# Patient Record
Sex: FEMALE | Race: BLACK OR AFRICAN AMERICAN | HISPANIC OR LATINO | Employment: OTHER | URBAN - METROPOLITAN AREA
[De-identification: names, ages, dates, MRNs, and addresses within clinical notes are randomized per-mention and may not be internally consistent; named-entity substitution may affect disease eponyms.]

---

## 2017-01-05 DIAGNOSIS — F41.1 ANXIETY STATE: ICD-10-CM

## 2017-01-05 DIAGNOSIS — F98.8 ADD (ATTENTION DEFICIT DISORDER): ICD-10-CM

## 2017-01-05 DIAGNOSIS — Z00.00 ROUTINE GENERAL MEDICAL EXAMINATION AT A HEALTH CARE FACILITY: Primary | ICD-10-CM

## 2017-01-05 DIAGNOSIS — K21.9 GASTROESOPHAGEAL REFLUX DISEASE, ESOPHAGITIS PRESENCE NOT SPECIFIED: ICD-10-CM

## 2017-01-05 RX ORDER — FLUOXETINE HYDROCHLORIDE 20 MG/1
20 CAPSULE ORAL DAILY
Qty: 30 CAPSULE | Refills: 5 | Status: SHIPPED | OUTPATIENT
Start: 2017-01-05 | End: 2017-05-18

## 2017-01-05 RX ORDER — METHYLPHENIDATE HYDROCHLORIDE 18 MG/1
18 TABLET ORAL EVERY MORNING
Qty: 30 TABLET | Refills: 0 | Status: SHIPPED | OUTPATIENT
Start: 2017-02-06 | End: 2017-03-21 | Stop reason: SDUPTHER

## 2017-01-05 RX ORDER — METHYLPHENIDATE HYDROCHLORIDE 18 MG/1
18 TABLET ORAL EVERY MORNING
Qty: 30 TABLET | Refills: 0 | Status: SHIPPED | OUTPATIENT
Start: 2017-01-06 | End: 2017-08-02 | Stop reason: SDUPTHER

## 2017-01-05 RX ORDER — OMEPRAZOLE 40 MG/1
40 CAPSULE, DELAYED RELEASE ORAL DAILY
Qty: 30 CAPSULE | Refills: 5 | Status: SHIPPED | OUTPATIENT
Start: 2017-01-05 | End: 2017-07-20 | Stop reason: SDUPTHER

## 2017-01-05 NOTE — TELEPHONE ENCOUNTER
----- Message from Grace Milian sent at 1/5/2017  1:11 PM CST -----  Doctor appointment and lab have been scheduled.  Please link lab orders to the lab appointment.  Date of doctor appointment:    Physical or EP:  4/10  Date of lab appointment:  4/3

## 2017-01-05 NOTE — TELEPHONE ENCOUNTER
----- Message from Xenia James sent at 2017  8:44 AM CST -----  Contact: Self/ [phone]   Type: Rx    Name of medication(s): losartan-hydrochlorothiazide 50-12.5 mg (HYZAAR) 50-12.5 mg per tablet, methylphenidate (CONCERTA) 18 MG CR tablet (), fluoxetine (PROZAC) 20 MG capsule, and omeprazole (PRILOSEC) 40 MG capsule    Is this a refill? New rx? Refill     Who prescribed medication? Dr. Ayers     Pharmacy Name, Phone, & Location: [facility]     Comments: pt is calling to have a refill on the medication above. Please call and advise     Thank you

## 2017-01-13 ENCOUNTER — OFFICE VISIT (OUTPATIENT)
Dept: FAMILY MEDICINE | Facility: CLINIC | Age: 52
End: 2017-01-13
Payer: COMMERCIAL

## 2017-01-13 VITALS
HEIGHT: 67 IN | TEMPERATURE: 98 F | SYSTOLIC BLOOD PRESSURE: 130 MMHG | WEIGHT: 152.31 LBS | DIASTOLIC BLOOD PRESSURE: 88 MMHG | HEART RATE: 92 BPM | BODY MASS INDEX: 23.91 KG/M2

## 2017-01-13 DIAGNOSIS — I10 ESSENTIAL HYPERTENSION: Primary | ICD-10-CM

## 2017-01-13 DIAGNOSIS — F33.1 MAJOR DEPRESSIVE DISORDER, RECURRENT EPISODE, MODERATE: ICD-10-CM

## 2017-01-13 DIAGNOSIS — G56.01 CARPAL TUNNEL SYNDROME OF RIGHT WRIST: ICD-10-CM

## 2017-01-13 DIAGNOSIS — F43.9 SITUATIONAL STRESS: ICD-10-CM

## 2017-01-13 DIAGNOSIS — K21.9 GASTROESOPHAGEAL REFLUX DISEASE, ESOPHAGITIS PRESENCE NOT SPECIFIED: ICD-10-CM

## 2017-01-13 DIAGNOSIS — F41.1 ANXIETY STATE: ICD-10-CM

## 2017-01-13 PROCEDURE — 99214 OFFICE O/P EST MOD 30 MIN: CPT | Mod: S$GLB,,, | Performed by: FAMILY MEDICINE

## 2017-01-13 PROCEDURE — 99999 PR PBB SHADOW E&M-EST. PATIENT-LVL IV: CPT | Mod: PBBFAC,,, | Performed by: FAMILY MEDICINE

## 2017-01-13 PROCEDURE — 3075F SYST BP GE 130 - 139MM HG: CPT | Mod: S$GLB,,, | Performed by: FAMILY MEDICINE

## 2017-01-13 PROCEDURE — 1159F MED LIST DOCD IN RCRD: CPT | Mod: S$GLB,,, | Performed by: FAMILY MEDICINE

## 2017-01-13 PROCEDURE — 3079F DIAST BP 80-89 MM HG: CPT | Mod: S$GLB,,, | Performed by: FAMILY MEDICINE

## 2017-01-13 RX ORDER — CYCLOBENZAPRINE HCL 10 MG
10 TABLET ORAL 3 TIMES DAILY PRN
Qty: 20 TABLET | Refills: 1 | Status: SHIPPED | OUTPATIENT
Start: 2017-01-13 | End: 2017-01-23

## 2017-01-13 NOTE — MR AVS SNAPSHOT
[facility]  [address] [facility], [address]  [facility] [address]  Phone: [phone]  Fax: [phone]                  Janki Jones   2017 2:40 PM   Office Visit    Description:  Female : 1965   Provider:  Maria G Ayers DO   Department:  [facility]           Reason for Visit     Numbness     Dizziness     Nausea     Chest Pain     Chills     Headache           Diagnoses this Visit        Comments    Essential hypertension    -  Primary     Major depressive disorder, recurrent episode, moderate         Gastroesophageal reflux disease, esophagitis presence not specified         Anxiety state         Situational stress         Carpal tunnel syndrome of right wrist                To Do List           Future Appointments        Provider Department Dept Phone    4/3/2017 8:00 AM LAB, [facility] [phone]    4/10/2017 2:00 PM Maria G Ayers DO [facility] [phone]      Goals (5 Years of Data)     None       These Medications        Disp Refills Start End    cyclobenzaprine (FLEXERIL) 10 MG tablet 20 tablet 1 2017    Take 1 tablet (10 mg total) by mouth 3 (three) times daily as needed for Muscle spasms. - Oral    [facility] - [address] #: [phone]         [facility]     [facility] Nurse Care [facility] -  Assistance  Registered nurses in the [facility] provide clinical advisement, health education, appointment booking, and other advisory services.  Call for this free service at [phone].             Medications           Message regarding Medications     Verify the changes and/or additions to your medication regime listed below are the same as discussed with your clinician today.  If any of these changes or additions are incorrect, please notify your healthcare provider.        START  "taking these NEW medications        Refills    cyclobenzaprine (FLEXERIL) 10 MG tablet 1    Sig: Take 1 tablet (10 mg total) by mouth 3 (three) times daily as needed for Muscle spasms.    Class: Normal    Route: Oral           Verify that the below list of medications is an accurate representation of the medications you are currently taking.  If none reported, the list may be blank. If incorrect, please contact your healthcare provider. Carry this list with you in case of emergency.           Current Medications     losartan-hydrochlorothiazide 50-12.5 mg (HYZAAR) 50-12.5 mg per tablet take 1 tablet by mouth once daily    methylphenidate (CONCERTA) 18 MG CR tablet Take 1 tablet (18 mg total) by mouth every morning.    methylphenidate (CONCERTA) 18 MG CR tablet Starting on Feb 06, 2017. Take 1 tablet (18 mg total) by mouth every morning.    multivitamin with minerals tablet Take 1 tablet by mouth once daily.    omeprazole (PRILOSEC) 40 MG capsule Take 1 capsule (40 mg total) by mouth once daily. Once daily    cyclobenzaprine (FLEXERIL) 10 MG tablet Take 1 tablet (10 mg total) by mouth 3 (three) times daily as needed for Muscle spasms.    fluoxetine (PROZAC) 20 MG capsule Take 1 capsule (20 mg total) by mouth once daily.           Clinical Reference Information           Vital Signs - Last Recorded  Most recent update: 1/13/2017  2:59 PM by Genna Sevilla MA    BP Pulse Temp Ht    130/88 (BP Location: Right arm, Patient Position: Sitting, BP Method: Manual) 92 98.1 °F (36.7 °C) (Oral) 5' 7" (1.702 m)    Wt LMP BMI    69.1 kg (152 lb 5.4 oz) 11/23/2016 (Approximate) 23.86 kg/m2      Blood Pressure          Most Recent Value    BP  130/88      Allergies as of 1/13/2017     Percocet  [Oxycodone-acetaminophen]      Immunizations Administered on Date of Encounter - 1/13/2017     None      Orders Placed During Today's Visit      Normal Orders This Visit    Ambulatory consult to Orthopedics     Ambulatory consult to " Psychology

## 2017-01-13 NOTE — PROGRESS NOTES
Subjective:       Patient ID: Janki Jones is a 51 y.o. female.    Chief Complaint: Numbness (Left arm); Dizziness; Nausea; Chest Pain; Chills; and Headache    HPI   pain in Numbness in right arm.   Also complaining of some dizziness and lightheadedness for months but-worse for few days. Mostly like if she stand suddenly, she has lots of nausea,   She is also complaining of sharp left sided chest pains off and on for a few months- she has to take a deep breath, she thinks its anxiety  This week she feels hot at times and feels like she is getting a cold. She has hot flashes at times- rush of heat at times for the past month   -out of blue. ? Menopause. She feels she is having panic attacks.   Mild cough for a few days.   Her family tells her she is treating them bad. She stopped her prozac a month ago. She doesn't really know why.   Her she had no menses for 5 months then had it again around Thanksgiving and then none since.     Review of Systems  per HPI  Objective:      Physical Exam   Constitutional: She is oriented to person, place, and time. She appears well-developed and well-nourished.   HENT:   Head: Normocephalic and atraumatic.   Right Ear: External ear normal.   Left Ear: External ear normal.   Nose: Nose normal.   Mouth/Throat: Oropharynx is clear and moist.   Eyes: Conjunctivae and EOM are normal. Pupils are equal, round, and reactive to light.   Neck: Normal range of motion. Neck supple. No JVD present. No thyromegaly present.   Cardiovascular: Normal rate, regular rhythm, normal heart sounds and intact distal pulses.    No murmur heard.  Pulmonary/Chest: Effort normal and breath sounds normal.   Abdominal: Soft. Bowel sounds are normal. She exhibits no mass. There is no tenderness.   Musculoskeletal: Normal range of motion. She exhibits no edema.   Lymphadenopathy:     She has no cervical adenopathy.   Neurological: She is alert and oriented to person, place, and time. She has normal  reflexes.   Skin: Skin is warm and dry.   Psychiatric: She has a normal mood and affect. Her behavior is normal. Judgment and thought content normal.   Vitals reviewed.      Assessment:         .Janki was seen today for numbness, dizziness, nausea, chest pain, chills and headache.    Diagnoses and all orders for this visit:    Essential hypertension  The current medical regimen is effective;  continue present plan and medications.    Major depressive disorder, recurrent episode, moderate  -     Ambulatory consult to Psychology    Gastroesophageal reflux disease, esophagitis presence not specified    Anxiety state  -     Ambulatory consult to Psychology    Situational stress  -     Ambulatory consult to Psychology    Carpal tunnel syndrome of right wrist  -     Ambulatory consult to Orthopedics    Other orders  -     cyclobenzaprine (FLEXERIL) 10 MG tablet; Take 1 tablet (10 mg total) by mouth 3 (three) times daily as needed for Muscle spasms.

## 2017-03-21 DIAGNOSIS — F98.8 ADD (ATTENTION DEFICIT DISORDER): ICD-10-CM

## 2017-03-21 RX ORDER — METHYLPHENIDATE HYDROCHLORIDE 18 MG/1
18 TABLET ORAL EVERY MORNING
Qty: 30 TABLET | Refills: 0 | Status: SHIPPED | OUTPATIENT
Start: 2017-05-20 | End: 2017-06-23 | Stop reason: SDUPTHER

## 2017-03-21 RX ORDER — METHYLPHENIDATE HYDROCHLORIDE 18 MG/1
18 TABLET ORAL EVERY MORNING
Qty: 30 TABLET | Refills: 0 | Status: SHIPPED | OUTPATIENT
Start: 2017-04-20 | End: 2017-08-02 | Stop reason: SDUPTHER

## 2017-03-21 RX ORDER — METHYLPHENIDATE HYDROCHLORIDE 18 MG/1
18 TABLET ORAL EVERY MORNING
Qty: 30 TABLET | Refills: 0 | Status: SHIPPED | OUTPATIENT
Start: 2017-03-21 | End: 2017-08-02 | Stop reason: SDUPTHER

## 2017-03-21 NOTE — TELEPHONE ENCOUNTER
----- Message from Xenia James sent at 3/21/2017  8:28 AM CDT -----  Contact: Self/ [phone]   Type: Rx    Name of medication(s): losartan-hydrochlorothiazide 50-12.5 mg (HYZAAR) 50-12.5 mg per tablet    Is this a refill? New rx? Refill     Who prescribed medication? Dr. Ayers    Pharmacy Name, Phone, & Location: [facility]     Comments: pt is calling to have a refill on the medication above. Please call and advise       Thank you

## 2017-04-10 ENCOUNTER — OFFICE VISIT (OUTPATIENT)
Dept: FAMILY MEDICINE | Facility: CLINIC | Age: 52
End: 2017-04-10
Payer: COMMERCIAL

## 2017-04-10 ENCOUNTER — LAB VISIT (OUTPATIENT)
Dept: LAB | Facility: HOSPITAL | Age: 52
End: 2017-04-10
Attending: FAMILY MEDICINE
Payer: COMMERCIAL

## 2017-04-10 VITALS
HEART RATE: 88 BPM | HEIGHT: 67 IN | DIASTOLIC BLOOD PRESSURE: 84 MMHG | BODY MASS INDEX: 24.53 KG/M2 | WEIGHT: 156.31 LBS | SYSTOLIC BLOOD PRESSURE: 152 MMHG | TEMPERATURE: 98 F

## 2017-04-10 DIAGNOSIS — R10.13 EPIGASTRIC PAIN: Primary | ICD-10-CM

## 2017-04-10 DIAGNOSIS — R10.13 EPIGASTRIC PAIN: ICD-10-CM

## 2017-04-10 DIAGNOSIS — R11.2 NON-INTRACTABLE VOMITING WITH NAUSEA, UNSPECIFIED VOMITING TYPE: ICD-10-CM

## 2017-04-10 LAB
ALBUMIN SERPL BCP-MCNC: 4.1 G/DL
ALP SERPL-CCNC: 96 U/L
ALT SERPL W/O P-5'-P-CCNC: 30 U/L
ANION GAP SERPL CALC-SCNC: 9 MMOL/L
AST SERPL-CCNC: 33 U/L
BASOPHILS # BLD AUTO: 0.01 K/UL
BASOPHILS NFR BLD: 0.1 %
BILIRUB SERPL-MCNC: 0.3 MG/DL
BUN SERPL-MCNC: 11 MG/DL
CALCIUM SERPL-MCNC: 9.6 MG/DL
CHLORIDE SERPL-SCNC: 103 MMOL/L
CO2 SERPL-SCNC: 27 MMOL/L
CREAT SERPL-MCNC: 0.8 MG/DL
DIFFERENTIAL METHOD: ABNORMAL
EOSINOPHIL # BLD AUTO: 0 K/UL
EOSINOPHIL NFR BLD: 0.2 %
ERYTHROCYTE [DISTWIDTH] IN BLOOD BY AUTOMATED COUNT: 13.8 %
EST. GFR  (AFRICAN AMERICAN): >60 ML/MIN/1.73 M^2
EST. GFR  (NON AFRICAN AMERICAN): >60 ML/MIN/1.73 M^2
GLUCOSE SERPL-MCNC: 87 MG/DL
HCT VFR BLD AUTO: 36.7 %
HGB BLD-MCNC: 11.9 G/DL
LYMPHOCYTES # BLD AUTO: 2.3 K/UL
LYMPHOCYTES NFR BLD: 25.5 %
MCH RBC QN AUTO: 27.5 PG
MCHC RBC AUTO-ENTMCNC: 32.4 %
MCV RBC AUTO: 85 FL
MONOCYTES # BLD AUTO: 0.4 K/UL
MONOCYTES NFR BLD: 4.6 %
NEUTROPHILS # BLD AUTO: 6.2 K/UL
NEUTROPHILS NFR BLD: 69.4 %
PLATELET # BLD AUTO: 242 K/UL
PMV BLD AUTO: 12.4 FL
POTASSIUM SERPL-SCNC: 3.7 MMOL/L
PROT SERPL-MCNC: 8.4 G/DL
RBC # BLD AUTO: 4.32 M/UL
SODIUM SERPL-SCNC: 139 MMOL/L
WBC # BLD AUTO: 8.97 K/UL

## 2017-04-10 PROCEDURE — 36415 COLL VENOUS BLD VENIPUNCTURE: CPT | Mod: PO

## 2017-04-10 PROCEDURE — 85025 COMPLETE CBC W/AUTO DIFF WBC: CPT

## 2017-04-10 PROCEDURE — 3077F SYST BP >= 140 MM HG: CPT | Mod: S$GLB,,, | Performed by: FAMILY MEDICINE

## 2017-04-10 PROCEDURE — 1160F RVW MEDS BY RX/DR IN RCRD: CPT | Mod: S$GLB,,, | Performed by: FAMILY MEDICINE

## 2017-04-10 PROCEDURE — 80053 COMPREHEN METABOLIC PANEL: CPT

## 2017-04-10 PROCEDURE — 99999 PR PBB SHADOW E&M-EST. PATIENT-LVL III: CPT | Mod: PBBFAC,,, | Performed by: FAMILY MEDICINE

## 2017-04-10 PROCEDURE — 3079F DIAST BP 80-89 MM HG: CPT | Mod: S$GLB,,, | Performed by: FAMILY MEDICINE

## 2017-04-10 PROCEDURE — 99214 OFFICE O/P EST MOD 30 MIN: CPT | Mod: S$GLB,,, | Performed by: FAMILY MEDICINE

## 2017-04-10 RX ORDER — ONDANSETRON 4 MG/1
4 TABLET, ORALLY DISINTEGRATING ORAL EVERY 6 HOURS PRN
Qty: 15 TABLET | Refills: 0 | Status: SHIPPED | OUTPATIENT
Start: 2017-04-10 | End: 2017-05-18 | Stop reason: ALTCHOICE

## 2017-04-10 RX ORDER — CYCLOBENZAPRINE HCL 10 MG
TABLET ORAL
Refills: 0 | COMMUNITY
Start: 2017-02-21 | End: 2017-11-17 | Stop reason: SDUPTHER

## 2017-04-10 NOTE — PROGRESS NOTES
Subjective:       Patient ID: Janki Jones is a 51 y.o. female.    Chief Complaint: Emesis; Abdominal Pain; Night Sweats; Fatigue; and Diarrhea    HPI  Awoke this morning with upper epigastric abdominal pain- severe (12 on a 1-10 PS), she broke out in a cold sweat, she vomited,  She took some immodium (not because she had diarrhea but just because it was the only thing for abdominal trouble she had in the house and she was taking whatever), she also the omeprazole which she takes every day., she almost called (!!.   The severe pain was coming in waves. The last severe pain was about an hour and a half ago.    she hasnt vomited in an hour or so. She did eat a banana at 8 am and hasnt thrown that up.   She has never had pain this severe before. But she reports some similar pains for 2 months - these pains are getting worse  Review of Systems  per HPI  Objective:      Physical Exam   Constitutional: She is oriented to person, place, and time. She appears well-developed and well-nourished.   HENT:   Head: Normocephalic and atraumatic.   Right Ear: External ear normal.   Left Ear: External ear normal.   Nose: Nose normal.   Mouth/Throat: Oropharynx is clear and moist.   Eyes: Conjunctivae and EOM are normal. Pupils are equal, round, and reactive to light.   Neck: Normal range of motion. Neck supple. No JVD present. No thyromegaly present.   Cardiovascular: Normal rate, regular rhythm, normal heart sounds and intact distal pulses.    No murmur heard.  Pulmonary/Chest: Effort normal and breath sounds normal.   Abdominal: Soft. Bowel sounds are normal. She exhibits no mass. There is tenderness (RUQ and epigastric, positive McMurrys).   Musculoskeletal: Normal range of motion. She exhibits no edema.   Lymphadenopathy:     She has no cervical adenopathy.   Neurological: She is alert and oriented to person, place, and time. She has normal reflexes.   Skin: Skin is warm and dry.   Psychiatric: She has a normal mood  and affect. Her behavior is normal. Judgment and thought content normal.   Vitals reviewed.      Assessment:         Janki was seen today for emesis, abdominal pain, night sweats, fatigue and diarrhea.    Diagnoses and all orders for this visit:    Epigastric pain-probable biliary colic  -     US Abdomen Complete; Future  -     CBC auto differential; Future  -     Comprehensive metabolic panel; Future    Non-intractable vomiting with nausea, unspecified vomiting type  -     ondansetron (ZOFRAN-ODT) 4 MG TbDL; Take 1 tablet (4 mg total) by mouth every 6 (six) hours as needed.

## 2017-04-10 NOTE — MR AVS SNAPSHOT
[facility]  [address] [facility], [address]  [facility] [address]  Phone: [phone]  Fax: [phone]                  Janki Jones   4/10/2017 9:40 AM   Office Visit    Description:  Female : 1965   Provider:  Maria G Ayers DO   Department:  [facility]           Reason for Visit     Emesis     Abdominal Pain     Night Sweats     Fatigue     Diarrhea           Diagnoses this Visit        Comments    Epigastric pain    -  Primary     Non-intractable vomiting with nausea, unspecified vomiting type                To Do List           Future Appointments        Provider Department Dept Phone    2017 8:00 AM LAB, [facility] [phone]    2017 9:00 AM Maria G Ayers DO [facility] [phone]      Goals (5 Years of Data)     None       These Medications        Disp Refills Start End    ondansetron (ZOFRAN-ODT) 4 MG TbDL 15 tablet 0 4/10/2017     Take 1 tablet (4 mg total) by mouth every 6 (six) hours as needed. - Oral    Ph[facility]: [facility] #: [phone]         [facility]     [facility] Care Line -  Assistance  Unless otherwise directed by your provider, please contact [facility], our nurse care line that is available for  assistance.     Registered nurses in the [facility] provide: appointment scheduling, clinical advisement, health education, and other advisory services.  Call: [phone] (toll free)               Medications           Message regarding Medications     Verify the changes and/or additions to your medication regime listed below are the same as discussed with your clinician today.  If any of these changes or additions are incorrect, please notify your healthcare provider.        START taking these NEW medications        Refills    ondansetron (ZOFRAN-ODT) 4 MG TbDL  "0    Sig: Take 1 tablet (4 mg total) by mouth every 6 (six) hours as needed.    Class: Normal    Route: Oral           Verify that the below list of medications is an accurate representation of the medications you are currently taking.  If none reported, the list may be blank. If incorrect, please contact your healthcare provider. Carry this list with you in case of emergency.           Current Medications     cyclobenzaprine (FLEXERIL) 10 MG tablet     fluoxetine (PROZAC) 20 MG capsule Take 1 capsule (20 mg total) by mouth once daily.    losartan-hydrochlorothiazide 50-12.5 mg (HYZAAR) 50-12.5 mg per tablet take 1 tablet by mouth once daily    methylphenidate (CONCERTA) 18 MG CR tablet Take 1 tablet (18 mg total) by mouth every morning.    methylphenidate (CONCERTA) 18 MG CR tablet Starting on Apr 20, 2017. Take 1 tablet (18 mg total) by mouth every morning.    methylphenidate (CONCERTA) 18 MG CR tablet Starting on May 20, 2017. Take 1 tablet (18 mg total) by mouth every morning.    multivitamin with minerals tablet Take 1 tablet by mouth once daily.    omeprazole (PRILOSEC) 40 MG capsule Take 1 capsule (40 mg total) by mouth once daily. Once daily    ondansetron (ZOFRAN-ODT) 4 MG TbDL Take 1 tablet (4 mg total) by mouth every 6 (six) hours as needed.           Clinical Reference Information           Your Vitals Were     BP Pulse Temp Height    152/84 (BP Location: Right arm, Patient Position: Sitting, BP Method: Manual) 88 97.8 °F (36.6 °C) (Oral) 5' 7" (1.702 m)    Weight Last Period BMI    70.9 kg (156 lb 4.9 oz) 02/10/2017 (Approximate) 24.48 kg/m2      Blood Pressure          Most Recent Value    BP  (!)  152/84      Allergies as of 4/10/2017     Percocet  [Oxycodone-acetaminophen]      Immunizations Administered on Date of Encounter - 4/10/2017     None      Orders Placed During Today's Visit     Future Labs/Procedures Expected by Expires    CBC auto differential  4/10/2017 6/9/2018    Comprehensive " metabolic panel  4/10/2017 6/9/2018    US Abdomen Complete  4/10/2017 4/10/2018      Language Assistance Services     ATTENTION: Language assistance services are available, free of charge. Please call [phone].      ATENCIÓN: Si habla waldo, tiene a caro disposición servicios gratuitos de asistencia lingüística. Llame al [phone].     CHÚ Ý: N?u b?n nói Ti?ng Vi?t, có các d?ch v? h? tr? ngôn ng? mi?n phí dành cho b?n. G?i s? [phone].         [facility] complies with applicable Federal civil rights laws and does not discriminate on the basis of race, color, national origin, age, disability, or sex.

## 2017-04-11 ENCOUNTER — LAB VISIT (OUTPATIENT)
Dept: LAB | Facility: HOSPITAL | Age: 52
End: 2017-04-11
Attending: FAMILY MEDICINE
Payer: COMMERCIAL

## 2017-04-11 DIAGNOSIS — Z00.00 ROUTINE GENERAL MEDICAL EXAMINATION AT A HEALTH CARE FACILITY: ICD-10-CM

## 2017-04-11 LAB
CHOLEST/HDLC SERPL: 3.7 {RATIO}
HDL/CHOLESTEROL RATIO: 26.9 %
HDLC SERPL-MCNC: 193 MG/DL
HDLC SERPL-MCNC: 52 MG/DL
LDLC SERPL CALC-MCNC: 123.6 MG/DL
NONHDLC SERPL-MCNC: 141 MG/DL
TRIGL SERPL-MCNC: 87 MG/DL
TSH SERPL DL<=0.005 MIU/L-ACNC: 1.77 UIU/ML

## 2017-04-11 PROCEDURE — 36415 COLL VENOUS BLD VENIPUNCTURE: CPT | Mod: PO

## 2017-04-11 PROCEDURE — 84443 ASSAY THYROID STIM HORMONE: CPT

## 2017-04-11 PROCEDURE — 80061 LIPID PANEL: CPT

## 2017-05-18 ENCOUNTER — OFFICE VISIT (OUTPATIENT)
Dept: FAMILY MEDICINE | Facility: CLINIC | Age: 52
End: 2017-05-18
Payer: COMMERCIAL

## 2017-05-18 VITALS
BODY MASS INDEX: 25.15 KG/M2 | DIASTOLIC BLOOD PRESSURE: 82 MMHG | TEMPERATURE: 98 F | SYSTOLIC BLOOD PRESSURE: 110 MMHG | HEIGHT: 66 IN | HEART RATE: 90 BPM | WEIGHT: 156.5 LBS

## 2017-05-18 DIAGNOSIS — N76.0 ACUTE VAGINITIS: ICD-10-CM

## 2017-05-18 DIAGNOSIS — L30.9 DERMATITIS: Primary | ICD-10-CM

## 2017-05-18 LAB
BACTERIA #/AREA URNS AUTO: ABNORMAL /HPF
BILIRUB UR QL STRIP: NEGATIVE
CANDIDA RRNA VAG QL PROBE: POSITIVE
CLARITY UR REFRACT.AUTO: ABNORMAL
COLOR UR AUTO: YELLOW
G VAGINALIS RRNA GENITAL QL PROBE: POSITIVE
GLUCOSE UR QL STRIP: NEGATIVE
HGB UR QL STRIP: ABNORMAL
KETONES UR QL STRIP: NEGATIVE
LEUKOCYTE ESTERASE UR QL STRIP: ABNORMAL
MICROSCOPIC COMMENT: ABNORMAL
NITRITE UR QL STRIP: NEGATIVE
PH UR STRIP: 7 [PH] (ref 5–8)
PROT UR QL STRIP: NEGATIVE
RBC #/AREA URNS AUTO: 3 /HPF (ref 0–4)
SP GR UR STRIP: 1 (ref 1–1.03)
SQUAMOUS #/AREA URNS AUTO: 10 /HPF
T VAGINALIS RRNA GENITAL QL PROBE: NEGATIVE
URN SPEC COLLECT METH UR: ABNORMAL
UROBILINOGEN UR STRIP-ACNC: NEGATIVE EU/DL
WBC #/AREA URNS AUTO: 4 /HPF (ref 0–5)

## 2017-05-18 PROCEDURE — 3074F SYST BP LT 130 MM HG: CPT | Mod: S$GLB,,, | Performed by: NURSE PRACTITIONER

## 2017-05-18 PROCEDURE — 87480 CANDIDA DNA DIR PROBE: CPT

## 2017-05-18 PROCEDURE — 87529 HSV DNA AMP PROBE: CPT

## 2017-05-18 PROCEDURE — 1160F RVW MEDS BY RX/DR IN RCRD: CPT | Mod: S$GLB,,, | Performed by: NURSE PRACTITIONER

## 2017-05-18 PROCEDURE — 81001 URINALYSIS AUTO W/SCOPE: CPT

## 2017-05-18 PROCEDURE — 87591 N.GONORRHOEAE DNA AMP PROB: CPT

## 2017-05-18 PROCEDURE — 3079F DIAST BP 80-89 MM HG: CPT | Mod: S$GLB,,, | Performed by: NURSE PRACTITIONER

## 2017-05-18 PROCEDURE — 99999 PR PBB SHADOW E&M-EST. PATIENT-LVL IV: CPT | Mod: PBBFAC,,, | Performed by: NURSE PRACTITIONER

## 2017-05-18 PROCEDURE — 99214 OFFICE O/P EST MOD 30 MIN: CPT | Mod: S$GLB,,, | Performed by: NURSE PRACTITIONER

## 2017-05-18 RX ORDER — DOXYLAMINE SUCCINATE 25 MG
TABLET ORAL 2 TIMES DAILY
Qty: 30 G | Refills: 2 | Status: SHIPPED | OUTPATIENT
Start: 2017-05-18 | End: 2017-11-17 | Stop reason: ALTCHOICE

## 2017-05-18 NOTE — PROGRESS NOTES
"Subjective:       Patient ID: Janki Jones is a 51 y.o. female.    Chief Complaint: Vaginal Itching    Vaginal Itching   The patient's primary symptoms include genital itching. The patient's pertinent negatives include no pelvic pain. This is a new problem. The current episode started yesterday. The problem occurs constantly. The problem has been unchanged. The patient is experiencing no pain. The problem affects both sides. She is not pregnant. Pertinent negatives include no abdominal pain, anorexia, back pain, chills, dysuria, fever or hematuria. Her menstrual history has been regular (on menses now).     Past Medical History:   Diagnosis Date    Abnormal Pap smear 2007    ADD (attention deficit disorder)     Anxiety     Depression     Fibroids     Hypertension      Past Surgical History:   Procedure Laterality Date    CERVICAL BIOPSY  W/ LOOP ELECTRODE EXCISION  2007    essure      TUBAL LIGATION       Social History     Social History Narrative     Family History   Problem Relation Age of Onset    Alcohol abuse Mother     Diabetes Mother     Liver disease Mother     Stroke Mother     Other Father      glaucoma    Diabetes Father     Heart disease Father     Hyperlipidemia Father     Hypertension Father     Kidney disease Father     Glaucoma Father     Hypertension Brother     Other Sister      thyroid    Cancer Sister      thyroid cancer    Breast cancer Neg Hx     Colon cancer Neg Hx     Ovarian cancer Neg Hx     Amblyopia Neg Hx     Blindness Neg Hx     Cataracts Neg Hx     Macular degeneration Neg Hx     Retinal detachment Neg Hx     Strabismus Neg Hx      Vitals:    05/18/17 1437   BP: 110/82   Pulse: 90   Temp: 98 °F (36.7 °C)   Weight: 71 kg (156 lb 8.4 oz)   Height: 5' 6" (1.676 m)   PainSc: 0-No pain     Outpatient Encounter Prescriptions as of 5/18/2017   Medication Sig Dispense Refill    losartan-hydrochlorothiazide 50-12.5 mg (HYZAAR) 50-12.5 mg per tablet " "take 1 tablet by mouth once daily 30 tablet 5    methylphenidate (CONCERTA) 18 MG CR tablet Take 1 tablet (18 mg total) by mouth every morning. 30 tablet 0    [START ON 5/20/2017] methylphenidate (CONCERTA) 18 MG CR tablet Take 1 tablet (18 mg total) by mouth every morning. 30 tablet 0    multivitamin with minerals tablet Take 1 tablet by mouth once daily.      omeprazole (PRILOSEC) 40 MG capsule Take 1 capsule (40 mg total) by mouth once daily. Once daily 30 capsule 5    cyclobenzaprine (FLEXERIL) 10 MG tablet   0    miconazole (MICOTIN) 2 % cream Apply topically 2 (two) times daily. Apply to external vaginal area and labia twice a day 30 g 2    [DISCONTINUED] fluoxetine (PROZAC) 20 MG capsule Take 1 capsule (20 mg total) by mouth once daily. 30 capsule 5    [DISCONTINUED] ondansetron (ZOFRAN-ODT) 4 MG TbDL Take 1 tablet (4 mg total) by mouth every 6 (six) hours as needed. 15 tablet 0     No facility-administered encounter medications on file as of 5/18/2017.      Wt Readings from Last 3 Encounters:   05/18/17 71 kg (156 lb 8.4 oz)   04/10/17 70.9 kg (156 lb 4.9 oz)   01/13/17 69.1 kg (152 lb 5.4 oz)     Last 3 sets of Vitals    Vitals - 1 value per visit 1/13/2017 4/10/2017 5/18/2017   SYSTOLIC 130 152 110   DIASTOLIC 88 84 82   PULSE 92 88 90   TEMPERATURE 98.1 97.8 98   RESPIRATIONS - - -   Weight (lb) 152.34 156.31 156.53   Weight (kg) 69.1 70.9 71   HEIGHT 5' 7" 5' 7" 5' 6"   BODY MASS INDEX 23.86 24.48 25.26   VISIT REPORT - - -   Pain Score  7 7 0     No results found for: CBC  Review of Systems   Constitutional: Negative for chills, fatigue and fever.   Respiratory: Negative for cough.    Gastrointestinal: Negative for abdominal pain and anorexia.   Genitourinary: Negative for dysuria, hematuria and pelvic pain.   Musculoskeletal: Negative for back pain.       Objective:      Physical Exam   Constitutional: She is oriented to person, place, and time. She appears well-developed and well-nourished. No " distress.   Abdominal: Soft. Hernia confirmed negative in the right inguinal area and confirmed negative in the left inguinal area.   Genitourinary:       Pelvic exam was performed with patient supine. There is bleeding in the vagina.   Genitourinary Comments: Mild swelling noted    Pt on menses     Lymphadenopathy:        Right: No inguinal adenopathy present.        Left: No inguinal adenopathy present.   Neurological: She is alert and oriented to person, place, and time.   Skin: Skin is warm and dry. No rash noted. She is not diaphoretic. No erythema. No pallor.   Psychiatric: She has a normal mood and affect. Her behavior is normal. Judgment and thought content normal.   Nursing note and vitals reviewed.      Assessment:       1. Dermatitis    2. Acute vaginitis        Plan:       Janki was seen today for vaginal itching.    Diagnoses and all orders for this visit:    Dermatitis    Acute vaginitis  -     miconazole (MICOTIN) 2 % cream; Apply topically 2 (two) times daily. Apply to external vaginal area and labia twice a day  -     Vaginosis Screen by DNA Probe  -     C. trachomatis/N. gonorrhoeae by AMP DNA Cervicovaginal  -     Herpes simplex virus culture      Patient Instructions   Baking soda bath    Apply cream to external area twice a day

## 2017-05-18 NOTE — MR AVS SNAPSHOT
[facility]  [address] [facility], [address]  [facility] [address]  Phone: [phone]  Fax: [phone]                  Janki Jones   2017 2:20 PM   Office Visit    Description:  Female : 1965   Provider:  ANDRÉS Angel   Department:  [facility]           Reason for Visit     Vaginal Itching           Diagnoses this Visit        Comments    Dermatitis    -  Primary     Acute vaginitis                To Do List           Future Appointments        Provider Department Dept Phone    2017 8:00 AM [facility] [phone]      Goals (5 Years of Data)     None       These Medications        Disp Refills Start End    miconazole (MICOTIN) 2 % cream 30 g 2 2017     Apply topically 2 (two) times daily. Apply to external vaginal area and labia twice a day - Topical (Top)    Pharmacy: [address] #: [phone]         [facility]     [facility] Nurse Care Line -  Assistance  Unless otherwise directed by your provider, please contact [facility], our nurse care line that is available for  assistance.     Registered nurses in the [facility] provide: appointment scheduling, clinical advisement, health education, and other advisory services.  Call: [phone] (toll free)               Medications           Message regarding Medications     Verify the changes and/or additions to your medication regime listed below are the same as discussed with your clinician today.  If any of these changes or additions are incorrect, please notify your healthcare provider.        START taking these NEW medications        Refills    miconazole (MICOTIN) 2 % cream 2    Sig: Apply topically 2 (two) times daily. Apply to external vaginal area and labia twice a day    Class: Normal    Route: Topical (Top)      STOP taking these  "medications     fluoxetine (PROZAC) 20 MG capsule Take 1 capsule (20 mg total) by mouth once daily.    ondansetron (ZOFRAN-ODT) 4 MG TbDL Take 1 tablet (4 mg total) by mouth every 6 (six) hours as needed.           Verify that the below list of medications is an accurate representation of the medications you are currently taking.  If none reported, the list may be blank. If incorrect, please contact your healthcare provider. Carry this list with you in case of emergency.           Current Medications     losartan-hydrochlorothiazide 50-12.5 mg (HYZAAR) 50-12.5 mg per tablet take 1 tablet by mouth once daily    methylphenidate (CONCERTA) 18 MG CR tablet Take 1 tablet (18 mg total) by mouth every morning.    methylphenidate (CONCERTA) 18 MG CR tablet Starting on May 20, 2017. Take 1 tablet (18 mg total) by mouth every morning.    multivitamin with minerals tablet Take 1 tablet by mouth once daily.    omeprazole (PRILOSEC) 40 MG capsule Take 1 capsule (40 mg total) by mouth once daily. Once daily    cyclobenzaprine (FLEXERIL) 10 MG tablet     miconazole (MICOTIN) 2 % cream Apply topically 2 (two) times daily. Apply to external vaginal area and labia twice a day           Clinical Reference Information           Your Vitals Were     BP Pulse Temp Height Weight Last Period    110/82 90 98 °F (36.7 °C) 5' 6" (1.676 m) 71 kg (156 lb 8.4 oz) 05/14/2017 (Exact Date)    BMI                25.26 kg/m2          Blood Pressure          Most Recent Value    BP  110/82      Allergies as of 5/18/2017     Percocet  [Oxycodone-acetaminophen]      Immunizations Administered on Date of Encounter - 5/18/2017     None      Instructions    Baking soda bath    Apply cream to external area twice a day           Language Assistance Services     ATTENTION: Language assistance services are available, free of charge. Please call [phone].      ATENCIÓN: Si pettyla waldo, tiene a caro disposición servicios gratuitos de asistencia " lingüística. Traci al [phone].     EDGARD Ý: N?u b?n nói Ti?ng Vi?t, có các d?ch v? h? tr? ngôn ng? mi?n phí dành cho b?n. G?i s? [phone].         [facility] complies with applicable Federal civil rights laws and does not discriminate on the basis of race, color, national origin, age, disability, or sex.

## 2017-05-19 ENCOUNTER — TELEPHONE (OUTPATIENT)
Dept: FAMILY MEDICINE | Facility: CLINIC | Age: 52
End: 2017-05-19

## 2017-05-19 DIAGNOSIS — N76.0 ACUTE VAGINITIS: Primary | ICD-10-CM

## 2017-05-19 LAB
C TRACH DNA SPEC QL NAA+PROBE: NOT DETECTED
N GONORRHOEA DNA SPEC QL NAA+PROBE: NOT DETECTED

## 2017-05-19 RX ORDER — METRONIDAZOLE 500 MG/1
500 TABLET ORAL EVERY 12 HOURS
Qty: 14 TABLET | Refills: 0 | Status: SHIPPED | OUTPATIENT
Start: 2017-05-19 | End: 2017-05-26

## 2017-05-19 RX ORDER — TERCONAZOLE 8 MG/G
1 CREAM VAGINAL DAILY
Qty: 20 G | Refills: 0 | Status: SHIPPED | OUTPATIENT
Start: 2017-05-19 | End: 2017-11-17 | Stop reason: ALTCHOICE

## 2017-05-19 NOTE — TELEPHONE ENCOUNTER
----- Message from ELSI Angel-ION sent at 5/19/2017 10:41 AM CDT -----  Please call pt and tell her that she had an overgrowth of the good bacteria in her vaginal area and yeast.  I sent prescriptions to her pharmacy

## 2017-05-19 NOTE — TELEPHONE ENCOUNTER
Spoke with patient, made her aware of results as per TAMMY Rueda and instructions, verbalizes understanding.

## 2017-05-20 LAB
HSV1 DNA SPEC QL NAA+PROBE: NEGATIVE
HSV2 DNA SPEC QL NAA+PROBE: NEGATIVE
SPECIMEN SOURCE: NORMAL

## 2017-05-31 ENCOUNTER — OFFICE VISIT (OUTPATIENT)
Dept: FAMILY MEDICINE | Facility: CLINIC | Age: 52
End: 2017-05-31
Payer: COMMERCIAL

## 2017-05-31 VITALS
DIASTOLIC BLOOD PRESSURE: 92 MMHG | BODY MASS INDEX: 25.15 KG/M2 | WEIGHT: 156.5 LBS | HEIGHT: 66 IN | HEART RATE: 78 BPM | TEMPERATURE: 98 F | SYSTOLIC BLOOD PRESSURE: 144 MMHG

## 2017-05-31 DIAGNOSIS — S61.218A LACERATION OF FINGER, INDEX, INITIAL ENCOUNTER: Primary | ICD-10-CM

## 2017-05-31 PROCEDURE — 90715 TDAP VACCINE 7 YRS/> IM: CPT | Mod: S$GLB,,, | Performed by: NURSE PRACTITIONER

## 2017-05-31 PROCEDURE — 99213 OFFICE O/P EST LOW 20 MIN: CPT | Mod: 25,S$GLB,, | Performed by: NURSE PRACTITIONER

## 2017-05-31 PROCEDURE — 99999 PR PBB SHADOW E&M-EST. PATIENT-LVL IV: CPT | Mod: PBBFAC,,, | Performed by: NURSE PRACTITIONER

## 2017-05-31 PROCEDURE — 90471 IMMUNIZATION ADMIN: CPT | Mod: S$GLB,,, | Performed by: NURSE PRACTITIONER

## 2017-05-31 NOTE — PROGRESS NOTES
Tdap vaccine ordered per TAMMY Rueda. Two patient identifiers used, allergies reviewed, and vaccine verified. Tdap vaccine administered to right deltoid . Pt tolerated injection well; no swelling, redness, or bruising noted at injection site. Pt advised to remain in clinic 15 minutes following injection for observation, verbalizes understanding.

## 2017-05-31 NOTE — PROGRESS NOTES
"Subjective:       Patient ID: Janki Jones is a 51 y.o. female.    Chief Complaint: Finger Injury (left hand index finger)    Pt here with laceration to left index finger.  Occurred last night around 5 pm.  Bleeding controlled.  No drainage.  Pt just wants to update her tetanus as she is concerned because her uncle  of tetanus.  No fever or chills        Past Medical History:   Diagnosis Date    Abnormal Pap smear     ADD (attention deficit disorder)     Anxiety     Depression     Fibroids     Hypertension      Past Surgical History:   Procedure Laterality Date    CERVICAL BIOPSY  W/ LOOP ELECTRODE EXCISION      essure      TUBAL LIGATION       Social History     Social History Narrative    No narrative on file     Family History   Problem Relation Age of Onset    Alcohol abuse Mother     Diabetes Mother     Liver disease Mother     Stroke Mother     Other Father      glaucoma    Diabetes Father     Heart disease Father     Hyperlipidemia Father     Hypertension Father     Kidney disease Father     Glaucoma Father     Hypertension Brother     Other Sister      thyroid    Cancer Sister      thyroid cancer    Breast cancer Neg Hx     Colon cancer Neg Hx     Ovarian cancer Neg Hx     Amblyopia Neg Hx     Blindness Neg Hx     Cataracts Neg Hx     Macular degeneration Neg Hx     Retinal detachment Neg Hx     Strabismus Neg Hx      Vitals:    17 1621   BP: (!) 144/92   Pulse: 78   Temp: 98 °F (36.7 °C)   Weight: 71 kg (156 lb 8.4 oz)   Height: 5' 6" (1.676 m)   PainSc:   5     Outpatient Encounter Prescriptions as of 2017   Medication Sig Dispense Refill    losartan-hydrochlorothiazide 50-12.5 mg (HYZAAR) 50-12.5 mg per tablet take 1 tablet by mouth once daily 30 tablet 5    methylphenidate (CONCERTA) 18 MG CR tablet Take 1 tablet (18 mg total) by mouth every morning. 30 tablet 0    miconazole (MICOTIN) 2 % cream Apply topically 2 (two) times daily. " "Apply to external vaginal area and labia twice a day 30 g 2    multivitamin with minerals tablet Take 1 tablet by mouth once daily.      omeprazole (PRILOSEC) 40 MG capsule Take 1 capsule (40 mg total) by mouth once daily. Once daily 30 capsule 5    terconazole (TERAZOL 3) 0.8 % vaginal cream Place 1 applicator vaginally once daily. 20 g 0    cyclobenzaprine (FLEXERIL) 10 MG tablet   0     No facility-administered encounter medications on file as of 5/31/2017.      Wt Readings from Last 3 Encounters:   05/31/17 71 kg (156 lb 8.4 oz)   05/18/17 71 kg (156 lb 8.4 oz)   04/10/17 70.9 kg (156 lb 4.9 oz)     Last 3 sets of Vitals    Vitals - 1 value per visit 4/10/2017 5/18/2017 5/31/2017   SYSTOLIC 152 110 144   DIASTOLIC 84 82 92   PULSE 88 90 78   TEMPERATURE 97.8 98 98   RESPIRATIONS - - -   Weight (lb) 156.31 156.53 156.53   Weight (kg) 70.9 71 71   HEIGHT 5' 7" 5' 6" 5' 6"   BODY MASS INDEX 24.48 25.26 25.26   VISIT REPORT - - -   Pain Score  7 0 5     No results found for: CBC  Review of Systems   Constitutional: Negative for chills and fever.   Skin: Positive for wound.       Objective:      Physical Exam   Constitutional: She is oriented to person, place, and time. She appears well-developed and well-nourished. No distress.   Pulmonary/Chest: Effort normal.   Neurological: She is alert and oriented to person, place, and time.   Skin: Skin is warm and dry. Capillary refill takes less than 2 seconds. She is not diaphoretic.   Small 1 cm self sealed laceration to left index finger lateral aspect.  No bleeding, no redness , no drainage.     Nursing note and vitals reviewed.      Assessment:       1. Laceration of finger, index, initial encounter        Plan:       Home care discussed , keep finger clean, dry     Tetanus updated today    Janki was seen today for finger injury.    Diagnoses and all orders for this visit:    Laceration of finger, index, initial encounter  -     Tdap Vaccine      "

## 2017-06-09 ENCOUNTER — HOSPITAL ENCOUNTER (OUTPATIENT)
Dept: RADIOLOGY | Facility: HOSPITAL | Age: 52
Discharge: HOME | End: 2017-06-09
Attending: FAMILY MEDICINE
Payer: COMMERCIAL

## 2017-06-09 DIAGNOSIS — R10.13 EPIGASTRIC PAIN: ICD-10-CM

## 2017-06-09 PROCEDURE — 76700 US EXAM ABDOM COMPLETE: CPT | Mod: TC

## 2017-06-09 PROCEDURE — 76700 US EXAM ABDOM COMPLETE: CPT | Mod: 26,,, | Performed by: RADIOLOGY

## 2017-06-23 DIAGNOSIS — F98.8 ADD (ATTENTION DEFICIT DISORDER): ICD-10-CM

## 2017-06-23 RX ORDER — LOSARTAN POTASSIUM AND HYDROCHLOROTHIAZIDE 12.5; 5 MG/1; MG/1
TABLET ORAL
Qty: 30 TABLET | Refills: 5 | Status: SHIPPED | OUTPATIENT
Start: 2017-06-23 | End: 2018-01-03 | Stop reason: SDUPTHER

## 2017-06-23 RX ORDER — METHYLPHENIDATE HYDROCHLORIDE 18 MG/1
TABLET ORAL
Qty: 30 TABLET | Refills: 0 | Status: SHIPPED | OUTPATIENT
Start: 2017-06-23 | End: 2017-08-02

## 2017-06-23 NOTE — TELEPHONE ENCOUNTER
----- Message from Cesarjaxon Isabela sent at 2017  1:01 PM CDT -----  Contact: Pt at [phone]  RX request - refill or new RX.  Is this a refill or new RX:  refill  RX name and strength: losartan-hydrochlorothiazide 50-12.5 mg (HYZAAR) 50-12.5 mg per tablet  RX name and strength: methylphenidate (CONCERTA) 18 MG CR tablet ()    Pharmacy name and phone #:  [facility] [place], [place] [address] [place], [place] - [address] [place] [address]   [phone] (Phone)  [phone] (Fax)      Comments:

## 2017-06-23 NOTE — LETTER
June 23, 2017    Janki Jones  [address] [facility] [address]             [facility]  [address] [facility], [address]  [facility] [address]  Phone: [phone]  Fax: [phone] Dear Mrs. Jones:      Our records indicate that it is now time for your Annual Physical with Dr. Ayers, along with fasting lab work prior. Please call our office to schedule. If already scheduled, please disregard.       If you have any questions or concerns, please don't hesitate to call.    Sincerely,        Harini Louis MA

## 2017-06-23 NOTE — TELEPHONE ENCOUNTER
LOV 05/31/17 (TAMMY Rueda)  Last Annual 03/08/17    [facility] message has been sent to the patient, to inform them that an appointment is due.    Letter has been mailed to the patient to inform them that an appointment is due.

## 2017-07-20 DIAGNOSIS — K21.9 GASTROESOPHAGEAL REFLUX DISEASE, ESOPHAGITIS PRESENCE NOT SPECIFIED: ICD-10-CM

## 2017-07-20 RX ORDER — OMEPRAZOLE 40 MG/1
CAPSULE, DELAYED RELEASE ORAL
Qty: 30 CAPSULE | Refills: 0 | Status: SHIPPED | OUTPATIENT
Start: 2017-07-20 | End: 2017-11-03 | Stop reason: SDUPTHER

## 2017-07-30 DIAGNOSIS — K21.9 GASTROESOPHAGEAL REFLUX DISEASE, ESOPHAGITIS PRESENCE NOT SPECIFIED: ICD-10-CM

## 2017-07-31 RX ORDER — OMEPRAZOLE 40 MG/1
CAPSULE, DELAYED RELEASE ORAL
Qty: 30 CAPSULE | Refills: 5 | Status: SHIPPED | OUTPATIENT
Start: 2017-07-31 | End: 2017-11-17 | Stop reason: SDUPTHER

## 2017-08-02 DIAGNOSIS — Z00.00 ROUTINE GENERAL MEDICAL EXAMINATION AT A HEALTH CARE FACILITY: Primary | ICD-10-CM

## 2017-08-02 DIAGNOSIS — F98.8 ATTENTION DEFICIT DISORDER, UNSPECIFIED HYPERACTIVITY PRESENCE: ICD-10-CM

## 2017-08-02 RX ORDER — METHYLPHENIDATE HYDROCHLORIDE 18 MG/1
18 TABLET ORAL EVERY MORNING
Qty: 30 TABLET | Refills: 0 | Status: SHIPPED | OUTPATIENT
Start: 2017-09-01 | End: 2017-10-01

## 2017-08-02 RX ORDER — METHYLPHENIDATE HYDROCHLORIDE 18 MG/1
18 TABLET ORAL EVERY MORNING
Qty: 30 TABLET | Refills: 0 | Status: SHIPPED | OUTPATIENT
Start: 2017-10-02 | End: 2017-11-03 | Stop reason: SDUPTHER

## 2017-08-02 RX ORDER — METHYLPHENIDATE HYDROCHLORIDE 18 MG/1
18 TABLET ORAL EVERY MORNING
Qty: 30 TABLET | Refills: 0 | Status: SHIPPED | OUTPATIENT
Start: 2017-08-02 | End: 2017-09-01

## 2017-08-02 NOTE — TELEPHONE ENCOUNTER
----- Message from Allyson Joshi sent at 8/2/2017  8:10 AM CDT -----  Contact: call pt at [phone]  RX request - refill or new RX.  Is this a refill or new RX:  refill  RX name and strength: methylphenidate (CONCERTA) 18 MG CR tablet  Directions:   Is this a 30 day or 90 day RX:    Pharmacy name and phone #: [facility] [place] [address] [place] - [address] [phone] (Phone)   Comments:

## 2017-08-02 NOTE — TELEPHONE ENCOUNTER
----- Message from Allyson Joshi sent at 8/2/2017  8:12 AM CDT -----  Contact: call pt [phone]  Doctor appointment and lab have been scheduled.  Please link lab orders to the lab appointment.  Date of doctor appointment:  11/17/2017  Physical or EP:  epp  Date of lab appointment:  11/13/17  Comments:

## 2017-08-25 ENCOUNTER — OFFICE VISIT (OUTPATIENT)
Dept: OPTOMETRY | Facility: CLINIC | Age: 52
End: 2017-08-25
Payer: COMMERCIAL

## 2017-08-25 DIAGNOSIS — H04.123 DRY EYES, BILATERAL: Primary | ICD-10-CM

## 2017-08-25 DIAGNOSIS — Z83.511 FAMILY HISTORY OF GLAUCOMA: ICD-10-CM

## 2017-08-25 DIAGNOSIS — H52.4 PRESBYOPIA OU: ICD-10-CM

## 2017-08-25 DIAGNOSIS — Z13.5 SCREENING FOR GLAUCOMA: ICD-10-CM

## 2017-08-25 PROCEDURE — 92014 COMPRE OPH EXAM EST PT 1/>: CPT | Mod: S$GLB,,, | Performed by: OPTOMETRIST

## 2017-08-25 PROCEDURE — 99999 PR PBB SHADOW E&M-EST. PATIENT-LVL II: CPT | Mod: PBBFAC,,, | Performed by: OPTOMETRIST

## 2017-08-25 PROCEDURE — 92015 DETERMINE REFRACTIVE STATE: CPT | Mod: S$GLB,,, | Performed by: OPTOMETRIST

## 2017-08-25 NOTE — PROGRESS NOTES
HPI     DLS: 9/22/2015  Pt states no noticeable va changes. Wear +1.75 otc readers when reading.    Occasional floaters  Denies flashes   fam hx glaucoma (dad)  No gtts     Last edited by Daniel Cano, OD on 8/25/2017  9:09 AM. (History)        ROS     Negative for: Constitutional, Gastrointestinal, Neurological, Skin,   Genitourinary, Musculoskeletal, HENT, Endocrine, Cardiovascular, Eyes,   Respiratory, Psychiatric, Allergic/Imm, Heme/Lymph    Last edited by Daniel Cano, OD on 8/25/2017  9:09 AM. (History)        Assessment /Plan     For exam results, see Encounter Report.    Dry eyes, bilateral    Family history of glaucoma    Presbyopia OU    Screening for glaucoma      1. Presby--wrote new spes Rx, ot otc readers OK  2. fam hx glauc (father)--advised yearly exam  3. Dry eyes sx--  Advised SYSTANE BAL or SOOTHE XP  ATs QID and overnight if needed    PLAN:    Pt will call if sx persist w ATs, otherwise rtc 1 yr

## 2017-08-29 ENCOUNTER — TELEPHONE (OUTPATIENT)
Dept: FAMILY MEDICINE | Facility: CLINIC | Age: 52
End: 2017-08-29

## 2017-08-29 NOTE — TELEPHONE ENCOUNTER
----- Message from Allyson Adi sent at 8/29/2017  3:57 PM CDT -----  Contact: call pt at [phone]  RX request - refill or new RX.  Is this a refill or new RX:  refill  RX name and strength: methylphenidate (CONCERTA) 18 MG CR tablet  Directions:   Is this a 30 day or 90 day RX:  30 day   Pharmacy name and phone #: [facility] [place], [place] [address] - [address] [phone] (Phone)   Comments:  Pt would like a phone call to let her know what is going on this medication, and why it is not ready for her to

## 2017-10-04 ENCOUNTER — TELEPHONE (OUTPATIENT)
Dept: FAMILY MEDICINE | Facility: CLINIC | Age: 52
End: 2017-10-04

## 2017-10-04 NOTE — TELEPHONE ENCOUNTER
Pt advised that she still has an Rx at the pharmacy, and that this was confirmed with the pharmacy.

## 2017-10-04 NOTE — TELEPHONE ENCOUNTER
"----- Message from Keyla Aguila sent at 10/4/2017 11:52 AM CDT -----  Contact: pt [phone]  RX request - refill or new RX.  Is this a refill or new RX:  Refill   RX name and strength: methylphenidate (CONCERTA) 18 MG CR tablet  Directions: once a day   Is this a 30 day or 90 day RX:  30  Pharmacy name and phone # (DON'T enter "on file" or "in chart"): [facility] [phone] (Phone)  [phone] (Fax)  Comments:          "

## 2017-11-03 DIAGNOSIS — K21.9 GASTROESOPHAGEAL REFLUX DISEASE, ESOPHAGITIS PRESENCE NOT SPECIFIED: ICD-10-CM

## 2017-11-03 DIAGNOSIS — F98.8 ATTENTION DEFICIT DISORDER, UNSPECIFIED HYPERACTIVITY PRESENCE: ICD-10-CM

## 2017-11-03 NOTE — TELEPHONE ENCOUNTER
"----- Message from Keyla Aguila sent at 11/3/2017  8:15 AM CDT -----  Contact: pt [phone]  RX request - refill or new RX.  Is this a refill or new RX:  Refill   RX name and strength: omeprazole (PRILOSEC) 40 MG capsule  Directions: once daily   Is this a 30 day or 90 day RX:  30  Pharmacy name and phone # (DON'T enter "on file" or "in chart"): [facility] [phone] (Phone)  [phone] (Fax)  Comments:        RX request - refill or new RX.  Is this a refill or new RX:  Refill   RX name and strength:   methylphenidate (CONCERTA) 18 MG CR tablet ()  Directions: once a day   Is this a 30 day or 90 day RX:  30  Pharmacy name and phone # (DON'T enter "on file" or "in chart"): [facility] [phone] (Phone)  [phone] (Fax)  Comments:            "

## 2017-11-05 RX ORDER — METHYLPHENIDATE HYDROCHLORIDE 18 MG/1
18 TABLET ORAL EVERY MORNING
Qty: 30 TABLET | Refills: 0 | Status: SHIPPED | OUTPATIENT
Start: 2017-11-05 | End: 2017-11-17 | Stop reason: DRUGHIGH

## 2017-11-05 RX ORDER — OMEPRAZOLE 40 MG/1
40 CAPSULE, DELAYED RELEASE ORAL DAILY
Qty: 30 CAPSULE | Refills: 5 | Status: SHIPPED | OUTPATIENT
Start: 2017-11-05 | End: 2018-07-06 | Stop reason: SDUPTHER

## 2017-11-13 ENCOUNTER — LAB VISIT (OUTPATIENT)
Dept: LAB | Facility: HOSPITAL | Age: 52
End: 2017-11-13
Attending: FAMILY MEDICINE
Payer: COMMERCIAL

## 2017-11-13 DIAGNOSIS — Z00.00 ROUTINE GENERAL MEDICAL EXAMINATION AT A HEALTH CARE FACILITY: ICD-10-CM

## 2017-11-13 LAB
ALBUMIN SERPL BCP-MCNC: 3.6 G/DL
ALP SERPL-CCNC: 99 U/L
ALT SERPL W/O P-5'-P-CCNC: 19 U/L
ANION GAP SERPL CALC-SCNC: 9 MMOL/L
AST SERPL-CCNC: 23 U/L
BASOPHILS # BLD AUTO: 0.01 K/UL
BASOPHILS NFR BLD: 0.2 %
BILIRUB SERPL-MCNC: 0.4 MG/DL
BUN SERPL-MCNC: 13 MG/DL
CALCIUM SERPL-MCNC: 9.2 MG/DL
CHLORIDE SERPL-SCNC: 105 MMOL/L
CHOLEST SERPL-MCNC: 203 MG/DL
CHOLEST/HDLC SERPL: 4.4 {RATIO}
CO2 SERPL-SCNC: 28 MMOL/L
CREAT SERPL-MCNC: 0.8 MG/DL
DIFFERENTIAL METHOD: ABNORMAL
EOSINOPHIL # BLD AUTO: 0.1 K/UL
EOSINOPHIL NFR BLD: 1.2 %
ERYTHROCYTE [DISTWIDTH] IN BLOOD BY AUTOMATED COUNT: 13.3 %
EST. GFR  (AFRICAN AMERICAN): >60 ML/MIN/1.73 M^2
EST. GFR  (NON AFRICAN AMERICAN): >60 ML/MIN/1.73 M^2
GLUCOSE SERPL-MCNC: 87 MG/DL
HCT VFR BLD AUTO: 35.8 %
HDLC SERPL-MCNC: 46 MG/DL
HDLC SERPL: 22.7 %
HGB BLD-MCNC: 11.4 G/DL
IMM GRANULOCYTES # BLD AUTO: 0.01 K/UL
IMM GRANULOCYTES NFR BLD AUTO: 0.2 %
LDLC SERPL CALC-MCNC: 135.6 MG/DL
LYMPHOCYTES # BLD AUTO: 2.6 K/UL
LYMPHOCYTES NFR BLD: 50.1 %
MCH RBC QN AUTO: 27.9 PG
MCHC RBC AUTO-ENTMCNC: 31.8 G/DL
MCV RBC AUTO: 88 FL
MONOCYTES # BLD AUTO: 0.5 K/UL
MONOCYTES NFR BLD: 8.7 %
NEUTROPHILS # BLD AUTO: 2 K/UL
NEUTROPHILS NFR BLD: 39.6 %
NONHDLC SERPL-MCNC: 157 MG/DL
NRBC BLD-RTO: 0 /100 WBC
PLATELET # BLD AUTO: 231 K/UL
PMV BLD AUTO: 12.7 FL
POTASSIUM SERPL-SCNC: 3.8 MMOL/L
PROT SERPL-MCNC: 7.7 G/DL
RBC # BLD AUTO: 4.08 M/UL
SODIUM SERPL-SCNC: 142 MMOL/L
TRIGL SERPL-MCNC: 107 MG/DL
TSH SERPL DL<=0.005 MIU/L-ACNC: 0.91 UIU/ML
WBC # BLD AUTO: 5.15 K/UL

## 2017-11-13 PROCEDURE — 80053 COMPREHEN METABOLIC PANEL: CPT

## 2017-11-13 PROCEDURE — 85025 COMPLETE CBC W/AUTO DIFF WBC: CPT

## 2017-11-13 PROCEDURE — 36415 COLL VENOUS BLD VENIPUNCTURE: CPT | Mod: PO

## 2017-11-13 PROCEDURE — 80061 LIPID PANEL: CPT

## 2017-11-13 PROCEDURE — 84443 ASSAY THYROID STIM HORMONE: CPT

## 2017-11-17 ENCOUNTER — OFFICE VISIT (OUTPATIENT)
Dept: FAMILY MEDICINE | Facility: CLINIC | Age: 52
End: 2017-11-17
Payer: COMMERCIAL

## 2017-11-17 VITALS
HEART RATE: 72 BPM | WEIGHT: 154.31 LBS | SYSTOLIC BLOOD PRESSURE: 134 MMHG | BODY MASS INDEX: 24.8 KG/M2 | DIASTOLIC BLOOD PRESSURE: 80 MMHG | HEIGHT: 66 IN | TEMPERATURE: 98 F

## 2017-11-17 DIAGNOSIS — K21.9 GASTROESOPHAGEAL REFLUX DISEASE, ESOPHAGITIS PRESENCE NOT SPECIFIED: ICD-10-CM

## 2017-11-17 DIAGNOSIS — G56.03 BILATERAL CARPAL TUNNEL SYNDROME: ICD-10-CM

## 2017-11-17 DIAGNOSIS — R10.11 RIGHT UPPER QUADRANT ABDOMINAL PAIN: ICD-10-CM

## 2017-11-17 DIAGNOSIS — Z23 NEED FOR PROPHYLACTIC VACCINATION AND INOCULATION AGAINST INFLUENZA: ICD-10-CM

## 2017-11-17 DIAGNOSIS — Z12.39 SCREENING FOR BREAST CANCER: ICD-10-CM

## 2017-11-17 DIAGNOSIS — F98.8 ATTENTION DEFICIT DISORDER, UNSPECIFIED HYPERACTIVITY PRESENCE: ICD-10-CM

## 2017-11-17 DIAGNOSIS — F41.1 ANXIETY STATE: ICD-10-CM

## 2017-11-17 DIAGNOSIS — Z01.419 ENCOUNTER FOR ANNUAL ROUTINE GYNECOLOGICAL EXAMINATION: ICD-10-CM

## 2017-11-17 DIAGNOSIS — F43.23 ADJUSTMENT DISORDER WITH MIXED ANXIETY AND DEPRESSED MOOD: ICD-10-CM

## 2017-11-17 DIAGNOSIS — J30.1 CHRONIC ALLERGIC RHINITIS DUE TO POLLEN, UNSPECIFIED SEASONALITY: ICD-10-CM

## 2017-11-17 DIAGNOSIS — I10 ESSENTIAL HYPERTENSION: ICD-10-CM

## 2017-11-17 DIAGNOSIS — F43.9 SITUATIONAL STRESS: ICD-10-CM

## 2017-11-17 DIAGNOSIS — Z00.00 ROUTINE GENERAL MEDICAL EXAMINATION AT A HEALTH CARE FACILITY: Primary | ICD-10-CM

## 2017-11-17 DIAGNOSIS — F33.1 MAJOR DEPRESSIVE DISORDER, RECURRENT EPISODE, MODERATE: ICD-10-CM

## 2017-11-17 LAB
CREAT UR-MCNC: 34 MG/DL
MICROALBUMIN UR DL<=1MG/L-MCNC: 3 UG/ML
MICROALBUMIN/CREATININE RATIO: 8.8 UG/MG

## 2017-11-17 PROCEDURE — 90686 IIV4 VACC NO PRSV 0.5 ML IM: CPT | Mod: S$GLB,,, | Performed by: FAMILY MEDICINE

## 2017-11-17 PROCEDURE — 99396 PREV VISIT EST AGE 40-64: CPT | Mod: S$GLB,,, | Performed by: FAMILY MEDICINE

## 2017-11-17 PROCEDURE — 82570 ASSAY OF URINE CREATININE: CPT

## 2017-11-17 PROCEDURE — 99999 PR PBB SHADOW E&M-EST. PATIENT-LVL V: CPT | Mod: PBBFAC,,, | Performed by: FAMILY MEDICINE

## 2017-11-17 PROCEDURE — 90471 IMMUNIZATION ADMIN: CPT | Mod: S$GLB,,, | Performed by: FAMILY MEDICINE

## 2017-11-17 RX ORDER — METHYLPHENIDATE HYDROCHLORIDE 36 MG/1
36 TABLET ORAL EVERY MORNING
Qty: 30 TABLET | Refills: 0 | Status: SHIPPED | OUTPATIENT
Start: 2017-11-17 | End: 2017-12-16

## 2017-11-17 RX ORDER — ESCITALOPRAM OXALATE 10 MG/1
10 TABLET ORAL DAILY
Qty: 30 TABLET | Refills: 5 | Status: SHIPPED | OUTPATIENT
Start: 2017-11-17 | End: 2018-06-12 | Stop reason: SDUPTHER

## 2017-11-17 RX ORDER — CYCLOBENZAPRINE HCL 10 MG
10 TABLET ORAL 3 TIMES DAILY PRN
Qty: 30 TABLET | Refills: 0 | Status: SHIPPED | OUTPATIENT
Start: 2017-11-17 | End: 2017-11-27

## 2017-11-17 RX ORDER — METHYLPHENIDATE HYDROCHLORIDE 36 MG/1
36 TABLET ORAL EVERY MORNING
Qty: 30 TABLET | Refills: 0 | Status: SHIPPED | OUTPATIENT
Start: 2018-01-17 | End: 2018-03-14 | Stop reason: SDUPTHER

## 2017-11-17 RX ORDER — METHYLPHENIDATE HYDROCHLORIDE 36 MG/1
36 TABLET ORAL EVERY MORNING
Qty: 30 TABLET | Refills: 0 | Status: SHIPPED | OUTPATIENT
Start: 2017-12-17 | End: 2018-01-15

## 2017-11-17 NOTE — PROGRESS NOTES
Two patient identifiers verified.  Allergies reviewed.Flu vaccine given IM administered to right deltoid per MD order.  Patient tolerated injection well; no redness, bleeding, or bruising noted to injection site.  Patient instructed to remain in clinic setting for 15 minutes.  Verbalizes understanding.

## 2017-11-17 NOTE — PROGRESS NOTES
Subjective:     Patient ID: Janki Jones is a 51 y.o. female.    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 She had to drop a class because she was doing poorly.   She states she has trouble focusing and staying on task.she stopped her anxiety med. She felt like it was was making her tired.   Lots os stress at home with  and daughter and school  HPI  Review of Systems   Constitutional: Negative for appetite change, fatigue and fever.   HENT: Negative for hearing loss and sore throat.    Eyes: Negative.    Respiratory: Negative for cough, chest tightness, shortness of breath and wheezing.    Cardiovascular: Negative for chest pain, palpitations and leg swelling.   Gastrointestinal: Positive for abdominal pain (when she eats fried foods- US in June was normal). Negative for blood in stool, constipation, diarrhea, nausea and vomiting.   Endocrine: Negative.    Genitourinary: Negative for difficulty urinating, dysuria, frequency, hematuria, menstrual problem, pelvic pain and urgency.   Musculoskeletal: Positive for arthralgias (wrist pain). Negative for back pain.   Skin: Negative for pallor and rash.   Allergic/Immunologic: Negative.    Neurological: Negative for dizziness, syncope, light-headedness and headaches.   Hematological: Negative for adenopathy.   Psychiatric/Behavioral: Negative.  Negative for dysphoric mood and sleep disturbance.       Objective:      Physical Exam   Constitutional: She is oriented to person, place, and time. She appears well-developed and well-nourished.   HENT:   Head: Normocephalic and atraumatic.   Right Ear: External ear normal.   Left Ear: External ear normal.   Nose: Nose normal.   Mouth/Throat: Oropharynx is clear and moist.   Eyes: Conjunctivae and EOM are normal. Pupils are equal, round, and reactive to light.   Neck: Normal range of motion. Neck supple. No JVD present. No thyromegaly present.   Cardiovascular: Normal  rate, regular rhythm, normal heart sounds and intact distal pulses.    No murmur heard.  Pulmonary/Chest: Effort normal and breath sounds normal.   Abdominal: Soft. Bowel sounds are normal. She exhibits no mass. There is no tenderness.   Musculoskeletal: Normal range of motion. She exhibits no edema.   Lymphadenopathy:     She has no cervical adenopathy.   Neurological: She is alert and oriented to person, place, and time. She has normal reflexes.   Skin: Skin is warm and dry.   Psychiatric: She has a normal mood and affect. Her behavior is normal. Judgment and thought content normal.   Vitals reviewed.      Assessment:     Janki was seen today for annual exam.    Diagnoses and all orders for this visit:    Routine general medical examination at a health care facility    Attention deficit disorder, unspecified hyperactivity presence  -     Ambulatory referral to Psychiatry    Situational stress    Adjustment disorder with mixed anxiety and depressed mood  -     Ambulatory referral to Psychiatry    Anxiety state  -     Ambulatory referral to Psychiatry    Major depressive disorder, recurrent episode, moderate    Essential hypertension  -     Microalbumin/creatinine urine ratio    Gastroesophageal reflux disease, esophagitis presence not specified    Chronic allergic rhinitis due to pollen, unspecified seasonality    Screening for breast cancer  -     Mammo Digital Screening Bilat with CAD; Future    Encounter for annual routine gynecological examination  -     Ambulatory referral to Obstetrics / Gynecology    Right upper quadrant abdominal pain  -     NM Hepatobiliary Imaging HIDA; Future    Bilateral carpal tunnel syndrome    Other orders  -     methylphenidate HCl (CONCERTA) 36 MG CR tablet; Take 1 tablet (36 mg total) by mouth every morning.  -     methylphenidate HCl (CONCERTA) 36 MG CR tablet; Take 1 tablet (36 mg total) by mouth every morning.  -     methylphenidate HCl (CONCERTA) 36 MG CR tablet; Take 1  tablet (36 mg total) by mouth every morning.  -     escitalopram oxalate (LEXAPRO) 10 MG tablet; Take 1 tablet (10 mg total) by mouth once daily.  -     cyclobenzaprine (FLEXERIL) 10 MG tablet; Take 1 tablet (10 mg total) by mouth 3 (three) times daily as needed for Muscle spasms.

## 2017-11-20 ENCOUNTER — TELEPHONE (OUTPATIENT)
Dept: FAMILY MEDICINE | Facility: CLINIC | Age: 52
End: 2017-11-20

## 2017-11-20 NOTE — TELEPHONE ENCOUNTER
----- Message from Maria G Ayers DO sent at 11/20/2017  7:03 AM CST -----  Please notify pt their urine test was fine. No sign of strain on the  kidneys.

## 2017-11-20 NOTE — TELEPHONE ENCOUNTER
Attempted to contact the patient, phone rang once & went to voicemail which was not set up. Will try to contact the patient again later today.

## 2017-11-30 ENCOUNTER — TELEPHONE (OUTPATIENT)
Dept: RADIOLOGY | Facility: HOSPITAL | Age: 52
End: 2017-11-30

## 2017-12-01 DIAGNOSIS — Z12.11 COLON CANCER SCREENING: ICD-10-CM

## 2017-12-27 ENCOUNTER — HOSPITAL ENCOUNTER (OUTPATIENT)
Dept: RADIOLOGY | Facility: HOSPITAL | Age: 52
Discharge: HOME | End: 2017-12-27
Attending: FAMILY MEDICINE
Payer: COMMERCIAL

## 2017-12-27 DIAGNOSIS — Z12.39 SCREENING FOR BREAST CANCER: ICD-10-CM

## 2017-12-27 PROCEDURE — 77067 SCR MAMMO BI INCL CAD: CPT | Mod: TC,PO

## 2017-12-27 PROCEDURE — 77063 BREAST TOMOSYNTHESIS BI: CPT | Mod: 26,,, | Performed by: RADIOLOGY

## 2017-12-27 PROCEDURE — 77067 SCR MAMMO BI INCL CAD: CPT | Mod: 26,,, | Performed by: RADIOLOGY

## 2018-01-03 RX ORDER — LOSARTAN POTASSIUM AND HYDROCHLOROTHIAZIDE 12.5; 5 MG/1; MG/1
TABLET ORAL
Qty: 30 TABLET | Refills: 5 | Status: SHIPPED | OUTPATIENT
Start: 2018-01-03 | End: 2018-07-06 | Stop reason: SDUPTHER

## 2018-03-14 DIAGNOSIS — F98.8 ATTENTION DEFICIT DISORDER, UNSPECIFIED HYPERACTIVITY PRESENCE: ICD-10-CM

## 2018-03-14 RX ORDER — METHYLPHENIDATE HYDROCHLORIDE 36 MG/1
36 TABLET ORAL EVERY MORNING
Qty: 30 TABLET | Refills: 0 | Status: SHIPPED | OUTPATIENT
Start: 2018-03-14 | End: 2018-03-20

## 2018-03-14 RX ORDER — METHYLPHENIDATE HYDROCHLORIDE 36 MG/1
36 TABLET ORAL EVERY MORNING
Qty: 30 TABLET | Refills: 0 | Status: SHIPPED | OUTPATIENT
Start: 2018-04-13 | End: 2018-03-20

## 2018-03-14 NOTE — TELEPHONE ENCOUNTER
Last filled on 02/05/18 per the [facility]  LOV 11/17/17  Follow up due in May 2018    Appointment scheduled for 03/20/18

## 2018-03-14 NOTE — TELEPHONE ENCOUNTER
"----- Message from Allyson Adi sent at 3/14/2018  9:23 AM CDT -----  RX request - refill or new RX.  Is this a refill or new RX:  Refill   RX name and strength: methylphenidate HCl (CONCERTA) 36 MG CR tablet ()  Directions:   Is this a 30 day or 90 day RX:  30 day   Pharmacy name and phone # (DON'T enter "on file" or "in chart"): [facility] [place], [place] [address] - [address] [phone] (Phone)   Comments:        "

## 2018-03-20 ENCOUNTER — OFFICE VISIT (OUTPATIENT)
Dept: FAMILY MEDICINE | Facility: CLINIC | Age: 53
End: 2018-03-20
Payer: COMMERCIAL

## 2018-03-20 VITALS
WEIGHT: 155.63 LBS | DIASTOLIC BLOOD PRESSURE: 86 MMHG | HEIGHT: 67 IN | TEMPERATURE: 98 F | SYSTOLIC BLOOD PRESSURE: 134 MMHG | BODY MASS INDEX: 24.43 KG/M2 | HEART RATE: 82 BPM

## 2018-03-20 DIAGNOSIS — F90.1 ATTENTION DEFICIT HYPERACTIVITY DISORDER (ADHD), PREDOMINANTLY HYPERACTIVE TYPE: Primary | ICD-10-CM

## 2018-03-20 DIAGNOSIS — B35.4 TINEA CORPORIS: ICD-10-CM

## 2018-03-20 DIAGNOSIS — F41.1 ANXIETY STATE: ICD-10-CM

## 2018-03-20 DIAGNOSIS — J30.1 CHRONIC ALLERGIC RHINITIS DUE TO POLLEN, UNSPECIFIED SEASONALITY: ICD-10-CM

## 2018-03-20 PROCEDURE — 3075F SYST BP GE 130 - 139MM HG: CPT | Mod: CPTII,S$GLB,, | Performed by: FAMILY MEDICINE

## 2018-03-20 PROCEDURE — 3079F DIAST BP 80-89 MM HG: CPT | Mod: CPTII,S$GLB,, | Performed by: FAMILY MEDICINE

## 2018-03-20 PROCEDURE — 99214 OFFICE O/P EST MOD 30 MIN: CPT | Mod: S$GLB,,, | Performed by: FAMILY MEDICINE

## 2018-03-20 PROCEDURE — 99999 PR PBB SHADOW E&M-EST. PATIENT-LVL III: CPT | Mod: PBBFAC,,, | Performed by: FAMILY MEDICINE

## 2018-03-20 RX ORDER — METHYLPHENIDATE HYDROCHLORIDE 54 MG/1
54 TABLET ORAL EVERY MORNING
Qty: 30 TABLET | Refills: 0 | Status: SHIPPED | OUTPATIENT
Start: 2018-03-20 | End: 2018-06-25 | Stop reason: SDUPTHER

## 2018-03-20 RX ORDER — METHYLPHENIDATE HYDROCHLORIDE 54 MG/1
54 TABLET ORAL EVERY MORNING
Qty: 30 TABLET | Refills: 0 | Status: SHIPPED | OUTPATIENT
Start: 2018-04-20 | End: 2018-06-25 | Stop reason: SDUPTHER

## 2018-03-20 RX ORDER — METHYLPHENIDATE HYDROCHLORIDE 54 MG/1
54 TABLET ORAL EVERY MORNING
Qty: 30 TABLET | Refills: 0 | Status: SHIPPED | OUTPATIENT
Start: 2018-05-20 | End: 2018-06-25 | Stop reason: SDUPTHER

## 2018-03-20 RX ORDER — CLOTRIMAZOLE 1 %
CREAM (GRAM) TOPICAL 2 TIMES DAILY
Qty: 60 G | Refills: 0 | Status: SHIPPED | OUTPATIENT
Start: 2018-03-20 | End: 2018-09-17

## 2018-03-20 RX ORDER — LORATADINE 10 MG/1
10 TABLET ORAL DAILY
COMMUNITY

## 2018-03-20 NOTE — PROGRESS NOTES
"Subjective:     Patient ID: Janki Jones is a 52 y.o. female.    Chief Complaint: Hypertension; Medication Management; Dizziness; ADD; Chest Pain (intermittent); Rash; and Mass (on left ear)    HPI This pleasant 52 y.o. Black or  female  presents for management  of their medical problems including   Patient Active Problem List   Diagnosis    Hypertension    ADD (attention deficit disorder)    GERD (gastroesophageal reflux disease)    Situational stress    Adjustment disorder with mixed anxiety and depressed mood    Anxiety state    Major depressive disorder, recurrent episode, moderate    Allergic rhinitis    Recurrent UTI   she is having trouble focusing and retaining the information with school. She did go back on the citalopram and she finds it is helping with her anxiousness   -she still finds she is "running too fast" "everything is too fast". Also the citalopram makes her gag.   Also she feels a little lightheaded at times - she fell one time. She has tried to reduce her sweets.   She didn't have  A chance to have the HIDA scan and she is still having nausea , also has a rash on her legs  Also having allergies and is using claritan -  Review of Systems  per HPI  Objective:      Physical Exam   Constitutional: She appears well-developed and well-nourished.   Neck: Normal range of motion. Neck supple.   Cardiovascular: Normal rate, regular rhythm and normal heart sounds.    Pulmonary/Chest: Effort normal and breath sounds normal.   Skin: Skin is warm and dry.   Psychiatric: She has a normal mood and affect. Her behavior is normal. Judgment and thought content normal.   Nursing note and vitals reviewed.      Assessment:     Janki was seen today for hypertension, medication management, dizziness, add, chest pain, rash and mass.    Diagnoses and all orders for this visit:    Attention deficit hyperactivity disorder (ADHD), predominantly hyperactive type  -    Increase  " methylphenidate HCl (CONCERTA) 54 MG CR tablet; Take 1 tablet (54 mg total) by mouth every morning.  -                    methylphenidate HCl (CONCERTA) 54 MG CR tablet; Take 1 tablet (54 mg total) by mouth every morning.  -                    methylphenidate HCl (CONCERTA) 54 MG CR tablet; Take 1 tablet (54 mg total) by mouth every morning.    Anxiety state  Continue lexapro    Tinea corporis  -     clotrimazole (LOTRIMIN) 1 % cream; Apply topically 2 (two) times daily.    Chronic allergic rhinitis due to pollen, unspecified seasonality    recheck 3 months

## 2018-03-23 ENCOUNTER — HOSPITAL ENCOUNTER (OUTPATIENT)
Dept: RADIOLOGY | Facility: HOSPITAL | Age: 53
Discharge: HOME | End: 2018-03-23
Attending: FAMILY MEDICINE
Payer: COMMERCIAL

## 2018-03-23 DIAGNOSIS — R10.11 RIGHT UPPER QUADRANT ABDOMINAL PAIN: ICD-10-CM

## 2018-03-23 PROCEDURE — A9537 TC99M MEBROFENIN: HCPCS

## 2018-03-23 PROCEDURE — 78227 HEPATOBIL SYST IMAGE W/DRUG: CPT | Mod: 26,,, | Performed by: RADIOLOGY

## 2018-04-07 DIAGNOSIS — N76.0 ACUTE VAGINITIS: ICD-10-CM

## 2018-04-09 RX ORDER — METRONIDAZOLE 500 MG/1
TABLET ORAL
Qty: 14 TABLET | Refills: 0 | OUTPATIENT
Start: 2018-04-09

## 2018-06-12 DIAGNOSIS — F41.1 ANXIETY STATE: ICD-10-CM

## 2018-06-12 RX ORDER — ESCITALOPRAM OXALATE 10 MG/1
TABLET ORAL
Qty: 30 TABLET | Refills: 5 | Status: SHIPPED | OUTPATIENT
Start: 2018-06-12 | End: 2018-07-30 | Stop reason: SDUPTHER

## 2018-06-25 DIAGNOSIS — F90.1 ATTENTION DEFICIT HYPERACTIVITY DISORDER (ADHD), PREDOMINANTLY HYPERACTIVE TYPE: ICD-10-CM

## 2018-06-25 RX ORDER — METHYLPHENIDATE HYDROCHLORIDE 54 MG/1
54 TABLET ORAL EVERY MORNING
Qty: 30 TABLET | Refills: 0 | Status: SHIPPED | OUTPATIENT
Start: 2018-08-24 | End: 2018-07-30 | Stop reason: SDUPTHER

## 2018-06-25 RX ORDER — METHYLPHENIDATE HYDROCHLORIDE 54 MG/1
54 TABLET ORAL EVERY MORNING
Qty: 30 TABLET | Refills: 0 | Status: SHIPPED | OUTPATIENT
Start: 2018-06-25 | End: 2018-07-25

## 2018-06-25 RX ORDER — METHYLPHENIDATE HYDROCHLORIDE 54 MG/1
54 TABLET ORAL EVERY MORNING
Qty: 30 TABLET | Refills: 0 | Status: SHIPPED | OUTPATIENT
Start: 2018-07-25 | End: 2018-07-31 | Stop reason: SDUPTHER

## 2018-06-25 NOTE — TELEPHONE ENCOUNTER
"----- Message from Samantha Alvares sent at 6/25/2018  4:42 PM CDT -----  Contact: Self [phone]  RX request - refill or new RX.  Is this a refill or new RX:  Refill  RX name and strength: methylphenidate HCl (CONCERTA) 54 MG CR tablet  Directions:   Is this a 30 day or 90 day RX:  30 day  Local pharmacy or mail order pharmacy:    Pharmacy name and phone # (DON'T enter "on file" or "in chart"): [facility] [place] [address]. - [address] [phone] (Phone) [phone] (Fax)  Comments:          "

## 2018-07-06 ENCOUNTER — OFFICE VISIT (OUTPATIENT)
Dept: FAMILY MEDICINE | Facility: CLINIC | Age: 53
End: 2018-07-06
Payer: COMMERCIAL

## 2018-07-06 VITALS
DIASTOLIC BLOOD PRESSURE: 84 MMHG | WEIGHT: 155.63 LBS | HEIGHT: 67 IN | BODY MASS INDEX: 24.43 KG/M2 | TEMPERATURE: 99 F | HEART RATE: 80 BPM | SYSTOLIC BLOOD PRESSURE: 128 MMHG

## 2018-07-06 DIAGNOSIS — F90.1 ATTENTION DEFICIT HYPERACTIVITY DISORDER (ADHD), PREDOMINANTLY HYPERACTIVE TYPE: Primary | ICD-10-CM

## 2018-07-06 DIAGNOSIS — F43.9 SITUATIONAL STRESS: ICD-10-CM

## 2018-07-06 DIAGNOSIS — I10 ESSENTIAL HYPERTENSION: ICD-10-CM

## 2018-07-06 DIAGNOSIS — F43.23 ADJUSTMENT DISORDER WITH MIXED ANXIETY AND DEPRESSED MOOD: ICD-10-CM

## 2018-07-06 DIAGNOSIS — K21.9 GASTROESOPHAGEAL REFLUX DISEASE, ESOPHAGITIS PRESENCE NOT SPECIFIED: ICD-10-CM

## 2018-07-06 DIAGNOSIS — F41.1 ANXIETY STATE: ICD-10-CM

## 2018-07-06 DIAGNOSIS — R07.82 INTERCOSTAL PAIN: ICD-10-CM

## 2018-07-06 PROCEDURE — 3079F DIAST BP 80-89 MM HG: CPT | Mod: CPTII,S$GLB,, | Performed by: FAMILY MEDICINE

## 2018-07-06 PROCEDURE — 3074F SYST BP LT 130 MM HG: CPT | Mod: CPTII,S$GLB,, | Performed by: FAMILY MEDICINE

## 2018-07-06 PROCEDURE — 99214 OFFICE O/P EST MOD 30 MIN: CPT | Mod: S$GLB,,, | Performed by: FAMILY MEDICINE

## 2018-07-06 PROCEDURE — 3008F BODY MASS INDEX DOCD: CPT | Mod: CPTII,S$GLB,, | Performed by: FAMILY MEDICINE

## 2018-07-06 PROCEDURE — 99999 PR PBB SHADOW E&M-EST. PATIENT-LVL III: CPT | Mod: PBBFAC,,, | Performed by: FAMILY MEDICINE

## 2018-07-06 RX ORDER — OMEPRAZOLE 40 MG/1
40 CAPSULE, DELAYED RELEASE ORAL DAILY
Qty: 30 CAPSULE | Refills: 5 | Status: SHIPPED | OUTPATIENT
Start: 2018-07-06 | End: 2018-07-30 | Stop reason: SDUPTHER

## 2018-07-06 RX ORDER — LOSARTAN POTASSIUM AND HYDROCHLOROTHIAZIDE 12.5; 5 MG/1; MG/1
1 TABLET ORAL DAILY
Qty: 30 TABLET | Refills: 5 | Status: SHIPPED | OUTPATIENT
Start: 2018-07-06 | End: 2018-07-30 | Stop reason: SDUPTHER

## 2018-07-06 NOTE — PROGRESS NOTES
Subjective:     Patient ID: Janki Jones is a 52 y.o. female.    Chief Complaint: ADD,  Dizziness (medication follow up); Chest Pain (last happened on yesterday); Anxiety; Diarrhea; and Anorexia    HPI   We did increase her ADD med last time. She did past her test. But she is complaining of fatigue in the afternoon. She goes to sleep at 9 or 9:30 (she is grinding her teeth a lot). She sleeps through the night. She gets up at 6;30 am. She occasionally wakes at night.    She finds a t 4-5 pm she gets fatigued.   She is frustrated that no one helps us at home. Her daughters (14 and 12)  isnt helping   Her . She has mild dizziness , she had some left sided CP off and on for years , -sharp.on the left side   Patient Active Problem List   Diagnosis    Hypertension    ADD (attention deficit disorder)    GERD (gastroesophageal reflux disease)    Situational stress    Adjustment disorder with mixed anxiety and depressed mood    Anxiety state    Major depressive disorder, recurrent episode, moderate    Allergic rhinitis    Recurrent UTI     Review of Systems  see above  Objective:      Physical Exam   Constitutional: She is oriented to person, place, and time. She appears well-developed and well-nourished.   HENT:   Head: Normocephalic and atraumatic.   Right Ear: External ear normal.   Left Ear: External ear normal.   Nose: Nose normal.   Mouth/Throat: Oropharynx is clear and moist.   Eyes: Conjunctivae and EOM are normal. Pupils are equal, round, and reactive to light.   Neck: Normal range of motion. Neck supple. No JVD present. No thyromegaly present.   Cardiovascular: Normal rate, regular rhythm, normal heart sounds and intact distal pulses.    No murmur heard.  Left sided chest pain at costochondral joints at T5 .   Pulmonary/Chest: Effort normal and breath sounds normal.   Abdominal: Soft. Bowel sounds are normal. She exhibits no mass. There is no tenderness.   Musculoskeletal: Normal range of motion.  She exhibits no edema.   Lymphadenopathy:     She has no cervical adenopathy.   Neurological: She is alert and oriented to person, place, and time. She has normal reflexes.   Skin: Skin is warm and dry.   Psychiatric: She has a normal mood and affect. Her behavior is normal. Judgment and thought content normal.   Vitals reviewed.      Assessment:     Janki was seen today for dizziness, chest pain, anxiety, diarrhea and anorexia.    Diagnoses and all orders for this visit:    Attention deficit hyperactivity disorder (ADHD), predominantly hyperactive type    Gastroesophageal reflux disease, esophagitis presence not specified  -     omeprazole (PRILOSEC) 40 MG capsule; Take 1 capsule (40 mg total) by mouth once daily.    Situational stress    Adjustment disorder with mixed anxiety and depressed mood    Anxiety state    Essential hypertension  -     losartan-hydrochlorothiazide 50-12.5 mg (HYZAAR) 50-12.5 mg per tablet; Take 1 tablet by mouth once daily.    Intercostal pain    chest wall strain.     Recheck 6 motnhs =-sooner prn

## 2018-07-30 ENCOUNTER — OFFICE VISIT (OUTPATIENT)
Dept: FAMILY MEDICINE | Facility: CLINIC | Age: 53
End: 2018-07-30
Payer: COMMERCIAL

## 2018-07-30 VITALS
HEIGHT: 67 IN | BODY MASS INDEX: 25.01 KG/M2 | RESPIRATION RATE: 16 BRPM | TEMPERATURE: 98 F | WEIGHT: 159.38 LBS | SYSTOLIC BLOOD PRESSURE: 128 MMHG | DIASTOLIC BLOOD PRESSURE: 80 MMHG

## 2018-07-30 DIAGNOSIS — T14.8XXA ABRASION OF SKIN WITH INFECTION: Primary | ICD-10-CM

## 2018-07-30 DIAGNOSIS — F41.1 ANXIETY STATE: ICD-10-CM

## 2018-07-30 DIAGNOSIS — I10 ESSENTIAL HYPERTENSION: ICD-10-CM

## 2018-07-30 DIAGNOSIS — L08.9 ABRASION OF SKIN WITH INFECTION: Primary | ICD-10-CM

## 2018-07-30 DIAGNOSIS — K21.9 GASTROESOPHAGEAL REFLUX DISEASE, ESOPHAGITIS PRESENCE NOT SPECIFIED: ICD-10-CM

## 2018-07-30 PROCEDURE — 99999 PR PBB SHADOW E&M-EST. PATIENT-LVL III: CPT | Mod: PBBFAC,,, | Performed by: INTERNAL MEDICINE

## 2018-07-30 PROCEDURE — 3008F BODY MASS INDEX DOCD: CPT | Mod: CPTII,S$GLB,, | Performed by: INTERNAL MEDICINE

## 2018-07-30 PROCEDURE — 3074F SYST BP LT 130 MM HG: CPT | Mod: CPTII,S$GLB,, | Performed by: INTERNAL MEDICINE

## 2018-07-30 PROCEDURE — 99214 OFFICE O/P EST MOD 30 MIN: CPT | Mod: S$GLB,,, | Performed by: INTERNAL MEDICINE

## 2018-07-30 PROCEDURE — 3079F DIAST BP 80-89 MM HG: CPT | Mod: CPTII,S$GLB,, | Performed by: INTERNAL MEDICINE

## 2018-07-30 RX ORDER — ESCITALOPRAM OXALATE 10 MG/1
10 TABLET ORAL DAILY
Qty: 90 TABLET | Refills: 2 | Status: SHIPPED | OUTPATIENT
Start: 2018-07-30 | End: 2018-12-07

## 2018-07-30 RX ORDER — OMEPRAZOLE 40 MG/1
40 CAPSULE, DELAYED RELEASE ORAL DAILY
Qty: 90 CAPSULE | Refills: 2 | Status: SHIPPED | OUTPATIENT
Start: 2018-07-30 | End: 2018-12-07 | Stop reason: SDUPTHER

## 2018-07-30 RX ORDER — LOSARTAN POTASSIUM AND HYDROCHLOROTHIAZIDE 12.5; 5 MG/1; MG/1
1 TABLET ORAL DAILY
Qty: 90 TABLET | Refills: 2 | Status: SHIPPED | OUTPATIENT
Start: 2018-07-30 | End: 2018-12-07

## 2018-07-30 RX ORDER — CLINDAMYCIN HYDROCHLORIDE 300 MG/1
300 CAPSULE ORAL EVERY 6 HOURS
Qty: 28 CAPSULE | Refills: 0 | Status: SHIPPED | OUTPATIENT
Start: 2018-07-30 | End: 2018-08-06

## 2018-07-30 NOTE — TELEPHONE ENCOUNTER
----- Message from Nirmala Khanna sent at 7/30/2018  8:25 AM CDT -----  Contact: self/[phone]  Patient called in regards needing rx to be resent to the pharmacy. Patient stated that [employer] and [employer] merge and they were having problems with the computer system. Please resent it.   #omeprazole (PRILOSEC) 40 MG capsule  #losartan-hydrochlorothiazide 50-12.5 mg (HYZAAR) 50-12.5 mg per tablet  #escitalopram oxalate (LEXAPRO) 10 MG tablet  [id number] [place], [place] - [address] [place] [address] AT [facility] # [phone]. Please call and advise. Thank you.

## 2018-07-30 NOTE — PROGRESS NOTES
Subjective:        Patient ID: Janki Jones is a 52 y.o. female.    Chief Complaint: Abrasion (right ankle)    HPI   Janki Jones presents with c/o abrasion and infection of the skin on the right lower leg.  Pt was in a hospital in [place] with her father.  She reached to help him/prevent him from falling and scraped her leg on something.  This occurred 1 week ago.  She has been cleaning the wound with alcohol and peroxide and applying antibiotic ointment; however, the wound has been getting worse.  Yesterday and today she has noticed some purulent discharge from the wound.    Review of Systems  as per HPI      Objective:        Vitals:    18 1600   BP: 128/80   Resp: 16   Temp: 98.2 °F (36.8 °C)     Physical Exam   Constitutional: She is oriented to person, place, and time. She appears well-developed and well-nourished. No distress.   Neurological: She is alert and oriented to person, place, and time.   Skin:   R medial lower le.5cm superficial abrasion with 5mm erythematous border, weeping of clear fluid   Psychiatric: She has a normal mood and affect. Her behavior is normal.   Vitals reviewed.          Assessment:         1. Abrasion of skin with infection              Plan:         Janki was seen today for abrasion.    Diagnoses and all orders for this visit:    Abrasion of skin with infection: Pt has pictures of wound with purulent drainage.   No improvement with topical abx.  Start oral clinda QID x 7 days.  Loosely cover with bandage to keep clean.  Can discontinue topical abx.  -     clindamycin (CLEOCIN) 300 MG capsule; Take 1 capsule (300 mg total) by mouth every 6 (six) hours. for 7 days        Follow-up if symptoms worsen or fail to improve.

## 2018-07-31 DIAGNOSIS — F90.1 ATTENTION DEFICIT HYPERACTIVITY DISORDER (ADHD), PREDOMINANTLY HYPERACTIVE TYPE: ICD-10-CM

## 2018-07-31 RX ORDER — METHYLPHENIDATE HYDROCHLORIDE 54 MG/1
54 TABLET ORAL EVERY MORNING
Qty: 30 TABLET | Refills: 0 | Status: SHIPPED | OUTPATIENT
Start: 2018-09-29 | End: 2018-09-17 | Stop reason: SDUPTHER

## 2018-07-31 RX ORDER — METHYLPHENIDATE HYDROCHLORIDE 54 MG/1
54 TABLET ORAL EVERY MORNING
Qty: 30 TABLET | Refills: 0 | Status: SHIPPED | OUTPATIENT
Start: 2018-08-30 | End: 2018-11-19 | Stop reason: SDUPTHER

## 2018-07-31 RX ORDER — METHYLPHENIDATE HYDROCHLORIDE 54 MG/1
54 TABLET ORAL EVERY MORNING
Qty: 30 TABLET | Refills: 0 | Status: SHIPPED | OUTPATIENT
Start: 2018-07-31 | End: 2018-08-30

## 2018-07-31 NOTE — TELEPHONE ENCOUNTER
Last filled on 06/26/18 per the [facility]    LOV 07/06/18    Patient is unable to  the last Rx with a start date of 07/25/18 due to the pharmacy being swapped from [facility] to [facility]. The Rx can not be transferred.

## 2018-07-31 NOTE — TELEPHONE ENCOUNTER
"----- Message from Sandra Reese sent at 7/31/2018 11:02 AM CDT -----  Contact: Call her at  [phone]  RX request - refill or new RX.  Is this a refill or new RX:  refill  RX name and strength: methylphenidate HCl (CONCERTA) 54 MG CR tablet  Directions:   Is this a 30 day or 90 day RX:  30  Local pharmacy or mail order pharmacy:  Local   Pharmacy name and phone # (DON'T enter "on file" or "in chart"): [facility] [phone] (Phone) [phone] (Fax)    Comments:  This was called sent in on 6/25 an when her  went to pick it up they said they didn't have it. Please resend.      "

## 2018-08-03 ENCOUNTER — TELEPHONE (OUTPATIENT)
Dept: OBSTETRICS AND GYNECOLOGY | Facility: CLINIC | Age: 53
End: 2018-08-03

## 2018-08-03 NOTE — TELEPHONE ENCOUNTER
----- Message from Jeana Cobb sent at 8/3/2018  1:39 PM CDT -----  Contact: LISSETT COELHO [[id number]]            Name of Who is Calling: LISSETT COELHO [[id number]]      What is the request in detail: patient would like to reschedule appt to a later time or a different day. Please call    Can the clinic reply by [facility]: no    What Number to Call Back [facility]: [phone]

## 2018-09-17 ENCOUNTER — OFFICE VISIT (OUTPATIENT)
Dept: OBSTETRICS AND GYNECOLOGY | Facility: CLINIC | Age: 53
End: 2018-09-17
Payer: COMMERCIAL

## 2018-09-17 VITALS
DIASTOLIC BLOOD PRESSURE: 82 MMHG | HEIGHT: 67 IN | BODY MASS INDEX: 24.67 KG/M2 | SYSTOLIC BLOOD PRESSURE: 128 MMHG | WEIGHT: 157.19 LBS

## 2018-09-17 DIAGNOSIS — Z12.4 PAP SMEAR FOR CERVICAL CANCER SCREENING: ICD-10-CM

## 2018-09-17 DIAGNOSIS — N89.8 VAGINAL DISCHARGE: ICD-10-CM

## 2018-09-17 DIAGNOSIS — Z01.419 ENCOUNTER FOR GYNECOLOGICAL EXAMINATION: Primary | ICD-10-CM

## 2018-09-17 LAB
CANDIDA RRNA VAG QL PROBE: NEGATIVE
G VAGINALIS RRNA GENITAL QL PROBE: NEGATIVE
T VAGINALIS RRNA GENITAL QL PROBE: NEGATIVE

## 2018-09-17 PROCEDURE — 99999 PR PBB SHADOW E&M-EST. PATIENT-LVL III: CPT | Mod: PBBFAC,,, | Performed by: OBSTETRICS & GYNECOLOGY

## 2018-09-17 PROCEDURE — 3079F DIAST BP 80-89 MM HG: CPT | Mod: CPTII,S$GLB,, | Performed by: OBSTETRICS & GYNECOLOGY

## 2018-09-17 PROCEDURE — 99386 PREV VISIT NEW AGE 40-64: CPT | Mod: S$GLB,,, | Performed by: OBSTETRICS & GYNECOLOGY

## 2018-09-17 PROCEDURE — 3074F SYST BP LT 130 MM HG: CPT | Mod: CPTII,S$GLB,, | Performed by: OBSTETRICS & GYNECOLOGY

## 2018-09-17 PROCEDURE — 87480 CANDIDA DNA DIR PROBE: CPT

## 2018-09-17 PROCEDURE — 87510 GARDNER VAG DNA DIR PROBE: CPT

## 2018-09-17 PROCEDURE — 88175 CYTOPATH C/V AUTO FLUID REDO: CPT

## 2018-09-17 NOTE — PROGRESS NOTES
HPI: Pt is a 52 y.o.  female who presents for routine annual exam. She is now  as she reports no periods in the past year. Complains of white vaginal discharge with odor.     ROS:  GENERAL: Feeling well overall. Denies fever or chills.   SKIN: Denies rash or lesions.   HEAD: Denies head injury or headache.   NODES: Denies enlarged lymph nodes.   CHEST: Denies chest pain or shortness of breath.   CARDIOVASCULAR: Denies palpitations or left sided chest pain.   ABDOMEN: No abdominal pain, constipation, diarrhea, nausea, vomiting or rectal bleeding.   URINARY: No dysuria, hematuria, or burning on urination.  REPRODUCTIVE: See HPI.   BREASTS: Denies pain, lumps, or nipple discharge.   HEMATOLOGIC: No easy bruisability or excessive bleeding.   MUSCULOSKELETAL: Denies joint pain or swelling.   NEUROLOGIC: Denies syncope or weakness.   PSYCHIATRIC: Denies depression, anxiety or mood swings.    PE:   APPEARANCE: Well nourished, well developed, Black or  female in no acute distress.  NODES: no cervical, supraclavicular, or inguinal lymphadenopathy  BREASTS: Symmetrical, no skin changes or visible lesions. No palpable masses, nipple discharge or adenopathy bilaterally.  ABDOMEN: Soft. No tenderness or masses. No distention. No hernias palpated. No CVA tenderness.  VULVA: No lesions. Normal external female genitalia.  URETHRAL MEATUS: Normal size and location, no lesions, no prolapse.  URETHRA: No masses, tenderness, or prolapse.  VAGINA: Moist. No lesions or lacerations noted. Creamy white discharge present. No odor present.   CERVIX: No lesions or discharge. No cervical motion tenderness.   UTERUS: Normal size, regular shape, mobile, non-tender.  ADNEXA: No tenderness. No fullness or masses palpated in the adnexal regions.   ANUS PERINEUM: Normal.      Diagnosis:  1. Encounter for gynecological examination    2. Pap smear for cervical cancer screening    3. Vaginal discharge        Plan:     Orders Placed  This Encounter    Vaginosis Screen by DNA Probe    Liquid-based pap smear, screening       Patient was counseled today on the new ACS guidelines for cervical cytology screening as well as the current recommendations for breast cancer screening. She was counseled to follow up with her PCP for other routine health maintenance. Counseling session lasted approximately 10 minutes, and all her questions were answered.    Follow-up with me in 1 year for routine exam; pap in 3 years.

## 2018-11-19 ENCOUNTER — NURSE TRIAGE (OUTPATIENT)
Dept: ADMINISTRATIVE | Facility: CLINIC | Age: 53
End: 2018-11-19

## 2018-11-19 DIAGNOSIS — F90.1 ATTENTION DEFICIT HYPERACTIVITY DISORDER (ADHD), PREDOMINANTLY HYPERACTIVE TYPE: Primary | ICD-10-CM

## 2018-11-19 DIAGNOSIS — Z00.00 ROUTINE GENERAL MEDICAL EXAMINATION AT A HEALTH CARE FACILITY: ICD-10-CM

## 2018-11-19 DIAGNOSIS — I10 ESSENTIAL HYPERTENSION: ICD-10-CM

## 2018-11-19 RX ORDER — METHYLPHENIDATE HYDROCHLORIDE 54 MG/1
54 TABLET ORAL EVERY MORNING
Qty: 30 TABLET | Refills: 0 | Status: SHIPPED | OUTPATIENT
Start: 2018-11-19 | End: 2018-12-07

## 2018-11-19 RX ORDER — METHYLPHENIDATE HYDROCHLORIDE 54 MG/1
54 TABLET ORAL EVERY MORNING
Qty: 30 TABLET | Refills: 0 | Status: SHIPPED | OUTPATIENT
Start: 2018-12-19 | End: 2018-12-07

## 2018-11-19 NOTE — TELEPHONE ENCOUNTER
----- Message from Prudence Do sent at 11/19/2018  9:03 AM CST -----  Contact: Patient [phone]  Is this a refill or new RX:  Refill    RX name and strength: methylphenidate HCl (CONCERTA) 54 MG CR tablet    Pharmacy name and phone # [id number] - [address] [phone] (Phone)  [phone] (Fax)

## 2018-11-19 NOTE — TELEPHONE ENCOUNTER
Reason for Disposition   Intermittent chest pain and pain has been increasing in severity or frequency    Protocols used: ST CHEST PAIN-A-OH    Ms. Jones states she has been experiencing sharp chest pain. She states pain is intermittent, but it has worsened. Patient advised to go to the ED.

## 2018-11-19 NOTE — TELEPHONE ENCOUNTER
Last filled on 10/12/18 per the [facility]    LOV 07/06/18    Due Back in January for ADD Check & Annual

## 2018-12-06 ENCOUNTER — LAB VISIT (OUTPATIENT)
Dept: LAB | Facility: HOSPITAL | Age: 53
End: 2018-12-06
Attending: FAMILY MEDICINE
Payer: COMMERCIAL

## 2018-12-06 DIAGNOSIS — Z00.00 ROUTINE GENERAL MEDICAL EXAMINATION AT A HEALTH CARE FACILITY: ICD-10-CM

## 2018-12-06 LAB
ALBUMIN SERPL BCP-MCNC: 3.9 G/DL
ALP SERPL-CCNC: 102 U/L
ALT SERPL W/O P-5'-P-CCNC: 28 U/L
ANION GAP SERPL CALC-SCNC: 7 MMOL/L
AST SERPL-CCNC: 30 U/L
BASOPHILS # BLD AUTO: 0.02 K/UL
BASOPHILS NFR BLD: 0.4 %
BILIRUB SERPL-MCNC: 0.6 MG/DL
BUN SERPL-MCNC: 14 MG/DL
CALCIUM SERPL-MCNC: 9.7 MG/DL
CHLORIDE SERPL-SCNC: 105 MMOL/L
CHOLEST SERPL-MCNC: 225 MG/DL
CHOLEST/HDLC SERPL: 3.9 {RATIO}
CO2 SERPL-SCNC: 30 MMOL/L
CREAT SERPL-MCNC: 0.8 MG/DL
DIFFERENTIAL METHOD: ABNORMAL
EOSINOPHIL # BLD AUTO: 0.1 K/UL
EOSINOPHIL NFR BLD: 1.5 %
ERYTHROCYTE [DISTWIDTH] IN BLOOD BY AUTOMATED COUNT: 13.3 %
EST. GFR  (AFRICAN AMERICAN): >60 ML/MIN/1.73 M^2
EST. GFR  (NON AFRICAN AMERICAN): >60 ML/MIN/1.73 M^2
GLUCOSE SERPL-MCNC: 93 MG/DL
HCT VFR BLD AUTO: 37.9 %
HDLC SERPL-MCNC: 58 MG/DL
HDLC SERPL: 25.8 %
HGB BLD-MCNC: 12.1 G/DL
IMM GRANULOCYTES # BLD AUTO: 0.01 K/UL
IMM GRANULOCYTES NFR BLD AUTO: 0.2 %
LDLC SERPL CALC-MCNC: 139 MG/DL
LYMPHOCYTES # BLD AUTO: 2.5 K/UL
LYMPHOCYTES NFR BLD: 47.1 %
MCH RBC QN AUTO: 27.8 PG
MCHC RBC AUTO-ENTMCNC: 31.9 G/DL
MCV RBC AUTO: 87 FL
MONOCYTES # BLD AUTO: 0.4 K/UL
MONOCYTES NFR BLD: 7.8 %
NEUTROPHILS # BLD AUTO: 2.3 K/UL
NEUTROPHILS NFR BLD: 43 %
NONHDLC SERPL-MCNC: 167 MG/DL
NRBC BLD-RTO: 0 /100 WBC
PLATELET # BLD AUTO: 259 K/UL
PMV BLD AUTO: 12.7 FL
POTASSIUM SERPL-SCNC: 3.7 MMOL/L
PROT SERPL-MCNC: 8.1 G/DL
RBC # BLD AUTO: 4.36 M/UL
SODIUM SERPL-SCNC: 142 MMOL/L
TRIGL SERPL-MCNC: 140 MG/DL
TSH SERPL DL<=0.005 MIU/L-ACNC: 1.51 UIU/ML
WBC # BLD AUTO: 5.26 K/UL

## 2018-12-06 PROCEDURE — 80061 LIPID PANEL: CPT

## 2018-12-06 PROCEDURE — 36415 COLL VENOUS BLD VENIPUNCTURE: CPT | Mod: PO

## 2018-12-06 PROCEDURE — 80053 COMPREHEN METABOLIC PANEL: CPT

## 2018-12-06 PROCEDURE — 85025 COMPLETE CBC W/AUTO DIFF WBC: CPT

## 2018-12-06 PROCEDURE — 84443 ASSAY THYROID STIM HORMONE: CPT

## 2018-12-07 ENCOUNTER — OFFICE VISIT (OUTPATIENT)
Dept: FAMILY MEDICINE | Facility: CLINIC | Age: 53
End: 2018-12-07
Payer: COMMERCIAL

## 2018-12-07 VITALS
BODY MASS INDEX: 24.92 KG/M2 | SYSTOLIC BLOOD PRESSURE: 134 MMHG | TEMPERATURE: 98 F | DIASTOLIC BLOOD PRESSURE: 80 MMHG | WEIGHT: 158.75 LBS | HEIGHT: 67 IN | HEART RATE: 84 BPM

## 2018-12-07 DIAGNOSIS — I10 ESSENTIAL HYPERTENSION: ICD-10-CM

## 2018-12-07 DIAGNOSIS — Z12.11 SCREEN FOR COLON CANCER: ICD-10-CM

## 2018-12-07 DIAGNOSIS — N39.0 RECURRENT UTI: ICD-10-CM

## 2018-12-07 DIAGNOSIS — Z00.00 ROUTINE GENERAL MEDICAL EXAMINATION AT A HEALTH CARE FACILITY: Primary | ICD-10-CM

## 2018-12-07 DIAGNOSIS — F41.1 ANXIETY STATE: ICD-10-CM

## 2018-12-07 DIAGNOSIS — Z23 NEED FOR PROPHYLACTIC VACCINATION AND INOCULATION AGAINST INFLUENZA: ICD-10-CM

## 2018-12-07 DIAGNOSIS — F43.9 SITUATIONAL STRESS: ICD-10-CM

## 2018-12-07 DIAGNOSIS — L98.9 SKIN LESION OF LEFT LEG: ICD-10-CM

## 2018-12-07 DIAGNOSIS — F90.1 ATTENTION DEFICIT HYPERACTIVITY DISORDER (ADHD), PREDOMINANTLY HYPERACTIVE TYPE: ICD-10-CM

## 2018-12-07 DIAGNOSIS — F33.1 MAJOR DEPRESSIVE DISORDER, RECURRENT EPISODE, MODERATE: ICD-10-CM

## 2018-12-07 DIAGNOSIS — L98.9 SKIN LESION OF RIGHT LEG: ICD-10-CM

## 2018-12-07 DIAGNOSIS — Z12.39 SCREENING FOR BREAST CANCER: ICD-10-CM

## 2018-12-07 DIAGNOSIS — K21.9 GASTROESOPHAGEAL REFLUX DISEASE, ESOPHAGITIS PRESENCE NOT SPECIFIED: ICD-10-CM

## 2018-12-07 DIAGNOSIS — F43.23 ADJUSTMENT DISORDER WITH MIXED ANXIETY AND DEPRESSED MOOD: ICD-10-CM

## 2018-12-07 DIAGNOSIS — J30.1 ALLERGIC RHINITIS DUE TO POLLEN, UNSPECIFIED SEASONALITY: ICD-10-CM

## 2018-12-07 PROCEDURE — 3075F SYST BP GE 130 - 139MM HG: CPT | Mod: CPTII,S$GLB,, | Performed by: FAMILY MEDICINE

## 2018-12-07 PROCEDURE — 3079F DIAST BP 80-89 MM HG: CPT | Mod: CPTII,S$GLB,, | Performed by: FAMILY MEDICINE

## 2018-12-07 PROCEDURE — 90686 IIV4 VACC NO PRSV 0.5 ML IM: CPT | Mod: S$GLB,,, | Performed by: FAMILY MEDICINE

## 2018-12-07 PROCEDURE — 99999 PR PBB SHADOW E&M-EST. PATIENT-LVL IV: CPT | Mod: PBBFAC,,, | Performed by: FAMILY MEDICINE

## 2018-12-07 PROCEDURE — 99396 PREV VISIT EST AGE 40-64: CPT | Mod: 25,S$GLB,, | Performed by: FAMILY MEDICINE

## 2018-12-07 PROCEDURE — 90471 IMMUNIZATION ADMIN: CPT | Mod: S$GLB,,, | Performed by: FAMILY MEDICINE

## 2018-12-07 RX ORDER — METHYLPHENIDATE HYDROCHLORIDE 36 MG/1
72 TABLET ORAL EVERY MORNING
Qty: 60 TABLET | Refills: 0 | Status: SHIPPED | OUTPATIENT
Start: 2018-12-07 | End: 2019-02-26 | Stop reason: SDUPTHER

## 2018-12-07 RX ORDER — PRENATAL VIT 91/IRON/FOLIC/DHA 28-975-200
COMBINATION PACKAGE (EA) ORAL 2 TIMES DAILY
Qty: 28 G | Refills: 1 | COMMUNITY
Start: 2018-12-07 | End: 2018-12-07 | Stop reason: SDUPTHER

## 2018-12-07 RX ORDER — PRENATAL VIT 91/IRON/FOLIC/DHA 28-975-200
COMBINATION PACKAGE (EA) ORAL 2 TIMES DAILY
Qty: 28 G | Refills: 1 | COMMUNITY
Start: 2018-12-07 | End: 2019-08-30 | Stop reason: SDUPTHER

## 2018-12-07 RX ORDER — ESCITALOPRAM OXALATE 20 MG/1
20 TABLET ORAL DAILY
Qty: 90 TABLET | Refills: 2 | Status: SHIPPED | OUTPATIENT
Start: 2018-12-07 | End: 2019-02-26 | Stop reason: SDUPTHER

## 2018-12-07 RX ORDER — LOSARTAN POTASSIUM AND HYDROCHLOROTHIAZIDE 25; 100 MG/1; MG/1
1 TABLET ORAL DAILY
Qty: 90 TABLET | Refills: 3 | Status: SHIPPED | OUTPATIENT
Start: 2018-12-07 | End: 2019-12-16 | Stop reason: SDUPTHER

## 2018-12-07 RX ORDER — OMEPRAZOLE 40 MG/1
40 CAPSULE, DELAYED RELEASE ORAL DAILY
Qty: 90 CAPSULE | Refills: 2 | Status: SHIPPED | OUTPATIENT
Start: 2018-12-07 | End: 2019-03-07

## 2018-12-07 NOTE — PROGRESS NOTES
"Subjective:     Patient ID: Janki Jones is a 53 y.o. female.    Chief Complaint: Annual Exam; Chest Pain (last night); Dizziness; and Excessive Sweating    HPI  Review of Systems   Cardiovascular: Positive for chest pain ( off and on for 2 months - more at night. sharp pain from back to front. deep breathing makes it better. she feels she is on edge. and she feels her chest muscles are tight. ).        She states people keep telling her to relax and calm/slow down. She states she "needs to do yoga" but she doesn't.    Gastrointestinal:        Gerd if she doesn't take omeprazole   Endocrine:        Occasionally she feels very hot -she is not breaking out in sweat. Those spells come and go over the past 3 months.   She feels like she wants to "run away"    Genitourinary:        No menses for  Almost a year but then she had it in October   Skin: Positive for rash.        2 rashes - 1 spot of left inner thigh and 1 of rt lower inner calf since she injured the leg    Neurological: Positive for dizziness (if she bends over and gets up fast she feels lightheaded but it passes quickly).   Psychiatric/Behavioral: Positive for agitation and decreased concentration (easily distracted). The patient is nervous/anxious (panic attacks).         She complains of trouble focusing and concentrating.        Objective:      Physical Exam   Constitutional: She is oriented to person, place, and time. She appears well-developed and well-nourished.   HENT:   Head: Normocephalic and atraumatic.   Right Ear: External ear normal.   Left Ear: External ear normal.   Nose: Nose normal.   Mouth/Throat: Oropharynx is clear and moist.   Eyes: Conjunctivae and EOM are normal. Pupils are equal, round, and reactive to light.   Neck: Normal range of motion. Neck supple. No JVD present. No thyromegaly present.   Cardiovascular: Normal rate, regular rhythm, normal heart sounds and intact distal pulses.   No murmur heard.  Pulmonary/Chest: " Effort normal and breath sounds normal.   Abdominal: Soft. Bowel sounds are normal. She exhibits no mass. There is no tenderness.   Musculoskeletal: Normal range of motion. She exhibits no edema.   Lymphadenopathy:     She has no cervical adenopathy.   Neurological: She is alert and oriented to person, place, and time. She has normal reflexes.   Skin: Skin is warm and dry.   round raised coin shaped plaque of left inner thigh, red and slightly thickened with some central clearing    Rt calf with healing hemosiderin change/scar   Psychiatric: She has a normal mood and affect. Her behavior is normal. Judgment and thought content normal.   Vitals reviewed.      Assessment:     Janki was seen today for annual exam, chest pain, dizziness and excessive sweating.    Diagnoses and all orders for this visit:    Routine general medical examination at a health care facility    Essential hypertension  -     Ambulatory referral to Optometry  -     losartan-hydrochlorothiazide 100-25 mg (HYZAAR) 100-25 mg per tablet; Take 1 tablet by mouth once daily.    Screening for breast cancer  -     Mammo Digital Diagnostic Bilat; Future    Screen for colon cancer  -     Case request GI: COLONOSCOPY    Skin lesion of left leg  -     Discontinue: terbinafine HCl (LAMISIL) 1 % cream; Apply topically 2 (two) times daily.  -     terbinafine HCl (LAMISIL) 1 % cream; Apply topically 2 (two) times daily.    Skin lesion of right leg    Major depressive disorder, recurrent episode, moderate    Situational stress    Anxiety state  -     escitalopram oxalate (LEXAPRO) 20 MG tablet; Take 1 tablet (20 mg total) by mouth once daily.    Attention deficit hyperactivity disorder (ADHD), predominantly hyperactive type  -     methylphenidate HCl (CONCERTA) 36 MG CR tablet; Take 2 tablets (72 mg total) by mouth every morning.    Adjustment disorder with mixed anxiety and depressed mood    Recurrent UTI    Gastroesophageal reflux disease, esophagitis presence  not specified  -     omeprazole (PRILOSEC) 40 MG capsule; Take 1 capsule (40 mg total) by mouth once daily.    Allergic rhinitis due to pollen, unspecified seasonality    rtc in 1 month for recheck bp and to see how she is doing ton the higher dose of the concerta.and higher dose of lexapro

## 2018-12-10 ENCOUNTER — OFFICE VISIT (OUTPATIENT)
Dept: OPTOMETRY | Facility: CLINIC | Age: 53
End: 2018-12-10
Payer: COMMERCIAL

## 2018-12-10 DIAGNOSIS — H04.123 DRY EYES, BILATERAL: Primary | ICD-10-CM

## 2018-12-10 DIAGNOSIS — H52.4 BILATERAL PRESBYOPIA: ICD-10-CM

## 2018-12-10 DIAGNOSIS — Z83.511 FAMILY HISTORY OF GLAUCOMA: ICD-10-CM

## 2018-12-10 PROCEDURE — 92014 COMPRE OPH EXAM EST PT 1/>: CPT | Mod: S$GLB,,, | Performed by: OPTOMETRIST

## 2018-12-10 PROCEDURE — 99999 PR PBB SHADOW E&M-EST. PATIENT-LVL II: CPT | Mod: PBBFAC,,, | Performed by: OPTOMETRIST

## 2018-12-10 NOTE — LETTER
December 10, 2018      Maria G Ayers, DO  [address]  [address]  [facility] [address]           [facility]  [address] [place] [address]  Phone: [phone]  Fax: [phone]          Patient: Janki Jones   MR Number: [id number]   YOB: 1965   Date of Visit: 12/10/2018       Dear Dr. Maria G Ayers:    Thank you for referring Janki Jones to me for evaluation. Attached you will find relevant portions of my assessment and plan of care.    If you have questions, please do not hesitate to call me. I look forward to following Janki Jones along with you.    Sincerely,    Maverick Jones, OD    Enclosure  CC:  No Recipients    If you would like to receive this communication electronically, please contact [email or web address] or [phone] to request more information on [facility] access.    For providers and/or their staff who would like to refer a patient to [facility], please contact us through our one-stop-shop provider referral line, [facility], at [phone].    If you feel you have received this communication in error or would no longer like to receive these types of communications, please e-mail [email or web address]

## 2018-12-10 NOTE — PROGRESS NOTES
VILMA SMITH  About 1 yr. Ago elsewhere.  Wears OTC +1.75, seems to work fine.    Distance seems fine without glasses.  Eyes feel dry, sometimes hard to   open in the morning. Not using any drops. Father has glaucoma.    Last edited by Linda Ennis on 12/10/2018  3:29 PM. (History)            Assessment /Plan     For exam results, see Encounter Report.    Dry eyes, bilateral    Family history of glaucoma    Bilateral presbyopia      1. Recommend artificial tears. 1 drop 2x per day. Chronicity of disease and treatment discussed.  2. Monitor condition. Patient to report any changes. RTC 1 year recheck.  3. Cont with OTC readers.

## 2019-01-28 ENCOUNTER — TELEPHONE (OUTPATIENT)
Dept: SURGERY | Facility: CLINIC | Age: 54
End: 2019-01-28

## 2019-01-28 DIAGNOSIS — Z12.11 SPECIAL SCREENING FOR MALIGNANT NEOPLASMS, COLON: Primary | ICD-10-CM

## 2019-01-28 RX ORDER — POLYETHYLENE GLYCOL 3350, SODIUM SULFATE ANHYDROUS, SODIUM BICARBONATE, SODIUM CHLORIDE, POTASSIUM CHLORIDE 236; 22.74; 6.74; 5.86; 2.97 G/4L; G/4L; G/4L; G/4L; G/4L
POWDER, FOR SOLUTION ORAL DAILY
Qty: 4000 ML | Refills: 0 | Status: SHIPPED | OUTPATIENT
Start: 2019-01-28 | End: 2019-01-30

## 2019-01-28 NOTE — TELEPHONE ENCOUNTER
----- Message from Maribel Tuttle PharmD sent at 1/28/2019 12:02 PM CST -----  Good afternoon. A prescription for Suprep was sent to the outpatient pharmacy for patient Janki Jones (MRN [id number]).  The Suprep is not covered by the insurance.  I also did a test claim for Moviprep and Prepopik, which were also not covered. Golytely is covered with $0 copay.  Patient is aware of the change and is okay with it.     Thanks,  Maribel Tuttle, PharmD.

## 2019-01-31 ENCOUNTER — HOSPITAL ENCOUNTER (OUTPATIENT)
Dept: RADIOLOGY | Facility: HOSPITAL | Age: 54
Discharge: HOME | End: 2019-01-31
Attending: FAMILY MEDICINE
Payer: COMMERCIAL

## 2019-01-31 DIAGNOSIS — Z12.39 SCREENING FOR BREAST CANCER: ICD-10-CM

## 2019-01-31 PROCEDURE — 77067 MAMMO DIGITAL SCREENING BILAT WITH TOMOSYNTHESIS_CAD: ICD-10-PCS | Mod: 26,,, | Performed by: RADIOLOGY

## 2019-01-31 PROCEDURE — 77063 BREAST TOMOSYNTHESIS BI: CPT | Mod: 26,,, | Performed by: RADIOLOGY

## 2019-01-31 PROCEDURE — 77067 SCR MAMMO BI INCL CAD: CPT | Mod: 26,,, | Performed by: RADIOLOGY

## 2019-01-31 PROCEDURE — 77067 SCR MAMMO BI INCL CAD: CPT | Mod: TC,PO

## 2019-01-31 PROCEDURE — 77063 MAMMO DIGITAL SCREENING BILAT WITH TOMOSYNTHESIS_CAD: ICD-10-PCS | Mod: 26,,, | Performed by: RADIOLOGY

## 2019-02-15 ENCOUNTER — ANESTHESIA EVENT (OUTPATIENT)
Dept: ENDOSCOPY | Facility: HOSPITAL | Age: 54
End: 2019-02-15
Payer: COMMERCIAL

## 2019-02-15 ENCOUNTER — ANESTHESIA (OUTPATIENT)
Dept: ENDOSCOPY | Facility: HOSPITAL | Age: 54
End: 2019-02-15
Payer: COMMERCIAL

## 2019-02-15 ENCOUNTER — HOSPITAL ENCOUNTER (OUTPATIENT)
Facility: HOSPITAL | Age: 54
Discharge: HOME | End: 2019-02-15
Attending: SURGERY | Admitting: SURGERY
Payer: COMMERCIAL

## 2019-02-15 VITALS
DIASTOLIC BLOOD PRESSURE: 77 MMHG | SYSTOLIC BLOOD PRESSURE: 141 MMHG | HEIGHT: 67 IN | HEART RATE: 64 BPM | WEIGHT: 150 LBS | BODY MASS INDEX: 23.54 KG/M2 | OXYGEN SATURATION: 100 % | RESPIRATION RATE: 16 BRPM | TEMPERATURE: 97 F

## 2019-02-15 DIAGNOSIS — Z12.11 ENCOUNTER FOR SCREENING COLONOSCOPY: Primary | ICD-10-CM

## 2019-02-15 LAB
B-HCG UR QL: NEGATIVE
CTP QC/QA: YES

## 2019-02-15 PROCEDURE — E9220 PRA ENDO ANESTHESIA: ICD-10-PCS | Mod: 33,,, | Performed by: NURSE ANESTHETIST, CERTIFIED REGISTERED

## 2019-02-15 PROCEDURE — 45380 COLONOSCOPY AND BIOPSY: CPT | Mod: 33,,, | Performed by: SURGERY

## 2019-02-15 PROCEDURE — 88305 TISSUE EXAM BY PATHOLOGIST: CPT | Performed by: PATHOLOGY

## 2019-02-15 PROCEDURE — 81025 URINE PREGNANCY TEST: CPT | Performed by: SURGERY

## 2019-02-15 PROCEDURE — 45380 COLONOSCOPY AND BIOPSY: CPT | Performed by: SURGERY

## 2019-02-15 PROCEDURE — E9220 PRA ENDO ANESTHESIA: HCPCS | Mod: 33,,, | Performed by: NURSE ANESTHETIST, CERTIFIED REGISTERED

## 2019-02-15 PROCEDURE — 27201012 HC FORCEPS, HOT/COLD, DISP: Performed by: SURGERY

## 2019-02-15 PROCEDURE — 37000009 HC ANESTHESIA EA ADD 15 MINS: Performed by: SURGERY

## 2019-02-15 PROCEDURE — 88305 TISSUE SPECIMEN TO PATHOLOGY - SURGERY: ICD-10-PCS | Mod: 26,,, | Performed by: PATHOLOGY

## 2019-02-15 PROCEDURE — 37000008 HC ANESTHESIA 1ST 15 MINUTES: Performed by: SURGERY

## 2019-02-15 PROCEDURE — 63600175 PHARM REV CODE 636 W HCPCS: Performed by: NURSE ANESTHETIST, CERTIFIED REGISTERED

## 2019-02-15 PROCEDURE — 25000003 PHARM REV CODE 250: Performed by: SURGERY

## 2019-02-15 PROCEDURE — 45380 PR COLONOSCOPY,BIOPSY: ICD-10-PCS | Mod: 33,,, | Performed by: SURGERY

## 2019-02-15 RX ORDER — LIDOCAINE HCL/PF 100 MG/5ML
SYRINGE (ML) INTRAVENOUS
Status: DISCONTINUED | OUTPATIENT
Start: 2019-02-15 | End: 2019-02-15

## 2019-02-15 RX ORDER — SODIUM CHLORIDE 9 MG/ML
INJECTION, SOLUTION INTRAVENOUS CONTINUOUS
Status: DISCONTINUED | OUTPATIENT
Start: 2019-02-15 | End: 2019-02-15 | Stop reason: HOSPADM

## 2019-02-15 RX ORDER — PROPOFOL 10 MG/ML
VIAL (ML) INTRAVENOUS
Status: DISCONTINUED | OUTPATIENT
Start: 2019-02-15 | End: 2019-02-15

## 2019-02-15 RX ORDER — PROPOFOL 10 MG/ML
VIAL (ML) INTRAVENOUS CONTINUOUS PRN
Status: DISCONTINUED | OUTPATIENT
Start: 2019-02-15 | End: 2019-02-15

## 2019-02-15 RX ADMIN — PROPOFOL 50 MG: 10 INJECTION, EMULSION INTRAVENOUS at 09:02

## 2019-02-15 RX ADMIN — LIDOCAINE HYDROCHLORIDE 100 MG: 20 INJECTION, SOLUTION INTRAVENOUS at 09:02

## 2019-02-15 RX ADMIN — SODIUM CHLORIDE: 0.9 INJECTION, SOLUTION INTRAVENOUS at 08:02

## 2019-02-15 RX ADMIN — PROPOFOL 200 MCG/KG/MIN: 10 INJECTION, EMULSION INTRAVENOUS at 09:02

## 2019-02-15 NOTE — H&P
[facility]  History & Physical     Subjective:     Chief Complaint/Reason for Admission: encounter for screening colonoscopy    History of Present Illness:   Patient 53 y.o. female presents for encounter for screening colonoscopy.  Pt notes that she has never had a colonoscopy.  She denies any abd pain. No changes in bowel habits.  No bleeding. No family history of cancer.         Patient Active Problem List    Diagnosis Date Noted    Encounter for screening colonoscopy 02/15/2019    Recurrent UTI 09/12/2016    Allergic rhinitis 03/08/2016    Anxiety state 08/20/2015    Major depressive disorder, recurrent episode, moderate 08/20/2015    Adjustment disorder with mixed anxiety and depressed mood 06/09/2015    Situational stress 04/15/2015    GERD (gastroesophageal reflux disease) 02/19/2013    Hypertension     ADD (attention deficit disorder)         Medications Prior to Admission   Medication Sig Dispense Refill Last Dose    escitalopram oxalate (LEXAPRO) 20 MG tablet Take 1 tablet (20 mg total) by mouth once daily. 90 tablet 2 2/15/2019 at Unknown time    losartan-hydrochlorothiazide 100-25 mg (HYZAAR) 100-25 mg per tablet Take 1 tablet by mouth once daily. 90 tablet 3 2/15/2019 at Unknown time    methylphenidate HCl (CONCERTA) 36 MG CR tablet Take 2 tablets (72 mg total) by mouth every morning. 60 tablet 0 2/15/2019 at Unknown time    multivitamin with minerals tablet Take 1 tablet by mouth once daily.   2/14/2019 at Unknown time    omeprazole (PRILOSEC) 40 MG capsule Take 1 capsule (40 mg total) by mouth once daily. 90 capsule 2 2/15/2019 at Unknown time    loratadine (CLARITIN) 10 mg tablet Take 10 mg by mouth once daily.   Unknown at Unknown time    terbinafine HCl (LAMISIL) 1 % cream Apply topically 2 (two) times daily. 28 g 1 Taking     Review of patient's allergies indicates:   Allergen Reactions    Percocet [oxycodone-acetaminophen]      Other reaction(s): Nausea     "    Past Medical History:   Diagnosis Date    Abnormal Pap smear 2007    Abnormal Pap smear of cervix     ADD (attention deficit disorder)     Anxiety     Depression     Fibroids     Herpes simplex virus (HSV) infection     HSV1.    Hypertension       Past Surgical History:   Procedure Laterality Date    CERVICAL BIOPSY  W/ LOOP ELECTRODE EXCISION  2007    essure      TUBAL LIGATION        Family History   Problem Relation Age of Onset    Alcohol abuse Mother     Diabetes Mother     Liver disease Mother     Stroke Mother     Panic disorder Mother     Other Father         glaucoma    Diabetes Father     Heart disease Father     Hyperlipidemia Father     Hypertension Father     Kidney disease Father     Glaucoma Father     Depression Father     Osteoporosis Father     Hypertension Brother     Other Sister         thyroid    Thyroid disease Sister     ADD / ADHD Daughter     ADD / ADHD Son     ADD / ADHD Daughter     Panic disorder Daughter     Breast cancer Neg Hx     Colon cancer Neg Hx     Ovarian cancer Neg Hx     Amblyopia Neg Hx     Blindness Neg Hx     Cataracts Neg Hx     Macular degeneration Neg Hx     Retinal detachment Neg Hx     Strabismus Neg Hx       Social History     Tobacco Use    Smoking status: Never Smoker    Smokeless tobacco: Never Used   Substance Use Topics    Alcohol use: No        Review of Systems:  A comprehensive review of systems was negative.    OBJECTIVE:     Patient Vitals for the past 8 hrs:   BP Temp Temp src Pulse Resp SpO2 Height Weight   02/15/19 0838 139/85 97.9 °F (36.6 °C) Temporal 77 18 100 % 5' 7" (1.702 m) 68 kg (150 lb)     AAox3  CTA b  Sinus  Soft/nd/nt      ASSESSMENT/PLAN:     Active Hospital Problems    Diagnosis  POA    Encounter for screening colonoscopy [Z12.11]  Not Applicable      Resolved Hospital Problems   No resolved problems to display.       Plan:  TO have screening colonoscopy  "

## 2019-02-15 NOTE — ANESTHESIA PREPROCEDURE EVALUATION
02/15/2019  Janki Jones is a 53 y.o., female.    Past Medical History:   Diagnosis Date    Abnormal Pap smear 2007    Abnormal Pap smear of cervix     ADD (attention deficit disorder)     Anxiety     Depression     Fibroids     Herpes simplex virus (HSV) infection     HSV1.    Hypertension      Past Surgical History:   Procedure Laterality Date    CERVICAL BIOPSY  W/ LOOP ELECTRODE EXCISION  2007    essure      TUBAL LIGATION           Anesthesia Evaluation    I have reviewed the Patient Summary Reports.    I have reviewed the Nursing Notes.   I have reviewed the Medications.     Review of Systems  Anesthesia Hx:  No problems with previous Anesthesia  Denies Family Hx of Anesthesia complications.   Denies Personal Hx of Anesthesia complications.   Hematology/Oncology:  Hematology Normal   Oncology Normal     EENT/Dental:EENT/Dental Normal   Cardiovascular:   Hypertension    Pulmonary:  Pulmonary Normal    Renal/:  Renal/ Normal     Hepatic/GI:   GERD    Musculoskeletal:  Musculoskeletal Normal    Neurological:  Neurology Normal    Endocrine:  Endocrine Normal    Dermatological:  Skin Normal    Psych:   Psychiatric History          Physical Exam  General:  Well nourished    Airway/Jaw/Neck:  Airway Findings: Mouth Opening: Normal Tongue: Normal  General Airway Assessment: Adult  Mallampati: II  Improves to I with phonation.  TM Distance: Normal, at least 6 cm        Eyes/Ears/Nose:  EYES/EARS/NOSE FINDINGS: Normal   Dental:  DENTAL FINDINGS: Normal   Chest/Lungs:  Chest/Lungs Findings: Clear to auscultation, Normal Respiratory Rate     Heart/Vascular:  Heart Findings: Rate: Normal  Rhythm: Regular Rhythm  Sounds: Normal  Heart murmur: negative Vascular Findings: Normal    Abdomen:  Abdomen Findings: Normal    Musculoskeletal:  Musculoskeletal Findings: Normal   Skin:  Skin Findings:  Normal    Mental Status:  Mental Status Findings: Normal        Anesthesia Plan  Type of Anesthesia, risks & benefits discussed:  Anesthesia Type:  general  Patient's Preference:   Intra-op Monitoring Plan: standard ASA monitors  Intra-op Monitoring Plan Comments:   Post Op Pain Control Plan:   Post Op Pain Control Plan Comments:   Induction:   IV  Beta Blocker:  Patient is not currently on a Beta-Blocker (No further documentation required).       Informed Consent: Patient understands risks and agrees with Anesthesia plan.  Questions answered. Anesthesia consent signed with patient.  ASA Score: 3     Day of Surgery Review of History & Physical:    H&P update referred to the provider.         Ready For Surgery From Anesthesia Perspective.

## 2019-02-15 NOTE — ANESTHESIA POSTPROCEDURE EVALUATION
"Anesthesia Post Evaluation    Patient: Janki Jones    Procedure(s) Performed: Procedure(s) (LRB):  COLONOSCOPY (N/A)    Final Anesthesia Type: general  Patient location during evaluation: PACU  Patient participation: Yes- Able to Participate  Level of consciousness: awake and alert and oriented  Post-procedure vital signs: reviewed and stable  Pain management: adequate  Airway patency: patent  PONV status at discharge: No PONV  Anesthetic complications: no      Cardiovascular status: blood pressure returned to baseline  Respiratory status: unassisted  Hydration status: euvolemic  Follow-up not needed.        Visit Vitals  BP (!) 141/77 (BP Location: Left arm, Patient Position: Lying)   Pulse 64   Temp 36.1 °C (97 °F) (Skin)   Resp 16   Ht 5' 7" (1.702 m)   Wt 68 kg (150 lb)   SpO2 100%   Breastfeeding? No   BMI 23.49 kg/m²       Pain/Ashly Score: Ashly Score: 10 (2/15/2019  9:59 AM)        "

## 2019-02-15 NOTE — DISCHARGE INSTRUCTIONS
Colonoscopy     A camera attached to a flexible tube with a viewing lens is used to take video pictures.     Colonoscopy is a test to view the inside of your lower digestive tract (colon and rectum). Sometimes it can show the last part of the small intestine (ileum). During the test, small pieces of tissue may be removed for testing. This is called a biopsy. Small growths, such as polyps, may also be removed.   Why is colonoscopy done?  The test is done to help look for colon cancer. And it can help find the source of abdominal pain, bleeding, and changes in bowel habits. It may be needed once a year, depending on factors such as your:  · Age  · Health history  · Family health history  · Symptoms  · Results from any prior colonoscopy  Risks and possible complications  These include:  · Bleeding               · A puncture or tear in the colon   · Risks of anesthesia  · A cancer lesion not being seen  Getting ready   To prepare for the test:  · Talk with your healthcare provider about the risks of the test (see below). Also ask your healthcare provider about alternatives to the test.  · Tell your healthcare provider about any medicines you take. Also tell him or her about any health conditions you may have.  · Make sure your rectum and colon are empty for the test. Follow the diet and bowel prep instructions exactly. If you dont, the test may need to be rescheduled.  · Plan for a friend or family member to drive you home after the test.     Colonoscopy provides an inside view of the entire colon.     You may discuss the results with your doctor right away or at a future visit.  During the test   The test is usually done in the hospital on an outpatient basis. This means you go home the same day. The procedure takes about 30 minutes. During that time:  · You are given relaxing (sedating) medicine through an IV line. You may be drowsy, or fully asleep.  · The healthcare provider will first give you a physical exam to  check for anal and rectal problems.  · Then the anus is lubricated and the scope inserted.  · If you are awake, you may have a feeling similar to needing to have a bowel movement. You may also feel pressure as air is pumped into the colon. Its OK to pass gas during the procedure.  · Biopsy, polyp removal, or other treatments may be done during the test.  After the test   You may have gas right after the test. It can help to try to pass it to help prevent later bloating. Your healthcare provider may discuss the results with you right away. Or you may need to schedule a follow-up visit to talk about the results. After the test, you can go back to your normal eating and other activities. You may be tired from the sedation and need to rest for a few hours.  Date Last Reviewed: 11/1/2016 © 2000-2017 [employer]. [address] [place] [address], [place] [address]. All rights reserved. This information is not intended as a substitute for professional medical care. Always follow your healthcare professional's instructions.

## 2019-02-15 NOTE — TRANSFER OF CARE
"Anesthesia Transfer of Care Note    Patient: Janki Jones    Procedure(s) Performed: Procedure(s) (LRB):  COLONOSCOPY (N/A)    Patient location: PACU    Anesthesia Type: general    Transport from OR: Transported from OR on room air with adequate spontaneous ventilation    Post pain: adequate analgesia    Post assessment: no apparent anesthetic complications and tolerated procedure well    Post vital signs: stable    Level of consciousness: sedated and responds to stimulation    Nausea/Vomiting: no nausea/vomiting    Complications: none    Transfer of care protocol was followed      Last vitals:   Visit Vitals  /77 (BP Location: Left arm, Patient Position: Lying)   Pulse 61   Temp 36.1 °C (97 °F) (Skin)   Resp 16   Ht 5' 7" (1.702 m)   Wt 68 kg (150 lb)   SpO2 100%   Breastfeeding? No   BMI 23.49 kg/m²     "

## 2019-02-15 NOTE — [employer] PATIENT INSTRUCTIONS
Discharge Summary/Instructions after an Endoscopic Procedure  Patient Name: Janki Jones  Patient MRN: [id number]  Patient YOB: 1965  Friday, February 15, 2019  Uri Torrez MD  RESTRICTIONS:  During your procedure today, you received medications for sedation.  These   medications may affect your judgment, balance and coordination.  Therefore,   for 24 hours, you have the following restrictions:   - DO NOT drive a car, operate machinery, make legal/financial decisions,   sign important papers or drink alcohol.    ACTIVITY:  Today: no heavy lifting, straining or running due to procedural   sedation/anesthesia.  The following day: return to full activity including work.  DIET:  Eat and drink normally unless instructed otherwise.     TREATMENT FOR COMMON SIDE EFFECTS:  - Mild abdominal pain, nausea, belching, bloating or excessive gas:  rest,   eat lightly and use a heating pad.  - Sore Throat: treat with throat lozenges and/or gargle with warm salt   water.  - Because air was used during the procedure, expelling large amounts of air   from your rectum or belching is normal.  - If a bowel prep was taken, you may not have a bowel movement for 1-3 days.    This is normal.  SYMPTOMS TO WATCH FOR AND REPORT TO YOUR PHYSICIAN:  1. Abdominal pain or bloating, other than gas cramps.  2. Chest pain.  3. Back pain.  4. Signs of infection such as: chills or fever occurring within 24 hours   after the procedure.  5. Rectal bleeding, which would show as bright red, maroon, or black stools.   (A tablespoon of blood from the rectum is not serious, especially if   hemorrhoids are present.)  6. Vomiting.  7. Weakness or dizziness.  GO DIRECTLY TO THE NEAREST EMERGENCY ROOM IF YOU HAVE ANY OF THE FOLLOWING:      Difficulty breathing              Chills and/or fever over 101 F   Persistent vomiting and/or vomiting blood   Severe abdominal pain   Severe chest pain   Black, tarry stools   Bleeding- more than one  tablespoon   Any other symptom or condition that you feel may need urgent attention  Your doctor recommends these additional instructions:  If any biopsies were taken, your doctors clinic will contact you in 1 to 2   weeks with any results.  - Discharge patient to home (ambulatory).   - Resume regular diet.   - Continue present medications.   - Await pathology results.   - Repeat colonoscopy in 5 years for surveillance.   - Return to my office PRN.   - Patient has a contact number available for emergencies.  The signs and   symptoms of potential delayed complications were discussed with the   patient.  Return to normal activities tomorrow.  Written discharge   instructions were provided to the patient.  For questions, problems or results please call your physician - Uri Torrez MD at Work:  [phone].  [facility], EMERGENCY ROOM PHONE NUMBER: [phone]  IF A COMPLICATION OR EMERGENCY SITUATION ARISES AND YOU ARE UNABLE TO REACH   YOUR PHYSICIAN - GO DIRECTLY TO THE EMERGENCY ROOM.  Uri Torrez MD  2/15/2019 9:25:23 AM  This report has been verified and signed electronically.  [employer]

## 2019-02-22 ENCOUNTER — TELEPHONE (OUTPATIENT)
Dept: ENDOSCOPY | Facility: HOSPITAL | Age: 54
End: 2019-02-22

## 2019-02-25 ENCOUNTER — TELEPHONE (OUTPATIENT)
Dept: SURGERY | Facility: HOSPITAL | Age: 54
End: 2019-02-25

## 2019-02-26 ENCOUNTER — OFFICE VISIT (OUTPATIENT)
Dept: FAMILY MEDICINE | Facility: CLINIC | Age: 54
End: 2019-02-26
Payer: COMMERCIAL

## 2019-02-26 VITALS
SYSTOLIC BLOOD PRESSURE: 148 MMHG | TEMPERATURE: 99 F | DIASTOLIC BLOOD PRESSURE: 94 MMHG | RESPIRATION RATE: 20 BRPM | WEIGHT: 155.88 LBS | HEIGHT: 67 IN | BODY MASS INDEX: 24.47 KG/M2

## 2019-02-26 DIAGNOSIS — F41.1 ANXIETY STATE: ICD-10-CM

## 2019-02-26 DIAGNOSIS — Z76.89 ENCOUNTER TO ESTABLISH CARE: Primary | ICD-10-CM

## 2019-02-26 DIAGNOSIS — I10 ESSENTIAL HYPERTENSION: ICD-10-CM

## 2019-02-26 DIAGNOSIS — K21.9 GASTROESOPHAGEAL REFLUX DISEASE, ESOPHAGITIS PRESENCE NOT SPECIFIED: ICD-10-CM

## 2019-02-26 DIAGNOSIS — F90.1 ATTENTION DEFICIT HYPERACTIVITY DISORDER (ADHD), PREDOMINANTLY HYPERACTIVE TYPE: ICD-10-CM

## 2019-02-26 PROCEDURE — 3077F SYST BP >= 140 MM HG: CPT | Mod: CPTII,S$GLB,, | Performed by: INTERNAL MEDICINE

## 2019-02-26 PROCEDURE — 3080F DIAST BP >= 90 MM HG: CPT | Mod: CPTII,S$GLB,, | Performed by: INTERNAL MEDICINE

## 2019-02-26 PROCEDURE — 3077F PR MOST RECENT SYSTOLIC BLOOD PRESSURE >= 140 MM HG: ICD-10-PCS | Mod: CPTII,S$GLB,, | Performed by: INTERNAL MEDICINE

## 2019-02-26 PROCEDURE — 99999 PR PBB SHADOW E&M-EST. PATIENT-LVL IV: ICD-10-PCS | Mod: PBBFAC,,, | Performed by: INTERNAL MEDICINE

## 2019-02-26 PROCEDURE — 3008F PR BODY MASS INDEX (BMI) DOCUMENTED: ICD-10-PCS | Mod: CPTII,S$GLB,, | Performed by: INTERNAL MEDICINE

## 2019-02-26 PROCEDURE — 3080F PR MOST RECENT DIASTOLIC BLOOD PRESSURE >= 90 MM HG: ICD-10-PCS | Mod: CPTII,S$GLB,, | Performed by: INTERNAL MEDICINE

## 2019-02-26 PROCEDURE — 99214 OFFICE O/P EST MOD 30 MIN: CPT | Mod: S$GLB,,, | Performed by: INTERNAL MEDICINE

## 2019-02-26 PROCEDURE — 99999 PR PBB SHADOW E&M-EST. PATIENT-LVL IV: CPT | Mod: PBBFAC,,, | Performed by: INTERNAL MEDICINE

## 2019-02-26 PROCEDURE — 3008F BODY MASS INDEX DOCD: CPT | Mod: CPTII,S$GLB,, | Performed by: INTERNAL MEDICINE

## 2019-02-26 PROCEDURE — 99214 PR OFFICE/OUTPT VISIT, EST, LEVL IV, 30-39 MIN: ICD-10-PCS | Mod: S$GLB,,, | Performed by: INTERNAL MEDICINE

## 2019-02-26 RX ORDER — METHYLPHENIDATE HYDROCHLORIDE 36 MG/1
36 TABLET ORAL 2 TIMES DAILY
Qty: 60 TABLET | Refills: 0 | Status: SHIPPED | OUTPATIENT
Start: 2019-04-27 | End: 2019-05-27

## 2019-02-26 RX ORDER — ESCITALOPRAM OXALATE 20 MG/1
20 TABLET ORAL DAILY
Qty: 90 TABLET | Refills: 3 | Status: SHIPPED | OUTPATIENT
Start: 2019-02-26 | End: 2019-08-30 | Stop reason: SDUPTHER

## 2019-02-26 RX ORDER — METHYLPHENIDATE HYDROCHLORIDE 36 MG/1
36 TABLET ORAL 2 TIMES DAILY
Qty: 60 TABLET | Refills: 0 | Status: SHIPPED | OUTPATIENT
Start: 2019-02-26 | End: 2019-05-30 | Stop reason: SDUPTHER

## 2019-02-26 RX ORDER — METHYLPHENIDATE HYDROCHLORIDE 36 MG/1
36 TABLET ORAL 2 TIMES DAILY
Qty: 60 TABLET | Refills: 0 | Status: SHIPPED | OUTPATIENT
Start: 2019-03-28 | End: 2019-04-27

## 2019-02-26 NOTE — PROGRESS NOTES
"Subjective:        Patient ID: Janki Jones is a 53 y.o. female.    Chief Complaint: Establish Care (refills)    HPI   Janki Jones presents to establish care and for medication refills.  Pt ran out of Concerta and Lexapro.  She has been taking Concerta 36mg BID which is helping her focus on school work and other tasks.  The Lexapro is helping with anxiety and also improves her focus.  Without the medications she feels very scattered, easily distracted, "all over the place".    Generally GERD is well controlled w Omeprazole qd but occasionally she'll still have sour reflux in her mouth depending on what she eats.    Previous PCP had recommended regular exercise like yoga or pilates for HTN, weight loss and stress relief.  Pt was also recommended to go to therapy but she has not done these yet.  She is interested in therapy.    Review of Systems  as per HPI      Objective:        Vitals:    02/26/19 0958   BP: (!) 148/94   Resp:    Temp:      Physical Exam   Constitutional: She is oriented to person, place, and time. She appears well-developed and well-nourished. No distress.   Cardiovascular: Normal rate, regular rhythm and intact distal pulses.   Pulmonary/Chest: Effort normal. No respiratory distress.   Neurological: She is alert and oriented to person, place, and time.   Psychiatric:   Anxious, rapid speech and changes in thought process   Vitals reviewed.          Assessment:         1. Encounter to establish care    2. Essential hypertension    3. Gastroesophageal reflux disease, esophagitis presence not specified    4. Attention deficit hyperactivity disorder (ADHD), predominantly hyperactive type    5. Anxiety state              Plan:         Janki was seen today for establish care.    Diagnoses and all orders for this visit:    Encounter to establish care    Essential hypertension: mildly elevated  - recommend continue with current losartan/hctz 100/25 qd  - start regular exercise like " walking or yoga  - f/u 1 month to recheck, if still above goal, will add another agent    Gastroesophageal reflux disease, esophagitis presence not specified: continue omeprazole 40mg qAM; if needed can take BID for breakthrough sx    Attention deficit hyperactivity disorder (ADHD), predominantly hyperactive type: continue concerta 36mg BID  -     methylphenidate HCl (CONCERTA) 36 MG CR tablet; Take 1 tablet (36 mg total) by mouth 2 (two) times daily.  -     methylphenidate HCl (CONCERTA) 36 MG CR tablet; Take 1 tablet (36 mg total) by mouth 2 (two) times daily.  -     methylphenidate HCl (CONCERTA) 36 MG CR tablet; Take 1 tablet (36 mg total) by mouth 2 (two) times daily.  -     Ambulatory referral to Psychology    Anxiety state: Lexapro 20mg qd  - referral to psychology; pt given phone # to call and schedule appt  - recommend yoga, walking, other calming activities to manage stress and anxiety  -     escitalopram oxalate (LEXAPRO) 20 MG tablet; Take 1 tablet (20 mg total) by mouth once daily.  -     Ambulatory referral to Psychology        Follow-up in about 1 month (around 3/26/2019) for high blood pressure.    Patient Instructions   [employer] (yoga, pilates and other types of exercise)

## 2019-03-08 ENCOUNTER — TELEPHONE (OUTPATIENT)
Dept: FAMILY MEDICINE | Facility: CLINIC | Age: 54
End: 2019-03-08

## 2019-03-08 NOTE — TELEPHONE ENCOUNTER
----- Message from Lisandra Spencer sent at 3/8/2019 11:16 AM CST -----  Contact: Pt Mobile/home [phone]   Patient is calling in regards to [facility] sending her a letter stating that her medication for losartan-hydrochlorothiazide 100-25 mg (HYZAAR) 100-25 mg per tablet has a recall on it. She would like a call back to speak with you about this please.

## 2019-03-08 NOTE — TELEPHONE ENCOUNTER
Called patient who states she got a letter stating that the losarten/HCTZ was recalled. I asked patient if it was her lot #, patient is going to contact [facility] to see and call back.

## 2019-05-30 DIAGNOSIS — K21.9 GASTROESOPHAGEAL REFLUX DISEASE, ESOPHAGITIS PRESENCE NOT SPECIFIED: ICD-10-CM

## 2019-05-30 DIAGNOSIS — F90.1 ATTENTION DEFICIT HYPERACTIVITY DISORDER (ADHD), PREDOMINANTLY HYPERACTIVE TYPE: ICD-10-CM

## 2019-05-30 RX ORDER — METHYLPHENIDATE HYDROCHLORIDE 36 MG/1
36 TABLET ORAL 2 TIMES DAILY
Qty: 60 TABLET | Refills: 0 | Status: SHIPPED | OUTPATIENT
Start: 2019-07-29 | End: 2019-08-30 | Stop reason: SDUPTHER

## 2019-05-30 RX ORDER — METHYLPHENIDATE HYDROCHLORIDE 36 MG/1
36 TABLET ORAL 2 TIMES DAILY
Qty: 60 TABLET | Refills: 0 | Status: SHIPPED | OUTPATIENT
Start: 2019-06-29 | End: 2019-07-29

## 2019-05-30 RX ORDER — METHYLPHENIDATE HYDROCHLORIDE 36 MG/1
36 TABLET ORAL 2 TIMES DAILY
Qty: 60 TABLET | Refills: 0 | Status: SHIPPED | OUTPATIENT
Start: 2019-05-30 | End: 2019-06-29

## 2019-05-30 NOTE — TELEPHONE ENCOUNTER
Notified patient refill was sent and follow up appointment with new PCP due in 3 months per Dr. Dillard

## 2019-05-30 NOTE — TELEPHONE ENCOUNTER
----- Message from Vanessa Wang sent at 5/30/2019  8:39 AM CDT -----  Contact: Patient [phone]  Type: Rx    Name of medication(s):  methylphenidate HCl (CONCERTA) 36 MG CR tablet    Is this a refill? New rx?Refill    Who prescribed medication?Radha Dillard MD    Pharmacy Name, Phone, & Location[id number] [address] [facility]    Comments:Please refill.      Thanks

## 2019-05-31 RX ORDER — OMEPRAZOLE 40 MG/1
CAPSULE, DELAYED RELEASE ORAL
Qty: 90 CAPSULE | Refills: 1 | Status: SHIPPED | OUTPATIENT
Start: 2019-05-31 | End: 2019-08-28 | Stop reason: SDUPTHER

## 2019-08-16 ENCOUNTER — NURSE TRIAGE (OUTPATIENT)
Dept: ADMINISTRATIVE | Facility: CLINIC | Age: 54
End: 2019-08-16

## 2019-08-16 NOTE — TELEPHONE ENCOUNTER
Pt calling with complaints of chest discomfort and dizziness x 2 months.  Intermittent, an occurring several times throughout the day.  Left sided chest pain, radiates through to her back, occurs several times daily, sharp pain, less than a minute in duration, rates it between 7-8/10 in severity.  Pt has a hx of htn, on hyzaar, and no missed doses.  She states the pain radiates into her jaw, shoulder and down her arm at times.  She also c/o sob intermittent, mild, and does not have any relationship to the amt of physical activity she is doing, and also states she feels like her heart is racing or skipping beats sometimes.  I advised she go to the ED.  She states she will do this, as soon as her  returns from picking up the children, which will be in a few minutes.  Pt  Then asks for a Concerta refill.  I advised she would need to be seen by a provider prior to a refill.    Reason for Disposition   Intermittent chest pain and pain has been increasing in severity or frequency    Additional Information   Negative: Severe difficulty breathing (e.g., struggling for each breath, speaks in single words)   Negative: Passed out (i.e., fainted, collapsed and was not responding)   Negative: Chest pain lasting longer than 5 minutes and ANY of the following:* Over 50 years old* Over 30 years old and at least one cardiac risk factor (i.e., high blood pressure, diabetes, high cholesterol, obesity, smoker or strong family history of heart disease)* Pain is crushing, pressure-like, or heavy * Took nitroglycerin and chest pain was not relieved* History of heart disease (i.e., angina, heart attack, bypass surgery, angioplasty, CHF)   Negative: Visible sweat on face or sweat dripping down face   Negative: Sounds like a life-threatening emergency to the triager   Negative: Followed an injury to chest   Negative: SEVERE chest pain   Negative: Pain also present in shoulder(s) or arm(s) or jaw   Negative: Difficulty  breathing   Negative: Cocaine use within last 3 days   Negative: History of prior 'blood clot' in leg or lungs (i.e., deep vein thrombosis, pulmonary embolism)   Negative: Recent illness requiring prolonged bed rest (i.e., immobilization)   Negative: Hip or leg fracture in past 2 months (e.g, or had cast on leg or ankle)   Negative: Major surgery in the past month   Negative: Recent long-distance travel with prolonged time in car, bus, plane, or train (i.e., within past 2 weeks; 6 or more hours duration)   Negative: Heart beating irregularly or very rapidly   Negative: Chest pain lasting longer than 5 minutes    Protocols used: CHEST PAIN-A-OH

## 2019-08-28 ENCOUNTER — TELEPHONE (OUTPATIENT)
Dept: INTERNAL MEDICINE | Facility: CLINIC | Age: 54
End: 2019-08-28

## 2019-08-28 DIAGNOSIS — K21.9 GASTROESOPHAGEAL REFLUX DISEASE, ESOPHAGITIS PRESENCE NOT SPECIFIED: ICD-10-CM

## 2019-08-28 RX ORDER — OMEPRAZOLE 40 MG/1
CAPSULE, DELAYED RELEASE ORAL
Qty: 90 CAPSULE | Refills: 1 | Status: SHIPPED | OUTPATIENT
Start: 2019-08-28 | End: 2020-10-06

## 2019-08-28 NOTE — TELEPHONE ENCOUNTER
Patient advised [facility] sent and keep appt Friday for concerta.    She expressed understanding.

## 2019-08-28 NOTE — TELEPHONE ENCOUNTER
----- Message from Prudence Do sent at 8/28/2019  9:01 AM CDT -----  Contact: Patient [phone]  Is this a refill or new RX:  Refill    RX name and strength: omeprazole (PRILOSEC) 40 MG capsule and methylphenidate HCl (CONCERTA) 36 MG CR tablet     Pharmacy name and phone # [facility] #[id number] - [address] [phone] (Phone)  [phone] (Fax)

## 2019-08-30 ENCOUNTER — TELEPHONE (OUTPATIENT)
Dept: INTERNAL MEDICINE | Facility: CLINIC | Age: 54
End: 2019-08-30

## 2019-08-30 ENCOUNTER — OFFICE VISIT (OUTPATIENT)
Dept: INTERNAL MEDICINE | Facility: CLINIC | Age: 54
End: 2019-08-30
Payer: COMMERCIAL

## 2019-08-30 ENCOUNTER — LAB VISIT (OUTPATIENT)
Dept: LAB | Facility: HOSPITAL | Age: 54
End: 2019-08-30
Attending: INTERNAL MEDICINE
Payer: COMMERCIAL

## 2019-08-30 VITALS
BODY MASS INDEX: 24.81 KG/M2 | HEIGHT: 67 IN | DIASTOLIC BLOOD PRESSURE: 80 MMHG | RESPIRATION RATE: 17 BRPM | WEIGHT: 158.06 LBS | TEMPERATURE: 99 F | SYSTOLIC BLOOD PRESSURE: 122 MMHG | HEART RATE: 68 BPM

## 2019-08-30 DIAGNOSIS — F90.1 ATTENTION DEFICIT HYPERACTIVITY DISORDER (ADHD), PREDOMINANTLY HYPERACTIVE TYPE: ICD-10-CM

## 2019-08-30 DIAGNOSIS — I10 ESSENTIAL HYPERTENSION: Primary | ICD-10-CM

## 2019-08-30 DIAGNOSIS — F41.1 ANXIETY STATE: ICD-10-CM

## 2019-08-30 DIAGNOSIS — K21.9 GASTROESOPHAGEAL REFLUX DISEASE, ESOPHAGITIS PRESENCE NOT SPECIFIED: ICD-10-CM

## 2019-08-30 DIAGNOSIS — I10 ESSENTIAL HYPERTENSION: ICD-10-CM

## 2019-08-30 DIAGNOSIS — R07.89 CHEST DISCOMFORT: ICD-10-CM

## 2019-08-30 LAB
ANION GAP SERPL CALC-SCNC: 12 MMOL/L (ref 8–16)
BASOPHILS # BLD AUTO: 0.02 K/UL (ref 0–0.2)
BASOPHILS NFR BLD: 0.4 % (ref 0–1.9)
BUN SERPL-MCNC: 11 MG/DL (ref 6–20)
CALCIUM SERPL-MCNC: 9.9 MG/DL (ref 8.7–10.5)
CHLORIDE SERPL-SCNC: 103 MMOL/L (ref 95–110)
CO2 SERPL-SCNC: 28 MMOL/L (ref 23–29)
CREAT SERPL-MCNC: 0.8 MG/DL (ref 0.5–1.4)
DIFFERENTIAL METHOD: ABNORMAL
EOSINOPHIL # BLD AUTO: 0.1 K/UL (ref 0–0.5)
EOSINOPHIL NFR BLD: 1 % (ref 0–8)
ERYTHROCYTE [DISTWIDTH] IN BLOOD BY AUTOMATED COUNT: 13.7 % (ref 11.5–14.5)
EST. GFR  (AFRICAN AMERICAN): >60 ML/MIN/1.73 M^2
EST. GFR  (NON AFRICAN AMERICAN): >60 ML/MIN/1.73 M^2
GLUCOSE SERPL-MCNC: 87 MG/DL (ref 70–110)
HCT VFR BLD AUTO: 37.7 % (ref 37–48.5)
HGB BLD-MCNC: 11.8 G/DL (ref 12–16)
IMM GRANULOCYTES # BLD AUTO: 0.01 K/UL (ref 0–0.04)
IMM GRANULOCYTES NFR BLD AUTO: 0.2 % (ref 0–0.5)
LYMPHOCYTES # BLD AUTO: 2.3 K/UL (ref 1–4.8)
LYMPHOCYTES NFR BLD: 45 % (ref 18–48)
MCH RBC QN AUTO: 28.6 PG (ref 27–31)
MCHC RBC AUTO-ENTMCNC: 31.3 G/DL (ref 32–36)
MCV RBC AUTO: 91 FL (ref 82–98)
MONOCYTES # BLD AUTO: 0.5 K/UL (ref 0.3–1)
MONOCYTES NFR BLD: 9.8 % (ref 4–15)
NEUTROPHILS # BLD AUTO: 2.2 K/UL (ref 1.8–7.7)
NEUTROPHILS NFR BLD: 43.6 % (ref 38–73)
NRBC BLD-RTO: 0 /100 WBC
PLATELET # BLD AUTO: 213 K/UL (ref 150–350)
PMV BLD AUTO: 12.9 FL (ref 9.2–12.9)
POTASSIUM SERPL-SCNC: 3.7 MMOL/L (ref 3.5–5.1)
RBC # BLD AUTO: 4.13 M/UL (ref 4–5.4)
SODIUM SERPL-SCNC: 143 MMOL/L (ref 136–145)
WBC # BLD AUTO: 5 K/UL (ref 3.9–12.7)

## 2019-08-30 PROCEDURE — 93005 ELECTROCARDIOGRAM TRACING: CPT | Mod: S$GLB,,, | Performed by: INTERNAL MEDICINE

## 2019-08-30 PROCEDURE — 3079F DIAST BP 80-89 MM HG: CPT | Mod: CPTII,S$GLB,, | Performed by: INTERNAL MEDICINE

## 2019-08-30 PROCEDURE — 85025 COMPLETE CBC W/AUTO DIFF WBC: CPT

## 2019-08-30 PROCEDURE — 99214 PR OFFICE/OUTPT VISIT, EST, LEVL IV, 30-39 MIN: ICD-10-PCS | Mod: S$GLB,,, | Performed by: INTERNAL MEDICINE

## 2019-08-30 PROCEDURE — 80048 BASIC METABOLIC PNL TOTAL CA: CPT

## 2019-08-30 PROCEDURE — 3008F BODY MASS INDEX DOCD: CPT | Mod: CPTII,S$GLB,, | Performed by: INTERNAL MEDICINE

## 2019-08-30 PROCEDURE — 36415 COLL VENOUS BLD VENIPUNCTURE: CPT | Mod: PO

## 2019-08-30 PROCEDURE — 99999 PR PBB SHADOW E&M-EST. PATIENT-LVL III: ICD-10-PCS | Mod: PBBFAC,,, | Performed by: INTERNAL MEDICINE

## 2019-08-30 PROCEDURE — 99999 PR PBB SHADOW E&M-EST. PATIENT-LVL III: CPT | Mod: PBBFAC,,, | Performed by: INTERNAL MEDICINE

## 2019-08-30 PROCEDURE — 3074F SYST BP LT 130 MM HG: CPT | Mod: CPTII,S$GLB,, | Performed by: INTERNAL MEDICINE

## 2019-08-30 PROCEDURE — 99214 OFFICE O/P EST MOD 30 MIN: CPT | Mod: S$GLB,,, | Performed by: INTERNAL MEDICINE

## 2019-08-30 PROCEDURE — 3074F PR MOST RECENT SYSTOLIC BLOOD PRESSURE < 130 MM HG: ICD-10-PCS | Mod: CPTII,S$GLB,, | Performed by: INTERNAL MEDICINE

## 2019-08-30 PROCEDURE — 93010 ELECTROCARDIOGRAM REPORT: CPT | Mod: S$GLB,,, | Performed by: INTERNAL MEDICINE

## 2019-08-30 PROCEDURE — 93010 EKG 12-LEAD: ICD-10-PCS | Mod: S$GLB,,, | Performed by: INTERNAL MEDICINE

## 2019-08-30 PROCEDURE — 3079F PR MOST RECENT DIASTOLIC BLOOD PRESSURE 80-89 MM HG: ICD-10-PCS | Mod: CPTII,S$GLB,, | Performed by: INTERNAL MEDICINE

## 2019-08-30 PROCEDURE — 93005 EKG 12-LEAD: ICD-10-PCS | Mod: S$GLB,,, | Performed by: INTERNAL MEDICINE

## 2019-08-30 PROCEDURE — 3008F PR BODY MASS INDEX (BMI) DOCUMENTED: ICD-10-PCS | Mod: CPTII,S$GLB,, | Performed by: INTERNAL MEDICINE

## 2019-08-30 RX ORDER — METHYLPHENIDATE HYDROCHLORIDE 36 MG/1
TABLET ORAL
Qty: 60 TABLET | Refills: 0 | Status: SHIPPED | OUTPATIENT
Start: 2019-08-30 | End: 2019-10-21 | Stop reason: SDUPTHER

## 2019-08-30 RX ORDER — METHYLPHENIDATE HYDROCHLORIDE 36 MG/1
TABLET ORAL
Refills: 0 | COMMUNITY
Start: 2019-05-30 | End: 2019-08-30 | Stop reason: SDUPTHER

## 2019-08-30 RX ORDER — ESCITALOPRAM OXALATE 20 MG/1
TABLET ORAL
Refills: 0 | COMMUNITY
Start: 2019-08-13 | End: 2019-10-21 | Stop reason: SDUPTHER

## 2019-08-30 RX ORDER — BUPROPION HYDROCHLORIDE 150 MG/1
150 TABLET ORAL DAILY
Qty: 30 TABLET | Refills: 1 | Status: SHIPPED | OUTPATIENT
Start: 2019-08-30 | End: 2019-10-21 | Stop reason: SDUPTHER

## 2019-08-30 NOTE — PROGRESS NOTES
Subjective:       Patient ID: Janki Jones is a 53 y.o. female.    Chief Complaint: Establish Care and Medication Refill    HPI   Pt with HTN, ADD, GERD, Anxiety is here for establishment. Pt is still c/o persistent symptoms of anxiety and feeling like she is on edge. No SI/HI. She would like a refill of her Concerta. No SE's from the medication.   Review of Systems   Constitutional: Negative for activity change, appetite change, chills, diaphoresis, fatigue, fever and unexpected weight change.   HENT: Negative for postnasal drip, rhinorrhea, sinus pressure, sneezing, sore throat, trouble swallowing and voice change.    Respiratory: Negative for cough, shortness of breath and wheezing.    Cardiovascular: Negative for chest pain, palpitations and leg swelling.   Gastrointestinal: Negative for abdominal pain, blood in stool, constipation, diarrhea, nausea and vomiting.   Genitourinary: Negative for dysuria.   Musculoskeletal: Negative for arthralgias and myalgias.   Skin: Negative for rash and wound.   Allergic/Immunologic: Negative for environmental allergies and food allergies.   Hematological: Negative for adenopathy. Does not bruise/bleed easily.   Psychiatric/Behavioral: Positive for decreased concentration. Negative for self-injury and suicidal ideas. The patient is nervous/anxious.        Objective:      Physical Exam   Constitutional: She is oriented to person, place, and time. She appears well-developed and well-nourished. No distress.   HENT:   Head: Normocephalic and atraumatic.   Eyes: Pupils are equal, round, and reactive to light. Conjunctivae and EOM are normal. Right eye exhibits no discharge. Left eye exhibits no discharge. No scleral icterus.   Neck: Neck supple. No JVD present.   Cardiovascular: Normal rate, regular rhythm, normal heart sounds and intact distal pulses.   Pulmonary/Chest: Effort normal and breath sounds normal. No respiratory distress. She has no wheezes. She has no rales.    Musculoskeletal: She exhibits no edema.   Lymphadenopathy:     She has no cervical adenopathy.   Neurological: She is alert and oriented to person, place, and time.   Skin: Skin is warm and dry. No rash noted. She is not diaphoretic. No pallor.       Assessment:       1. Essential hypertension    2. Attention deficit hyperactivity disorder (ADHD), predominantly hyperactive type    3. Gastroesophageal reflux disease, esophagitis presence not specified    4. Anxiety state    5. Chest discomfort        Plan:    1. HTN- stable on Hyzaar 100-25 mg daily   2. ADD- stable, refill Concerta 36 mg BID   3. GERD- stable on Prilosec 40 mg daily   4. Anxiety- Rx Wellbutrin  mg daily and continue Lexapro 20 mg daily   5. Check EKG   6. F/u in 1 month for anxiety

## 2019-08-30 NOTE — TELEPHONE ENCOUNTER
----- Message from Prudence Do sent at 8/30/2019 12:38 PM CDT -----  Contact: Pt [phone]  She said the pharmacy said to contact her insurance and give [place] in order for the omeprazole (PRILOSEC) 40 MG capsule to cost less.    Thank you

## 2019-08-30 NOTE — TELEPHONE ENCOUNTER
Received refill request for omeprazole.  After review of chart, new prescription was sent on 8/28/2019.

## 2019-09-06 ENCOUNTER — TELEPHONE (OUTPATIENT)
Dept: INTERNAL MEDICINE | Facility: CLINIC | Age: 54
End: 2019-09-06

## 2019-09-06 NOTE — TELEPHONE ENCOUNTER
----- Message from Lisandra Spencer sent at 9/6/2019 10:07 AM CDT -----  Contact: Pt self Mobile/Home [phone]   Patient would like a call back in regards to her saying that she spoke with you about getting her script for omeprazole (PRILOSEC) 40 MG capsule for a cheaper price. She would like a call back to speak with you about this please.

## 2019-10-21 ENCOUNTER — OFFICE VISIT (OUTPATIENT)
Dept: INTERNAL MEDICINE | Facility: CLINIC | Age: 54
End: 2019-10-21
Payer: COMMERCIAL

## 2019-10-21 VITALS
RESPIRATION RATE: 18 BRPM | HEART RATE: 84 BPM | WEIGHT: 162.25 LBS | SYSTOLIC BLOOD PRESSURE: 110 MMHG | BODY MASS INDEX: 25.47 KG/M2 | TEMPERATURE: 98 F | HEIGHT: 67 IN | DIASTOLIC BLOOD PRESSURE: 80 MMHG

## 2019-10-21 DIAGNOSIS — F43.23 ADJUSTMENT DISORDER WITH MIXED ANXIETY AND DEPRESSED MOOD: Primary | ICD-10-CM

## 2019-10-21 PROCEDURE — 99999 PR PBB SHADOW E&M-EST. PATIENT-LVL III: ICD-10-PCS | Mod: PBBFAC,,, | Performed by: INTERNAL MEDICINE

## 2019-10-21 PROCEDURE — 99213 PR OFFICE/OUTPT VISIT, EST, LEVL III, 20-29 MIN: ICD-10-PCS | Mod: 25,S$GLB,, | Performed by: INTERNAL MEDICINE

## 2019-10-21 PROCEDURE — 90686 FLU VACCINE (QUAD) GREATER THAN OR EQUAL TO 3YO PRESERVATIVE FREE IM: ICD-10-PCS | Mod: S$GLB,,, | Performed by: INTERNAL MEDICINE

## 2019-10-21 PROCEDURE — 90471 IMMUNIZATION ADMIN: CPT | Mod: S$GLB,,, | Performed by: INTERNAL MEDICINE

## 2019-10-21 PROCEDURE — 3008F BODY MASS INDEX DOCD: CPT | Mod: CPTII,S$GLB,, | Performed by: INTERNAL MEDICINE

## 2019-10-21 PROCEDURE — 99213 OFFICE O/P EST LOW 20 MIN: CPT | Mod: 25,S$GLB,, | Performed by: INTERNAL MEDICINE

## 2019-10-21 PROCEDURE — 3008F PR BODY MASS INDEX (BMI) DOCUMENTED: ICD-10-PCS | Mod: CPTII,S$GLB,, | Performed by: INTERNAL MEDICINE

## 2019-10-21 PROCEDURE — 3074F PR MOST RECENT SYSTOLIC BLOOD PRESSURE < 130 MM HG: ICD-10-PCS | Mod: CPTII,S$GLB,, | Performed by: INTERNAL MEDICINE

## 2019-10-21 PROCEDURE — 3074F SYST BP LT 130 MM HG: CPT | Mod: CPTII,S$GLB,, | Performed by: INTERNAL MEDICINE

## 2019-10-21 PROCEDURE — 90471 FLU VACCINE (QUAD) GREATER THAN OR EQUAL TO 3YO PRESERVATIVE FREE IM: ICD-10-PCS | Mod: S$GLB,,, | Performed by: INTERNAL MEDICINE

## 2019-10-21 PROCEDURE — 3079F PR MOST RECENT DIASTOLIC BLOOD PRESSURE 80-89 MM HG: ICD-10-PCS | Mod: CPTII,S$GLB,, | Performed by: INTERNAL MEDICINE

## 2019-10-21 PROCEDURE — 3079F DIAST BP 80-89 MM HG: CPT | Mod: CPTII,S$GLB,, | Performed by: INTERNAL MEDICINE

## 2019-10-21 PROCEDURE — 99999 PR PBB SHADOW E&M-EST. PATIENT-LVL III: CPT | Mod: PBBFAC,,, | Performed by: INTERNAL MEDICINE

## 2019-10-21 PROCEDURE — 90686 IIV4 VACC NO PRSV 0.5 ML IM: CPT | Mod: S$GLB,,, | Performed by: INTERNAL MEDICINE

## 2019-10-21 RX ORDER — METHYLPHENIDATE HYDROCHLORIDE 36 MG/1
TABLET ORAL
Qty: 60 TABLET | Refills: 0 | Status: SHIPPED | OUTPATIENT
Start: 2019-10-21 | End: 2019-11-21 | Stop reason: SDUPTHER

## 2019-10-21 RX ORDER — BUPROPION HYDROCHLORIDE 150 MG/1
TABLET ORAL
Refills: 1 | COMMUNITY
Start: 2019-10-14 | End: 2019-10-21

## 2019-10-21 RX ORDER — ESCITALOPRAM OXALATE 20 MG/1
20 TABLET ORAL NIGHTLY
Qty: 30 TABLET | Refills: 0
Start: 2019-10-21 | End: 2019-11-21 | Stop reason: SDUPTHER

## 2019-10-21 RX ORDER — BUPROPION HYDROCHLORIDE 150 MG/1
150 TABLET ORAL DAILY
Qty: 30 TABLET | Refills: 1
Start: 2019-10-21 | End: 2019-11-08

## 2019-10-21 NOTE — PROGRESS NOTES
Subjective:       Patient ID: Janki Jones is a 53 y.o. female.    Chief Complaint: Follow-up (1 month); Dizziness; and Epistaxis    HPI   Pt here for 1 month f/u regarding anxiety. Wellbutrin was added at last visit which she stopped 2/2 dizziness. She also stopped the Lexapro on accident.   Review of Systems   Constitutional: Negative for activity change, appetite change, chills, diaphoresis, fatigue, fever and unexpected weight change.   HENT: Negative for postnasal drip, rhinorrhea, sinus pressure, sneezing, sore throat, trouble swallowing and voice change.    Respiratory: Negative for cough, shortness of breath and wheezing.    Cardiovascular: Negative for chest pain, palpitations and leg swelling.   Gastrointestinal: Negative for abdominal pain, blood in stool, constipation, diarrhea, nausea and vomiting.   Genitourinary: Negative for dysuria.   Musculoskeletal: Negative for arthralgias and myalgias.   Skin: Negative for rash and wound.   Allergic/Immunologic: Negative for environmental allergies and food allergies.   Hematological: Negative for adenopathy. Does not bruise/bleed easily.   Psychiatric/Behavioral: Negative for self-injury and suicidal ideas. The patient is nervous/anxious.        Objective:      Physical Exam   Constitutional: She is oriented to person, place, and time. She appears well-developed and well-nourished. No distress.   HENT:   Head: Normocephalic and atraumatic.   Eyes: Pupils are equal, round, and reactive to light. Conjunctivae and EOM are normal. Right eye exhibits no discharge. Left eye exhibits no discharge. No scleral icterus.   Neck: Neck supple. No JVD present.   Cardiovascular: Normal rate, regular rhythm, normal heart sounds and intact distal pulses.   Pulmonary/Chest: Effort normal and breath sounds normal. No respiratory distress. She has no wheezes. She has no rales.   Musculoskeletal: She exhibits no edema.   Lymphadenopathy:     She has no cervical  adenopathy.   Neurological: She is alert and oriented to person, place, and time.   Skin: Skin is warm and dry. No rash noted. She is not diaphoretic. No pallor.       Assessment:       1. Adjustment disorder with mixed anxiety and depressed mood        Plan:    1. Pt advised to restart Wellbutrin/Lexapro together   2. F/u in 1 month

## 2019-11-08 RX ORDER — BUPROPION HYDROCHLORIDE 150 MG/1
TABLET ORAL
Qty: 30 TABLET | Refills: 0 | Status: SHIPPED | OUTPATIENT
Start: 2019-11-08 | End: 2019-11-21 | Stop reason: SDUPTHER

## 2019-11-11 DIAGNOSIS — I10 ESSENTIAL HYPERTENSION: ICD-10-CM

## 2019-11-11 RX ORDER — LOSARTAN POTASSIUM AND HYDROCHLOROTHIAZIDE 12.5; 5 MG/1; MG/1
TABLET ORAL
Qty: 90 TABLET | Refills: 0 | OUTPATIENT
Start: 2019-11-11

## 2019-11-21 ENCOUNTER — OFFICE VISIT (OUTPATIENT)
Dept: INTERNAL MEDICINE | Facility: CLINIC | Age: 54
End: 2019-11-21
Payer: COMMERCIAL

## 2019-11-21 VITALS
TEMPERATURE: 99 F | HEIGHT: 67 IN | BODY MASS INDEX: 25.53 KG/M2 | WEIGHT: 162.69 LBS | DIASTOLIC BLOOD PRESSURE: 80 MMHG | SYSTOLIC BLOOD PRESSURE: 136 MMHG | HEART RATE: 95 BPM | RESPIRATION RATE: 18 BRPM

## 2019-11-21 DIAGNOSIS — F90.1 ATTENTION DEFICIT HYPERACTIVITY DISORDER (ADHD), PREDOMINANTLY HYPERACTIVE TYPE: ICD-10-CM

## 2019-11-21 DIAGNOSIS — F43.23 ADJUSTMENT DISORDER WITH MIXED ANXIETY AND DEPRESSED MOOD: Primary | ICD-10-CM

## 2019-11-21 PROCEDURE — 3075F PR MOST RECENT SYSTOLIC BLOOD PRESS GE 130-139MM HG: ICD-10-PCS | Mod: CPTII,S$GLB,, | Performed by: INTERNAL MEDICINE

## 2019-11-21 PROCEDURE — 99999 PR PBB SHADOW E&M-EST. PATIENT-LVL III: CPT | Mod: PBBFAC,,, | Performed by: INTERNAL MEDICINE

## 2019-11-21 PROCEDURE — 3008F PR BODY MASS INDEX (BMI) DOCUMENTED: ICD-10-PCS | Mod: CPTII,S$GLB,, | Performed by: INTERNAL MEDICINE

## 2019-11-21 PROCEDURE — 3008F BODY MASS INDEX DOCD: CPT | Mod: CPTII,S$GLB,, | Performed by: INTERNAL MEDICINE

## 2019-11-21 PROCEDURE — 99999 PR PBB SHADOW E&M-EST. PATIENT-LVL III: ICD-10-PCS | Mod: PBBFAC,,, | Performed by: INTERNAL MEDICINE

## 2019-11-21 PROCEDURE — 3079F DIAST BP 80-89 MM HG: CPT | Mod: CPTII,S$GLB,, | Performed by: INTERNAL MEDICINE

## 2019-11-21 PROCEDURE — 3075F SYST BP GE 130 - 139MM HG: CPT | Mod: CPTII,S$GLB,, | Performed by: INTERNAL MEDICINE

## 2019-11-21 PROCEDURE — 3079F PR MOST RECENT DIASTOLIC BLOOD PRESSURE 80-89 MM HG: ICD-10-PCS | Mod: CPTII,S$GLB,, | Performed by: INTERNAL MEDICINE

## 2019-11-21 PROCEDURE — 99214 PR OFFICE/OUTPT VISIT, EST, LEVL IV, 30-39 MIN: ICD-10-PCS | Mod: S$GLB,,, | Performed by: INTERNAL MEDICINE

## 2019-11-21 PROCEDURE — 99214 OFFICE O/P EST MOD 30 MIN: CPT | Mod: S$GLB,,, | Performed by: INTERNAL MEDICINE

## 2019-11-21 RX ORDER — METHYLPHENIDATE HYDROCHLORIDE 36 MG/1
TABLET ORAL
Qty: 60 TABLET | Refills: 0 | Status: SHIPPED | OUTPATIENT
Start: 2019-11-21 | End: 2020-02-18 | Stop reason: SDUPTHER

## 2019-11-21 RX ORDER — ESCITALOPRAM OXALATE 20 MG/1
20 TABLET ORAL NIGHTLY
Qty: 90 TABLET | Refills: 1 | Status: SHIPPED | OUTPATIENT
Start: 2019-11-21 | End: 2020-06-10

## 2019-11-21 RX ORDER — METHYLPHENIDATE HYDROCHLORIDE 36 MG/1
36 TABLET ORAL 2 TIMES DAILY
Qty: 60 TABLET | Refills: 0 | Status: SHIPPED | OUTPATIENT
Start: 2019-12-21 | End: 2020-02-18 | Stop reason: SDUPTHER

## 2019-11-21 RX ORDER — METHYLPHENIDATE HYDROCHLORIDE 36 MG/1
36 TABLET ORAL 2 TIMES DAILY
Qty: 60 TABLET | Refills: 0 | Status: SHIPPED | OUTPATIENT
Start: 2020-01-21 | End: 2020-02-18 | Stop reason: SDUPTHER

## 2019-11-21 RX ORDER — BUPROPION HYDROCHLORIDE 150 MG/1
TABLET ORAL
Qty: 90 TABLET | Refills: 1 | Status: SHIPPED | OUTPATIENT
Start: 2019-11-21 | End: 2020-06-10

## 2019-11-21 NOTE — PROGRESS NOTES
Subjective:       Patient ID: Janki Jones is a 53 y.o. female.    Chief Complaint: Follow-up (1 month)    HPI   Pt with ADD is here for 1 month f/u regarding anxiety. She has been on both Wellbutrin/Lexapro which has helped some but overall she is still very anxious and would like to see a Psychiatrist for further management. No SI/HI.   Review of Systems   Constitutional: Negative for activity change, appetite change, chills, diaphoresis, fatigue, fever and unexpected weight change.   HENT: Negative for postnasal drip, rhinorrhea, sinus pressure, sneezing, sore throat, trouble swallowing and voice change.    Respiratory: Negative for cough, shortness of breath and wheezing.    Cardiovascular: Negative for chest pain, palpitations and leg swelling.   Gastrointestinal: Negative for abdominal pain, blood in stool, constipation, diarrhea, nausea and vomiting.   Genitourinary: Negative for dysuria.   Musculoskeletal: Negative for arthralgias and myalgias.   Skin: Negative for rash and wound.   Allergic/Immunologic: Negative for environmental allergies and food allergies.   Hematological: Negative for adenopathy. Does not bruise/bleed easily.   Psychiatric/Behavioral: Positive for decreased concentration. Negative for self-injury and suicidal ideas. The patient is nervous/anxious.        Objective:      Physical Exam   Constitutional: She is oriented to person, place, and time. She appears well-developed and well-nourished. No distress.   HENT:   Head: Normocephalic and atraumatic.   Eyes: Pupils are equal, round, and reactive to light. Conjunctivae and EOM are normal. Right eye exhibits no discharge. Left eye exhibits no discharge. No scleral icterus.   Neck: Neck supple. No JVD present.   Cardiovascular: Normal rate, regular rhythm, normal heart sounds and intact distal pulses.   Pulmonary/Chest: Effort normal and breath sounds normal. No respiratory distress. She has no wheezes. She has no rales.    Abdominal: Soft. Bowel sounds are normal. There is no tenderness.   Musculoskeletal: She exhibits no edema.   Lymphadenopathy:     She has no cervical adenopathy.   Neurological: She is alert and oriented to person, place, and time.   Skin: Skin is warm and dry. No rash noted. She is not diaphoretic. No pallor.       Assessment:       1. Adjustment disorder with mixed anxiety and depressed mood    2. Attention deficit hyperactivity disorder (ADHD), predominantly hyperactive type        Plan:    1. Fair control, referral to Psyc   2. Stable, refill Concerta

## 2019-12-15 DIAGNOSIS — I10 ESSENTIAL HYPERTENSION: ICD-10-CM

## 2019-12-16 ENCOUNTER — TELEPHONE (OUTPATIENT)
Dept: INTERNAL MEDICINE | Facility: CLINIC | Age: 54
End: 2019-12-16

## 2019-12-16 DIAGNOSIS — I10 ESSENTIAL HYPERTENSION: ICD-10-CM

## 2019-12-16 RX ORDER — LOSARTAN POTASSIUM AND HYDROCHLOROTHIAZIDE 25; 100 MG/1; MG/1
TABLET ORAL
Qty: 90 TABLET | Refills: 0 | OUTPATIENT
Start: 2019-12-16

## 2019-12-16 RX ORDER — LOSARTAN POTASSIUM AND HYDROCHLOROTHIAZIDE 25; 100 MG/1; MG/1
1 TABLET ORAL DAILY
Qty: 90 TABLET | Refills: 0 | Status: SHIPPED | OUTPATIENT
Start: 2019-12-16 | End: 2020-02-19

## 2019-12-16 NOTE — TELEPHONE ENCOUNTER
----- Message from Michael Oneill sent at 12/16/2019  9:00 AM CST -----  Contact: self   Patient is calling for an RX refill or new RX.  Is this a refill or new RX:    RX name and strength: losartan-hydrochlorothiazide 100-25 mg (HYZAAR) 100-25 mg per tablet  Directions (copy/paste from chart):  Take 1 tablet by mouth once daily  Is this a 30 day or 90 day RX:    Local pharmacy or mail order pharmacy:[phone] (Fax) local  Pharmacy name and phone # (copy/paste from chart):   [id number] - [place], [place] - [address] [place] [address] AT [facility] [phone] (Phone)  [phone] (Fax)  Comments:

## 2019-12-16 NOTE — TELEPHONE ENCOUNTER
----- Message from Iza Henderson LPN sent at 12/16/2019  2:37 PM CST -----  Contact: [facility] [phone]  Dr Frausto said medication can be  into two rx, for losartan and HCTZ separately.    ----- Message -----  From: Samantha Alvares  Sent: 12/16/2019  11:36 AM CST  To: Jett Pacheco Staff    Pharmacy is calling to clarify an RX.  RX name:  losartan-hydrochlorothiazide 100-25 mg (HYZAAR) 100-25 mg per tablet  What do they need to clarify:  Medication on back order, will like to change it   Comments:

## 2020-02-17 ENCOUNTER — TELEPHONE (OUTPATIENT)
Dept: INTERNAL MEDICINE | Facility: CLINIC | Age: 55
End: 2020-02-17

## 2020-02-17 NOTE — TELEPHONE ENCOUNTER
----- Message from Prudence Do sent at 2/17/2020  2:57 PM CST -----  Contact: Pt [phone]  Patient is calling for an RX refill or new RX.  Is this a refill or new RX:  Refill  RX name and strength: methylphenidate HCl (CONCERTA) 36 MG CR tablet  Directions (copy/paste from chart):    Is this a 30 day or 90 day RX:  30  Local pharmacy or mail order pharmacy:    Pharmacy name and phone [id number] - [address] [phone] (Phone)  [phone] (Fax)

## 2020-02-18 ENCOUNTER — OFFICE VISIT (OUTPATIENT)
Dept: INTERNAL MEDICINE | Facility: CLINIC | Age: 55
End: 2020-02-18
Payer: COMMERCIAL

## 2020-02-18 VITALS
DIASTOLIC BLOOD PRESSURE: 88 MMHG | BODY MASS INDEX: 26.09 KG/M2 | RESPIRATION RATE: 18 BRPM | SYSTOLIC BLOOD PRESSURE: 130 MMHG | HEIGHT: 67 IN | HEART RATE: 84 BPM | WEIGHT: 166.25 LBS | TEMPERATURE: 98 F

## 2020-02-18 DIAGNOSIS — I10 ESSENTIAL HYPERTENSION: ICD-10-CM

## 2020-02-18 DIAGNOSIS — F90.1 ATTENTION DEFICIT HYPERACTIVITY DISORDER (ADHD), PREDOMINANTLY HYPERACTIVE TYPE: Primary | ICD-10-CM

## 2020-02-18 DIAGNOSIS — F41.1 ANXIETY STATE: ICD-10-CM

## 2020-02-18 PROCEDURE — 3075F PR MOST RECENT SYSTOLIC BLOOD PRESS GE 130-139MM HG: ICD-10-PCS | Mod: CPTII,S$GLB,, | Performed by: INTERNAL MEDICINE

## 2020-02-18 PROCEDURE — 99214 PR OFFICE/OUTPT VISIT, EST, LEVL IV, 30-39 MIN: ICD-10-PCS | Mod: S$GLB,,, | Performed by: INTERNAL MEDICINE

## 2020-02-18 PROCEDURE — 99999 PR PBB SHADOW E&M-EST. PATIENT-LVL III: ICD-10-PCS | Mod: PBBFAC,,, | Performed by: INTERNAL MEDICINE

## 2020-02-18 PROCEDURE — 3008F PR BODY MASS INDEX (BMI) DOCUMENTED: ICD-10-PCS | Mod: CPTII,S$GLB,, | Performed by: INTERNAL MEDICINE

## 2020-02-18 PROCEDURE — 3008F BODY MASS INDEX DOCD: CPT | Mod: CPTII,S$GLB,, | Performed by: INTERNAL MEDICINE

## 2020-02-18 PROCEDURE — 3079F DIAST BP 80-89 MM HG: CPT | Mod: CPTII,S$GLB,, | Performed by: INTERNAL MEDICINE

## 2020-02-18 PROCEDURE — 99999 PR PBB SHADOW E&M-EST. PATIENT-LVL III: CPT | Mod: PBBFAC,,, | Performed by: INTERNAL MEDICINE

## 2020-02-18 PROCEDURE — 99214 OFFICE O/P EST MOD 30 MIN: CPT | Mod: S$GLB,,, | Performed by: INTERNAL MEDICINE

## 2020-02-18 PROCEDURE — 3075F SYST BP GE 130 - 139MM HG: CPT | Mod: CPTII,S$GLB,, | Performed by: INTERNAL MEDICINE

## 2020-02-18 PROCEDURE — 3079F PR MOST RECENT DIASTOLIC BLOOD PRESSURE 80-89 MM HG: ICD-10-PCS | Mod: CPTII,S$GLB,, | Performed by: INTERNAL MEDICINE

## 2020-02-18 RX ORDER — METHYLPHENIDATE HYDROCHLORIDE 36 MG/1
36 TABLET ORAL DAILY
Qty: 30 TABLET | Refills: 0 | Status: SHIPPED | OUTPATIENT
Start: 2020-04-18 | End: 2020-10-06 | Stop reason: SDUPTHER

## 2020-02-18 RX ORDER — LOSARTAN POTASSIUM 100 MG/1
TABLET ORAL
COMMUNITY
Start: 2019-12-16 | End: 2021-02-05 | Stop reason: SDUPTHER

## 2020-02-18 RX ORDER — METHYLPHENIDATE HYDROCHLORIDE 36 MG/1
36 TABLET ORAL DAILY
Qty: 30 TABLET | Refills: 0 | Status: SHIPPED | OUTPATIENT
Start: 2020-03-18 | End: 2020-10-06 | Stop reason: SDUPTHER

## 2020-02-18 RX ORDER — METHYLPHENIDATE HYDROCHLORIDE 36 MG/1
36 TABLET ORAL DAILY
Qty: 30 TABLET | Refills: 0 | Status: SHIPPED | OUTPATIENT
Start: 2020-02-18 | End: 2020-10-20 | Stop reason: SDUPTHER

## 2020-02-18 RX ORDER — HYDROCHLOROTHIAZIDE 25 MG/1
TABLET ORAL
COMMUNITY
Start: 2019-12-16 | End: 2020-10-06

## 2020-02-18 NOTE — PROGRESS NOTES
Subjective:       Patient ID: Janki Jones is a 54 y.o. female.    Chief Complaint: Medication Refill and Jaw Pain (right side, grinding teeth)    HPI   Pt with ADD, HTN, Anxiety is here for f/u. She is requesting refills of her Concerta which has been controlling her symptoms without any SE's.   Review of Systems   Constitutional: Negative for activity change, appetite change, chills, diaphoresis, fatigue, fever and unexpected weight change.   HENT: Negative for postnasal drip, rhinorrhea, sinus pressure, sneezing, sore throat, trouble swallowing and voice change.    Respiratory: Negative for cough, shortness of breath and wheezing.    Cardiovascular: Negative for chest pain, palpitations and leg swelling.   Gastrointestinal: Negative for abdominal pain, blood in stool, constipation, diarrhea, nausea and vomiting.   Genitourinary: Negative for dysuria.   Musculoskeletal: Negative for arthralgias and myalgias.   Skin: Negative for rash and wound.   Allergic/Immunologic: Negative for environmental allergies and food allergies.   Hematological: Negative for adenopathy. Does not bruise/bleed easily.   Psychiatric/Behavioral: Positive for decreased concentration. Negative for self-injury and suicidal ideas. The patient is nervous/anxious.        Objective:      Physical Exam   Constitutional: She is oriented to person, place, and time. She appears well-developed and well-nourished. No distress.   HENT:   Head: Normocephalic and atraumatic.   Eyes: Pupils are equal, round, and reactive to light. Conjunctivae and EOM are normal. Right eye exhibits no discharge. Left eye exhibits no discharge. No scleral icterus.   Neck: Neck supple. No JVD present.   Cardiovascular: Normal rate, regular rhythm, normal heart sounds and intact distal pulses.   Pulmonary/Chest: Effort normal and breath sounds normal. No respiratory distress. She has no wheezes. She has no rales.   Musculoskeletal: She exhibits no edema.    Lymphadenopathy:     She has no cervical adenopathy.   Neurological: She is alert and oriented to person, place, and time.   Skin: Skin is warm and dry. No rash noted. She is not diaphoretic. No pallor.       Assessment:       1. Attention deficit hyperactivity disorder (ADHD), predominantly hyperactive type    2. Essential hypertension    3. Anxiety state        Plan:    1. Stable, refill Concerta   2. Controlled on current meds   3. Stable, continue present therapy

## 2020-02-24 ENCOUNTER — OFFICE VISIT (OUTPATIENT)
Dept: OPTOMETRY | Facility: CLINIC | Age: 55
End: 2020-02-24
Payer: COMMERCIAL

## 2020-02-24 DIAGNOSIS — H52.4 PRESBYOPIA: ICD-10-CM

## 2020-02-24 DIAGNOSIS — H04.123 DRY EYE SYNDROME OF BOTH EYES: Primary | ICD-10-CM

## 2020-02-24 PROCEDURE — 99999 PR PBB SHADOW E&M-EST. PATIENT-LVL II: ICD-10-PCS | Mod: PBBFAC,,, | Performed by: OPTOMETRIST

## 2020-02-24 PROCEDURE — 92014 PR EYE EXAM, EST PATIENT,COMPREHESV: ICD-10-PCS | Mod: S$GLB,,, | Performed by: OPTOMETRIST

## 2020-02-24 PROCEDURE — 92015 DETERMINE REFRACTIVE STATE: CPT | Mod: S$GLB,,, | Performed by: OPTOMETRIST

## 2020-02-24 PROCEDURE — 99999 PR PBB SHADOW E&M-EST. PATIENT-LVL II: CPT | Mod: PBBFAC,,, | Performed by: OPTOMETRIST

## 2020-02-24 PROCEDURE — 92015 PR REFRACTION: ICD-10-PCS | Mod: S$GLB,,, | Performed by: OPTOMETRIST

## 2020-02-24 PROCEDURE — 92014 COMPRE OPH EXAM EST PT 1/>: CPT | Mod: S$GLB,,, | Performed by: OPTOMETRIST

## 2020-03-23 NOTE — PROGRESS NOTES
VILMA SMITH 12/10/2018 with Dr. Jones. Patient reports she's been having   burning, tearing, and itching that has been going on for 2 months. Patient   reports she uses Systane bid OU. Also reports glare especially with car   headlights. Patient does not wear correction for distance but reports good   distance vision. Wears +2.00 OTC readers for near, reports good near   vision with them. Patient however is interested in getting bifocals.   Denies flashes, has occasional floaters OS>OD.     Last edited by Yovana Borrero, OD on 2/24/2020  3:32 PM. (History)            Assessment /Plan     For exam results, see Encounter Report.    Dry eye syndrome of both eyes    Presbyopia      1. Educated pt on findings. Recommended increasing Systane use to 3-4x/day and using gel drop qhs for added lubrication and comfort. RTC if symptoms persist.   2. Prescribe Final Spec Rx. Educated patient on changes. RTC in 1 yr for annual eye exam or prn.

## 2020-04-14 DIAGNOSIS — I10 ESSENTIAL HYPERTENSION: ICD-10-CM

## 2020-04-14 RX ORDER — LOSARTAN POTASSIUM AND HYDROCHLOROTHIAZIDE 25; 100 MG/1; MG/1
TABLET ORAL
Qty: 90 TABLET | Refills: 3 | Status: SHIPPED | OUTPATIENT
Start: 2020-04-14 | End: 2020-10-06

## 2020-10-05 NOTE — PROGRESS NOTES
CC: Annual PE    Pt is a 54 y.o. female  With active medical problems of HTN, GERD, Anxiety, MDD, recurrent UTI  presents for PE. Exerts teeth grinding, have been trying mouth guards, associated with right sided headache. Exerts that she needs to see her dentist again. Pt admits to anxiety.  Was offered a referral to psychiatry at the last visit back in February 2020, but pt did not go to psychiatry due to COVID. However, she is now willing to see psychiatry.  She is aware that she needs to relax but also a lot of social stressors at home. Have tried family therapy in the past with some improvement. However,  might not want to participate again. Exert suicidal ideation, no plan, no prior attempts. No homicidal ideation. Has a gun at home. Currently in menopause, last menstrual cycle was more than one year ago. Pt states that she has not been taking her antidepressants for a few days, and just recently restarted. She currently does not need any medication refills. Pt denies chest pain, sob, n/v/d, constipation, fever, abdominal pain, dysuria, urinary frequency.     Non smoker  NonETOH    HEALTH MAINTENANCE:  Cholesterol/labs: due  C-scope: Feb 2019, to repeat colonoscopy in 5 years   WWE: Sept 2018, repeat PAP in 3 years  Mammo: due   DEXA: n/a   EYE exam: Feb 2020  DDS exam: due   VACCINATIONS:  TD/TDAP: May 2017  Flu: due  Pneumovax: n/a  Prevanr: n/a  Shingles: due    MEDCARD: Reviewed  ROS:  No fever, chills, or night sweats  No visual disturbance or d/c  No ear or sinus pain or pressure  No dysphagia or early satiety  No chest pain or palpitations  No cough or wheezing  No PND or orthopnea  No GERD or abdominal pain  No change in bowels or blood in stool  No hematuria or dysuria;  +  Nocturia  No vaginal bleeding or pelvic pain or bloating  No skin rashes or lesions  No joint swelling or erythema  No unusual HA or focal deficits  No cold or heat intolerance  No increased thirst or urination  No unusual  bruising or bleeding      ADLs: no limitations, 100% independent  Advance directive, living will: none   Memory:   Mental health:   Depression: PHQ9 (0 - 3 for each, considering last 2 weeks)  Little interest, pleasure during: 3  Feeling down, depressed, hopeless: 3  Trouble falling or staying asleep, sleeping too much: 3  Feeling tired, little energy: 3  Poor appetite or overeatin  Feeling bad about yourself, feelings of failure: 2  Trouble concentrating (watching TV, reading a book): 3  Moving/speaking slowly or restlessness that others notice: 0  Thoughts that you'd be better off dead, hurting yourself: 2  ** 0 = never, 1 = several day, 2 = more than half the days, 3 = nearly every day **  Safety: pt states she feels safe at home   Gait: wnl, no falls  Diet: wide variety, lots of vegetables.   Exercise: no exercise   Urinary incontinence: none     Remainder of review negative except as previously noted    PMHX:  Past Medical History:   Diagnosis Date    Abnormal Pap smear     Abnormal Pap smear of cervix     ADD (attention deficit disorder)     Anxiety     Depression     Fibroids     Herpes simplex virus (HSV) infection     HSV1.    Hypertension        PSHX:   Past Surgical History:   Procedure Laterality Date    CERVICAL BIOPSY  W/ LOOP ELECTRODE EXCISION      COLONOSCOPY N/A 2/15/2019    Procedure: COLONOSCOPY;  Surgeon: Uri Torrez MD;  Location[address];  Service: Endoscopy;  Laterality: N/A;    essure      TUBAL LIGATION         SHX:   Relationships   Social connections    Talks on phone: Not on file    Gets together: Not on file    Attends Quaker service: Not on file    Active member of club or organization: Not on file    Attends meetings of clubs or organizations: Not on file    Relationship status: Not on file           PE:  VS: As noted  GENERAL: Well developed well nourished, alert and oriented in NAD  Conversant and co-operative  EYES: Conjunctiva and  lids normal, sclera anicteric, PERRL, EOMI  ENT: Hearing intact; canals free of cerumen , TM's unremarkable  Nasal mucosa/turbinates/septum unremarkable; oropharynx w/o lesions or stridor  Sinus nontender  NECK: Supple w/o thyromegaly or lymphadenopathy  RESPIRATORY: Efforts are unlabored; lungs are CTAP  CARDIOVASCULAR: Heart RRR w/o mumur, gallop or rub; No carotid bruits noted  1+ pedal pulses; no LE edema noted  GASTROINTESTINAL: Bowel sounds are present, soft, nontender, nondistended  No hepatosplenomegaly noted.  Rectum w/o lesions, heme negative  BREASTS: On inspection; no erythema, asymmetry, or puckering                      On palpation; no warmth, masses , or discharge  LYMPH NODES: No cervical or axillary lymph nodes noted.  : External genitalia w/ normal hair distribution, no lesions or irritation noted  Vault w/o lesions or stenosis; cervix nontender to palpation,  no friability noted   Uterus midline , nontender. Adnexal w/o masses or tenderness  MUSCULOSKELETAL: Gait normal. No clubbing, cyanosis, or edema noted.  NEUROLOGIC: ESPINOZA. No tremor noted    IMPRESSION:    Pt was seen today for annual exam.     Diagnoses and all orders for this visit:    Annual physical exam  - CBC auto differential; Future  - Comprehensive Metabolic Panel; Future  - Hemoglobin A1C; Future  - Lipid Panel; Future  - TSH; Future  - Urinalysis; Future  - Urinalysis Microscopic; Future  - Vitamin D; Future    Major depressive disorder, recurrent episode, moderate  - Ambulatory referral/consult to Psychiatry; Future  - Pt exerts suicidal ideation, with no current plan, and no prior attempt.  does have a gun at home but it is locked.   - Continue with current medication of Lexapro 20 mg, Wellbutrin 20 mg    - Pt admitted she did stop her medication for a few days and is now back on her medications   - Pt advised to see psychiatry for further evaluation and management; to make an appointment as soon as possible; phone  "number given  - Pt advised to not abruptly stopping her antidepressive medications  - Phone number for psychiatry given; various resources of psychiatry were discussed with patient and she expressed understanding and awareness as multiple of family members also has mental illnesses and all are seeing psychiatry     Anxiety  - Continue with current medication of Wellbutrin 20 mg and Lexapro 20 mg   - See Major depressive disorder     Attention deficit hyperactivity disorder (ADHD), predominantly hyperactive type  - Ambulatory referral/consult to Psychiatry; Future  - Continue with current therapy of methylphenidate 36 mg QD  - Might need some medication adjustment given pt continues to exert ADHD symptoms; to defer to psychiatry for this    Adjustment disorder with mixed anxiety and depressed mood  - Ambulatory referral/consult to Psychiatry; Future  - See MDD     Well woman exam  - Ambulatory referral/consult to Obstetrics / Gynecology; Future  - Last WWE in 2018    Essential hypertension  -BP today: /84 (BP Location: Left arm, Patient Position: Sitting, BP Method: Medium (Manual))   Pulse 87   Temp 97.3 °F (36.3 °C) (Temporal)   Resp 20   Ht 5' 7" (1.702 m)   Wt 76.1 kg (167 lb 12.3 oz)   SpO2 100%   BMI 26.28 kg/m²   -HTN well controlled, pt advised to continue current management of losartan 100 mg  -Pt advised to be compliant with their treatment regimen daily to avoid BP fluctuation and any complications.   -Pt advised to monitor their blood pressure regularly and bring their recorded results to next office visit.  -Pt educated that it is important to eat right. Following a reasonable-calorie low-salt diet (Such as the DASH diet) has been shown to control blood pressure better with less medications. Recommended to exercise at least 30 mins a day for 5 days a week and also told to avoid smoking all together.    Gastroesophageal reflux disease, unspecified whether esophagitis present  - Continue with " current therapy OTC omeprazole 20 mg QD     Need for hepatitis C screening test  - Hepatitis C antibody; Future    Need for influenza vaccination  - Flu vaccine given in office today         Chelo Griffin D.O  Internal Medicine PGY-1  [facility]

## 2020-10-06 ENCOUNTER — OFFICE VISIT (OUTPATIENT)
Dept: INTERNAL MEDICINE | Facility: CLINIC | Age: 55
End: 2020-10-06
Payer: COMMERCIAL

## 2020-10-06 ENCOUNTER — LAB VISIT (OUTPATIENT)
Dept: LAB | Facility: HOSPITAL | Age: 55
End: 2020-10-06
Attending: INTERNAL MEDICINE
Payer: COMMERCIAL

## 2020-10-06 VITALS
RESPIRATION RATE: 20 BRPM | OXYGEN SATURATION: 100 % | BODY MASS INDEX: 26.33 KG/M2 | HEART RATE: 87 BPM | WEIGHT: 167.75 LBS | HEIGHT: 67 IN | SYSTOLIC BLOOD PRESSURE: 138 MMHG | DIASTOLIC BLOOD PRESSURE: 84 MMHG | TEMPERATURE: 97 F

## 2020-10-06 DIAGNOSIS — Z11.59 NEED FOR HEPATITIS C SCREENING TEST: ICD-10-CM

## 2020-10-06 DIAGNOSIS — Z00.00 ANNUAL PHYSICAL EXAM: ICD-10-CM

## 2020-10-06 DIAGNOSIS — Z23 NEED FOR INFLUENZA VACCINATION: ICD-10-CM

## 2020-10-06 DIAGNOSIS — F33.1 MAJOR DEPRESSIVE DISORDER, RECURRENT EPISODE, MODERATE: ICD-10-CM

## 2020-10-06 DIAGNOSIS — F43.23 ADJUSTMENT DISORDER WITH MIXED ANXIETY AND DEPRESSED MOOD: ICD-10-CM

## 2020-10-06 DIAGNOSIS — Z01.419 WELL WOMAN EXAM: ICD-10-CM

## 2020-10-06 DIAGNOSIS — I10 ESSENTIAL HYPERTENSION: ICD-10-CM

## 2020-10-06 DIAGNOSIS — K21.9 GASTROESOPHAGEAL REFLUX DISEASE, UNSPECIFIED WHETHER ESOPHAGITIS PRESENT: ICD-10-CM

## 2020-10-06 DIAGNOSIS — F41.1 ANXIETY STATE: ICD-10-CM

## 2020-10-06 DIAGNOSIS — Z00.00 ANNUAL PHYSICAL EXAM: Primary | ICD-10-CM

## 2020-10-06 DIAGNOSIS — F90.1 ATTENTION DEFICIT HYPERACTIVITY DISORDER (ADHD), PREDOMINANTLY HYPERACTIVE TYPE: ICD-10-CM

## 2020-10-06 LAB
ALBUMIN SERPL BCP-MCNC: 4.5 G/DL (ref 3.5–5.2)
ALP SERPL-CCNC: 119 U/L (ref 55–135)
ALT SERPL W/O P-5'-P-CCNC: 71 U/L (ref 10–44)
ANION GAP SERPL CALC-SCNC: 17 MMOL/L (ref 8–16)
AST SERPL-CCNC: 53 U/L (ref 10–40)
BASOPHILS # BLD AUTO: 0.03 K/UL (ref 0–0.2)
BASOPHILS NFR BLD: 0.4 % (ref 0–1.9)
BILIRUB SERPL-MCNC: 0.4 MG/DL (ref 0.1–1)
BUN SERPL-MCNC: 13 MG/DL (ref 6–20)
CALCIUM SERPL-MCNC: 10.1 MG/DL (ref 8.7–10.5)
CHLORIDE SERPL-SCNC: 99 MMOL/L (ref 95–110)
CHOLEST SERPL-MCNC: 240 MG/DL (ref 120–199)
CHOLEST/HDLC SERPL: 4.6 {RATIO} (ref 2–5)
CO2 SERPL-SCNC: 27 MMOL/L (ref 23–29)
CREAT SERPL-MCNC: 0.8 MG/DL (ref 0.5–1.4)
DIFFERENTIAL METHOD: ABNORMAL
EOSINOPHIL # BLD AUTO: 0.1 K/UL (ref 0–0.5)
EOSINOPHIL NFR BLD: 0.9 % (ref 0–8)
ERYTHROCYTE [DISTWIDTH] IN BLOOD BY AUTOMATED COUNT: 13.1 % (ref 11.5–14.5)
EST. GFR  (AFRICAN AMERICAN): >60 ML/MIN/1.73 M^2
EST. GFR  (NON AFRICAN AMERICAN): >60 ML/MIN/1.73 M^2
GLUCOSE SERPL-MCNC: 84 MG/DL (ref 70–110)
HCT VFR BLD AUTO: 40.8 % (ref 37–48.5)
HDLC SERPL-MCNC: 52 MG/DL (ref 40–75)
HDLC SERPL: 21.7 % (ref 20–50)
HGB BLD-MCNC: 12.4 G/DL (ref 12–16)
IMM GRANULOCYTES # BLD AUTO: 0.01 K/UL (ref 0–0.04)
IMM GRANULOCYTES NFR BLD AUTO: 0.1 % (ref 0–0.5)
LDLC SERPL CALC-MCNC: 166.4 MG/DL (ref 63–159)
LYMPHOCYTES # BLD AUTO: 3.6 K/UL (ref 1–4.8)
LYMPHOCYTES NFR BLD: 43.6 % (ref 18–48)
MCH RBC QN AUTO: 28.8 PG (ref 27–31)
MCHC RBC AUTO-ENTMCNC: 30.4 G/DL (ref 32–36)
MCV RBC AUTO: 95 FL (ref 82–98)
MONOCYTES # BLD AUTO: 0.5 K/UL (ref 0.3–1)
MONOCYTES NFR BLD: 6.6 % (ref 4–15)
NEUTROPHILS # BLD AUTO: 4 K/UL (ref 1.8–7.7)
NEUTROPHILS NFR BLD: 48.4 % (ref 38–73)
NONHDLC SERPL-MCNC: 188 MG/DL
NRBC BLD-RTO: 0 /100 WBC
PLATELET # BLD AUTO: 235 K/UL (ref 150–350)
PMV BLD AUTO: 13.1 FL (ref 9.2–12.9)
POTASSIUM SERPL-SCNC: 3.3 MMOL/L (ref 3.5–5.1)
PROT SERPL-MCNC: 8.7 G/DL (ref 6–8.4)
RBC # BLD AUTO: 4.31 M/UL (ref 4–5.4)
SODIUM SERPL-SCNC: 143 MMOL/L (ref 136–145)
TRIGL SERPL-MCNC: 108 MG/DL (ref 30–150)
TSH SERPL DL<=0.005 MIU/L-ACNC: 1.1 UIU/ML (ref 0.4–4)
WBC # BLD AUTO: 8.21 K/UL (ref 3.9–12.7)

## 2020-10-06 PROCEDURE — 80061 LIPID PANEL: CPT

## 2020-10-06 PROCEDURE — 82306 VITAMIN D 25 HYDROXY: CPT

## 2020-10-06 PROCEDURE — 86803 HEPATITIS C AB TEST: CPT

## 2020-10-06 PROCEDURE — 84443 ASSAY THYROID STIM HORMONE: CPT

## 2020-10-06 PROCEDURE — 3008F PR BODY MASS INDEX (BMI) DOCUMENTED: ICD-10-PCS | Mod: CPTII,S$GLB,, | Performed by: STUDENT IN AN ORGANIZED HEALTH CARE EDUCATION/TRAINING PROGRAM

## 2020-10-06 PROCEDURE — 99999 PR PBB SHADOW E&M-EST. PATIENT-LVL V: ICD-10-PCS | Mod: PBBFAC,,, | Performed by: STUDENT IN AN ORGANIZED HEALTH CARE EDUCATION/TRAINING PROGRAM

## 2020-10-06 PROCEDURE — 3079F DIAST BP 80-89 MM HG: CPT | Mod: CPTII,S$GLB,, | Performed by: STUDENT IN AN ORGANIZED HEALTH CARE EDUCATION/TRAINING PROGRAM

## 2020-10-06 PROCEDURE — 3079F PR MOST RECENT DIASTOLIC BLOOD PRESSURE 80-89 MM HG: ICD-10-PCS | Mod: CPTII,S$GLB,, | Performed by: STUDENT IN AN ORGANIZED HEALTH CARE EDUCATION/TRAINING PROGRAM

## 2020-10-06 PROCEDURE — 90686 FLU VACCINE (QUAD) GREATER THAN OR EQUAL TO 3YO PRESERVATIVE FREE IM: ICD-10-PCS | Mod: S$GLB,,, | Performed by: INTERNAL MEDICINE

## 2020-10-06 PROCEDURE — 85025 COMPLETE CBC W/AUTO DIFF WBC: CPT

## 2020-10-06 PROCEDURE — 80053 COMPREHEN METABOLIC PANEL: CPT

## 2020-10-06 PROCEDURE — 90471 IMMUNIZATION ADMIN: CPT | Mod: S$GLB,,, | Performed by: INTERNAL MEDICINE

## 2020-10-06 PROCEDURE — 90471 FLU VACCINE (QUAD) GREATER THAN OR EQUAL TO 3YO PRESERVATIVE FREE IM: ICD-10-PCS | Mod: S$GLB,,, | Performed by: INTERNAL MEDICINE

## 2020-10-06 PROCEDURE — 83036 HEMOGLOBIN GLYCOSYLATED A1C: CPT

## 2020-10-06 PROCEDURE — 99396 PR PREVENTIVE VISIT,EST,40-64: ICD-10-PCS | Mod: 25,S$GLB,, | Performed by: STUDENT IN AN ORGANIZED HEALTH CARE EDUCATION/TRAINING PROGRAM

## 2020-10-06 PROCEDURE — 99396 PREV VISIT EST AGE 40-64: CPT | Mod: 25,S$GLB,, | Performed by: STUDENT IN AN ORGANIZED HEALTH CARE EDUCATION/TRAINING PROGRAM

## 2020-10-06 PROCEDURE — 3075F SYST BP GE 130 - 139MM HG: CPT | Mod: CPTII,S$GLB,, | Performed by: STUDENT IN AN ORGANIZED HEALTH CARE EDUCATION/TRAINING PROGRAM

## 2020-10-06 PROCEDURE — 3008F BODY MASS INDEX DOCD: CPT | Mod: CPTII,S$GLB,, | Performed by: STUDENT IN AN ORGANIZED HEALTH CARE EDUCATION/TRAINING PROGRAM

## 2020-10-06 PROCEDURE — 99999 PR PBB SHADOW E&M-EST. PATIENT-LVL V: CPT | Mod: PBBFAC,,, | Performed by: STUDENT IN AN ORGANIZED HEALTH CARE EDUCATION/TRAINING PROGRAM

## 2020-10-06 PROCEDURE — 90686 IIV4 VACC NO PRSV 0.5 ML IM: CPT | Mod: S$GLB,,, | Performed by: INTERNAL MEDICINE

## 2020-10-06 PROCEDURE — 3075F PR MOST RECENT SYSTOLIC BLOOD PRESS GE 130-139MM HG: ICD-10-PCS | Mod: CPTII,S$GLB,, | Performed by: STUDENT IN AN ORGANIZED HEALTH CARE EDUCATION/TRAINING PROGRAM

## 2020-10-06 PROCEDURE — 36415 COLL VENOUS BLD VENIPUNCTURE: CPT | Mod: PO

## 2020-10-06 RX ORDER — OMEPRAZOLE 20 MG/1
20 TABLET, DELAYED RELEASE ORAL 2 TIMES DAILY PRN
COMMUNITY
End: 2022-01-26

## 2020-10-06 NOTE — PROGRESS NOTES
I have interviewed and examined the patient w/ the resident,   I agree w/ the impression and plan as outlined above.   Gris Dukes MD- Staff

## 2020-10-07 LAB
25(OH)D3+25(OH)D2 SERPL-MCNC: 40 NG/ML (ref 30–96)
ESTIMATED AVG GLUCOSE: 114 MG/DL (ref 68–131)
HBA1C MFR BLD HPLC: 5.6 % (ref 4–5.6)

## 2020-10-08 ENCOUNTER — TELEPHONE (OUTPATIENT)
Dept: INTERNAL MEDICINE | Facility: CLINIC | Age: 55
End: 2020-10-08

## 2020-10-08 DIAGNOSIS — E78.5 HYPERLIPIDEMIA, UNSPECIFIED HYPERLIPIDEMIA TYPE: ICD-10-CM

## 2020-10-08 DIAGNOSIS — R74.01 TRANSAMINITIS: ICD-10-CM

## 2020-10-08 DIAGNOSIS — E87.6 HYPOKALEMIA: Primary | ICD-10-CM

## 2020-10-08 LAB — HCV AB SERPL QL IA: NEGATIVE

## 2020-10-08 RX ORDER — POTASSIUM CHLORIDE 20 MEQ/1
40 TABLET, EXTENDED RELEASE ORAL 2 TIMES DAILY
Qty: 4 TABLET | Refills: 0 | Status: SHIPPED | OUTPATIENT
Start: 2020-10-08 | End: 2020-10-09

## 2020-10-08 NOTE — TELEPHONE ENCOUNTER
----- Message from Chelo Griffin DO sent at 10/8/2020 11:15 AM CDT -----  Hello, I already called the pt to notify of her results. Also added a telephone encounter. I sent a prescription for 40 mEq KCl x 2 and also told her to supplement with diet. Labs with repeat CMP, Mg, Acute hepatitis panel and Lipid Panel in one month. I told the pt and she agreed. Thank you

## 2020-10-08 NOTE — TELEPHONE ENCOUNTER
Called pt to notify of lab results. See below for significant lab results. Pt was given opportunity for ask questions or raise any concerns. All questions were answered. Pt agreed with plan.     Hypokalemia  -     potassium chloride SA (K-DUR,KLOR-CON) 20 MEQ tablet; Take 2 tablets (40 mEq total) by mouth 2 (two) times daily. for 2 doses  Dispense: 4 tablet; Refill: 0  -     Comprehensive Metabolic Panel; Future; Expected date: 11/08/2020  -     Magnesium; Future; Expected date: 11/08/2020        - Sent prescription of KCL 40 mEq x 2 for hypokalemia. Pt also advised to supplement through diet (spinach, banana).         -     To re-evaluate electrolytes (K and Mg) with repeat labs in one month     Transaminitis  -     Comprehensive Metabolic Panel; Future; Expected date: 11/08/2020  -     HEPATITIS PANEL, ACUTE; Future; Expected date: 11/08/2020        -     Likely secondary to fatty changes given  US abdomen done in 2017 revealed homogenous echogenicity likely fatty change in the liver. Denies any abdominal pain at this time         -     Pt denies consuming Tylenol, IVDA or alcohol use. Has never had acute hepatitis panel done before         -     Pt advised to avoid greasy and fried food        -     Repeat CMP along with Hep BsAg, HBsAb, HBcoreAb, HCV Ab, Ferritin and iron/TIBC in one month     Hyperlipidemia, unspecified hyperlipidemia type  -     Lipid Panel; Future; Expected date: 11/08/2020        -     Lipid profile on 11/2020: .4, HDL 52, Cholesterol 240, TGs 108.         -     Currently not on any statin         -     ASCVD score 6.7%, will try diet controlled for now given new transaminitis as well         -     Pt advised to avoid greasy and fried food         -     Repeat Lipid Panel in a month     Laboratory  Lab Results   Component Value Date    WBC 8.21 10/06/2020    HGB 12.4 10/06/2020    HCT 40.8 10/06/2020    MCV 95 10/06/2020     10/06/2020     Sodium   Date Value Ref Range Status    10/06/2020 143 136 - 145 mmol/L Final     Potassium   Date Value Ref Range Status   10/06/2020 3.3 (L) 3.5 - 5.1 mmol/L Final     Chloride   Date Value Ref Range Status   10/06/2020 99 95 - 110 mmol/L Final     CO2   Date Value Ref Range Status   10/06/2020 27 23 - 29 mmol/L Final     BUN, Bld   Date Value Ref Range Status   10/06/2020 13 6 - 20 mg/dL Final     Anion Gap   Date Value Ref Range Status   10/06/2020 17 (H) 8 - 16 mmol/L Final     Lab Results   Component Value Date    LDLCALC 166.4 (H) 10/06/2020    HDL 52 10/06/2020    TRIG 108 10/06/2020    CHOL 240 (H) 10/06/2020       Lab Results   Component Value Date    HGBA1C 5.6 10/06/2020

## 2020-10-09 ENCOUNTER — OFFICE VISIT (OUTPATIENT)
Dept: OBSTETRICS AND GYNECOLOGY | Facility: CLINIC | Age: 55
End: 2020-10-09
Payer: COMMERCIAL

## 2020-10-09 ENCOUNTER — HOSPITAL ENCOUNTER (OUTPATIENT)
Dept: RADIOLOGY | Facility: OTHER | Age: 55
Discharge: HOME | End: 2020-10-09
Attending: OBSTETRICS & GYNECOLOGY
Payer: COMMERCIAL

## 2020-10-09 VITALS
DIASTOLIC BLOOD PRESSURE: 70 MMHG | HEIGHT: 67 IN | WEIGHT: 168.19 LBS | BODY MASS INDEX: 26.4 KG/M2 | SYSTOLIC BLOOD PRESSURE: 116 MMHG

## 2020-10-09 DIAGNOSIS — Z11.51 ENCOUNTER FOR SCREENING FOR HUMAN PAPILLOMAVIRUS (HPV): ICD-10-CM

## 2020-10-09 DIAGNOSIS — Z12.31 ENCOUNTER FOR SCREENING MAMMOGRAM FOR MALIGNANT NEOPLASM OF BREAST: ICD-10-CM

## 2020-10-09 DIAGNOSIS — Z01.419 WELL WOMAN EXAM: ICD-10-CM

## 2020-10-09 DIAGNOSIS — Z12.4 PAP SMEAR FOR CERVICAL CANCER SCREENING: ICD-10-CM

## 2020-10-09 DIAGNOSIS — Z01.419 ENCOUNTER FOR GYNECOLOGICAL EXAMINATION: Primary | ICD-10-CM

## 2020-10-09 PROCEDURE — 3008F BODY MASS INDEX DOCD: CPT | Mod: CPTII,S$GLB,, | Performed by: OBSTETRICS & GYNECOLOGY

## 2020-10-09 PROCEDURE — 3078F PR MOST RECENT DIASTOLIC BLOOD PRESSURE < 80 MM HG: ICD-10-PCS | Mod: CPTII,S$GLB,, | Performed by: OBSTETRICS & GYNECOLOGY

## 2020-10-09 PROCEDURE — 3074F PR MOST RECENT SYSTOLIC BLOOD PRESSURE < 130 MM HG: ICD-10-PCS | Mod: CPTII,S$GLB,, | Performed by: OBSTETRICS & GYNECOLOGY

## 2020-10-09 PROCEDURE — 99999 PR PBB SHADOW E&M-EST. PATIENT-LVL III: ICD-10-PCS | Mod: PBBFAC,,, | Performed by: OBSTETRICS & GYNECOLOGY

## 2020-10-09 PROCEDURE — 77063 MAMMO DIGITAL SCREENING BILAT WITH TOMO: ICD-10-PCS | Mod: 26,,, | Performed by: RADIOLOGY

## 2020-10-09 PROCEDURE — 77067 MAMMO DIGITAL SCREENING BILAT WITH TOMO: ICD-10-PCS | Mod: 26,,, | Performed by: RADIOLOGY

## 2020-10-09 PROCEDURE — 99396 PR PREVENTIVE VISIT,EST,40-64: ICD-10-PCS | Mod: S$GLB,,, | Performed by: OBSTETRICS & GYNECOLOGY

## 2020-10-09 PROCEDURE — 77067 SCR MAMMO BI INCL CAD: CPT | Mod: 26,,, | Performed by: RADIOLOGY

## 2020-10-09 PROCEDURE — 3008F PR BODY MASS INDEX (BMI) DOCUMENTED: ICD-10-PCS | Mod: CPTII,S$GLB,, | Performed by: OBSTETRICS & GYNECOLOGY

## 2020-10-09 PROCEDURE — 99999 PR PBB SHADOW E&M-EST. PATIENT-LVL III: CPT | Mod: PBBFAC,,, | Performed by: OBSTETRICS & GYNECOLOGY

## 2020-10-09 PROCEDURE — 87624 HPV HI-RISK TYP POOLED RSLT: CPT

## 2020-10-09 PROCEDURE — 3074F SYST BP LT 130 MM HG: CPT | Mod: CPTII,S$GLB,, | Performed by: OBSTETRICS & GYNECOLOGY

## 2020-10-09 PROCEDURE — 77067 SCR MAMMO BI INCL CAD: CPT | Mod: TC

## 2020-10-09 PROCEDURE — 77063 BREAST TOMOSYNTHESIS BI: CPT | Mod: 26,,, | Performed by: RADIOLOGY

## 2020-10-09 PROCEDURE — 99396 PREV VISIT EST AGE 40-64: CPT | Mod: S$GLB,,, | Performed by: OBSTETRICS & GYNECOLOGY

## 2020-10-09 PROCEDURE — 88175 CYTOPATH C/V AUTO FLUID REDO: CPT

## 2020-10-09 PROCEDURE — 3078F DIAST BP <80 MM HG: CPT | Mod: CPTII,S$GLB,, | Performed by: OBSTETRICS & GYNECOLOGY

## 2020-10-09 RX ORDER — TINIDAZOLE 500 MG/1
2 TABLET ORAL DAILY
Qty: 8 TABLET | Refills: 0 | Status: SHIPPED | OUTPATIENT
Start: 2020-10-09 | End: 2020-10-11

## 2020-10-09 NOTE — PROGRESS NOTES
HPI: Pt is a 54 y.o.  female who presents for routine annual exam. She is now  as she reports no periods in the past year. Complains of white vaginal discharge with odor.     ROS:  GENERAL: Feeling well overall. Denies fever or chills.   SKIN: Denies rash or lesions.   HEAD: Denies head injury or headache.   NODES: Denies enlarged lymph nodes.   CHEST: Denies chest pain or shortness of breath.   CARDIOVASCULAR: Denies palpitations or left sided chest pain.   ABDOMEN: No abdominal pain, constipation, diarrhea, nausea, vomiting or rectal bleeding.   URINARY: No dysuria, hematuria, or burning on urination.  REPRODUCTIVE: See HPI.   BREASTS: Denies pain, lumps, or nipple discharge.   HEMATOLOGIC: No easy bruisability or excessive bleeding.   MUSCULOSKELETAL: Denies joint pain or swelling.   NEUROLOGIC: Denies syncope or weakness.   PSYCHIATRIC: Denies depression, anxiety or mood swings.    PE:   APPEARANCE: Well nourished, well developed, Black or  female in no acute distress.  NODES: no cervical, supraclavicular, or inguinal lymphadenopathy  BREASTS: Symmetrical, no skin changes or visible lesions. No palpable masses, nipple discharge or adenopathy bilaterally.  ABDOMEN: Soft. No tenderness or masses. No distention. No hernias palpated. No CVA tenderness.  VULVA: No lesions. Normal external female genitalia.  URETHRAL MEATUS: Normal size and location, no lesions, no prolapse.  URETHRA: No masses, tenderness, or prolapse.  VAGINA: Moist. No lesions or lacerations noted. Creamy white discharge present. No odor present.   CERVIX: No lesions or discharge. No cervical motion tenderness.   UTERUS: Normal size, regular shape, mobile, non-tender.  ADNEXA: No tenderness. No fullness or masses palpated in the adnexal regions.   ANUS PERINEUM: Normal.      Diagnosis:  1. Encounter for gynecological examination    2. Well woman exam    3. Encounter for screening mammogram for malignant neoplasm of breast    4.  Pap smear for cervical cancer screening    5. Encounter for screening for human papillomavirus (HPV)        Plan:     Orders Placed This Encounter    HPV High Risk Genotypes, PCR    Mammo Digital Screening Bilat w/ Trae    Liquid-Based Pap Smear, Screening    tinidazole (TINDAMAX) 500 MG tablet       Patient was counseled today on the new ACS guidelines for cervical cytology screening as well as the current recommendations for breast cancer screening. She was counseled to follow up with her PCP for other routine health maintenance. Counseling session lasted approximately 10 minutes, and all her questions were answered.    Follow-up with me in 1 year for routine exam.

## 2020-10-09 NOTE — LETTER
October 9, 2020      Chelo Griffin, DO  [address]  [facility] [address]           [facility] [address]  [address] [facility] [address]  Phone: [phone]          Patient: Janki Jones   MR Number: [id number]   YOB: 1965   Date of Visit: 10/9/2020       Dear Dr. Chelo Griffin:    Thank you for referring Janki Jones to me for evaluation. Attached you will find relevant portions of my assessment and plan of care.    If you have questions, please do not hesitate to call me. I look forward to following Janki Jones along with you.    Sincerely,    Radha Bautista MD    Enclosure  CC:  No Recipients    If you would like to receive this communication electronically, please contact [email or web address] or [phone] to request more information on [employer] access.    For providers and/or their staff who would like to refer a patient to [facility], please contact us through our one-stop-shop provider referral line, [facility], at [phone].    If you feel you have received this communication in error or would no longer like to receive these types of communications, please e-mail [email or web address]

## 2020-10-13 ENCOUNTER — TELEPHONE (OUTPATIENT)
Dept: INTERNAL MEDICINE | Facility: CLINIC | Age: 55
End: 2020-10-13

## 2020-10-20 DIAGNOSIS — F90.1 ATTENTION DEFICIT HYPERACTIVITY DISORDER (ADHD), PREDOMINANTLY HYPERACTIVE TYPE: ICD-10-CM

## 2020-10-20 LAB
HPV HR 12 DNA SPEC QL NAA+PROBE: POSITIVE
HPV16 AG SPEC QL: NEGATIVE
HPV18 DNA SPEC QL NAA+PROBE: NEGATIVE

## 2020-10-20 RX ORDER — METHYLPHENIDATE HYDROCHLORIDE 36 MG/1
36 TABLET ORAL DAILY
Qty: 30 TABLET | Refills: 0 | Status: SHIPPED | OUTPATIENT
Start: 2020-10-20 | End: 2020-12-17 | Stop reason: SDUPTHER

## 2020-10-20 NOTE — TELEPHONE ENCOUNTER
----- Message from Margaret Abhi sent at 10/20/2020 11:19 AM CDT -----  Contact: Pt [phone]  Contact: Pt [phone]  Requesting an RX refill or new RX.  Is this a refill or new RX:  refill  RX name and strength: methylphenidate HCl (CONCERTA) 36 MG CR tablet  Is this a 30 day or 90 day RX:  90  Pharmacy name and phone # (copy/paste from chart):   [id number] - [place], [place] - [address] [place] [address] [phone] (Phone)  [phone] (Fax)        Comments:

## 2020-11-06 ENCOUNTER — PATIENT MESSAGE (OUTPATIENT)
Dept: OBSTETRICS AND GYNECOLOGY | Facility: CLINIC | Age: 55
End: 2020-11-06

## 2020-11-06 LAB
FINAL PATHOLOGIC DIAGNOSIS: NORMAL
Lab: NORMAL

## 2020-11-10 ENCOUNTER — LAB VISIT (OUTPATIENT)
Dept: LAB | Facility: HOSPITAL | Age: 55
End: 2020-11-10
Attending: INTERNAL MEDICINE
Payer: COMMERCIAL

## 2020-11-10 DIAGNOSIS — E78.5 HYPERLIPIDEMIA, UNSPECIFIED HYPERLIPIDEMIA TYPE: ICD-10-CM

## 2020-11-10 DIAGNOSIS — R74.01 TRANSAMINITIS: ICD-10-CM

## 2020-11-10 DIAGNOSIS — E87.6 HYPOKALEMIA: ICD-10-CM

## 2020-11-10 LAB
ALBUMIN SERPL BCP-MCNC: 3.9 G/DL (ref 3.5–5.2)
ALP SERPL-CCNC: 98 U/L (ref 55–135)
ALT SERPL W/O P-5'-P-CCNC: 43 U/L (ref 10–44)
ANION GAP SERPL CALC-SCNC: 6 MMOL/L (ref 8–16)
AST SERPL-CCNC: 34 U/L (ref 10–40)
BILIRUB SERPL-MCNC: 0.4 MG/DL (ref 0.1–1)
BUN SERPL-MCNC: 12 MG/DL (ref 6–20)
CALCIUM SERPL-MCNC: 9.5 MG/DL (ref 8.7–10.5)
CHLORIDE SERPL-SCNC: 105 MMOL/L (ref 95–110)
CHOLEST SERPL-MCNC: 229 MG/DL (ref 120–199)
CHOLEST/HDLC SERPL: 4.7 {RATIO} (ref 2–5)
CO2 SERPL-SCNC: 33 MMOL/L (ref 23–29)
CREAT SERPL-MCNC: 0.8 MG/DL (ref 0.5–1.4)
EST. GFR  (AFRICAN AMERICAN): >60 ML/MIN/1.73 M^2
EST. GFR  (NON AFRICAN AMERICAN): >60 ML/MIN/1.73 M^2
FERRITIN SERPL-MCNC: 61 NG/ML (ref 20–300)
GLUCOSE SERPL-MCNC: 97 MG/DL (ref 70–110)
HDLC SERPL-MCNC: 49 MG/DL (ref 40–75)
HDLC SERPL: 21.4 % (ref 20–50)
IRON SERPL-MCNC: 91 UG/DL (ref 30–160)
LDLC SERPL CALC-MCNC: 150.6 MG/DL (ref 63–159)
MAGNESIUM SERPL-MCNC: 1.9 MG/DL (ref 1.6–2.6)
NONHDLC SERPL-MCNC: 180 MG/DL
POTASSIUM SERPL-SCNC: 3.9 MMOL/L (ref 3.5–5.1)
PROT SERPL-MCNC: 7.6 G/DL (ref 6–8.4)
SATURATED IRON: 24 % (ref 20–50)
SODIUM SERPL-SCNC: 144 MMOL/L (ref 136–145)
TOTAL IRON BINDING CAPACITY: 382 UG/DL (ref 250–450)
TRANSFERRIN SERPL-MCNC: 258 MG/DL (ref 200–375)
TRIGL SERPL-MCNC: 147 MG/DL (ref 30–150)

## 2020-11-10 PROCEDURE — 83540 ASSAY OF IRON: CPT

## 2020-11-10 PROCEDURE — 86803 HEPATITIS C AB TEST: CPT

## 2020-11-10 PROCEDURE — 87340 HEPATITIS B SURFACE AG IA: CPT

## 2020-11-10 PROCEDURE — 83735 ASSAY OF MAGNESIUM: CPT

## 2020-11-10 PROCEDURE — 86704 HEP B CORE ANTIBODY TOTAL: CPT

## 2020-11-10 PROCEDURE — 80061 LIPID PANEL: CPT

## 2020-11-10 PROCEDURE — 36415 COLL VENOUS BLD VENIPUNCTURE: CPT | Mod: PO

## 2020-11-10 PROCEDURE — 86706 HEP B SURFACE ANTIBODY: CPT

## 2020-11-10 PROCEDURE — 80053 COMPREHEN METABOLIC PANEL: CPT

## 2020-11-10 PROCEDURE — 82728 ASSAY OF FERRITIN: CPT

## 2020-11-11 LAB
HBV CORE AB SERPL QL IA: NEGATIVE
HBV SURFACE AB SER-ACNC: NEGATIVE M[IU]/ML
HBV SURFACE AG SERPL QL IA: NEGATIVE

## 2020-11-12 ENCOUNTER — PATIENT MESSAGE (OUTPATIENT)
Dept: INTERNAL MEDICINE | Facility: CLINIC | Age: 55
End: 2020-11-12

## 2020-11-12 ENCOUNTER — TELEPHONE (OUTPATIENT)
Dept: INTERNAL MEDICINE | Facility: CLINIC | Age: 55
End: 2020-11-12

## 2020-11-12 DIAGNOSIS — R74.01 TRANSAMINITIS: ICD-10-CM

## 2020-11-12 DIAGNOSIS — E87.6 HYPOKALEMIA: ICD-10-CM

## 2020-11-12 DIAGNOSIS — E78.5 HYPERLIPIDEMIA, UNSPECIFIED HYPERLIPIDEMIA TYPE: Primary | ICD-10-CM

## 2020-11-12 LAB — HCV AB SERPL QL IA: NEGATIVE

## 2020-11-12 NOTE — TELEPHONE ENCOUNTER
Called pt to notify of lab results. See below for significant lab results. Pt was given opportunity for ask questions or raise any concerns. All questions were answered. Pt agreed with plan.     Hyperlipidemia, unspecified hyperlipidemia type  Lipid profile on   Lab Results   Component Value Date    LDLCALC 150.6 11/10/2020    HDL 49 11/10/2020    TRIG 147 11/10/2020    CHOL 229 (H) 11/10/2020     Lipid profile on 11/10/2020: .6, HDL 49, Cholesterol 229, TGs 147.   The 10-year ASCVD risk score (Liberty ROXI Jr., et al., 2013) is: 3.6%    Values used to calculate the score:      Age: 54 years      Sex: Female      Is Non- : Yes      Diabetic: No      Tobacco smoker: No      Systolic Blood Pressure: 116 mmHg      Is BP treated: Yes      HDL Cholesterol: 49 mg/dL      Total Cholesterol: 229 mg/dL    - Not qualified for statin as of now  - Pt advised to continue with diet and lifestyle modifications. Instructions on diet restrictions will be sent to pt.    Hypokalemia  - RESOLVED   - Potassium is currently 3.9 from 3.3    Transaminitis  - RESOLVED   - AST/ALT : 34/43 from 53/71  - Hepatitis B surface antigen, hepatitis B surface Ab, hepatitis B core antibody, hepatitis C antibody are all negative.     Chelo Griffin D.O  Internal Medicine PGY-1  [facility]

## 2020-12-09 RX ORDER — BUPROPION HYDROCHLORIDE 150 MG/1
150 TABLET ORAL DAILY
Qty: 90 TABLET | Refills: 0 | Status: SHIPPED | OUTPATIENT
Start: 2020-12-09 | End: 2021-02-10 | Stop reason: SDUPTHER

## 2020-12-09 RX ORDER — ESCITALOPRAM OXALATE 20 MG/1
20 TABLET ORAL NIGHTLY
Qty: 90 TABLET | Refills: 0 | Status: SHIPPED | OUTPATIENT
Start: 2020-12-09 | End: 2021-02-10 | Stop reason: SDUPTHER

## 2020-12-09 NOTE — TELEPHONE ENCOUNTER
Care Due:                  Date            Visit Type   Department     Provider  --------------------------------------------------------------------------------                                SAME DAY -                              ESTABLISHED   [facility] INTERNAL  Last Visit: 02-      PATIENT      MEDICINE       Daniel Lawrence  Next Visit: None Scheduled  None         None Found                                                            Last  Test          Frequency    Reason                     Performed    Due Date  --------------------------------------------------------------------------------    Office Visit  12 months..  buPROPion, escitalopram..  02- 02-    Powered by [employer]. Reference number: [id number]. 12/09/2020 9:08:15 AM CST

## 2020-12-10 NOTE — TELEPHONE ENCOUNTER
Provider Staff:     Action is required for this patient.     Please schedule patient for Annual    Thanks!  [facility]     Appointments  past 12m or future 3m with PCP    Date Provider   Last Visit   2/18/2020 Daniel Lawrence DO   Next Visit   Visit date not found Daniel Lawrence DO     Note composed:9:04 PM 12/09/2020

## 2020-12-10 NOTE — PROGRESS NOTES
Refill Authorization Note   Janki Jones  is requesting a refill authorization.  Brief Assessment and Rationale for Refill:  Approve    -Medication-Related Problems Identified: Requires appointment  Medication Therapy Plan:  CDMR. need appt(ANNUAL); approve 3 more     Medication Reconciliation Completed: No   Comments:       Requested Prescriptions   Pending Prescriptions Disp Refills    buPROPion (WELLBUTRIN XL) 150 MG TB24 tablet 90 tablet 0     Sig: Take 1 tablet (150 mg total) by mouth once daily.       Psychiatry: Antidepressants - bupropion Passed - 12/9/2020  9:03 PM        Passed - Patient is at least 18 years old        Passed - Last BP in normal range within 360 days.     BP Readings from Last 3 Encounters:   10/09/20 116/70   10/06/20 138/84   02/18/20 130/88              Passed - Office visit in past 12 months or future 90 days     Recent Outpatient Visits            2 months ago Encounter for gynecological examination    [facility] [address] Radha aButista MD    2 months ago Annual physical exam    [place] [facility] Internal [facility] Chelo Griffin DO    9 months ago Dry eye syndrome of both eyes    [place] [place] 1st Fl Yovana Borrero, OD    9 months ago Attention deficit hyperactivity disorder (ADHD), predominantly hyperactive type    [place] [facility] Internal [facility] Daniel Lawrence DO    1 year ago Adjustment disorder with mixed anxiety and depressed mood    [place] [facility] Internal [facility] Daniel Lawrence DO          Future Appointments              In 3 months [facility] DO [facility], [place]                  escitalopram oxalate (LEXAPRO) 20 MG tablet 90 tablet 0     Sig: Take 1 tablet (20 mg total) by mouth every evening.       Psychiatry:  Antidepressants - SSRI Passed - 12/9/2020  9:03 PM        Passed - Patient is at least 18 years old        Passed - Office visit in past 12 months or future 90 days     Recent Outpatient Visits             2 months ago Encounter for gynecological examination    [facility] [address] Radha Bautista MD    2 months ago Annual physical exam    [facility] Chelo Griffin DO    9 months ago Dry eye syndrome of both eyes    [facility] 1st Fl Yovana Borrero, OD    9 months ago Attention deficit hyperactivity disorder (ADHD), predominantly hyperactive type    [facility] Daniel Lawrence DO    1 year ago Adjustment disorder with mixed anxiety and depressed mood    [facility] Daniel Lawrence DO          Future Appointments              In 3 months Chelo Griffin DO [facility], [place]                    Appointments  past 12m or future 3m with PCP    Date Provider   Last Visit   2/18/2020 Daniel Lawrence DO   Next Visit   Visit date not found Daniel Lawrence DO   ED visits in past 90 days: 0     Note composed:9:04 PM 12/09/2020

## 2020-12-11 DIAGNOSIS — F90.1 ATTENTION DEFICIT HYPERACTIVITY DISORDER (ADHD), PREDOMINANTLY HYPERACTIVE TYPE: ICD-10-CM

## 2020-12-11 RX ORDER — METHYLPHENIDATE HYDROCHLORIDE 36 MG/1
36 TABLET ORAL DAILY
Qty: 30 TABLET | Refills: 0 | OUTPATIENT
Start: 2020-12-11

## 2020-12-11 NOTE — TELEPHONE ENCOUNTER
Sent pt a msg via portal re: medication refill request has been denied.   Please call of go online to schedule an appt.

## 2020-12-11 NOTE — TELEPHONE ENCOUNTER
----- Message from Marilee Ryan sent at 12/11/2020 11:39 AM CST -----  Contact: self   Requesting an RX refill or new RX.  Is this a refill or new RX: New   RX name and strength: methylphenidate HCl 36 MG CR tablet  Is this a 30 day or 90 day RX: 30  Pharmacy name and phone # (copy/paste from chart):    Comments:   [id number] - [place], [place] - [address] [place] [id number]  Phone: [phone] Fax: [phone]

## 2020-12-15 ENCOUNTER — TELEPHONE (OUTPATIENT)
Dept: INTERNAL MEDICINE | Facility: CLINIC | Age: 55
End: 2020-12-15

## 2020-12-15 DIAGNOSIS — I10 HTN, GOAL BELOW 130/80: Primary | ICD-10-CM

## 2020-12-15 NOTE — TELEPHONE ENCOUNTER
----- Message from Phyllis Bardales PharmD sent at 12/15/2020 12:25 PM CST -----  Regarding: [facility] Enrollment  Hel[place]rado Lawrence,    I currently work with your  patient's , Mr. Kenneth Jones (whom you have referred) in the digital medicine program. Today, he expressed that Mrs. Jones would be interested in joining the [facility].     If you believe that she would be a good candidate for the program, please place enrollment orders. Thank you for your continued support!      Phyllis Bardales PharmD  Digital Medicine Care Team  [phone]

## 2020-12-17 DIAGNOSIS — F90.1 ATTENTION DEFICIT HYPERACTIVITY DISORDER (ADHD), PREDOMINANTLY HYPERACTIVE TYPE: ICD-10-CM

## 2020-12-17 RX ORDER — METHYLPHENIDATE HYDROCHLORIDE 36 MG/1
36 TABLET ORAL DAILY
Qty: 30 TABLET | Refills: 0 | Status: SHIPPED | OUTPATIENT
Start: 2020-12-17 | End: 2021-02-10 | Stop reason: SDUPTHER

## 2020-12-17 NOTE — TELEPHONE ENCOUNTER
----- Message from Jayleen Peters sent at 12/17/2020 12:08 PM CST -----  Contact: pt [phone]  Pt scheduled an appt for 12/23/2020 and is inquiring regarding a refill on Rx methylphenidate HCl 36 MG CR tablet    Please call and advise.    Thank You

## 2020-12-23 ENCOUNTER — OFFICE VISIT (OUTPATIENT)
Dept: INTERNAL MEDICINE | Facility: CLINIC | Age: 55
End: 2020-12-23
Payer: COMMERCIAL

## 2020-12-23 VITALS
HEIGHT: 67 IN | WEIGHT: 166 LBS | RESPIRATION RATE: 18 BRPM | DIASTOLIC BLOOD PRESSURE: 72 MMHG | OXYGEN SATURATION: 96 % | BODY MASS INDEX: 26.06 KG/M2 | TEMPERATURE: 98 F | SYSTOLIC BLOOD PRESSURE: 118 MMHG | HEART RATE: 76 BPM

## 2020-12-23 DIAGNOSIS — R10.12 LEFT UPPER QUADRANT PAIN: Primary | ICD-10-CM

## 2020-12-23 DIAGNOSIS — Z86.19 HISTORY OF HELICOBACTER PYLORI INFECTION: ICD-10-CM

## 2020-12-23 DIAGNOSIS — J30.2 SEASONAL ALLERGIES: ICD-10-CM

## 2020-12-23 PROCEDURE — 3008F BODY MASS INDEX DOCD: CPT | Mod: CPTII,S$GLB,, | Performed by: NURSE PRACTITIONER

## 2020-12-23 PROCEDURE — 1125F PR PAIN SEVERITY QUANTIFIED, PAIN PRESENT: ICD-10-PCS | Mod: S$GLB,,, | Performed by: NURSE PRACTITIONER

## 2020-12-23 PROCEDURE — 1125F AMNT PAIN NOTED PAIN PRSNT: CPT | Mod: S$GLB,,, | Performed by: NURSE PRACTITIONER

## 2020-12-23 PROCEDURE — 99999 PR PBB SHADOW E&M-EST. PATIENT-LVL III: ICD-10-PCS | Mod: PBBFAC,,, | Performed by: NURSE PRACTITIONER

## 2020-12-23 PROCEDURE — 99999 PR PBB SHADOW E&M-EST. PATIENT-LVL III: CPT | Mod: PBBFAC,,, | Performed by: NURSE PRACTITIONER

## 2020-12-23 PROCEDURE — 3008F PR BODY MASS INDEX (BMI) DOCUMENTED: ICD-10-PCS | Mod: CPTII,S$GLB,, | Performed by: NURSE PRACTITIONER

## 2020-12-23 PROCEDURE — 99214 OFFICE O/P EST MOD 30 MIN: CPT | Mod: S$GLB,,, | Performed by: NURSE PRACTITIONER

## 2020-12-23 PROCEDURE — 99214 PR OFFICE/OUTPT VISIT, EST, LEVL IV, 30-39 MIN: ICD-10-PCS | Mod: S$GLB,,, | Performed by: NURSE PRACTITIONER

## 2020-12-23 NOTE — PROGRESS NOTES
[facility] Note    Chief Complaint      Chief Complaint   Patient presents with    Dizziness     x 1 mo; when sitting down or standing up    Abdominal Pain     LUQ; extreme pain    Hand Pain     Both hands; dx carpal tunnel    Medication Refill     Methylphenidate     History of Present Illness      Janki Jones is a 55 y.o. female patient of Dr. Avina who presents today for intermittent dizziness over the past month. Denies sob, cp, visual changes, syncope. Has increased allergies at this time.     Pt is c/o worsening intermittent LUQ pain over the past month, intermittent n/v/d. Takes omeprazole, watches diet-decrease fried foods. Has h/o H pylori. Denies esophageal burning, belching, bloating.     Pt tolerating Methylphenidate very well, takes daily as directed, needs refill.  checked no concerns of abuse. Dr. Fernandez refilled x 1 month    Problem List Items Addressed This Visit     None      Visit Diagnoses     Left upper quadrant pain    -  Primary    Relevant Orders    US Abdomen Complete    H. pylori antigen, stool    History of Helicobacter pylori infection        Relevant Orders    H. pylori antigen, stool    Seasonal allergies              Health Maintenance   Topic Date Due    Eye Exam  02/24/2021    Mammogram  10/09/2022    Lipid Panel  11/10/2025    TETANUS VACCINE  05/31/2027    Hepatitis C Screening  Completed       Past Medical History:   Diagnosis Date    Abnormal Pap smear 2007    Abnormal Pap smear of cervix     ADD (attention deficit disorder)     Anxiety     Depression     Fibroids     Herpes simplex virus (HSV) infection     HSV1.    Hypertension        Past Surgical History:   Procedure Laterality Date    CERVICAL BIOPSY  W/ LOOP ELECTRODE EXCISION  2007    COLONOSCOPY N/A 2/15/2019    Procedure: COLONOSCOPY;  Surgeon: Uri Torrez MD;  Location: [address]);  Service: Endoscopy;  Laterality: N/A;    essure      TUBAL  LIGATION         family history includes ADD / ADHD in her daughter, daughter, and son; Alcohol abuse in her mother; Depression in her father; Diabetes in her father and mother; Glaucoma in her father; Heart disease in her father; Hyperlipidemia in her father; Hypertension in her brother and father; Kidney disease in her father; Liver disease in her mother; Osteoporosis in her father; Other in her father and sister; Panic disorder in her daughter and mother; Stroke in her mother; Thyroid disease in her sister.    Social History     Tobacco Use    Smoking status: Never Smoker    Smokeless tobacco: Never Used   Substance Use Topics    Alcohol use: No    Drug use: No       Review of Systems   Constitutional: Negative for chills, fever and malaise/fatigue.   Eyes: Negative for blurred vision.   Respiratory: Negative for cough and shortness of breath.    Cardiovascular: Negative for chest pain and palpitations.   Gastrointestinal: Positive for abdominal pain, diarrhea, heartburn, nausea and vomiting. Negative for blood in stool.   Genitourinary: Negative for dysuria.   Musculoskeletal: Negative for myalgias.   Neurological: Positive for dizziness. Negative for headaches.   Psychiatric/Behavioral: Negative for depression. The patient is not nervous/anxious.         Outpatient Encounter Medications as of 12/23/2020   Medication Sig Dispense Refill    buPROPion (WELLBUTRIN XL) 150 MG TB24 tablet Take 1 tablet (150 mg total) by mouth once daily. 90 tablet 0    escitalopram oxalate (LEXAPRO) 20 MG tablet Take 1 tablet (20 mg total) by mouth every evening. 90 tablet 0    loratadine (CLARITIN) 10 mg tablet Take 10 mg by mouth once daily.      losartan (COZAAR) 100 MG tablet       methylphenidate HCl 36 MG CR tablet Take 1 tablet (36 mg total) by mouth once daily. 30 tablet 0    multivitamin with minerals tablet Take 1 tablet by mouth once daily.      omeprazole (PRILOSEC OTC) 20 MG tablet Take 20 mg by mouth 2 (two)  "times daily as needed.       No facility-administered encounter medications on file as of 12/23/2020.         Review of patient's allergies indicates:   Allergen Reactions    Percocet [oxycodone-acetaminophen]      Other reaction(s): Nausea       Physical Exam      Vital Signs  Temp: 97.5 °F (36.4 °C)  Temp src: Temporal  Pulse: 76  Resp: 18  SpO2: 96 %  BP: 118/72  BP Location: Left arm  Patient Position: Sitting  Pain Score:   6  Pain Loc: Jaw(Right jaw)  Height and Weight  Height: 5' 7" (170.2 cm)  Weight: 75.3 kg (166 lb 0.1 oz)  BSA (Calculated - sq m): 1.89 sq meters  BMI (Calculated): 26  Weight in (lb) to have BMI = 25: 159.3]    Physical Exam  Vitals signs and nursing note reviewed.   Constitutional:       Appearance: Normal appearance.   HENT:      Head: Normocephalic and atraumatic.   Neck:      Musculoskeletal: Normal range of motion and neck supple.   Cardiovascular:      Rate and Rhythm: Normal rate and regular rhythm.      Heart sounds: Normal heart sounds.   Pulmonary:      Effort: Pulmonary effort is normal. No respiratory distress.      Breath sounds: Normal breath sounds.   Abdominal:      General: Bowel sounds are normal. There is no distension.      Palpations: Abdomen is soft. There is no mass.      Tenderness: There is abdominal tenderness. There is no guarding.      Hernia: No hernia is present.      Comments: Tenderness to left upper quadrant noted.    Musculoskeletal: Normal range of motion.   Skin:     General: Skin is warm and dry.   Neurological:      Mental Status: She is alert.   Psychiatric:         Mood and Affect: Mood normal.         Behavior: Behavior normal.         Thought Content: Thought content normal.         Judgment: Judgment normal.          Laboratory:  CBC:  Recent Labs   Lab Result Units 10/06/20  1436   WBC K/uL 8.21   RBC M/uL 4.31   Hemoglobin g/dL 12.4   Hematocrit % 40.8   Platelets K/uL 235   MCV fL 95   MCH pg 28.8   MCHC g/dL 30.4*     CMP:  Recent Labs   Lab " Result Units 10/06/20  1436 11/10/20  0829   Glucose mg/dL 84 97   Calcium mg/dL 10.1 9.5   Albumin g/dL 4.5 3.9   Total Protein g/dL 8.7* 7.6   Sodium mmol/L 143 144   Potassium mmol/L 3.3* 3.9   CO2 mmol/L 27 33*   Chloride mmol/L 99 105   BUN mg/dL 13 12   Alkaline Phosphatase U/L 119 98   ALT U/L 71* 43   AST U/L 53* 34   Total Bilirubin mg/dL 0.4 0.4     URINALYSIS:  Recent Labs   Lab Result Units 10/06/20  1427   Color, UA  Yellow   Specific Gravity, UA  1.010   pH, UA  7.0   Protein, UA  Negative   Bacteria /hpf Moderate*   Nitrite, UA  Negative   Leukocytes, UA  1+*      LIPIDS:  Recent Labs   Lab Result Units 10/06/20  1436 11/10/20  0829   TSH uIU/mL 1.103  --    HDL mg/dL 52 49   Cholesterol mg/dL 240* 229*   Triglycerides mg/dL 108 147   LDL Cholesterol mg/dL 166.4* 150.6   HDL/Cholesterol Ratio % 21.7 21.4   Non-HDL Cholesterol mg/dL 188 180   Total Cholesterol/HDL Ratio  4.6 4.7     TSH:  Recent Labs   Lab Result Units 10/06/20  1436   TSH uIU/mL 1.103     A1C:  Recent Labs   Lab Result Units 10/06/20  1436   Hemoglobin A1C % 5.6         Assessment/Plan     Janki Jones is a 55 y.o.female with:    Encounter Diagnoses   Name Primary?    Left upper quadrant pain Yes    History of Helicobacter pylori infection     Seasonal allergies         PLAN  abd US ordered  Stools for Hpylori, may need to refer to GI for eval   Instructed to get OTC debrox ear wax  and can use zyrtec for allergies.   Stay hydrated with water, continue to monitor strict diet of low fat/chol- fried, spicy and acidic, wallace 2 hours prior to bedtime.         -Continue current medications and maintain follow up with specialists.  Return to clinic as needed for any concerns       Erin Jiminez, NP-C [facility]

## 2020-12-28 ENCOUNTER — LAB VISIT (OUTPATIENT)
Dept: LAB | Facility: HOSPITAL | Age: 55
End: 2020-12-28
Attending: NURSE PRACTITIONER
Payer: COMMERCIAL

## 2020-12-28 DIAGNOSIS — R10.12 LEFT UPPER QUADRANT PAIN: ICD-10-CM

## 2020-12-28 DIAGNOSIS — Z86.19 HISTORY OF HELICOBACTER PYLORI INFECTION: ICD-10-CM

## 2020-12-28 PROCEDURE — 87338 HPYLORI STOOL AG IA: CPT

## 2020-12-29 ENCOUNTER — HOSPITAL ENCOUNTER (OUTPATIENT)
Dept: RADIOLOGY | Facility: HOSPITAL | Age: 55
Discharge: HOME | End: 2020-12-29
Attending: NURSE PRACTITIONER
Payer: COMMERCIAL

## 2020-12-29 ENCOUNTER — PATIENT MESSAGE (OUTPATIENT)
Dept: INTERNAL MEDICINE | Facility: CLINIC | Age: 55
End: 2020-12-29

## 2020-12-29 DIAGNOSIS — R10.12 LEFT UPPER QUADRANT PAIN: ICD-10-CM

## 2020-12-29 PROCEDURE — 76700 US ABDOMEN COMPLETE: ICD-10-PCS | Mod: 26,,, | Performed by: RADIOLOGY

## 2020-12-29 PROCEDURE — 76700 US EXAM ABDOM COMPLETE: CPT | Mod: TC

## 2020-12-29 PROCEDURE — 76700 US EXAM ABDOM COMPLETE: CPT | Mod: 26,,, | Performed by: RADIOLOGY

## 2020-12-29 NOTE — TELEPHONE ENCOUNTER
Stool for h pylori still in process  abd US shows fatty liver infiltration- this is diet related, need low fat/cholesterol and lower carbs in diet, decrease alcohol if pt drinks    Pancreas and spleen are normal    Gallbladder, kidneys are normal

## 2021-01-02 LAB — H PYLORI AG STL QL IA: NOT DETECTED

## 2021-01-04 ENCOUNTER — PATIENT MESSAGE (OUTPATIENT)
Dept: INTERNAL MEDICINE | Facility: CLINIC | Age: 56
End: 2021-01-04

## 2021-02-02 ENCOUNTER — TELEPHONE (OUTPATIENT)
Dept: INTERNAL MEDICINE | Facility: CLINIC | Age: 56
End: 2021-02-02

## 2021-02-05 RX ORDER — LOSARTAN POTASSIUM 100 MG/1
100 TABLET ORAL DAILY
Qty: 30 TABLET | Refills: 0 | Status: SHIPPED | OUTPATIENT
Start: 2021-02-05 | End: 2021-02-10 | Stop reason: SDUPTHER

## 2021-02-10 ENCOUNTER — OFFICE VISIT (OUTPATIENT)
Dept: INTERNAL MEDICINE | Facility: CLINIC | Age: 56
End: 2021-02-10
Payer: COMMERCIAL

## 2021-02-10 VITALS
HEART RATE: 80 BPM | BODY MASS INDEX: 26.68 KG/M2 | WEIGHT: 170 LBS | TEMPERATURE: 97 F | OXYGEN SATURATION: 100 % | SYSTOLIC BLOOD PRESSURE: 156 MMHG | DIASTOLIC BLOOD PRESSURE: 102 MMHG | HEIGHT: 67 IN | RESPIRATION RATE: 16 BRPM

## 2021-02-10 DIAGNOSIS — I10 ESSENTIAL HYPERTENSION: ICD-10-CM

## 2021-02-10 DIAGNOSIS — F41.1 ANXIETY STATE: ICD-10-CM

## 2021-02-10 DIAGNOSIS — F90.1 ATTENTION DEFICIT HYPERACTIVITY DISORDER (ADHD), PREDOMINANTLY HYPERACTIVE TYPE: Primary | ICD-10-CM

## 2021-02-10 PROCEDURE — 99999 PR PBB SHADOW E&M-EST. PATIENT-LVL III: CPT | Mod: PBBFAC,,, | Performed by: INTERNAL MEDICINE

## 2021-02-10 PROCEDURE — 3008F PR BODY MASS INDEX (BMI) DOCUMENTED: ICD-10-PCS | Mod: CPTII,S$GLB,, | Performed by: INTERNAL MEDICINE

## 2021-02-10 PROCEDURE — 3008F BODY MASS INDEX DOCD: CPT | Mod: CPTII,S$GLB,, | Performed by: INTERNAL MEDICINE

## 2021-02-10 PROCEDURE — 3077F PR MOST RECENT SYSTOLIC BLOOD PRESSURE >= 140 MM HG: ICD-10-PCS | Mod: CPTII,S$GLB,, | Performed by: INTERNAL MEDICINE

## 2021-02-10 PROCEDURE — 99214 PR OFFICE/OUTPT VISIT, EST, LEVL IV, 30-39 MIN: ICD-10-PCS | Mod: S$GLB,,, | Performed by: INTERNAL MEDICINE

## 2021-02-10 PROCEDURE — 99214 OFFICE O/P EST MOD 30 MIN: CPT | Mod: S$GLB,,, | Performed by: INTERNAL MEDICINE

## 2021-02-10 PROCEDURE — 1126F AMNT PAIN NOTED NONE PRSNT: CPT | Mod: S$GLB,,, | Performed by: INTERNAL MEDICINE

## 2021-02-10 PROCEDURE — 3077F SYST BP >= 140 MM HG: CPT | Mod: CPTII,S$GLB,, | Performed by: INTERNAL MEDICINE

## 2021-02-10 PROCEDURE — 99999 PR PBB SHADOW E&M-EST. PATIENT-LVL III: ICD-10-PCS | Mod: PBBFAC,,, | Performed by: INTERNAL MEDICINE

## 2021-02-10 PROCEDURE — 1126F PR PAIN SEVERITY QUANTIFIED, NO PAIN PRESENT: ICD-10-PCS | Mod: S$GLB,,, | Performed by: INTERNAL MEDICINE

## 2021-02-10 PROCEDURE — 3080F DIAST BP >= 90 MM HG: CPT | Mod: CPTII,S$GLB,, | Performed by: INTERNAL MEDICINE

## 2021-02-10 PROCEDURE — 3080F PR MOST RECENT DIASTOLIC BLOOD PRESSURE >= 90 MM HG: ICD-10-PCS | Mod: CPTII,S$GLB,, | Performed by: INTERNAL MEDICINE

## 2021-02-10 RX ORDER — LOSARTAN POTASSIUM 100 MG/1
100 TABLET ORAL DAILY
Qty: 90 TABLET | Refills: 1 | Status: SHIPPED | OUTPATIENT
Start: 2021-02-10 | End: 2021-03-08

## 2021-02-10 RX ORDER — METHYLPHENIDATE HYDROCHLORIDE 36 MG/1
36 TABLET ORAL EVERY MORNING
Qty: 30 TABLET | Refills: 0 | Status: SHIPPED | OUTPATIENT
Start: 2021-04-10 | End: 2021-05-28 | Stop reason: SDUPTHER

## 2021-02-10 RX ORDER — BUPROPION HYDROCHLORIDE 150 MG/1
150 TABLET ORAL DAILY
Qty: 90 TABLET | Refills: 1 | Status: SHIPPED | OUTPATIENT
Start: 2021-02-10 | End: 2022-06-07 | Stop reason: SDUPTHER

## 2021-02-10 RX ORDER — METHYLPHENIDATE HYDROCHLORIDE 36 MG/1
36 TABLET ORAL EVERY MORNING
Qty: 30 TABLET | Refills: 0 | Status: SHIPPED | OUTPATIENT
Start: 2021-03-10 | End: 2021-07-20 | Stop reason: SDUPTHER

## 2021-02-10 RX ORDER — METHYLPHENIDATE HYDROCHLORIDE 36 MG/1
36 TABLET ORAL DAILY
Qty: 30 TABLET | Refills: 0 | Status: SHIPPED | OUTPATIENT
Start: 2021-02-10 | End: 2021-07-20 | Stop reason: SDUPTHER

## 2021-02-10 RX ORDER — ESCITALOPRAM OXALATE 20 MG/1
20 TABLET ORAL NIGHTLY
Qty: 90 TABLET | Refills: 1 | Status: SHIPPED | OUTPATIENT
Start: 2021-02-10 | End: 2021-09-27 | Stop reason: SDUPTHER

## 2021-02-13 ENCOUNTER — PATIENT MESSAGE (OUTPATIENT)
Dept: INTERNAL MEDICINE | Facility: CLINIC | Age: 56
End: 2021-02-13

## 2021-02-15 ENCOUNTER — PATIENT MESSAGE (OUTPATIENT)
Dept: INTERNAL MEDICINE | Facility: CLINIC | Age: 56
End: 2021-02-15

## 2021-02-23 ENCOUNTER — PATIENT OUTREACH (OUTPATIENT)
Dept: ADMINISTRATIVE | Facility: HOSPITAL | Age: 56
End: 2021-02-23

## 2021-02-24 ENCOUNTER — OFFICE VISIT (OUTPATIENT)
Dept: INTERNAL MEDICINE | Facility: CLINIC | Age: 56
End: 2021-02-24
Payer: COMMERCIAL

## 2021-02-24 VITALS
HEART RATE: 79 BPM | HEIGHT: 67 IN | OXYGEN SATURATION: 97 % | RESPIRATION RATE: 16 BRPM | BODY MASS INDEX: 26.3 KG/M2 | SYSTOLIC BLOOD PRESSURE: 160 MMHG | WEIGHT: 167.56 LBS | DIASTOLIC BLOOD PRESSURE: 108 MMHG

## 2021-02-24 DIAGNOSIS — I10 ESSENTIAL HYPERTENSION: Primary | ICD-10-CM

## 2021-02-24 PROCEDURE — 3077F PR MOST RECENT SYSTOLIC BLOOD PRESSURE >= 140 MM HG: ICD-10-PCS | Mod: CPTII,S$GLB,, | Performed by: INTERNAL MEDICINE

## 2021-02-24 PROCEDURE — 1126F PR PAIN SEVERITY QUANTIFIED, NO PAIN PRESENT: ICD-10-PCS | Mod: S$GLB,,, | Performed by: INTERNAL MEDICINE

## 2021-02-24 PROCEDURE — 3008F BODY MASS INDEX DOCD: CPT | Mod: CPTII,S$GLB,, | Performed by: INTERNAL MEDICINE

## 2021-02-24 PROCEDURE — 3080F DIAST BP >= 90 MM HG: CPT | Mod: CPTII,S$GLB,, | Performed by: INTERNAL MEDICINE

## 2021-02-24 PROCEDURE — 99999 PR PBB SHADOW E&M-EST. PATIENT-LVL III: ICD-10-PCS | Mod: PBBFAC,,, | Performed by: INTERNAL MEDICINE

## 2021-02-24 PROCEDURE — 99999 PR PBB SHADOW E&M-EST. PATIENT-LVL III: CPT | Mod: PBBFAC,,, | Performed by: INTERNAL MEDICINE

## 2021-02-24 PROCEDURE — 99213 OFFICE O/P EST LOW 20 MIN: CPT | Mod: S$GLB,,, | Performed by: INTERNAL MEDICINE

## 2021-02-24 PROCEDURE — 3080F PR MOST RECENT DIASTOLIC BLOOD PRESSURE >= 90 MM HG: ICD-10-PCS | Mod: CPTII,S$GLB,, | Performed by: INTERNAL MEDICINE

## 2021-02-24 PROCEDURE — 3008F PR BODY MASS INDEX (BMI) DOCUMENTED: ICD-10-PCS | Mod: CPTII,S$GLB,, | Performed by: INTERNAL MEDICINE

## 2021-02-24 PROCEDURE — 99213 PR OFFICE/OUTPT VISIT, EST, LEVL III, 20-29 MIN: ICD-10-PCS | Mod: S$GLB,,, | Performed by: INTERNAL MEDICINE

## 2021-02-24 PROCEDURE — 1126F AMNT PAIN NOTED NONE PRSNT: CPT | Mod: S$GLB,,, | Performed by: INTERNAL MEDICINE

## 2021-02-24 PROCEDURE — 3077F SYST BP >= 140 MM HG: CPT | Mod: CPTII,S$GLB,, | Performed by: INTERNAL MEDICINE

## 2021-02-24 RX ORDER — HYDROCHLOROTHIAZIDE 25 MG/1
25 TABLET ORAL DAILY
Qty: 90 TABLET | Refills: 1 | Status: SHIPPED | OUTPATIENT
Start: 2021-02-24 | End: 2021-08-30

## 2021-03-10 ENCOUNTER — OFFICE VISIT (OUTPATIENT)
Dept: INTERNAL MEDICINE | Facility: CLINIC | Age: 56
End: 2021-03-10
Payer: COMMERCIAL

## 2021-03-10 VITALS
WEIGHT: 162.94 LBS | OXYGEN SATURATION: 99 % | RESPIRATION RATE: 18 BRPM | BODY MASS INDEX: 25.57 KG/M2 | TEMPERATURE: 98 F | SYSTOLIC BLOOD PRESSURE: 138 MMHG | DIASTOLIC BLOOD PRESSURE: 89 MMHG | HEIGHT: 67 IN | HEART RATE: 75 BPM

## 2021-03-10 DIAGNOSIS — F43.23 ADJUSTMENT DISORDER WITH MIXED ANXIETY AND DEPRESSED MOOD: ICD-10-CM

## 2021-03-10 DIAGNOSIS — I10 ESSENTIAL HYPERTENSION: ICD-10-CM

## 2021-03-10 DIAGNOSIS — F33.1 MAJOR DEPRESSIVE DISORDER, RECURRENT EPISODE, MODERATE: Primary | ICD-10-CM

## 2021-03-10 PROCEDURE — 99999 PR PBB SHADOW E&M-EST. PATIENT-LVL IV: CPT | Mod: PBBFAC,,, | Performed by: INTERNAL MEDICINE

## 2021-03-10 PROCEDURE — 3008F PR BODY MASS INDEX (BMI) DOCUMENTED: ICD-10-PCS | Mod: CPTII,S$GLB,, | Performed by: INTERNAL MEDICINE

## 2021-03-10 PROCEDURE — 1126F AMNT PAIN NOTED NONE PRSNT: CPT | Mod: S$GLB,,, | Performed by: INTERNAL MEDICINE

## 2021-03-10 PROCEDURE — 3075F SYST BP GE 130 - 139MM HG: CPT | Mod: CPTII,S$GLB,, | Performed by: INTERNAL MEDICINE

## 2021-03-10 PROCEDURE — 3079F PR MOST RECENT DIASTOLIC BLOOD PRESSURE 80-89 MM HG: ICD-10-PCS | Mod: CPTII,S$GLB,, | Performed by: INTERNAL MEDICINE

## 2021-03-10 PROCEDURE — 1126F PR PAIN SEVERITY QUANTIFIED, NO PAIN PRESENT: ICD-10-PCS | Mod: S$GLB,,, | Performed by: INTERNAL MEDICINE

## 2021-03-10 PROCEDURE — 3079F DIAST BP 80-89 MM HG: CPT | Mod: CPTII,S$GLB,, | Performed by: INTERNAL MEDICINE

## 2021-03-10 PROCEDURE — 3008F BODY MASS INDEX DOCD: CPT | Mod: CPTII,S$GLB,, | Performed by: INTERNAL MEDICINE

## 2021-03-10 PROCEDURE — 3075F PR MOST RECENT SYSTOLIC BLOOD PRESS GE 130-139MM HG: ICD-10-PCS | Mod: CPTII,S$GLB,, | Performed by: INTERNAL MEDICINE

## 2021-03-10 PROCEDURE — 99213 OFFICE O/P EST LOW 20 MIN: CPT | Mod: S$GLB,,, | Performed by: INTERNAL MEDICINE

## 2021-03-10 PROCEDURE — 99999 PR PBB SHADOW E&M-EST. PATIENT-LVL IV: ICD-10-PCS | Mod: PBBFAC,,, | Performed by: INTERNAL MEDICINE

## 2021-03-10 PROCEDURE — 99213 PR OFFICE/OUTPT VISIT, EST, LEVL III, 20-29 MIN: ICD-10-PCS | Mod: S$GLB,,, | Performed by: INTERNAL MEDICINE

## 2021-03-10 RX ORDER — AMLODIPINE BESYLATE 2.5 MG/1
2.5 TABLET ORAL DAILY
Qty: 90 TABLET | Refills: 3 | Status: SHIPPED | OUTPATIENT
Start: 2021-03-10 | End: 2022-03-18

## 2021-03-26 ENCOUNTER — IMMUNIZATION (OUTPATIENT)
Dept: PRIMARY CARE CLINIC | Facility: CLINIC | Age: 56
End: 2021-03-26
Payer: COMMERCIAL

## 2021-03-26 DIAGNOSIS — Z23 NEED FOR VACCINATION: Primary | ICD-10-CM

## 2021-03-26 PROCEDURE — 91300 PR SARS-COV- 2 COVID-19 VACCINE, NO PRSV, 30MCG/0.3ML, IM: ICD-10-PCS | Mod: S$GLB,,, | Performed by: INTERNAL MEDICINE

## 2021-03-26 PROCEDURE — 0001A PR IMMUNIZ ADMIN, SARS-COV-2 COVID-19 VACC, 30MCG/0.3ML, 1ST DOSE: CPT | Mod: CV19,S$GLB,, | Performed by: INTERNAL MEDICINE

## 2021-03-26 PROCEDURE — 0001A PR IMMUNIZ ADMIN, SARS-COV-2 COVID-19 VACC, 30MCG/0.3ML, 1ST DOSE: ICD-10-PCS | Mod: CV19,S$GLB,, | Performed by: INTERNAL MEDICINE

## 2021-03-26 PROCEDURE — 91300 PR SARS-COV- 2 COVID-19 VACCINE, NO PRSV, 30MCG/0.3ML, IM: CPT | Mod: S$GLB,,, | Performed by: INTERNAL MEDICINE

## 2021-03-26 RX ADMIN — Medication 0.3 ML: at 01:03

## 2021-04-18 ENCOUNTER — IMMUNIZATION (OUTPATIENT)
Dept: PRIMARY CARE CLINIC | Facility: CLINIC | Age: 56
End: 2021-04-18
Payer: COMMERCIAL

## 2021-04-18 DIAGNOSIS — Z23 NEED FOR VACCINATION: Primary | ICD-10-CM

## 2021-04-18 PROCEDURE — 0002A PR IMMUNIZ ADMIN, SARS-COV-2 COVID-19 VACC, 30MCG/0.3ML, 2ND DOSE: CPT | Mod: CV19,S$GLB,, | Performed by: INTERNAL MEDICINE

## 2021-04-18 PROCEDURE — 91300 PR SARS-COV- 2 COVID-19 VACCINE, NO PRSV, 30MCG/0.3ML, IM: CPT | Mod: S$GLB,,, | Performed by: INTERNAL MEDICINE

## 2021-04-18 PROCEDURE — 91300 PR SARS-COV- 2 COVID-19 VACCINE, NO PRSV, 30MCG/0.3ML, IM: ICD-10-PCS | Mod: S$GLB,,, | Performed by: INTERNAL MEDICINE

## 2021-04-18 PROCEDURE — 0002A PR IMMUNIZ ADMIN, SARS-COV-2 COVID-19 VACC, 30MCG/0.3ML, 2ND DOSE: ICD-10-PCS | Mod: CV19,S$GLB,, | Performed by: INTERNAL MEDICINE

## 2021-04-18 RX ADMIN — Medication 0.3 ML: at 08:04

## 2021-05-28 ENCOUNTER — TELEPHONE (OUTPATIENT)
Dept: INTERNAL MEDICINE | Facility: CLINIC | Age: 56
End: 2021-05-28

## 2021-05-28 ENCOUNTER — PATIENT MESSAGE (OUTPATIENT)
Dept: INTERNAL MEDICINE | Facility: CLINIC | Age: 56
End: 2021-05-28

## 2021-05-28 DIAGNOSIS — F90.1 ATTENTION DEFICIT HYPERACTIVITY DISORDER (ADHD), PREDOMINANTLY HYPERACTIVE TYPE: ICD-10-CM

## 2021-05-28 RX ORDER — METHYLPHENIDATE HYDROCHLORIDE 36 MG/1
36 TABLET ORAL EVERY MORNING
Qty: 30 TABLET | Refills: 0 | Status: SHIPPED | OUTPATIENT
Start: 2021-05-28 | End: 2021-07-20 | Stop reason: SDUPTHER

## 2021-07-20 ENCOUNTER — OFFICE VISIT (OUTPATIENT)
Dept: INTERNAL MEDICINE | Facility: CLINIC | Age: 56
End: 2021-07-20
Payer: COMMERCIAL

## 2021-07-20 VITALS
HEIGHT: 67 IN | TEMPERATURE: 98 F | DIASTOLIC BLOOD PRESSURE: 72 MMHG | OXYGEN SATURATION: 99 % | RESPIRATION RATE: 18 BRPM | HEART RATE: 78 BPM | BODY MASS INDEX: 25.6 KG/M2 | SYSTOLIC BLOOD PRESSURE: 104 MMHG | WEIGHT: 163.13 LBS

## 2021-07-20 DIAGNOSIS — R10.13 EPIGASTRIC ABDOMINAL PAIN: ICD-10-CM

## 2021-07-20 DIAGNOSIS — F90.1 ATTENTION DEFICIT HYPERACTIVITY DISORDER (ADHD), PREDOMINANTLY HYPERACTIVE TYPE: ICD-10-CM

## 2021-07-20 DIAGNOSIS — F33.1 MAJOR DEPRESSIVE DISORDER, RECURRENT EPISODE, MODERATE: ICD-10-CM

## 2021-07-20 DIAGNOSIS — I10 ESSENTIAL HYPERTENSION: Primary | ICD-10-CM

## 2021-07-20 PROCEDURE — 1125F PR PAIN SEVERITY QUANTIFIED, PAIN PRESENT: ICD-10-PCS | Mod: CPTII,S$GLB,, | Performed by: INTERNAL MEDICINE

## 2021-07-20 PROCEDURE — 99999 PR PBB SHADOW E&M-EST. PATIENT-LVL IV: CPT | Mod: PBBFAC,,, | Performed by: INTERNAL MEDICINE

## 2021-07-20 PROCEDURE — 3078F DIAST BP <80 MM HG: CPT | Mod: CPTII,S$GLB,, | Performed by: INTERNAL MEDICINE

## 2021-07-20 PROCEDURE — 3078F PR MOST RECENT DIASTOLIC BLOOD PRESSURE < 80 MM HG: ICD-10-PCS | Mod: CPTII,S$GLB,, | Performed by: INTERNAL MEDICINE

## 2021-07-20 PROCEDURE — 99214 PR OFFICE/OUTPT VISIT, EST, LEVL IV, 30-39 MIN: ICD-10-PCS | Mod: S$GLB,,, | Performed by: INTERNAL MEDICINE

## 2021-07-20 PROCEDURE — 1125F AMNT PAIN NOTED PAIN PRSNT: CPT | Mod: CPTII,S$GLB,, | Performed by: INTERNAL MEDICINE

## 2021-07-20 PROCEDURE — 3074F PR MOST RECENT SYSTOLIC BLOOD PRESSURE < 130 MM HG: ICD-10-PCS | Mod: CPTII,S$GLB,, | Performed by: INTERNAL MEDICINE

## 2021-07-20 PROCEDURE — 3008F BODY MASS INDEX DOCD: CPT | Mod: CPTII,S$GLB,, | Performed by: INTERNAL MEDICINE

## 2021-07-20 PROCEDURE — 3074F SYST BP LT 130 MM HG: CPT | Mod: CPTII,S$GLB,, | Performed by: INTERNAL MEDICINE

## 2021-07-20 PROCEDURE — 99214 OFFICE O/P EST MOD 30 MIN: CPT | Mod: S$GLB,,, | Performed by: INTERNAL MEDICINE

## 2021-07-20 PROCEDURE — 99999 PR PBB SHADOW E&M-EST. PATIENT-LVL IV: ICD-10-PCS | Mod: PBBFAC,,, | Performed by: INTERNAL MEDICINE

## 2021-07-20 PROCEDURE — 3008F PR BODY MASS INDEX (BMI) DOCUMENTED: ICD-10-PCS | Mod: CPTII,S$GLB,, | Performed by: INTERNAL MEDICINE

## 2021-07-20 RX ORDER — METHYLPHENIDATE HYDROCHLORIDE 36 MG/1
36 TABLET ORAL DAILY
Qty: 30 TABLET | Refills: 0 | Status: SHIPPED | OUTPATIENT
Start: 2021-09-20 | End: 2021-10-20 | Stop reason: SDUPTHER

## 2021-07-20 RX ORDER — METHYLPHENIDATE HYDROCHLORIDE 36 MG/1
36 TABLET ORAL EVERY MORNING
Qty: 30 TABLET | Refills: 0 | Status: SHIPPED | OUTPATIENT
Start: 2021-07-20 | End: 2021-10-20 | Stop reason: SDUPTHER

## 2021-07-20 RX ORDER — METHYLPHENIDATE HYDROCHLORIDE 36 MG/1
36 TABLET ORAL EVERY MORNING
Qty: 30 TABLET | Refills: 0 | Status: SHIPPED | OUTPATIENT
Start: 2021-08-20 | End: 2021-10-20 | Stop reason: SDUPTHER

## 2021-08-10 ENCOUNTER — PATIENT OUTREACH (OUTPATIENT)
Dept: ADMINISTRATIVE | Facility: OTHER | Age: 56
End: 2021-08-10

## 2021-08-11 ENCOUNTER — TELEPHONE (OUTPATIENT)
Dept: GASTROENTEROLOGY | Facility: CLINIC | Age: 56
End: 2021-08-11

## 2021-08-12 ENCOUNTER — TELEPHONE (OUTPATIENT)
Dept: GASTROENTEROLOGY | Facility: CLINIC | Age: 56
End: 2021-08-12

## 2021-08-20 ENCOUNTER — OFFICE VISIT (OUTPATIENT)
Dept: OPTOMETRY | Facility: CLINIC | Age: 56
End: 2021-08-20
Payer: COMMERCIAL

## 2021-08-20 DIAGNOSIS — H10.13 ALLERGIC CONJUNCTIVITIS OF BOTH EYES: ICD-10-CM

## 2021-08-20 DIAGNOSIS — Z83.511 FAMILY HISTORY OF GLAUCOMA IN FATHER: ICD-10-CM

## 2021-08-20 DIAGNOSIS — H04.123 DRY EYE SYNDROME OF BOTH EYES: Primary | ICD-10-CM

## 2021-08-20 DIAGNOSIS — H52.4 BILATERAL PRESBYOPIA: ICD-10-CM

## 2021-08-20 PROCEDURE — 1160F RVW MEDS BY RX/DR IN RCRD: CPT | Mod: CPTII,S$GLB,, | Performed by: OPTOMETRIST

## 2021-08-20 PROCEDURE — 1126F PR PAIN SEVERITY QUANTIFIED, NO PAIN PRESENT: ICD-10-PCS | Mod: CPTII,S$GLB,, | Performed by: OPTOMETRIST

## 2021-08-20 PROCEDURE — 1126F AMNT PAIN NOTED NONE PRSNT: CPT | Mod: CPTII,S$GLB,, | Performed by: OPTOMETRIST

## 2021-08-20 PROCEDURE — 92014 COMPRE OPH EXAM EST PT 1/>: CPT | Mod: S$GLB,,, | Performed by: OPTOMETRIST

## 2021-08-20 PROCEDURE — 1159F PR MEDICATION LIST DOCUMENTED IN MEDICAL RECORD: ICD-10-PCS | Mod: CPTII,S$GLB,, | Performed by: OPTOMETRIST

## 2021-08-20 PROCEDURE — 92014 PR EYE EXAM, EST PATIENT,COMPREHESV: ICD-10-PCS | Mod: S$GLB,,, | Performed by: OPTOMETRIST

## 2021-08-20 PROCEDURE — 1160F PR REVIEW ALL MEDS BY PRESCRIBER/CLIN PHARMACIST DOCUMENTED: ICD-10-PCS | Mod: CPTII,S$GLB,, | Performed by: OPTOMETRIST

## 2021-08-20 PROCEDURE — 99999 PR PBB SHADOW E&M-EST. PATIENT-LVL III: CPT | Mod: PBBFAC,,, | Performed by: OPTOMETRIST

## 2021-08-20 PROCEDURE — 99999 PR PBB SHADOW E&M-EST. PATIENT-LVL III: ICD-10-PCS | Mod: PBBFAC,,, | Performed by: OPTOMETRIST

## 2021-08-20 PROCEDURE — 1159F MED LIST DOCD IN RCRD: CPT | Mod: CPTII,S$GLB,, | Performed by: OPTOMETRIST

## 2021-08-30 RX ORDER — HYDROCHLOROTHIAZIDE 25 MG/1
TABLET ORAL
Qty: 90 TABLET | Refills: 0 | Status: SHIPPED | OUTPATIENT
Start: 2021-08-30 | End: 2021-08-31 | Stop reason: SDUPTHER

## 2021-09-02 RX ORDER — HYDROCHLOROTHIAZIDE 25 MG/1
25 TABLET ORAL DAILY
Qty: 90 TABLET | Refills: 0 | Status: SHIPPED | OUTPATIENT
Start: 2021-09-02 | End: 2021-12-06

## 2021-09-29 RX ORDER — ESCITALOPRAM OXALATE 20 MG/1
20 TABLET ORAL NIGHTLY
Qty: 90 TABLET | Refills: 3 | Status: SHIPPED | OUTPATIENT
Start: 2021-09-29 | End: 2022-06-07 | Stop reason: SDUPTHER

## 2021-10-20 ENCOUNTER — OFFICE VISIT (OUTPATIENT)
Dept: INTERNAL MEDICINE | Facility: CLINIC | Age: 56
End: 2021-10-20
Payer: COMMERCIAL

## 2021-10-20 VITALS
TEMPERATURE: 98 F | WEIGHT: 163.81 LBS | OXYGEN SATURATION: 99 % | HEIGHT: 67 IN | HEART RATE: 63 BPM | SYSTOLIC BLOOD PRESSURE: 134 MMHG | BODY MASS INDEX: 25.71 KG/M2 | DIASTOLIC BLOOD PRESSURE: 86 MMHG | RESPIRATION RATE: 18 BRPM

## 2021-10-20 DIAGNOSIS — Z12.4 CERVICAL CANCER SCREENING: ICD-10-CM

## 2021-10-20 DIAGNOSIS — S83.92XA SPRAIN OF LEFT KNEE, UNSPECIFIED LIGAMENT, INITIAL ENCOUNTER: ICD-10-CM

## 2021-10-20 DIAGNOSIS — F90.1 ATTENTION DEFICIT HYPERACTIVITY DISORDER (ADHD), PREDOMINANTLY HYPERACTIVE TYPE: Primary | ICD-10-CM

## 2021-10-20 DIAGNOSIS — Z12.31 ENCOUNTER FOR SCREENING MAMMOGRAM FOR BREAST CANCER: ICD-10-CM

## 2021-10-20 PROCEDURE — 4010F ACE/ARB THERAPY RXD/TAKEN: CPT | Mod: CPTII,S$GLB,, | Performed by: INTERNAL MEDICINE

## 2021-10-20 PROCEDURE — 4010F PR ACE/ARB THEARPY RXD/TAKEN: ICD-10-PCS | Mod: CPTII,S$GLB,, | Performed by: INTERNAL MEDICINE

## 2021-10-20 PROCEDURE — 3008F PR BODY MASS INDEX (BMI) DOCUMENTED: ICD-10-PCS | Mod: CPTII,S$GLB,, | Performed by: INTERNAL MEDICINE

## 2021-10-20 PROCEDURE — 90471 FLU VACCINE (QUAD) GREATER THAN OR EQUAL TO 3YO PRESERVATIVE FREE IM: ICD-10-PCS | Mod: S$GLB,,, | Performed by: INTERNAL MEDICINE

## 2021-10-20 PROCEDURE — 3079F DIAST BP 80-89 MM HG: CPT | Mod: CPTII,S$GLB,, | Performed by: INTERNAL MEDICINE

## 2021-10-20 PROCEDURE — 1159F MED LIST DOCD IN RCRD: CPT | Mod: CPTII,S$GLB,, | Performed by: INTERNAL MEDICINE

## 2021-10-20 PROCEDURE — 90686 IIV4 VACC NO PRSV 0.5 ML IM: CPT | Mod: S$GLB,,, | Performed by: INTERNAL MEDICINE

## 2021-10-20 PROCEDURE — 90686 FLU VACCINE (QUAD) GREATER THAN OR EQUAL TO 3YO PRESERVATIVE FREE IM: ICD-10-PCS | Mod: S$GLB,,, | Performed by: INTERNAL MEDICINE

## 2021-10-20 PROCEDURE — 3075F SYST BP GE 130 - 139MM HG: CPT | Mod: CPTII,S$GLB,, | Performed by: INTERNAL MEDICINE

## 2021-10-20 PROCEDURE — 99214 OFFICE O/P EST MOD 30 MIN: CPT | Mod: 25,S$GLB,, | Performed by: INTERNAL MEDICINE

## 2021-10-20 PROCEDURE — 90471 IMMUNIZATION ADMIN: CPT | Mod: S$GLB,,, | Performed by: INTERNAL MEDICINE

## 2021-10-20 PROCEDURE — 3008F BODY MASS INDEX DOCD: CPT | Mod: CPTII,S$GLB,, | Performed by: INTERNAL MEDICINE

## 2021-10-20 PROCEDURE — 99999 PR PBB SHADOW E&M-EST. PATIENT-LVL IV: CPT | Mod: PBBFAC,,, | Performed by: INTERNAL MEDICINE

## 2021-10-20 PROCEDURE — 3079F PR MOST RECENT DIASTOLIC BLOOD PRESSURE 80-89 MM HG: ICD-10-PCS | Mod: CPTII,S$GLB,, | Performed by: INTERNAL MEDICINE

## 2021-10-20 PROCEDURE — 99214 PR OFFICE/OUTPT VISIT, EST, LEVL IV, 30-39 MIN: ICD-10-PCS | Mod: 25,S$GLB,, | Performed by: INTERNAL MEDICINE

## 2021-10-20 PROCEDURE — 3075F PR MOST RECENT SYSTOLIC BLOOD PRESS GE 130-139MM HG: ICD-10-PCS | Mod: CPTII,S$GLB,, | Performed by: INTERNAL MEDICINE

## 2021-10-20 PROCEDURE — 1159F PR MEDICATION LIST DOCUMENTED IN MEDICAL RECORD: ICD-10-PCS | Mod: CPTII,S$GLB,, | Performed by: INTERNAL MEDICINE

## 2021-10-20 PROCEDURE — 99999 PR PBB SHADOW E&M-EST. PATIENT-LVL IV: ICD-10-PCS | Mod: PBBFAC,,, | Performed by: INTERNAL MEDICINE

## 2021-10-20 RX ORDER — METHYLPREDNISOLONE 4 MG/1
TABLET ORAL
Qty: 1 EACH | Refills: 0 | Status: SHIPPED | OUTPATIENT
Start: 2021-10-20 | End: 2022-01-26

## 2021-10-20 RX ORDER — METHYLPHENIDATE HYDROCHLORIDE 36 MG/1
36 TABLET ORAL EVERY MORNING
Qty: 30 TABLET | Refills: 0 | Status: SHIPPED | OUTPATIENT
Start: 2021-11-20 | End: 2022-04-04

## 2021-10-20 RX ORDER — METHYLPHENIDATE HYDROCHLORIDE 36 MG/1
36 TABLET ORAL EVERY MORNING
Qty: 30 TABLET | Refills: 0 | Status: SHIPPED | OUTPATIENT
Start: 2021-10-20 | End: 2022-04-04

## 2021-10-20 RX ORDER — METHYLPHENIDATE HYDROCHLORIDE 36 MG/1
36 TABLET ORAL DAILY
Qty: 30 TABLET | Refills: 0 | Status: SHIPPED | OUTPATIENT
Start: 2021-12-20 | End: 2022-01-26 | Stop reason: SDUPTHER

## 2021-10-25 ENCOUNTER — PATIENT OUTREACH (OUTPATIENT)
Dept: ADMINISTRATIVE | Facility: OTHER | Age: 56
End: 2021-10-25
Payer: COMMERCIAL

## 2021-11-02 ENCOUNTER — OFFICE VISIT (OUTPATIENT)
Dept: GASTROENTEROLOGY | Facility: CLINIC | Age: 56
End: 2021-11-02
Payer: COMMERCIAL

## 2021-11-02 VITALS — BODY MASS INDEX: 25.57 KG/M2 | WEIGHT: 162.94 LBS | HEIGHT: 67 IN

## 2021-11-02 DIAGNOSIS — R10.13 DYSPEPSIA: ICD-10-CM

## 2021-11-02 DIAGNOSIS — R10.13 EPIGASTRIC ABDOMINAL PAIN: Primary | ICD-10-CM

## 2021-11-02 DIAGNOSIS — R19.7 DIARRHEA, UNSPECIFIED TYPE: ICD-10-CM

## 2021-11-02 PROCEDURE — 3008F BODY MASS INDEX DOCD: CPT | Mod: CPTII,S$GLB,, | Performed by: NURSE PRACTITIONER

## 2021-11-02 PROCEDURE — 99204 OFFICE O/P NEW MOD 45 MIN: CPT | Mod: S$GLB,,, | Performed by: NURSE PRACTITIONER

## 2021-11-02 PROCEDURE — 99999 PR PBB SHADOW E&M-EST. PATIENT-LVL III: ICD-10-PCS | Mod: PBBFAC,,, | Performed by: NURSE PRACTITIONER

## 2021-11-02 PROCEDURE — 4010F ACE/ARB THERAPY RXD/TAKEN: CPT | Mod: CPTII,S$GLB,, | Performed by: NURSE PRACTITIONER

## 2021-11-02 PROCEDURE — 4010F PR ACE/ARB THEARPY RXD/TAKEN: ICD-10-PCS | Mod: CPTII,S$GLB,, | Performed by: NURSE PRACTITIONER

## 2021-11-02 PROCEDURE — 3008F PR BODY MASS INDEX (BMI) DOCUMENTED: ICD-10-PCS | Mod: CPTII,S$GLB,, | Performed by: NURSE PRACTITIONER

## 2021-11-02 PROCEDURE — 99999 PR PBB SHADOW E&M-EST. PATIENT-LVL III: CPT | Mod: PBBFAC,,, | Performed by: NURSE PRACTITIONER

## 2021-11-02 PROCEDURE — 99204 PR OFFICE/OUTPT VISIT, NEW, LEVL IV, 45-59 MIN: ICD-10-PCS | Mod: S$GLB,,, | Performed by: NURSE PRACTITIONER

## 2021-11-02 PROCEDURE — 1159F MED LIST DOCD IN RCRD: CPT | Mod: CPTII,S$GLB,, | Performed by: NURSE PRACTITIONER

## 2021-11-02 PROCEDURE — 1159F PR MEDICATION LIST DOCUMENTED IN MEDICAL RECORD: ICD-10-PCS | Mod: CPTII,S$GLB,, | Performed by: NURSE PRACTITIONER

## 2021-11-02 RX ORDER — PANTOPRAZOLE SODIUM 40 MG/1
40 TABLET, DELAYED RELEASE ORAL DAILY
Qty: 30 TABLET | Refills: 3 | Status: SHIPPED | OUTPATIENT
Start: 2021-11-02 | End: 2022-02-21

## 2021-11-04 ENCOUNTER — LAB VISIT (OUTPATIENT)
Dept: LAB | Facility: HOSPITAL | Age: 56
End: 2021-11-04
Attending: INTERNAL MEDICINE
Payer: COMMERCIAL

## 2021-11-04 ENCOUNTER — HOSPITAL ENCOUNTER (OUTPATIENT)
Dept: RADIOLOGY | Facility: HOSPITAL | Age: 56
Discharge: HOME | End: 2021-11-04
Attending: INTERNAL MEDICINE
Payer: COMMERCIAL

## 2021-11-04 VITALS — BODY MASS INDEX: 25.37 KG/M2 | WEIGHT: 162 LBS

## 2021-11-04 DIAGNOSIS — R10.13 EPIGASTRIC ABDOMINAL PAIN: ICD-10-CM

## 2021-11-04 DIAGNOSIS — Z12.31 ENCOUNTER FOR SCREENING MAMMOGRAM FOR BREAST CANCER: ICD-10-CM

## 2021-11-04 DIAGNOSIS — R19.7 DIARRHEA, UNSPECIFIED TYPE: ICD-10-CM

## 2021-11-04 PROCEDURE — 77063 BREAST TOMOSYNTHESIS BI: CPT | Mod: 26,,, | Performed by: RADIOLOGY

## 2021-11-04 PROCEDURE — 77063 MAMMO DIGITAL SCREENING BILAT WITH TOMO: ICD-10-PCS | Mod: 26,,, | Performed by: RADIOLOGY

## 2021-11-04 PROCEDURE — 77067 SCR MAMMO BI INCL CAD: CPT | Mod: 26,,, | Performed by: RADIOLOGY

## 2021-11-04 PROCEDURE — 87177 OVA AND PARASITES SMEARS: CPT | Performed by: NURSE PRACTITIONER

## 2021-11-04 PROCEDURE — 83993 ASSAY FOR CALPROTECTIN FECAL: CPT | Performed by: NURSE PRACTITIONER

## 2021-11-04 PROCEDURE — 87425 ROTAVIRUS AG IA: CPT | Performed by: NURSE PRACTITIONER

## 2021-11-04 PROCEDURE — 87329 GIARDIA AG IA: CPT | Performed by: NURSE PRACTITIONER

## 2021-11-04 PROCEDURE — 87427 SHIGA-LIKE TOXIN AG IA: CPT | Performed by: NURSE PRACTITIONER

## 2021-11-04 PROCEDURE — 87324 CLOSTRIDIUM AG IA: CPT | Performed by: NURSE PRACTITIONER

## 2021-11-04 PROCEDURE — 87449 NOS EACH ORGANISM AG IA: CPT | Performed by: NURSE PRACTITIONER

## 2021-11-04 PROCEDURE — 87045 FECES CULTURE AEROBIC BACT: CPT | Performed by: NURSE PRACTITIONER

## 2021-11-04 PROCEDURE — 77067 MAMMO DIGITAL SCREENING BILAT WITH TOMO: ICD-10-PCS | Mod: 26,,, | Performed by: RADIOLOGY

## 2021-11-04 PROCEDURE — 87046 STOOL CULTR AEROBIC BACT EA: CPT | Mod: 59 | Performed by: NURSE PRACTITIONER

## 2021-11-04 PROCEDURE — 77067 SCR MAMMO BI INCL CAD: CPT | Mod: TC,PO

## 2021-11-04 PROCEDURE — 82656 EL-1 FECAL QUAL/SEMIQ: CPT | Performed by: NURSE PRACTITIONER

## 2021-11-04 PROCEDURE — 89055 LEUKOCYTE ASSESSMENT FECAL: CPT | Performed by: NURSE PRACTITIONER

## 2021-11-05 LAB
CRYPTOSP AG STL QL IA: NEGATIVE
E COLI SXT1 STL QL IA: NEGATIVE
E COLI SXT2 STL QL IA: NEGATIVE
G LAMBLIA AG STL QL IA: NEGATIVE
RV AG STL QL IA.RAPID: NEGATIVE
WBC #/AREA STL HPF: NORMAL /[HPF]

## 2021-11-07 LAB — BACTERIA STL CULT: NORMAL

## 2021-11-09 LAB
CALPROTECTIN STL-MCNT: 55.4 MCG/G
ELASTASE 1, FECAL: 482 MCG/G
O+P STL MICRO: NORMAL

## 2021-11-10 ENCOUNTER — TELEPHONE (OUTPATIENT)
Dept: GASTROENTEROLOGY | Facility: CLINIC | Age: 56
End: 2021-11-10
Payer: COMMERCIAL

## 2021-11-12 ENCOUNTER — OFFICE VISIT (OUTPATIENT)
Dept: OBSTETRICS AND GYNECOLOGY | Facility: CLINIC | Age: 56
End: 2021-11-12
Payer: COMMERCIAL

## 2021-11-12 VITALS
BODY MASS INDEX: 26.06 KG/M2 | HEIGHT: 67 IN | WEIGHT: 166 LBS | SYSTOLIC BLOOD PRESSURE: 130 MMHG | DIASTOLIC BLOOD PRESSURE: 62 MMHG

## 2021-11-12 DIAGNOSIS — Z12.4 CERVICAL CANCER SCREENING: ICD-10-CM

## 2021-11-12 DIAGNOSIS — N89.8 VAGINAL DISCHARGE: ICD-10-CM

## 2021-11-12 DIAGNOSIS — Z01.419 ENCOUNTER FOR WELL WOMAN EXAM WITH ROUTINE GYNECOLOGICAL EXAM: Primary | ICD-10-CM

## 2021-11-12 DIAGNOSIS — Z12.31 ENCOUNTER FOR SCREENING MAMMOGRAM FOR MALIGNANT NEOPLASM OF BREAST: ICD-10-CM

## 2021-11-12 DIAGNOSIS — B97.7 HIGH RISK HPV INFECTION: ICD-10-CM

## 2021-11-12 PROCEDURE — 3075F SYST BP GE 130 - 139MM HG: CPT | Mod: CPTII,S$GLB,, | Performed by: OBSTETRICS & GYNECOLOGY

## 2021-11-12 PROCEDURE — 3075F PR MOST RECENT SYSTOLIC BLOOD PRESS GE 130-139MM HG: ICD-10-PCS | Mod: CPTII,S$GLB,, | Performed by: OBSTETRICS & GYNECOLOGY

## 2021-11-12 PROCEDURE — 99396 PREV VISIT EST AGE 40-64: CPT | Mod: S$GLB,,, | Performed by: OBSTETRICS & GYNECOLOGY

## 2021-11-12 PROCEDURE — 99396 PR PREVENTIVE VISIT,EST,40-64: ICD-10-PCS | Mod: S$GLB,,, | Performed by: OBSTETRICS & GYNECOLOGY

## 2021-11-12 PROCEDURE — 4010F PR ACE/ARB THEARPY RXD/TAKEN: ICD-10-PCS | Mod: CPTII,S$GLB,, | Performed by: OBSTETRICS & GYNECOLOGY

## 2021-11-12 PROCEDURE — 99999 PR PBB SHADOW E&M-EST. PATIENT-LVL III: ICD-10-PCS | Mod: PBBFAC,,, | Performed by: OBSTETRICS & GYNECOLOGY

## 2021-11-12 PROCEDURE — 87624 HPV HI-RISK TYP POOLED RSLT: CPT | Performed by: OBSTETRICS & GYNECOLOGY

## 2021-11-12 PROCEDURE — 3008F BODY MASS INDEX DOCD: CPT | Mod: CPTII,S$GLB,, | Performed by: OBSTETRICS & GYNECOLOGY

## 2021-11-12 PROCEDURE — 99999 PR PBB SHADOW E&M-EST. PATIENT-LVL III: CPT | Mod: PBBFAC,,, | Performed by: OBSTETRICS & GYNECOLOGY

## 2021-11-12 PROCEDURE — 1160F PR REVIEW ALL MEDS BY PRESCRIBER/CLIN PHARMACIST DOCUMENTED: ICD-10-PCS | Mod: CPTII,S$GLB,, | Performed by: OBSTETRICS & GYNECOLOGY

## 2021-11-12 PROCEDURE — 1159F MED LIST DOCD IN RCRD: CPT | Mod: CPTII,S$GLB,, | Performed by: OBSTETRICS & GYNECOLOGY

## 2021-11-12 PROCEDURE — 3078F DIAST BP <80 MM HG: CPT | Mod: CPTII,S$GLB,, | Performed by: OBSTETRICS & GYNECOLOGY

## 2021-11-12 PROCEDURE — 88175 CYTOPATH C/V AUTO FLUID REDO: CPT | Performed by: OBSTETRICS & GYNECOLOGY

## 2021-11-12 PROCEDURE — 4010F ACE/ARB THERAPY RXD/TAKEN: CPT | Mod: CPTII,S$GLB,, | Performed by: OBSTETRICS & GYNECOLOGY

## 2021-11-12 PROCEDURE — 3008F PR BODY MASS INDEX (BMI) DOCUMENTED: ICD-10-PCS | Mod: CPTII,S$GLB,, | Performed by: OBSTETRICS & GYNECOLOGY

## 2021-11-12 PROCEDURE — 1159F PR MEDICATION LIST DOCUMENTED IN MEDICAL RECORD: ICD-10-PCS | Mod: CPTII,S$GLB,, | Performed by: OBSTETRICS & GYNECOLOGY

## 2021-11-12 PROCEDURE — 3078F PR MOST RECENT DIASTOLIC BLOOD PRESSURE < 80 MM HG: ICD-10-PCS | Mod: CPTII,S$GLB,, | Performed by: OBSTETRICS & GYNECOLOGY

## 2021-11-12 PROCEDURE — 1160F RVW MEDS BY RX/DR IN RCRD: CPT | Mod: CPTII,S$GLB,, | Performed by: OBSTETRICS & GYNECOLOGY

## 2021-11-12 PROCEDURE — 87481 CANDIDA DNA AMP PROBE: CPT | Mod: 59 | Performed by: OBSTETRICS & GYNECOLOGY

## 2021-11-19 LAB
HPV HR 12 DNA SPEC QL NAA+PROBE: NEGATIVE
HPV16 AG SPEC QL: NEGATIVE
HPV18 DNA SPEC QL NAA+PROBE: NEGATIVE

## 2021-11-20 LAB
FINAL PATHOLOGIC DIAGNOSIS: NORMAL
Lab: NORMAL

## 2021-11-21 LAB
BACTERIAL VAGINOSIS DNA: NEGATIVE
CANDIDA GLABRATA DNA: NEGATIVE
CANDIDA KRUSEI DNA: NEGATIVE
CANDIDA RRNA VAG QL PROBE: NEGATIVE
T VAGINALIS RRNA GENITAL QL PROBE: NEGATIVE

## 2021-11-22 ENCOUNTER — TELEPHONE (OUTPATIENT)
Dept: ENDOSCOPY | Facility: HOSPITAL | Age: 56
End: 2021-11-22
Payer: COMMERCIAL

## 2021-11-23 ENCOUNTER — TELEPHONE (OUTPATIENT)
Dept: ENDOSCOPY | Facility: HOSPITAL | Age: 56
End: 2021-11-23
Payer: COMMERCIAL

## 2021-11-26 ENCOUNTER — ANESTHESIA EVENT (OUTPATIENT)
Dept: ENDOSCOPY | Facility: HOSPITAL | Age: 56
End: 2021-11-26
Payer: COMMERCIAL

## 2021-11-26 ENCOUNTER — HOSPITAL ENCOUNTER (OUTPATIENT)
Facility: HOSPITAL | Age: 56
Discharge: HOME | End: 2021-11-26
Attending: INTERNAL MEDICINE | Admitting: INTERNAL MEDICINE
Payer: COMMERCIAL

## 2021-11-26 ENCOUNTER — ANESTHESIA (OUTPATIENT)
Dept: ENDOSCOPY | Facility: HOSPITAL | Age: 56
End: 2021-11-26
Payer: COMMERCIAL

## 2021-11-26 VITALS
TEMPERATURE: 98 F | WEIGHT: 160 LBS | HEIGHT: 67 IN | BODY MASS INDEX: 25.11 KG/M2 | SYSTOLIC BLOOD PRESSURE: 107 MMHG | RESPIRATION RATE: 15 BRPM | HEART RATE: 61 BPM | DIASTOLIC BLOOD PRESSURE: 71 MMHG | OXYGEN SATURATION: 99 %

## 2021-11-26 DIAGNOSIS — R10.13 EPIGASTRIC PAIN: ICD-10-CM

## 2021-11-26 PROCEDURE — 25000003 PHARM REV CODE 250: Performed by: INTERNAL MEDICINE

## 2021-11-26 PROCEDURE — 88342 IMHCHEM/IMCYTCHM 1ST ANTB: CPT | Mod: 26,,, | Performed by: STUDENT IN AN ORGANIZED HEALTH CARE EDUCATION/TRAINING PROGRAM

## 2021-11-26 PROCEDURE — 43239 EGD BIOPSY SINGLE/MULTIPLE: CPT | Performed by: INTERNAL MEDICINE

## 2021-11-26 PROCEDURE — 88305 TISSUE EXAM BY PATHOLOGIST: ICD-10-PCS | Mod: 26,,, | Performed by: STUDENT IN AN ORGANIZED HEALTH CARE EDUCATION/TRAINING PROGRAM

## 2021-11-26 PROCEDURE — 37000009 HC ANESTHESIA EA ADD 15 MINS: Performed by: INTERNAL MEDICINE

## 2021-11-26 PROCEDURE — 43239 PR EGD, FLEX, W/BIOPSY, SGL/MULTI: ICD-10-PCS | Mod: ,,, | Performed by: INTERNAL MEDICINE

## 2021-11-26 PROCEDURE — 37000008 HC ANESTHESIA 1ST 15 MINUTES: Performed by: INTERNAL MEDICINE

## 2021-11-26 PROCEDURE — 88305 TISSUE EXAM BY PATHOLOGIST: CPT | Performed by: STUDENT IN AN ORGANIZED HEALTH CARE EDUCATION/TRAINING PROGRAM

## 2021-11-26 PROCEDURE — 25000003 PHARM REV CODE 250: Performed by: NURSE ANESTHETIST, CERTIFIED REGISTERED

## 2021-11-26 PROCEDURE — 88305 TISSUE EXAM BY PATHOLOGIST: CPT | Mod: 26,,, | Performed by: STUDENT IN AN ORGANIZED HEALTH CARE EDUCATION/TRAINING PROGRAM

## 2021-11-26 PROCEDURE — 88342 IMHCHEM/IMCYTCHM 1ST ANTB: CPT | Performed by: STUDENT IN AN ORGANIZED HEALTH CARE EDUCATION/TRAINING PROGRAM

## 2021-11-26 PROCEDURE — 27201012 HC FORCEPS, HOT/COLD, DISP: Performed by: INTERNAL MEDICINE

## 2021-11-26 PROCEDURE — 88342 CHG IMMUNOCYTOCHEMISTRY: ICD-10-PCS | Mod: 26,,, | Performed by: STUDENT IN AN ORGANIZED HEALTH CARE EDUCATION/TRAINING PROGRAM

## 2021-11-26 PROCEDURE — 43239 EGD BIOPSY SINGLE/MULTIPLE: CPT | Mod: ,,, | Performed by: INTERNAL MEDICINE

## 2021-11-26 PROCEDURE — 63600175 PHARM REV CODE 636 W HCPCS: Performed by: NURSE ANESTHETIST, CERTIFIED REGISTERED

## 2021-11-26 RX ORDER — LIDOCAINE HYDROCHLORIDE 20 MG/ML
INJECTION INTRAVENOUS
Status: DISCONTINUED | OUTPATIENT
Start: 2021-11-26 | End: 2021-11-26

## 2021-11-26 RX ORDER — PROPOFOL 10 MG/ML
VIAL (ML) INTRAVENOUS
Status: DISCONTINUED | OUTPATIENT
Start: 2021-11-26 | End: 2021-11-26

## 2021-11-26 RX ORDER — SODIUM CHLORIDE 9 MG/ML
INJECTION, SOLUTION INTRAVENOUS CONTINUOUS
Status: DISCONTINUED | OUTPATIENT
Start: 2021-11-26 | End: 2021-11-26 | Stop reason: HOSPADM

## 2021-11-26 RX ORDER — SODIUM CHLORIDE 0.9 % (FLUSH) 0.9 %
10 SYRINGE (ML) INJECTION
Status: DISCONTINUED | OUTPATIENT
Start: 2021-11-26 | End: 2021-11-26 | Stop reason: HOSPADM

## 2021-11-26 RX ADMIN — SODIUM CHLORIDE: 0.9 INJECTION, SOLUTION INTRAVENOUS at 09:11

## 2021-11-26 RX ADMIN — LIDOCAINE HYDROCHLORIDE 100 MG: 20 INJECTION, SOLUTION INTRAVENOUS at 09:11

## 2021-11-26 RX ADMIN — PROPOFOL 80 MG: 10 INJECTION, EMULSION INTRAVENOUS at 09:11

## 2021-11-29 ENCOUNTER — TELEPHONE (OUTPATIENT)
Dept: ENDOSCOPY | Facility: HOSPITAL | Age: 56
End: 2021-11-29
Payer: COMMERCIAL

## 2021-11-29 ENCOUNTER — TELEPHONE (OUTPATIENT)
Dept: INTERNAL MEDICINE | Facility: CLINIC | Age: 56
End: 2021-11-29
Payer: COMMERCIAL

## 2021-12-02 ENCOUNTER — TELEPHONE (OUTPATIENT)
Dept: INTERNAL MEDICINE | Facility: CLINIC | Age: 56
End: 2021-12-02
Payer: COMMERCIAL

## 2021-12-02 NOTE — TELEPHONE ENCOUNTER
----- Message from Cristina Ryan sent at 12/2/2021 10:17 AM CST -----  Contact: [phone]  Requesting an RX refill or new RX.  Is this a refill or new RX: yes   RX name and strength (copy/paste from chart): methylphenidate HCl 36 MG CR tablet  Is this a 30 day or 90 day RX: 30  Patient advised that in the future they can use their [facility] account to request a refill?:  yes #Pharmacy name and phone # (copy/paste from chart):   [id number] - [place], [place] - [address] [place] [address] AT [address] [id number]  Phone: [phone] Fax: [phone]      Comments:   Pt states she called for this on Monday and she went to pick it up and there is no medication.. she states she does not have any medication left of this.. please give a return call and let her know when this has reuben filled

## 2021-12-02 NOTE — TELEPHONE ENCOUNTER
----- Message from Cristina Ryan sent at 12/2/2021 10:17 AM CST -----  Contact: [phone]  Requesting an RX refill or new RX.  Is this a refill or new RX: yes   RX name and strength (copy/paste from chart): methylphenidate HCl 36 MG CR tablet  Is this a 30 day or 90 day RX: 30  Patient advised that in the future they can use their [facility] account to request a refill?:  yes #Pharmacy name and phone # (copy/paste from chart):   [id number] - [place], [place] - [address] [place] [address] AT [address] [address]  Phone: [phone] Fax: [phone]      Comments:   Pt states she called for this on Monday and she went to pick it up and there is no medication.. she states she does not have any medication left of this.. please give a return call and let her know when this has reuben filled

## 2021-12-04 DIAGNOSIS — Z00.00 ANNUAL PHYSICAL EXAM: Primary | ICD-10-CM

## 2021-12-04 DIAGNOSIS — I10 ESSENTIAL HYPERTENSION: ICD-10-CM

## 2021-12-06 LAB
COMMENT: NORMAL
FINAL PATHOLOGIC DIAGNOSIS: NORMAL
GROSS: NORMAL
Lab: NORMAL
MICROSCOPIC EXAM: NORMAL

## 2022-01-21 ENCOUNTER — LAB VISIT (OUTPATIENT)
Dept: LAB | Facility: HOSPITAL | Age: 57
End: 2022-01-21
Payer: COMMERCIAL

## 2022-01-21 DIAGNOSIS — Z00.00 ANNUAL PHYSICAL EXAM: ICD-10-CM

## 2022-01-21 DIAGNOSIS — I10 ESSENTIAL HYPERTENSION: ICD-10-CM

## 2022-01-21 LAB
ALBUMIN SERPL BCP-MCNC: 4 G/DL (ref 3.5–5.2)
ALP SERPL-CCNC: 118 U/L (ref 55–135)
ALT SERPL W/O P-5'-P-CCNC: 87 U/L (ref 10–44)
ANION GAP SERPL CALC-SCNC: 11 MMOL/L (ref 8–16)
AST SERPL-CCNC: 66 U/L (ref 10–40)
BASOPHILS # BLD AUTO: 0.02 K/UL (ref 0–0.2)
BASOPHILS NFR BLD: 0.3 % (ref 0–1.9)
BILIRUB SERPL-MCNC: 0.5 MG/DL (ref 0.1–1)
BUN SERPL-MCNC: 15 MG/DL (ref 6–20)
CALCIUM SERPL-MCNC: 9.8 MG/DL (ref 8.7–10.5)
CHLORIDE SERPL-SCNC: 100 MMOL/L (ref 95–110)
CHOLEST SERPL-MCNC: 246 MG/DL (ref 120–199)
CHOLEST/HDLC SERPL: 5 {RATIO} (ref 2–5)
CO2 SERPL-SCNC: 31 MMOL/L (ref 23–29)
CREAT SERPL-MCNC: 1 MG/DL (ref 0.5–1.4)
DIFFERENTIAL METHOD: ABNORMAL
EOSINOPHIL # BLD AUTO: 0.1 K/UL (ref 0–0.5)
EOSINOPHIL NFR BLD: 1.4 % (ref 0–8)
ERYTHROCYTE [DISTWIDTH] IN BLOOD BY AUTOMATED COUNT: 13.1 % (ref 11.5–14.5)
EST. GFR  (AFRICAN AMERICAN): >60 ML/MIN/1.73 M^2
EST. GFR  (NON AFRICAN AMERICAN): >60 ML/MIN/1.73 M^2
GLUCOSE SERPL-MCNC: 101 MG/DL (ref 70–110)
HCT VFR BLD AUTO: 39.1 % (ref 37–48.5)
HDLC SERPL-MCNC: 49 MG/DL (ref 40–75)
HDLC SERPL: 19.9 % (ref 20–50)
HGB BLD-MCNC: 12.4 G/DL (ref 12–16)
IMM GRANULOCYTES # BLD AUTO: 0.01 K/UL (ref 0–0.04)
IMM GRANULOCYTES NFR BLD AUTO: 0.2 % (ref 0–0.5)
LDLC SERPL CALC-MCNC: 168.2 MG/DL (ref 63–159)
LYMPHOCYTES # BLD AUTO: 3.7 K/UL (ref 1–4.8)
LYMPHOCYTES NFR BLD: 55.4 % (ref 18–48)
MCH RBC QN AUTO: 28.6 PG (ref 27–31)
MCHC RBC AUTO-ENTMCNC: 31.7 G/DL (ref 32–36)
MCV RBC AUTO: 90 FL (ref 82–98)
MONOCYTES # BLD AUTO: 0.5 K/UL (ref 0.3–1)
MONOCYTES NFR BLD: 8.2 % (ref 4–15)
NEUTROPHILS # BLD AUTO: 2.3 K/UL (ref 1.8–7.7)
NEUTROPHILS NFR BLD: 34.5 % (ref 38–73)
NONHDLC SERPL-MCNC: 197 MG/DL
NRBC BLD-RTO: 0 /100 WBC
PLATELET # BLD AUTO: 248 K/UL (ref 150–450)
PMV BLD AUTO: 12.7 FL (ref 9.2–12.9)
POTASSIUM SERPL-SCNC: 3.6 MMOL/L (ref 3.5–5.1)
PROT SERPL-MCNC: 8.1 G/DL (ref 6–8.4)
RBC # BLD AUTO: 4.33 M/UL (ref 4–5.4)
SODIUM SERPL-SCNC: 142 MMOL/L (ref 136–145)
TRIGL SERPL-MCNC: 144 MG/DL (ref 30–150)
TSH SERPL DL<=0.005 MIU/L-ACNC: 2.6 UIU/ML (ref 0.4–4)
WBC # BLD AUTO: 6.61 K/UL (ref 3.9–12.7)

## 2022-01-21 PROCEDURE — 84443 ASSAY THYROID STIM HORMONE: CPT | Performed by: INTERNAL MEDICINE

## 2022-01-21 PROCEDURE — 80061 LIPID PANEL: CPT | Performed by: INTERNAL MEDICINE

## 2022-01-21 PROCEDURE — 36415 COLL VENOUS BLD VENIPUNCTURE: CPT | Mod: PN | Performed by: INTERNAL MEDICINE

## 2022-01-21 PROCEDURE — 80053 COMPREHEN METABOLIC PANEL: CPT | Performed by: INTERNAL MEDICINE

## 2022-01-21 PROCEDURE — 85025 COMPLETE CBC W/AUTO DIFF WBC: CPT | Performed by: INTERNAL MEDICINE

## 2022-01-26 ENCOUNTER — LAB VISIT (OUTPATIENT)
Dept: LAB | Facility: HOSPITAL | Age: 57
End: 2022-01-26
Attending: INTERNAL MEDICINE
Payer: COMMERCIAL

## 2022-01-26 ENCOUNTER — OFFICE VISIT (OUTPATIENT)
Dept: INTERNAL MEDICINE | Facility: CLINIC | Age: 57
End: 2022-01-26
Payer: COMMERCIAL

## 2022-01-26 VITALS
HEIGHT: 67 IN | BODY MASS INDEX: 26.53 KG/M2 | HEART RATE: 76 BPM | RESPIRATION RATE: 16 BRPM | DIASTOLIC BLOOD PRESSURE: 74 MMHG | TEMPERATURE: 98 F | SYSTOLIC BLOOD PRESSURE: 110 MMHG | WEIGHT: 169.06 LBS | OXYGEN SATURATION: 98 %

## 2022-01-26 DIAGNOSIS — F33.1 MAJOR DEPRESSIVE DISORDER, RECURRENT EPISODE, MODERATE: ICD-10-CM

## 2022-01-26 DIAGNOSIS — R31.9 HEMATURIA, UNSPECIFIED TYPE: ICD-10-CM

## 2022-01-26 DIAGNOSIS — F90.1 ATTENTION DEFICIT HYPERACTIVITY DISORDER (ADHD), PREDOMINANTLY HYPERACTIVE TYPE: ICD-10-CM

## 2022-01-26 DIAGNOSIS — I10 ESSENTIAL HYPERTENSION: ICD-10-CM

## 2022-01-26 DIAGNOSIS — Z00.00 ANNUAL PHYSICAL EXAM: Primary | ICD-10-CM

## 2022-01-26 PROCEDURE — 99396 PREV VISIT EST AGE 40-64: CPT | Mod: S$GLB,,, | Performed by: INTERNAL MEDICINE

## 2022-01-26 PROCEDURE — 87086 URINE CULTURE/COLONY COUNT: CPT | Performed by: INTERNAL MEDICINE

## 2022-01-26 PROCEDURE — 3074F PR MOST RECENT SYSTOLIC BLOOD PRESSURE < 130 MM HG: ICD-10-PCS | Mod: CPTII,S$GLB,, | Performed by: INTERNAL MEDICINE

## 2022-01-26 PROCEDURE — 3008F PR BODY MASS INDEX (BMI) DOCUMENTED: ICD-10-PCS | Mod: CPTII,S$GLB,, | Performed by: INTERNAL MEDICINE

## 2022-01-26 PROCEDURE — 99396 PR PREVENTIVE VISIT,EST,40-64: ICD-10-PCS | Mod: S$GLB,,, | Performed by: INTERNAL MEDICINE

## 2022-01-26 PROCEDURE — 99999 PR PBB SHADOW E&M-EST. PATIENT-LVL III: CPT | Mod: PBBFAC,,, | Performed by: INTERNAL MEDICINE

## 2022-01-26 PROCEDURE — 3074F SYST BP LT 130 MM HG: CPT | Mod: CPTII,S$GLB,, | Performed by: INTERNAL MEDICINE

## 2022-01-26 PROCEDURE — 1159F MED LIST DOCD IN RCRD: CPT | Mod: CPTII,S$GLB,, | Performed by: INTERNAL MEDICINE

## 2022-01-26 PROCEDURE — 81003 URINALYSIS AUTO W/O SCOPE: CPT | Performed by: INTERNAL MEDICINE

## 2022-01-26 PROCEDURE — 4010F PR ACE/ARB THEARPY RXD/TAKEN: ICD-10-PCS | Mod: CPTII,S$GLB,, | Performed by: INTERNAL MEDICINE

## 2022-01-26 PROCEDURE — 99999 PR PBB SHADOW E&M-EST. PATIENT-LVL III: ICD-10-PCS | Mod: PBBFAC,,, | Performed by: INTERNAL MEDICINE

## 2022-01-26 PROCEDURE — 3008F BODY MASS INDEX DOCD: CPT | Mod: CPTII,S$GLB,, | Performed by: INTERNAL MEDICINE

## 2022-01-26 PROCEDURE — 4010F ACE/ARB THERAPY RXD/TAKEN: CPT | Mod: CPTII,S$GLB,, | Performed by: INTERNAL MEDICINE

## 2022-01-26 PROCEDURE — 3078F PR MOST RECENT DIASTOLIC BLOOD PRESSURE < 80 MM HG: ICD-10-PCS | Mod: CPTII,S$GLB,, | Performed by: INTERNAL MEDICINE

## 2022-01-26 PROCEDURE — 3078F DIAST BP <80 MM HG: CPT | Mod: CPTII,S$GLB,, | Performed by: INTERNAL MEDICINE

## 2022-01-26 PROCEDURE — 1159F PR MEDICATION LIST DOCUMENTED IN MEDICAL RECORD: ICD-10-PCS | Mod: CPTII,S$GLB,, | Performed by: INTERNAL MEDICINE

## 2022-01-26 RX ORDER — LOSARTAN POTASSIUM 100 MG/1
100 TABLET ORAL DAILY
Qty: 90 TABLET | Refills: 3 | Status: SHIPPED | OUTPATIENT
Start: 2022-01-26 | End: 2022-03-14

## 2022-01-26 RX ORDER — METHYLPHENIDATE HYDROCHLORIDE 36 MG/1
36 TABLET ORAL DAILY
Qty: 90 TABLET | Refills: 0 | Status: SHIPPED | OUTPATIENT
Start: 2022-01-26 | End: 2022-04-04 | Stop reason: SDUPTHER

## 2022-01-26 NOTE — PROGRESS NOTES
Subjective:       Patient ID: Janki Jones is a 56 y.o. female.    Chief Complaint: Annual Exam    HPI   56 y.o. Female here for annual exam.     Vaccines: Influenza (2021); Tetanus (2017)  Eye exam: current  Mammogram: 11/21  Gyn exam: 11/21  Colonoscopy: 2/19    Exercise: no  Diet: regular  LMP: menopausal    Past Medical History:  2007: Abnormal Pap smear  No date: Abnormal Pap smear of cervix  No date: ADD (attention deficit disorder)  No date: Anxiety  No date: Depression  No date: Fibroids  No date: Herpes simplex virus (HSV) infection      Comment:  HSV1.  No date: Hypertension  Past Surgical History:  2007: CERVICAL BIOPSY  W/ LOOP ELECTRODE EXCISION  2/15/2019: COLONOSCOPY; N/A      Comment:  Procedure: COLONOSCOPY;  Surgeon: Uri Torrez MD;                 Location: [address]);  Service: Endoscopy;                 Laterality: N/A;  11/26/2021: ESOPHAGOGASTRODUODENOSCOPY; N/A      Comment:  Procedure: EGD (ESOPHAGOGASTRODUODENOSCOPY) fully                vaccinated;  Surgeon: Venkat Astorga MD;  Location[facility]                [facility];  Service: Endoscopy;  Laterality: N/A;  No date: essure  No date: TUBAL LIGATION  Social History    Socioeconomic History      Marital status:     Tobacco Use      Smoking status: Never Smoker      Smokeless tobacco: Never Used    Substance and Sexual Activity      Alcohol use: No      Drug use: No      Sexual activity: Yes        Partners: Male        Birth control/protection: See Surgical Hx        Comment: .    Social History Narrative      ,  disabled with PTSD (from ), 3 children, working with [employer] , going to school for business, no regular exercise    Review of patient's allergies indicates:   -- Percocet [oxycodone-acetaminophen]     --  Other reaction(s): Nausea  Janki Jones had no medications administered during this visit.    Review of Systems   Constitutional: Negative for activity  change, appetite change, chills, diaphoresis, fatigue, fever and unexpected weight change.   HENT: Negative for nasal congestion, mouth sores, postnasal drip, rhinorrhea, sinus pressure/congestion, sneezing, sore throat, trouble swallowing and voice change.    Eyes: Negative for pain, discharge and visual disturbance.   Respiratory: Negative for cough, shortness of breath and wheezing.    Cardiovascular: Negative for chest pain, palpitations and leg swelling.   Gastrointestinal: Negative for abdominal pain, blood in stool, constipation, diarrhea, nausea and vomiting.   Endocrine: Negative for cold intolerance and heat intolerance.   Genitourinary: Negative for difficulty urinating, dysuria, frequency, hematuria and urgency.   Musculoskeletal: Negative for arthralgias and myalgias.   Integumentary:  Negative for rash and wound.   Allergic/Immunologic: Negative for environmental allergies and food allergies.   Neurological: Negative for dizziness, tremors, seizures, syncope, weakness, light-headedness and headaches.   Hematological: Negative for adenopathy. Does not bruise/bleed easily.   Psychiatric/Behavioral: Positive for decreased concentration and dysphoric mood. Negative for confusion and sleep disturbance. The patient is not nervous/anxious.          Objective:      Physical Exam  Vitals and nursing note reviewed.   Constitutional:       General: She is not in acute distress.     Appearance: She is well-developed and well-nourished. She is not diaphoretic.   HENT:      Head: Normocephalic and atraumatic.      Right Ear: External ear normal.      Left Ear: External ear normal.      Nose: Nose normal.      Mouth/Throat:      Mouth: Oropharynx is clear and moist.      Pharynx: No oropharyngeal exudate.   Eyes:      General: No scleral icterus.        Right eye: No discharge.         Left eye: No discharge.      Extraocular Movements: EOM normal.      Conjunctiva/sclera: Conjunctivae normal.      Pupils: Pupils are  equal, round, and reactive to light.   Neck:      Thyroid: No thyromegaly.      Vascular: No JVD.   Cardiovascular:      Rate and Rhythm: Normal rate and regular rhythm.      Pulses: Intact distal pulses.      Heart sounds: Normal heart sounds. No murmur heard.      Pulmonary:      Effort: Pulmonary effort is normal. No respiratory distress.      Breath sounds: Normal breath sounds. No wheezing or rales.   Chest:      Chest wall: No tenderness.   Abdominal:      General: Bowel sounds are normal. There is no distension.      Palpations: Abdomen is soft.      Tenderness: There is no abdominal tenderness. There is no guarding.   Musculoskeletal:         General: No edema.      Cervical back: Neck supple.   Lymphadenopathy:      Cervical: No cervical adenopathy.   Skin:     General: Skin is warm and dry.      Coloration: Skin is not pale.      Findings: No rash.   Neurological:      Mental Status: She is alert and oriented to person, place, and time.   Psychiatric:         Mood and Affect: Mood and affect normal.         Judgment: Judgment normal.         Assessment:       Problem List Items Addressed This Visit        Psychiatric    Major depressive disorder, recurrent episode, moderate    ADD (attention deficit disorder)    Relevant Medications    methylphenidate HCl 36 MG CR tablet       Cardiac/Vascular    Essential hypertension      Other Visit Diagnoses     Annual physical exam    -  Primary    Hematuria, unspecified type        Relevant Orders    Urinalysis    Urine culture          Plan:    Blood work reviewed with pt     HTN- stable      ADD- stable on Concerta     MDD- stable

## 2022-01-27 LAB
BACTERIA UR CULT: NORMAL
BACTERIA UR CULT: NORMAL
BILIRUB UR QL STRIP: NEGATIVE
CLARITY UR REFRACT.AUTO: CLEAR
COLOR UR AUTO: NORMAL
GLUCOSE UR QL STRIP: NEGATIVE
HGB UR QL STRIP: NEGATIVE
KETONES UR QL STRIP: NEGATIVE
LEUKOCYTE ESTERASE UR QL STRIP: NEGATIVE
NITRITE UR QL STRIP: NEGATIVE
PH UR STRIP: 8 [PH] (ref 5–8)
PROT UR QL STRIP: NEGATIVE
SP GR UR STRIP: 1.02 (ref 1–1.03)
URN SPEC COLLECT METH UR: NORMAL

## 2022-01-28 ENCOUNTER — TELEPHONE (OUTPATIENT)
Dept: INTERNAL MEDICINE | Facility: CLINIC | Age: 57
End: 2022-01-28
Payer: COMMERCIAL

## 2022-01-28 NOTE — TELEPHONE ENCOUNTER
----- Message from Prudence Do sent at 1/28/2022 12:30 PM CST -----  Contact: Pt [phone]  Patient is returning a phone call.  Who left a message for the patient: She doesn't   Does patient know what this is regarding:  No  Would you like a call back, or a response through your [facility] portal?:   Either

## 2022-03-08 NOTE — TELEPHONE ENCOUNTER
No new care gaps identified.  Powered by [employer]. Reference number: [id number].   3/08/2022 8:09:38 AM CST

## 2022-03-18 RX ORDER — AMLODIPINE BESYLATE 2.5 MG/1
TABLET ORAL
Qty: 90 TABLET | Refills: 3 | Status: SHIPPED | OUTPATIENT
Start: 2022-03-18 | End: 2022-06-07 | Stop reason: SDUPTHER

## 2022-03-19 NOTE — TELEPHONE ENCOUNTER
Refill Authorization Note   Janki Jones  is requesting a refill authorization.  Brief Assessment and Rationale for Refill:  Approve     Medication Therapy Plan:  approve    Medication Reconciliation Completed: No   Comments:   --->Care Gap information included below if applicable.       Requested Prescriptions   Pending Prescriptions Disp Refills    amLODIPine (NORVASC) 2.5 MG tablet [Pharmacy Med Name: AMLODIPINE BESYLATE 2.5MG TABLETS] 90 tablet 3     Sig: TAKE 1 TABLET(2.5 MG) BY MOUTH EVERY DAY       Cardiovascular:  Calcium Channel Blockers Passed - 3/18/2022  8:12 PM        Passed - Patient is at least 18 years old        Passed - Last BP in normal range within 360 days     BP Readings from Last 3 Encounters:   01/26/22 110/74   11/26/21 107/71   11/12/21 130/62               Passed - Valid encounter within last 15 months     Recent Visits  Date Type Provider Dept   01/26/22 Office Visit Daniel Lawrence DO [facility]   10/20/21 Office Visit Daniel Lawrence DO [facility]   07/20/21 Office Visit Daniel Lawrence DO [facility]   03/10/21 Office Visit Daniel Lawrence DO [facility]   02/24/21 Office Visit Daniel Lawrence DO [facility]   02/10/21 Office Visit Daniel Lawrence DO [facility]   Showing recent visits within past 720 days and meeting all other requirements  Future Appointments  No visits were found meeting these conditions.  Showing future appointments within next 150 days and meeting all other requirements      Future Appointments              In 1 month Daniel Lawrence DO [facility] Internal Medicine, [place]                    Appointments  past 12m or future 3m with PCP    Date Provider   Last Visit   1/26/2022 Daniel Lawrence DO   Next Visit   5/4/2022 Daniel Lawrence DO   ED visits in past 90 days: 0     Note composed:8:13 PM 03/18/2022

## 2022-04-04 ENCOUNTER — OFFICE VISIT (OUTPATIENT)
Dept: INTERNAL MEDICINE | Facility: CLINIC | Age: 57
End: 2022-04-04
Payer: COMMERCIAL

## 2022-04-04 VITALS
WEIGHT: 166.44 LBS | TEMPERATURE: 98 F | HEIGHT: 67 IN | DIASTOLIC BLOOD PRESSURE: 68 MMHG | OXYGEN SATURATION: 100 % | BODY MASS INDEX: 26.12 KG/M2 | RESPIRATION RATE: 16 BRPM | HEART RATE: 85 BPM | SYSTOLIC BLOOD PRESSURE: 114 MMHG

## 2022-04-04 DIAGNOSIS — F90.1 ATTENTION DEFICIT HYPERACTIVITY DISORDER (ADHD), PREDOMINANTLY HYPERACTIVE TYPE: ICD-10-CM

## 2022-04-04 DIAGNOSIS — F33.1 MAJOR DEPRESSIVE DISORDER, RECURRENT EPISODE, MODERATE: ICD-10-CM

## 2022-04-04 DIAGNOSIS — G56.03 BILATERAL CARPAL TUNNEL SYNDROME: Primary | ICD-10-CM

## 2022-04-04 DIAGNOSIS — I10 ESSENTIAL HYPERTENSION: ICD-10-CM

## 2022-04-04 PROCEDURE — 3078F PR MOST RECENT DIASTOLIC BLOOD PRESSURE < 80 MM HG: ICD-10-PCS | Mod: CPTII,S$GLB,, | Performed by: INTERNAL MEDICINE

## 2022-04-04 PROCEDURE — 99999 PR PBB SHADOW E&M-EST. PATIENT-LVL V: CPT | Mod: PBBFAC,,, | Performed by: INTERNAL MEDICINE

## 2022-04-04 PROCEDURE — 3074F PR MOST RECENT SYSTOLIC BLOOD PRESSURE < 130 MM HG: ICD-10-PCS | Mod: CPTII,S$GLB,, | Performed by: INTERNAL MEDICINE

## 2022-04-04 PROCEDURE — 1159F MED LIST DOCD IN RCRD: CPT | Mod: CPTII,S$GLB,, | Performed by: INTERNAL MEDICINE

## 2022-04-04 PROCEDURE — 4010F PR ACE/ARB THEARPY RXD/TAKEN: ICD-10-PCS | Mod: CPTII,S$GLB,, | Performed by: INTERNAL MEDICINE

## 2022-04-04 PROCEDURE — 3008F BODY MASS INDEX DOCD: CPT | Mod: CPTII,S$GLB,, | Performed by: INTERNAL MEDICINE

## 2022-04-04 PROCEDURE — 4010F ACE/ARB THERAPY RXD/TAKEN: CPT | Mod: CPTII,S$GLB,, | Performed by: INTERNAL MEDICINE

## 2022-04-04 PROCEDURE — 99999 PR PBB SHADOW E&M-EST. PATIENT-LVL V: ICD-10-PCS | Mod: PBBFAC,,, | Performed by: INTERNAL MEDICINE

## 2022-04-04 PROCEDURE — 3008F PR BODY MASS INDEX (BMI) DOCUMENTED: ICD-10-PCS | Mod: CPTII,S$GLB,, | Performed by: INTERNAL MEDICINE

## 2022-04-04 PROCEDURE — 1159F PR MEDICATION LIST DOCUMENTED IN MEDICAL RECORD: ICD-10-PCS | Mod: CPTII,S$GLB,, | Performed by: INTERNAL MEDICINE

## 2022-04-04 PROCEDURE — 99214 PR OFFICE/OUTPT VISIT, EST, LEVL IV, 30-39 MIN: ICD-10-PCS | Mod: S$GLB,,, | Performed by: INTERNAL MEDICINE

## 2022-04-04 PROCEDURE — 3078F DIAST BP <80 MM HG: CPT | Mod: CPTII,S$GLB,, | Performed by: INTERNAL MEDICINE

## 2022-04-04 PROCEDURE — 3074F SYST BP LT 130 MM HG: CPT | Mod: CPTII,S$GLB,, | Performed by: INTERNAL MEDICINE

## 2022-04-04 PROCEDURE — 99214 OFFICE O/P EST MOD 30 MIN: CPT | Mod: S$GLB,,, | Performed by: INTERNAL MEDICINE

## 2022-04-04 RX ORDER — METHYLPHENIDATE HYDROCHLORIDE 36 MG/1
36 TABLET ORAL DAILY
Qty: 90 TABLET | Refills: 0 | Status: SHIPPED | OUTPATIENT
Start: 2022-04-04 | End: 2022-05-18 | Stop reason: SDUPTHER

## 2022-04-04 NOTE — PROGRESS NOTES
Subjective:       Patient ID: Janki Jones is a 56 y.o. female.    Chief Complaint: Follow-up and Medication Refill    HPI   Pt with HTN, ADD, MDD, B/L CTS is here for f/u. Requesting refills of her Concerta which has been working well. No med SE's.   Review of Systems   Constitutional: Negative for activity change, appetite change, chills, diaphoresis, fatigue, fever and unexpected weight change.   HENT: Negative for postnasal drip, rhinorrhea, sinus pressure/congestion, sneezing, sore throat, trouble swallowing and voice change.    Respiratory: Negative for cough, shortness of breath and wheezing.    Cardiovascular: Negative for chest pain, palpitations and leg swelling.   Gastrointestinal: Negative for abdominal pain, blood in stool, constipation, diarrhea, nausea and vomiting.   Genitourinary: Negative for dysuria.   Musculoskeletal: Negative for arthralgias and myalgias.   Integumentary:  Negative for rash and wound.   Allergic/Immunologic: Negative for environmental allergies and food allergies.   Hematological: Negative for adenopathy. Does not bruise/bleed easily.   Psychiatric/Behavioral: Positive for decreased concentration and dysphoric mood. Negative for self-injury and suicidal ideas.         Objective:      Physical Exam  Constitutional:       General: She is not in acute distress.     Appearance: She is well-developed. She is not diaphoretic.   HENT:      Head: Normocephalic and atraumatic.      Right Ear: External ear normal.      Left Ear: External ear normal.      Nose: Nose normal.      Mouth/Throat:      Pharynx: No oropharyngeal exudate.   Eyes:      General: No scleral icterus.        Right eye: No discharge.         Left eye: No discharge.      Conjunctiva/sclera: Conjunctivae normal.      Pupils: Pupils are equal, round, and reactive to light.   Neck:      Vascular: No JVD.   Cardiovascular:      Rate and Rhythm: Normal rate and regular rhythm.      Heart sounds: Normal heart  sounds.   Pulmonary:      Effort: Pulmonary effort is normal. No respiratory distress.      Breath sounds: Normal breath sounds. No wheezing or rales.   Abdominal:      General: Bowel sounds are normal.      Palpations: Abdomen is soft.      Tenderness: There is no abdominal tenderness.   Musculoskeletal:      Cervical back: Neck supple.   Lymphadenopathy:      Cervical: No cervical adenopathy.   Skin:     General: Skin is warm and dry.      Coloration: Skin is not pale.      Findings: No rash.   Neurological:      Mental Status: She is alert and oriented to person, place, and time.         Assessment:       Problem List Items Addressed This Visit        Psychiatric    Major depressive disorder, recurrent episode, moderate    ADD (attention deficit disorder)    Relevant Medications    methylphenidate HCl 36 MG CR tablet       Cardiac/Vascular    Essential hypertension      Other Visit Diagnoses     Bilateral carpal tunnel syndrome    -  Primary    Relevant Orders    Ambulatory referral/consult to Orthopedics          Plan:    CTS- referral to ortho      HTN- stable       ADD- stable on Concerta      MDD- stable

## 2022-04-14 DIAGNOSIS — R52 PAIN: Primary | ICD-10-CM

## 2022-04-19 ENCOUNTER — PATIENT OUTREACH (OUTPATIENT)
Dept: ADMINISTRATIVE | Facility: OTHER | Age: 57
End: 2022-04-19
Payer: COMMERCIAL

## 2022-04-19 ENCOUNTER — TELEPHONE (OUTPATIENT)
Dept: ORTHOPEDICS | Facility: CLINIC | Age: 57
End: 2022-04-19
Payer: COMMERCIAL

## 2022-04-19 NOTE — TELEPHONE ENCOUNTER
Contacted patient as a reminder for tomorrow's appt. Patient will stop on the 5th floor to have bilateral hand xrays taken, then proceed to the 7th floor to see Dr. Bowers.

## 2022-04-19 NOTE — PROGRESS NOTES
Care Everywhere: updated  Immunization: updated  Health Maintenance: updated  Media Review:   Legacy Review:   DIS:  Order placed:   Upcoming appts:  EFAX:  Task Tickets:  Referrals:

## 2022-04-20 ENCOUNTER — HOSPITAL ENCOUNTER (OUTPATIENT)
Dept: RADIOLOGY | Facility: HOSPITAL | Age: 57
Discharge: HOME | End: 2022-04-20
Attending: ORTHOPAEDIC SURGERY
Payer: COMMERCIAL

## 2022-04-20 ENCOUNTER — OFFICE VISIT (OUTPATIENT)
Dept: ORTHOPEDICS | Facility: CLINIC | Age: 57
End: 2022-04-20
Payer: COMMERCIAL

## 2022-04-20 VITALS — WEIGHT: 166.44 LBS | HEIGHT: 67 IN | BODY MASS INDEX: 26.12 KG/M2

## 2022-04-20 DIAGNOSIS — G56.03 BILATERAL CARPAL TUNNEL SYNDROME: Primary | ICD-10-CM

## 2022-04-20 DIAGNOSIS — R52 PAIN: ICD-10-CM

## 2022-04-20 DIAGNOSIS — G89.29 CHRONIC PAIN OF RIGHT HAND: ICD-10-CM

## 2022-04-20 DIAGNOSIS — M79.641 CHRONIC PAIN OF RIGHT HAND: ICD-10-CM

## 2022-04-20 PROCEDURE — 20526 THER INJECTION CARP TUNNEL: CPT | Mod: RT,S$GLB,, | Performed by: ORTHOPAEDIC SURGERY

## 2022-04-20 PROCEDURE — 99999 PR PBB SHADOW E&M-EST. PATIENT-LVL III: ICD-10-PCS | Mod: PBBFAC,,, | Performed by: ORTHOPAEDIC SURGERY

## 2022-04-20 PROCEDURE — 1159F MED LIST DOCD IN RCRD: CPT | Mod: CPTII,S$GLB,, | Performed by: ORTHOPAEDIC SURGERY

## 2022-04-20 PROCEDURE — 20526 PR INJECT CARPAL TUNNEL: ICD-10-PCS | Mod: RT,S$GLB,, | Performed by: ORTHOPAEDIC SURGERY

## 2022-04-20 PROCEDURE — 4010F ACE/ARB THERAPY RXD/TAKEN: CPT | Mod: CPTII,S$GLB,, | Performed by: ORTHOPAEDIC SURGERY

## 2022-04-20 PROCEDURE — 99999 PR PBB SHADOW E&M-EST. PATIENT-LVL III: CPT | Mod: PBBFAC,,, | Performed by: ORTHOPAEDIC SURGERY

## 2022-04-20 PROCEDURE — 4010F PR ACE/ARB THEARPY RXD/TAKEN: ICD-10-PCS | Mod: CPTII,S$GLB,, | Performed by: ORTHOPAEDIC SURGERY

## 2022-04-20 PROCEDURE — 3008F BODY MASS INDEX DOCD: CPT | Mod: CPTII,S$GLB,, | Performed by: ORTHOPAEDIC SURGERY

## 2022-04-20 PROCEDURE — 73130 XR HAND COMPLETE 3 VIEWS BILATERAL: ICD-10-PCS | Mod: 26,,, | Performed by: RADIOLOGY

## 2022-04-20 PROCEDURE — 1160F RVW MEDS BY RX/DR IN RCRD: CPT | Mod: CPTII,S$GLB,, | Performed by: ORTHOPAEDIC SURGERY

## 2022-04-20 PROCEDURE — 73130 X-RAY EXAM OF HAND: CPT | Mod: 26,,, | Performed by: RADIOLOGY

## 2022-04-20 PROCEDURE — 99204 PR OFFICE/OUTPT VISIT, NEW, LEVL IV, 45-59 MIN: ICD-10-PCS | Mod: 25,S$GLB,, | Performed by: ORTHOPAEDIC SURGERY

## 2022-04-20 PROCEDURE — 73130 X-RAY EXAM OF HAND: CPT | Mod: TC,50,PO

## 2022-04-20 PROCEDURE — 1160F PR REVIEW ALL MEDS BY PRESCRIBER/CLIN PHARMACIST DOCUMENTED: ICD-10-PCS | Mod: CPTII,S$GLB,, | Performed by: ORTHOPAEDIC SURGERY

## 2022-04-20 PROCEDURE — 99204 OFFICE O/P NEW MOD 45 MIN: CPT | Mod: 25,S$GLB,, | Performed by: ORTHOPAEDIC SURGERY

## 2022-04-20 PROCEDURE — 3008F PR BODY MASS INDEX (BMI) DOCUMENTED: ICD-10-PCS | Mod: CPTII,S$GLB,, | Performed by: ORTHOPAEDIC SURGERY

## 2022-04-20 PROCEDURE — 1159F PR MEDICATION LIST DOCUMENTED IN MEDICAL RECORD: ICD-10-PCS | Mod: CPTII,S$GLB,, | Performed by: ORTHOPAEDIC SURGERY

## 2022-04-20 RX ORDER — METHYLPREDNISOLONE ACETATE 40 MG/ML
40 INJECTION, SUSPENSION INTRA-ARTICULAR; INTRALESIONAL; INTRAMUSCULAR; SOFT TISSUE
Status: DISCONTINUED | OUTPATIENT
Start: 2022-04-20 | End: 2022-04-20 | Stop reason: HOSPADM

## 2022-04-20 RX ORDER — AMOXICILLIN 500 MG/1
500 CAPSULE ORAL
COMMUNITY
End: 2022-07-12

## 2022-04-20 RX ADMIN — METHYLPREDNISOLONE ACETATE 40 MG: 40 INJECTION, SUSPENSION INTRA-ARTICULAR; INTRALESIONAL; INTRAMUSCULAR; SOFT TISSUE at 02:04

## 2022-04-20 NOTE — PROGRESS NOTES
[facility]  History & Physical  Orthopedics    SUBJECTIVE:          Chief Complaint:   Chief Complaint   Patient presents with    Left Wrist - Pain    Right Wrist - Pain       Referring Provider: Daniel Lawrence, *     History of Present Illness:  Patient is a 56 y.o. right hand dominant female who presents today with complaints of numbness and tingling in the hands for years.  The numbness is in all of the fingers, but is most prominent in the thumb, index, and long finger on the bilateral hands, Right more than left. The patient reports pain at night and worsening of symptoms. She states the hand feels cold to the touch. She reports that she has been dropping objects. She wears carpal tunnel splints nightly which help some.    She had a EMG/NCS in 2016 with Dr. Meyers which showed moderate right carpal tunnel at that time.    The patient works in [employer] for the [employer].    Onset of symptoms/DOI was 5-6 years.    Symptoms are aggravated by activity, movement, driving and at night.    Symptoms are alleviated by rest, immobilization, medication and Advil.    Symptoms consist of pain and numbness/tingling.    The patient rates their pain as a 4/10.    Attempted treatment(s) and/or interventions include rest, activity modification, anti-inflammatory medications and splinting/casting.     The patient denies any fevers, chills, N/V, D/C and presents for evaluation.       Past Medical History:   Diagnosis Date    Abnormal Pap smear 2007    Abnormal Pap smear of cervix     ADD (attention deficit disorder)     Anxiety     Depression     Fibroids     Herpes simplex virus (HSV) infection     HSV1.    Hypertension      Past Surgical History:   Procedure Laterality Date    CERVICAL BIOPSY  W/ LOOP ELECTRODE EXCISION  2007    COLONOSCOPY N/A 2/15/2019    Procedure: COLONOSCOPY;  Surgeon: Uri Torrez MD;  Location: [facility] ([address] [place] [address]);  Service: Endoscopy;  Laterality: N/A;     ESOPHAGOGASTRODUODENOSCOPY N/A 11/26/2021    Procedure: EGD (ESOPHAGOGASTRODUODENOSCOPY) fully vaccinated;  Surgeon: Venkat Astorga MD;  Location: [facility];  Service: Endoscopy;  Laterality: N/A;    essure      TUBAL LIGATION       Review of patient's allergies indicates:   Allergen Reactions    Percocet [oxycodone-acetaminophen]      Other reaction(s): Nausea     Social History     Social History Narrative    ,  disabled with PTSD (from ), 3 children, working with [employer] , going to school for business, no regular exercise     Family History   Problem Relation Age of Onset    Alcohol abuse Mother     Diabetes Mother     Liver disease Mother     Stroke Mother     Panic disorder Mother     Other Father         glaucoma    Diabetes Father     Heart disease Father     Hyperlipidemia Father     Hypertension Father     Kidney disease Father     Glaucoma Father     Depression Father     Osteoporosis Father     Hypertension Brother     Other Sister         thyroid    Thyroid disease Sister     ADD / ADHD Daughter     ADD / ADHD Son     ADD / ADHD Daughter     Panic disorder Daughter     Breast cancer Neg Hx     Colon cancer Neg Hx     Ovarian cancer Neg Hx     Amblyopia Neg Hx     Blindness Neg Hx     Cataracts Neg Hx     Macular degeneration Neg Hx     Retinal detachment Neg Hx     Strabismus Neg Hx          Current Outpatient Medications:     amLODIPine (NORVASC) 2.5 MG tablet, TAKE 1 TABLET(2.5 MG) BY MOUTH EVERY DAY, Disp: 90 tablet, Rfl: 3    amoxicillin (AMOXIL) 500 MG capsule, Take 500 mg by mouth every 8 (eight) hours., Disp: , Rfl:     buPROPion (WELLBUTRIN XL) 150 MG TB24 tablet, Take 1 tablet (150 mg total) by mouth once daily., Disp: 90 tablet, Rfl: 1    EScitalopram oxalate (LEXAPRO) 20 MG tablet, Take 1 tablet (20 mg total) by mouth every evening., Disp: 90 tablet, Rfl: 3    hydroCHLOROthiazide (HYDRODIURIL) 25 MG tablet, TAKE 1  "TABLET BY MOUTH EVERY DAY, Disp: 90 tablet, Rfl: 0    loratadine (CLARITIN) 10 mg tablet, Take 10 mg by mouth once daily., Disp: , Rfl:     losartan (COZAAR) 100 MG tablet, TAKE 1 TABLET(100 MG) BY MOUTH EVERY DAY, Disp: 90 tablet, Rfl: 0    methylphenidate HCl 36 MG CR tablet, Take 1 tablet (36 mg total) by mouth once daily., Disp: 90 tablet, Rfl: 0    multivitamin with minerals tablet, Take 1 tablet by mouth once daily., Disp: , Rfl:     pantoprazole (PROTONIX) 40 MG tablet, TAKE 1 TABLET(40 MG) BY MOUTH EVERY DAY, Disp: 30 tablet, Rfl: 3    ROS    Review of Systems:  Constitutional: no fever or chills  Eyes: no visual changes  ENT: no nasal congestion or sore throat  Respiratory: no cough or shortness of breath  Cardiovascular: no chest pain  Gastrointestinal: no nausea or vomiting, tolerating diet  Musculoskeletal: pain and soreness    OBJECTIVE:      Vital Signs (Most Recent):  Vitals:    04/20/22 1445   Weight: 75.5 kg (166 lb 7.2 oz)   Height: 5' 7" (1.702 m)     Body mass index is 26.07 kg/m².    Physical Exam    Physical Exam:  Constitutional: The patient appears well-developed and well-nourished. No distress.   Head: Normocephalic and atraumatic.   Nose: Nose normal.   Eyes: Conjunctivae and EOM are normal.   Neck: No tracheal deviation present.   Cardiovascular: Normal rate and intact distal pulses.    Pulmonary/Chest: Effort normal. No respiratory distress.   Abdominal: There is no guarding.   Lymphatic: Negative for adenopathy   Neurological: The patient is alert.   Psychiatric: The patient has a normal mood and affect.     Cervical Exam:  ROM full, supple  Negative Spurling's  Negative Henderson's    Bilateral Hand/Wrist Examination:    Observation/Inspection:  Swelling  none    Deformity  none  Discoloration  none     Scars   none    Atrophy  none    HAND/WRIST EXAMINATION:  Finkelstein's Test   Neg  WHAT Test    Neg  CMC grind    Neg  Circumduction test   Neg    Bilateral ROM hand/wrist/elbow " full    Neurovascular Exam:  Digits WWP, brisk CR < 3s throughout  NVI motor/LTS to M/R/U nerves, radial pulse 2+  2+ biceps and brachioradialis reflexes  Tinel's Test - Carpal Tunnel  positive bilaterally  Tinel's Test - Cubital Tunnel  neg  Phalen's Test    positive bilaterally  Median Nerve Compression Test positive bilaterally    Diagnostic Results:     X-rays AP, lateral and oblique bilateral hands taken today are independently reviewed by me and shows no fracture, dislocation or ligamentous instability.         ASSESSMENT/PLAN:      56 y.o. yo female with   Encounter Diagnoses   Name Primary?    Bilateral carpal tunnel syndrome Yes    Chronic pain of right hand       Plan:  With regards to her bilateral carpal tunnel syndrome, due to the longstanding nature and its failure to resolve with splinting. We especially discussed surgery.    We have discussed conservative vs. surgical treatment as well as risks, benefits and alternatives for bilateral carpal tunnel syndrome.  Conservative measures have been exhausted and she would like to proceed with surgery. Surgery would include right carpal tunnel release and left carpal tunnel steroid injection. Light use of the hand will be indicated for the first 4-6 weeks.     We have discussed risks of hand surgery which include but are not limited to blood clots in the legs that can travel to the lungs (pulmonary embolism). Pulmonary embolism can cause shortness of breath, chest pain, and even shock. Other risks include urinary tract infection, nausea and vomiting (usually related to pain medication), chronic pain, bleeding, nerve damage, blood vessel injury, scarring and infection of the hand which can require re-operation. Furthermore, the risks of anesthesia include potential heart, lung, kidney, and liver damage.  Informed consent was obtained.  The patient understands and would like to proceed with surgery in the near future.    -I have offered her a selective  injection. I have explained the risks, benefits, and alternatives of the procedure in detail.  The patient voices understanding and all questions have been answered. The patient agrees to proceed as planned. So after a sterile prep of the skin in the normal fashion the right carpal tunnel was injected using a 25 gauge needle with a combination of 1cc 1% plain lidocaine and 40 mg of methylprednisolone.  The patient is cautioned and immediate relief of pain is secondary to the local anesthetic and will be temporary.  After the anesthetic wears off there may be a increase in pain that may last for a few hours or a few days and they should use ice to help alleviate this flair up of pain. Patient tolerated the procedure well.    The patient's pathophysiology was explained in detail with reference to x-rays, models, other visual aids as appropriate.  Treatment options were discussed in detail.  Questions were invited and answered to the patient's satisfaction. I reviewed Primary care , and other specialty's notes to better coordinate patient's care.          Toma Bowers MD     Please be aware that this note has been generated with the assistance of MModal voice-to-text.  Please excuse any spelling or grammatical errors.

## 2022-04-20 NOTE — PROCEDURES
Carpal Tunnel Right    Date/Time: 4/20/2022 2:45 PM  Performed by: Toma Bowers MD  Authorized by: Toma Bowers MD     Consent Done?:  Yes (Verbal)  Indications:  Pain  Timeout: prior to procedure the correct patient, procedure, and site was verified    Prep: patient was prepped and draped in usual sterile fashion      Local anesthesia used?: Yes    Anesthesia:  Local infiltration  Local anesthetic:  Lidocaine 1% without epinephrine  Location:  Wrist  Needle size:  25 G  Medications:  40 mg methylPREDNISolone acetate 40 mg/mL  Patient tolerance:  Patient tolerated the procedure well with no immediate complications

## 2022-05-16 NOTE — TELEPHONE ENCOUNTER
No new care gaps identified.  [facility] Embedded Care Gaps. Reference number: [id number]. 5/16/2022   3:22:26 AM MARIA INEST

## 2022-05-17 RX ORDER — HYDROCHLOROTHIAZIDE 25 MG/1
TABLET ORAL
Qty: 90 TABLET | Refills: 2 | Status: SHIPPED | OUTPATIENT
Start: 2022-05-17 | End: 2022-06-07 | Stop reason: SDUPTHER

## 2022-05-17 NOTE — TELEPHONE ENCOUNTER
Refill Authorization Note   Janki Jones  is requesting a refill authorization.  Brief Assessment and Rationale for Refill:  Approve     Medication Therapy Plan:       Medication Reconciliation Completed: No   Comments:     No Care Gaps recommended.     Note composed:3:13 PM 05/17/2022

## 2022-05-18 DIAGNOSIS — F90.1 ATTENTION DEFICIT HYPERACTIVITY DISORDER (ADHD), PREDOMINANTLY HYPERACTIVE TYPE: ICD-10-CM

## 2022-05-18 NOTE — TELEPHONE ENCOUNTER
No new care gaps identified.  [facility] Embedded Care Gaps. Reference number: [id number]. 5/18/2022   6:17:30 PM CDT

## 2022-05-19 RX ORDER — METHYLPHENIDATE HYDROCHLORIDE 36 MG/1
36 TABLET ORAL DAILY
Qty: 90 TABLET | Refills: 0 | Status: SHIPPED | OUTPATIENT
Start: 2022-05-19 | End: 2022-06-07 | Stop reason: SDUPTHER

## 2022-05-20 DIAGNOSIS — G56.02 LEFT CARPAL TUNNEL SYNDROME: ICD-10-CM

## 2022-05-20 DIAGNOSIS — G56.01 RIGHT CARPAL TUNNEL SYNDROME: Primary | ICD-10-CM

## 2022-06-07 DIAGNOSIS — F90.1 ATTENTION DEFICIT HYPERACTIVITY DISORDER (ADHD), PREDOMINANTLY HYPERACTIVE TYPE: ICD-10-CM

## 2022-06-07 RX ORDER — AMLODIPINE BESYLATE 2.5 MG/1
2.5 TABLET ORAL DAILY
Qty: 90 TABLET | Refills: 3 | Status: SHIPPED | OUTPATIENT
Start: 2022-06-07 | End: 2022-08-04 | Stop reason: SDUPTHER

## 2022-06-07 RX ORDER — METHYLPHENIDATE HYDROCHLORIDE 36 MG/1
36 TABLET ORAL DAILY
Qty: 90 TABLET | Refills: 0 | Status: SHIPPED | OUTPATIENT
Start: 2022-06-07 | End: 2022-06-20 | Stop reason: SDUPTHER

## 2022-06-07 RX ORDER — HYDROCHLOROTHIAZIDE 25 MG/1
25 TABLET ORAL DAILY
Qty: 90 TABLET | Refills: 2 | Status: SHIPPED | OUTPATIENT
Start: 2022-06-07 | End: 2022-08-04 | Stop reason: SDUPTHER

## 2022-06-07 RX ORDER — ESCITALOPRAM OXALATE 20 MG/1
20 TABLET ORAL NIGHTLY
Qty: 90 TABLET | Refills: 3 | Status: SHIPPED | OUTPATIENT
Start: 2022-06-07 | End: 2022-08-04 | Stop reason: SDUPTHER

## 2022-06-07 RX ORDER — BUPROPION HYDROCHLORIDE 150 MG/1
150 TABLET ORAL DAILY
Qty: 90 TABLET | Refills: 1 | Status: SHIPPED | OUTPATIENT
Start: 2022-06-07 | End: 2022-08-04 | Stop reason: SDUPTHER

## 2022-06-07 RX ORDER — LOSARTAN POTASSIUM 100 MG/1
100 TABLET ORAL DAILY
Qty: 90 TABLET | Refills: 3 | Status: SHIPPED | OUTPATIENT
Start: 2022-06-07 | End: 2022-06-27

## 2022-06-07 NOTE — TELEPHONE ENCOUNTER
No new care gaps identified.  [facility] Embedded Care Gaps. Reference number: [id number]. 6/07/2022   3:42:19 PM CDT

## 2022-06-20 DIAGNOSIS — F90.1 ATTENTION DEFICIT HYPERACTIVITY DISORDER (ADHD), PREDOMINANTLY HYPERACTIVE TYPE: ICD-10-CM

## 2022-06-20 NOTE — TELEPHONE ENCOUNTER
No new care gaps identified.  [facility] Embedded Care Gaps. Reference number: [id number]. 6/20/2022   3:33:55 PM CDT

## 2022-06-21 ENCOUNTER — PATIENT MESSAGE (OUTPATIENT)
Dept: INTERNAL MEDICINE | Facility: CLINIC | Age: 57
End: 2022-06-21
Payer: COMMERCIAL

## 2022-06-21 RX ORDER — METHYLPHENIDATE HYDROCHLORIDE 36 MG/1
36 TABLET ORAL DAILY
Qty: 30 TABLET | Refills: 0 | Status: SHIPPED | OUTPATIENT
Start: 2022-06-21 | End: 2022-07-12 | Stop reason: SDUPTHER

## 2022-06-21 NOTE — TELEPHONE ENCOUNTER
Refill Routing Note   Medication(s) are not appropriate for processing by [facility] for the following reason(s):      - Outside of protocol    [facility] action(s):  Route          Medication reconciliation completed: No     Appointments  past 12m or future 3m with PCP    Date Provider   Last Visit   4/4/2022 Daniel Lawrence,    Next Visit   7/12/2022 Daniel Lawrence,    ED visits in past 90 days: 0        Note composed:7:15 PM 06/20/2022

## 2022-07-12 ENCOUNTER — OFFICE VISIT (OUTPATIENT)
Dept: INTERNAL MEDICINE | Facility: CLINIC | Age: 57
End: 2022-07-12
Payer: COMMERCIAL

## 2022-07-12 VITALS
DIASTOLIC BLOOD PRESSURE: 80 MMHG | WEIGHT: 161.19 LBS | OXYGEN SATURATION: 99 % | HEIGHT: 67 IN | BODY MASS INDEX: 25.3 KG/M2 | TEMPERATURE: 98 F | RESPIRATION RATE: 20 BRPM | HEART RATE: 65 BPM | SYSTOLIC BLOOD PRESSURE: 120 MMHG

## 2022-07-12 DIAGNOSIS — F90.1 ATTENTION DEFICIT HYPERACTIVITY DISORDER (ADHD), PREDOMINANTLY HYPERACTIVE TYPE: ICD-10-CM

## 2022-07-12 DIAGNOSIS — I10 ESSENTIAL HYPERTENSION: Primary | ICD-10-CM

## 2022-07-12 DIAGNOSIS — F33.1 MAJOR DEPRESSIVE DISORDER, RECURRENT EPISODE, MODERATE: ICD-10-CM

## 2022-07-12 PROCEDURE — 1159F PR MEDICATION LIST DOCUMENTED IN MEDICAL RECORD: ICD-10-PCS | Mod: CPTII,S$GLB,, | Performed by: INTERNAL MEDICINE

## 2022-07-12 PROCEDURE — 99999 PR PBB SHADOW E&M-EST. PATIENT-LVL IV: ICD-10-PCS | Mod: PBBFAC,,, | Performed by: INTERNAL MEDICINE

## 2022-07-12 PROCEDURE — 4010F ACE/ARB THERAPY RXD/TAKEN: CPT | Mod: CPTII,S$GLB,, | Performed by: INTERNAL MEDICINE

## 2022-07-12 PROCEDURE — 3074F PR MOST RECENT SYSTOLIC BLOOD PRESSURE < 130 MM HG: ICD-10-PCS | Mod: CPTII,S$GLB,, | Performed by: INTERNAL MEDICINE

## 2022-07-12 PROCEDURE — 99214 PR OFFICE/OUTPT VISIT, EST, LEVL IV, 30-39 MIN: ICD-10-PCS | Mod: S$GLB,,, | Performed by: INTERNAL MEDICINE

## 2022-07-12 PROCEDURE — 99214 OFFICE O/P EST MOD 30 MIN: CPT | Mod: S$GLB,,, | Performed by: INTERNAL MEDICINE

## 2022-07-12 PROCEDURE — 1160F RVW MEDS BY RX/DR IN RCRD: CPT | Mod: CPTII,S$GLB,, | Performed by: INTERNAL MEDICINE

## 2022-07-12 PROCEDURE — 3079F PR MOST RECENT DIASTOLIC BLOOD PRESSURE 80-89 MM HG: ICD-10-PCS | Mod: CPTII,S$GLB,, | Performed by: INTERNAL MEDICINE

## 2022-07-12 PROCEDURE — 3079F DIAST BP 80-89 MM HG: CPT | Mod: CPTII,S$GLB,, | Performed by: INTERNAL MEDICINE

## 2022-07-12 PROCEDURE — 1159F MED LIST DOCD IN RCRD: CPT | Mod: CPTII,S$GLB,, | Performed by: INTERNAL MEDICINE

## 2022-07-12 PROCEDURE — 3008F PR BODY MASS INDEX (BMI) DOCUMENTED: ICD-10-PCS | Mod: CPTII,S$GLB,, | Performed by: INTERNAL MEDICINE

## 2022-07-12 PROCEDURE — 99999 PR PBB SHADOW E&M-EST. PATIENT-LVL IV: CPT | Mod: PBBFAC,,, | Performed by: INTERNAL MEDICINE

## 2022-07-12 PROCEDURE — 3074F SYST BP LT 130 MM HG: CPT | Mod: CPTII,S$GLB,, | Performed by: INTERNAL MEDICINE

## 2022-07-12 PROCEDURE — 4010F PR ACE/ARB THEARPY RXD/TAKEN: ICD-10-PCS | Mod: CPTII,S$GLB,, | Performed by: INTERNAL MEDICINE

## 2022-07-12 PROCEDURE — 3008F BODY MASS INDEX DOCD: CPT | Mod: CPTII,S$GLB,, | Performed by: INTERNAL MEDICINE

## 2022-07-12 PROCEDURE — 1160F PR REVIEW ALL MEDS BY PRESCRIBER/CLIN PHARMACIST DOCUMENTED: ICD-10-PCS | Mod: CPTII,S$GLB,, | Performed by: INTERNAL MEDICINE

## 2022-07-12 RX ORDER — METHYLPHENIDATE HYDROCHLORIDE 36 MG/1
36 TABLET ORAL DAILY
Qty: 30 TABLET | Refills: 0 | Status: SHIPPED | OUTPATIENT
Start: 2022-07-12 | End: 2022-08-04 | Stop reason: SDUPTHER

## 2022-07-12 RX ORDER — METHYLPHENIDATE HYDROCHLORIDE 36 MG/1
36 TABLET ORAL EVERY MORNING
Qty: 30 TABLET | Refills: 0 | Status: SHIPPED | OUTPATIENT
Start: 2022-08-12 | End: 2022-09-30 | Stop reason: SDUPTHER

## 2022-07-12 RX ORDER — METHYLPHENIDATE HYDROCHLORIDE 36 MG/1
36 TABLET ORAL EVERY MORNING
Qty: 30 TABLET | Refills: 0 | Status: SHIPPED | OUTPATIENT
Start: 2022-09-12 | End: 2022-09-26 | Stop reason: SDUPTHER

## 2022-07-12 NOTE — PROGRESS NOTES
Subjective:       Patient ID: Janki Jones is a 56 y.o. female.    Chief Complaint: Follow-up (3 mo f/u)    HPI   Pt with HTN, ADD, MDD, B/L CTS is here for f/u. Requesting refills of her Concerta which has been working well. No med SE's.   Review of Systems   Constitutional: Negative for activity change, appetite change, chills, diaphoresis, fatigue, fever and unexpected weight change.   HENT: Negative for postnasal drip, rhinorrhea, sinus pressure/congestion, sneezing, sore throat, trouble swallowing and voice change.    Respiratory: Negative for cough, shortness of breath and wheezing.    Cardiovascular: Negative for chest pain, palpitations and leg swelling.   Gastrointestinal: Negative for abdominal pain, blood in stool, constipation, diarrhea, nausea and vomiting.   Genitourinary: Negative for dysuria.   Musculoskeletal: Negative for arthralgias and myalgias.   Integumentary:  Negative for rash and wound.   Allergic/Immunologic: Negative for environmental allergies and food allergies.   Hematological: Negative for adenopathy. Does not bruise/bleed easily.   Psychiatric/Behavioral: Positive for decreased concentration and dysphoric mood. Negative for self-injury and suicidal ideas.         Objective:      Physical Exam  Constitutional:       General: She is not in acute distress.     Appearance: She is well-developed. She is not diaphoretic.   HENT:      Head: Normocephalic and atraumatic.      Right Ear: External ear normal.      Left Ear: External ear normal.      Nose: Nose normal.      Mouth/Throat:      Pharynx: No oropharyngeal exudate.   Eyes:      General: No scleral icterus.        Right eye: No discharge.         Left eye: No discharge.      Conjunctiva/sclera: Conjunctivae normal.      Pupils: Pupils are equal, round, and reactive to light.   Neck:      Vascular: No JVD.   Cardiovascular:      Rate and Rhythm: Normal rate and regular rhythm.      Heart sounds: Normal heart sounds.    Pulmonary:      Effort: Pulmonary effort is normal. No respiratory distress.      Breath sounds: Normal breath sounds. No wheezing or rales.   Abdominal:      General: Bowel sounds are normal.      Palpations: Abdomen is soft.      Tenderness: There is no abdominal tenderness.   Musculoskeletal:      Cervical back: Neck supple.   Lymphadenopathy:      Cervical: No cervical adenopathy.   Skin:     General: Skin is warm and dry.      Coloration: Skin is not pale.      Findings: No rash.   Neurological:      Mental Status: She is alert and oriented to person, place, and time.         Assessment:       Problem List Items Addressed This Visit        Psychiatric    Major depressive disorder, recurrent episode, moderate    ADD (attention deficit disorder)    Relevant Medications    methylphenidate HCl 36 MG CR tablet    methylphenidate HCl 36 MG CR tablet (Start on 8/12/2022)    methylphenidate HCl 36 MG CR tablet (Start on 9/12/2022)       Cardiac/Vascular    Essential hypertension - Primary          Plan:    HTN- stable       ADD- stable on Concerta      MDD- stable

## 2022-10-03 ENCOUNTER — OFFICE VISIT (OUTPATIENT)
Dept: OPTOMETRY | Facility: CLINIC | Age: 57
End: 2022-10-03
Payer: COMMERCIAL

## 2022-10-03 DIAGNOSIS — H04.123 DRY EYE SYNDROME OF BOTH EYES: Primary | ICD-10-CM

## 2022-10-03 DIAGNOSIS — H52.4 BILATERAL PRESBYOPIA: ICD-10-CM

## 2022-10-03 DIAGNOSIS — Z83.511 FAMILY HISTORY OF GLAUCOMA IN FATHER: ICD-10-CM

## 2022-10-03 PROCEDURE — 1160F RVW MEDS BY RX/DR IN RCRD: CPT | Mod: CPTII,S$GLB,, | Performed by: OPTOMETRIST

## 2022-10-03 PROCEDURE — 99999 PR PBB SHADOW E&M-EST. PATIENT-LVL III: ICD-10-PCS | Mod: PBBFAC,,, | Performed by: OPTOMETRIST

## 2022-10-03 PROCEDURE — 92014 COMPRE OPH EXAM EST PT 1/>: CPT | Mod: S$GLB,,, | Performed by: OPTOMETRIST

## 2022-10-03 PROCEDURE — 99999 PR PBB SHADOW E&M-EST. PATIENT-LVL III: CPT | Mod: PBBFAC,,, | Performed by: OPTOMETRIST

## 2022-10-03 PROCEDURE — 1159F PR MEDICATION LIST DOCUMENTED IN MEDICAL RECORD: ICD-10-PCS | Mod: CPTII,S$GLB,, | Performed by: OPTOMETRIST

## 2022-10-03 PROCEDURE — 92014 PR EYE EXAM, EST PATIENT,COMPREHESV: ICD-10-PCS | Mod: S$GLB,,, | Performed by: OPTOMETRIST

## 2022-10-03 PROCEDURE — 4010F ACE/ARB THERAPY RXD/TAKEN: CPT | Mod: CPTII,S$GLB,, | Performed by: OPTOMETRIST

## 2022-10-03 PROCEDURE — 1160F PR REVIEW ALL MEDS BY PRESCRIBER/CLIN PHARMACIST DOCUMENTED: ICD-10-PCS | Mod: CPTII,S$GLB,, | Performed by: OPTOMETRIST

## 2022-10-03 PROCEDURE — 1159F MED LIST DOCD IN RCRD: CPT | Mod: CPTII,S$GLB,, | Performed by: OPTOMETRIST

## 2022-10-03 PROCEDURE — 4010F PR ACE/ARB THEARPY RXD/TAKEN: ICD-10-PCS | Mod: CPTII,S$GLB,, | Performed by: OPTOMETRIST

## 2022-10-03 NOTE — PROGRESS NOTES
VILMA    LUIS: 085/21  Chief complaint (CC): Patient is here for annual eye exam today.  Patient   sleeps with fans at night and sometimes wakes up with dryness and sleep in   her eyes.  Eyes sometimes burn and feel irritated.  Wears OTC readers,   seem to work fine.  Distance seems fine without glasses.  Glasses? +1.50 or +2.00  Contacts? -  H/o eye surgery, injections or laser: -  H/o eye injury: -  Known eye conditions? See above  Family h/o eye conditions? Father with glaucoma  Eye gtts? AT's once a day      (-) Flashes (-)  Floaters (-) Mucous   (-)  Tearing (-) Itching (+) Burning   (-) Headaches (-) Eye Pain/discomfort (+) Irritation   (-)  Redness (-) Double vision (-) Blurry vision    Diabetic? -  A1c? -      Last edited by Linda Ennis on 10/3/2022  9:20 AM.            Assessment /Plan     For exam results, see Encounter Report.      Dry eye syndrome of both eyes  Recommend Systane Ultra or Refresh Optive TID-QID OU to aid with symptoms of dry eyes.  Rec bedtime tomasz.     Family history of glaucoma in father  No e/o glaucoma. Father has h/o multiple surgeries for glaucoma. Monitor.     Bilateral presbyopia  Cont w/OTC readers

## 2022-10-12 ENCOUNTER — PATIENT MESSAGE (OUTPATIENT)
Dept: ORTHOPEDICS | Facility: CLINIC | Age: 57
End: 2022-10-12
Payer: COMMERCIAL

## 2022-10-12 ENCOUNTER — OFFICE VISIT (OUTPATIENT)
Dept: INTERNAL MEDICINE | Facility: CLINIC | Age: 57
End: 2022-10-12
Payer: COMMERCIAL

## 2022-10-12 ENCOUNTER — LAB VISIT (OUTPATIENT)
Dept: LAB | Facility: HOSPITAL | Age: 57
End: 2022-10-12
Attending: INTERNAL MEDICINE
Payer: COMMERCIAL

## 2022-10-12 VITALS
HEIGHT: 67 IN | WEIGHT: 166.88 LBS | SYSTOLIC BLOOD PRESSURE: 120 MMHG | HEART RATE: 60 BPM | OXYGEN SATURATION: 97 % | TEMPERATURE: 98 F | BODY MASS INDEX: 26.19 KG/M2 | DIASTOLIC BLOOD PRESSURE: 80 MMHG

## 2022-10-12 DIAGNOSIS — Z12.31 ENCOUNTER FOR SCREENING MAMMOGRAM FOR BREAST CANCER: ICD-10-CM

## 2022-10-12 DIAGNOSIS — F90.1 ATTENTION DEFICIT HYPERACTIVITY DISORDER (ADHD), PREDOMINANTLY HYPERACTIVE TYPE: ICD-10-CM

## 2022-10-12 DIAGNOSIS — I10 ESSENTIAL HYPERTENSION: ICD-10-CM

## 2022-10-12 DIAGNOSIS — G56.03 BILATERAL CARPAL TUNNEL SYNDROME: ICD-10-CM

## 2022-10-12 DIAGNOSIS — I10 ESSENTIAL HYPERTENSION: Primary | ICD-10-CM

## 2022-10-12 DIAGNOSIS — F33.1 MAJOR DEPRESSIVE DISORDER, RECURRENT EPISODE, MODERATE: ICD-10-CM

## 2022-10-12 LAB
ALBUMIN SERPL BCP-MCNC: 4.1 G/DL (ref 3.5–5.2)
ALP SERPL-CCNC: 109 U/L (ref 55–135)
ALT SERPL W/O P-5'-P-CCNC: 184 U/L (ref 10–44)
ANION GAP SERPL CALC-SCNC: 8 MMOL/L (ref 8–16)
AST SERPL-CCNC: 123 U/L (ref 10–40)
BASOPHILS # BLD AUTO: 0.01 K/UL (ref 0–0.2)
BASOPHILS NFR BLD: 0.2 % (ref 0–1.9)
BILIRUB SERPL-MCNC: 0.5 MG/DL (ref 0.1–1)
BUN SERPL-MCNC: 8 MG/DL (ref 6–20)
CALCIUM SERPL-MCNC: 10.2 MG/DL (ref 8.7–10.5)
CHLORIDE SERPL-SCNC: 106 MMOL/L (ref 95–110)
CO2 SERPL-SCNC: 30 MMOL/L (ref 23–29)
CREAT SERPL-MCNC: 0.8 MG/DL (ref 0.5–1.4)
DIFFERENTIAL METHOD: ABNORMAL
EOSINOPHIL # BLD AUTO: 0.1 K/UL (ref 0–0.5)
EOSINOPHIL NFR BLD: 1 % (ref 0–8)
ERYTHROCYTE [DISTWIDTH] IN BLOOD BY AUTOMATED COUNT: 13.2 % (ref 11.5–14.5)
EST. GFR  (NO RACE VARIABLE): >60 ML/MIN/1.73 M^2
GLUCOSE SERPL-MCNC: 96 MG/DL (ref 70–110)
HCT VFR BLD AUTO: 40.2 % (ref 37–48.5)
HGB BLD-MCNC: 12.3 G/DL (ref 12–16)
IMM GRANULOCYTES # BLD AUTO: 0.01 K/UL (ref 0–0.04)
IMM GRANULOCYTES NFR BLD AUTO: 0.2 % (ref 0–0.5)
LYMPHOCYTES # BLD AUTO: 2.6 K/UL (ref 1–4.8)
LYMPHOCYTES NFR BLD: 44.4 % (ref 18–48)
MCH RBC QN AUTO: 28.9 PG (ref 27–31)
MCHC RBC AUTO-ENTMCNC: 30.6 G/DL (ref 32–36)
MCV RBC AUTO: 94 FL (ref 82–98)
MONOCYTES # BLD AUTO: 0.6 K/UL (ref 0.3–1)
MONOCYTES NFR BLD: 9.7 % (ref 4–15)
NEUTROPHILS # BLD AUTO: 2.6 K/UL (ref 1.8–7.7)
NEUTROPHILS NFR BLD: 44.5 % (ref 38–73)
NRBC BLD-RTO: 0 /100 WBC
PLATELET # BLD AUTO: 242 K/UL (ref 150–450)
PMV BLD AUTO: 12.7 FL (ref 9.2–12.9)
POTASSIUM SERPL-SCNC: 4 MMOL/L (ref 3.5–5.1)
PROT SERPL-MCNC: 8.3 G/DL (ref 6–8.4)
RBC # BLD AUTO: 4.26 M/UL (ref 4–5.4)
SODIUM SERPL-SCNC: 144 MMOL/L (ref 136–145)
WBC # BLD AUTO: 5.9 K/UL (ref 3.9–12.7)

## 2022-10-12 PROCEDURE — 1159F PR MEDICATION LIST DOCUMENTED IN MEDICAL RECORD: ICD-10-PCS | Mod: CPTII,S$GLB,, | Performed by: INTERNAL MEDICINE

## 2022-10-12 PROCEDURE — 80053 COMPREHEN METABOLIC PANEL: CPT | Performed by: INTERNAL MEDICINE

## 2022-10-12 PROCEDURE — 4010F ACE/ARB THERAPY RXD/TAKEN: CPT | Mod: CPTII,S$GLB,, | Performed by: INTERNAL MEDICINE

## 2022-10-12 PROCEDURE — 90686 FLU VACCINE (QUAD) GREATER THAN OR EQUAL TO 3YO PRESERVATIVE FREE IM: ICD-10-PCS | Mod: S$GLB,,, | Performed by: INTERNAL MEDICINE

## 2022-10-12 PROCEDURE — 1160F PR REVIEW ALL MEDS BY PRESCRIBER/CLIN PHARMACIST DOCUMENTED: ICD-10-PCS | Mod: CPTII,S$GLB,, | Performed by: INTERNAL MEDICINE

## 2022-10-12 PROCEDURE — 3079F DIAST BP 80-89 MM HG: CPT | Mod: CPTII,S$GLB,, | Performed by: INTERNAL MEDICINE

## 2022-10-12 PROCEDURE — 90471 FLU VACCINE (QUAD) GREATER THAN OR EQUAL TO 3YO PRESERVATIVE FREE IM: ICD-10-PCS | Mod: S$GLB,,, | Performed by: INTERNAL MEDICINE

## 2022-10-12 PROCEDURE — 99214 PR OFFICE/OUTPT VISIT, EST, LEVL IV, 30-39 MIN: ICD-10-PCS | Mod: 25,S$GLB,, | Performed by: INTERNAL MEDICINE

## 2022-10-12 PROCEDURE — 90471 IMMUNIZATION ADMIN: CPT | Mod: S$GLB,,, | Performed by: INTERNAL MEDICINE

## 2022-10-12 PROCEDURE — 1160F RVW MEDS BY RX/DR IN RCRD: CPT | Mod: CPTII,S$GLB,, | Performed by: INTERNAL MEDICINE

## 2022-10-12 PROCEDURE — 3074F PR MOST RECENT SYSTOLIC BLOOD PRESSURE < 130 MM HG: ICD-10-PCS | Mod: CPTII,S$GLB,, | Performed by: INTERNAL MEDICINE

## 2022-10-12 PROCEDURE — 99999 PR PBB SHADOW E&M-EST. PATIENT-LVL IV: ICD-10-PCS | Mod: PBBFAC,,, | Performed by: INTERNAL MEDICINE

## 2022-10-12 PROCEDURE — 85025 COMPLETE CBC W/AUTO DIFF WBC: CPT | Performed by: INTERNAL MEDICINE

## 2022-10-12 PROCEDURE — 36415 COLL VENOUS BLD VENIPUNCTURE: CPT | Mod: PO | Performed by: INTERNAL MEDICINE

## 2022-10-12 PROCEDURE — 3074F SYST BP LT 130 MM HG: CPT | Mod: CPTII,S$GLB,, | Performed by: INTERNAL MEDICINE

## 2022-10-12 PROCEDURE — 3079F PR MOST RECENT DIASTOLIC BLOOD PRESSURE 80-89 MM HG: ICD-10-PCS | Mod: CPTII,S$GLB,, | Performed by: INTERNAL MEDICINE

## 2022-10-12 PROCEDURE — 90686 IIV4 VACC NO PRSV 0.5 ML IM: CPT | Mod: S$GLB,,, | Performed by: INTERNAL MEDICINE

## 2022-10-12 PROCEDURE — 99214 OFFICE O/P EST MOD 30 MIN: CPT | Mod: 25,S$GLB,, | Performed by: INTERNAL MEDICINE

## 2022-10-12 PROCEDURE — 4010F PR ACE/ARB THEARPY RXD/TAKEN: ICD-10-PCS | Mod: CPTII,S$GLB,, | Performed by: INTERNAL MEDICINE

## 2022-10-12 PROCEDURE — 1159F MED LIST DOCD IN RCRD: CPT | Mod: CPTII,S$GLB,, | Performed by: INTERNAL MEDICINE

## 2022-10-12 PROCEDURE — 99999 PR PBB SHADOW E&M-EST. PATIENT-LVL IV: CPT | Mod: PBBFAC,,, | Performed by: INTERNAL MEDICINE

## 2022-10-12 RX ORDER — MELOXICAM 15 MG/1
15 TABLET ORAL DAILY PRN
Qty: 30 TABLET | Refills: 3 | Status: SHIPPED | OUTPATIENT
Start: 2022-10-12 | End: 2022-11-08 | Stop reason: SDUPTHER

## 2022-10-12 RX ORDER — METHYLPHENIDATE HYDROCHLORIDE 36 MG/1
36 TABLET ORAL EVERY MORNING
Qty: 30 TABLET | Refills: 0 | Status: SHIPPED | OUTPATIENT
Start: 2022-12-12 | End: 2023-01-18 | Stop reason: SDUPTHER

## 2022-10-12 RX ORDER — METHYLPHENIDATE HYDROCHLORIDE 36 MG/1
36 TABLET ORAL EVERY MORNING
Qty: 30 TABLET | Refills: 0 | Status: SHIPPED | OUTPATIENT
Start: 2022-10-12 | End: 2022-11-08 | Stop reason: SDUPTHER

## 2022-10-12 RX ORDER — METHYLPHENIDATE HYDROCHLORIDE 36 MG/1
36 TABLET ORAL DAILY
Qty: 30 TABLET | Refills: 0 | Status: SHIPPED | OUTPATIENT
Start: 2022-12-27 | End: 2023-01-18 | Stop reason: SDUPTHER

## 2022-10-12 NOTE — PROGRESS NOTES
Subjective:       Patient ID: Janki Jones is a 56 y.o. female.    Chief Complaint: Follow-up, Flu Vaccine, and Immunizations    HPI  Pt with HTN, ADD, MDD, B/L CTS is here for f/u. Requesting refills of her Concerta which has been working well. No med SE's. Also requesting flu vaccine.   Review of Systems   Constitutional:  Negative for activity change, appetite change, chills, diaphoresis, fatigue, fever and unexpected weight change.   HENT:  Negative for nasal congestion, mouth sores, postnasal drip, rhinorrhea, sinus pressure/congestion, sneezing, sore throat, trouble swallowing and voice change.    Eyes:  Negative for pain, discharge and visual disturbance.   Respiratory:  Negative for cough, shortness of breath and wheezing.    Cardiovascular:  Negative for chest pain, palpitations and leg swelling.   Gastrointestinal:  Negative for abdominal pain, blood in stool, constipation, diarrhea, nausea and vomiting.   Endocrine: Negative for cold intolerance and heat intolerance.   Genitourinary:  Negative for difficulty urinating, dysuria, frequency, hematuria and urgency.   Musculoskeletal:  Negative for arthralgias and myalgias.   Integumentary:  Negative for rash and wound.   Allergic/Immunologic: Negative for environmental allergies and food allergies.   Neurological:  Negative for dizziness, tremors, seizures, syncope, weakness, light-headedness and headaches.   Hematological:  Negative for adenopathy. Does not bruise/bleed easily.   Psychiatric/Behavioral:  Positive for decreased concentration. Negative for confusion, self-injury, sleep disturbance and suicidal ideas. The patient is nervous/anxious.        Objective:      Physical Exam  Constitutional:       General: She is not in acute distress.     Appearance: Normal appearance. She is well-developed. She is not diaphoretic.   HENT:      Head: Normocephalic and atraumatic.      Right Ear: External ear normal.      Left Ear: External ear normal.       Nose: Nose normal.      Mouth/Throat:      Pharynx: No oropharyngeal exudate.   Eyes:      General: No scleral icterus.        Right eye: No discharge.         Left eye: No discharge.      Conjunctiva/sclera: Conjunctivae normal.      Pupils: Pupils are equal, round, and reactive to light.   Neck:      Vascular: No JVD.   Cardiovascular:      Rate and Rhythm: Normal rate and regular rhythm.      Pulses: Normal pulses.      Heart sounds: Normal heart sounds.   Pulmonary:      Effort: Pulmonary effort is normal. No respiratory distress.      Breath sounds: Normal breath sounds. No wheezing, rhonchi or rales.   Abdominal:      General: Bowel sounds are normal. There is no distension.      Palpations: Abdomen is soft.      Tenderness: There is no abdominal tenderness. There is no guarding or rebound.   Musculoskeletal:      Cervical back: Neck supple.      Right lower leg: No edema.      Left lower leg: No edema.   Lymphadenopathy:      Cervical: No cervical adenopathy.   Skin:     General: Skin is warm and dry.      Coloration: Skin is not pale.      Findings: No rash.   Neurological:      General: No focal deficit present.      Mental Status: She is alert and oriented to person, place, and time.      Gait: Gait normal.   Psychiatric:         Behavior: Behavior normal.         Thought Content: Thought content normal.       Assessment:       Problem List Items Addressed This Visit          Psychiatric    Major depressive disorder, recurrent episode, moderate    ADD (attention deficit disorder)    Relevant Medications    methylphenidate HCl 36 MG CR tablet (Start on 12/12/2022)    methylphenidate HCl 36 MG CR tablet (Start on 12/27/2022)    methylphenidate HCl 36 MG CR tablet       Cardiac/Vascular    Essential hypertension - Primary    Relevant Orders    CBC Auto Differential    Comprehensive Metabolic Panel     Other Visit Diagnoses       Bilateral carpal tunnel syndrome        Relevant Orders    Ambulatory  referral/consult to Orthopedics    Encounter for screening mammogram for breast cancer        Relevant Orders    Mammo Digital Screening Bilat w/ Trae            Plan:    HTN- stable       ADD- stable on Concerta      MDD- stable     B/L CTS- referral back to Ortho    Rx mobic 15 mg qd PRN     F/u in 3 months

## 2022-10-13 ENCOUNTER — TELEPHONE (OUTPATIENT)
Dept: INTERNAL MEDICINE | Facility: CLINIC | Age: 57
End: 2022-10-13
Payer: COMMERCIAL

## 2022-10-13 ENCOUNTER — OFFICE VISIT (OUTPATIENT)
Dept: OBSTETRICS AND GYNECOLOGY | Facility: CLINIC | Age: 57
End: 2022-10-13
Payer: COMMERCIAL

## 2022-10-13 VITALS — SYSTOLIC BLOOD PRESSURE: 153 MMHG | BODY MASS INDEX: 26.14 KG/M2 | DIASTOLIC BLOOD PRESSURE: 82 MMHG | HEIGHT: 67 IN

## 2022-10-13 DIAGNOSIS — K76.0 FATTY LIVER: Primary | ICD-10-CM

## 2022-10-13 DIAGNOSIS — R74.8 ELEVATED LIVER ENZYMES: ICD-10-CM

## 2022-10-13 DIAGNOSIS — N89.8 VAGINAL DISCHARGE: Primary | ICD-10-CM

## 2022-10-13 PROCEDURE — 3079F PR MOST RECENT DIASTOLIC BLOOD PRESSURE 80-89 MM HG: ICD-10-PCS | Mod: CPTII,S$GLB,, | Performed by: OBSTETRICS & GYNECOLOGY

## 2022-10-13 PROCEDURE — 81514 NFCT DS BV&VAGINITIS DNA ALG: CPT | Performed by: OBSTETRICS & GYNECOLOGY

## 2022-10-13 PROCEDURE — 4010F PR ACE/ARB THEARPY RXD/TAKEN: ICD-10-PCS | Mod: CPTII,S$GLB,, | Performed by: OBSTETRICS & GYNECOLOGY

## 2022-10-13 PROCEDURE — 4010F ACE/ARB THERAPY RXD/TAKEN: CPT | Mod: CPTII,S$GLB,, | Performed by: OBSTETRICS & GYNECOLOGY

## 2022-10-13 PROCEDURE — 1159F MED LIST DOCD IN RCRD: CPT | Mod: CPTII,S$GLB,, | Performed by: OBSTETRICS & GYNECOLOGY

## 2022-10-13 PROCEDURE — 99999 PR PBB SHADOW E&M-EST. PATIENT-LVL III: CPT | Mod: PBBFAC,,, | Performed by: OBSTETRICS & GYNECOLOGY

## 2022-10-13 PROCEDURE — 3077F SYST BP >= 140 MM HG: CPT | Mod: CPTII,S$GLB,, | Performed by: OBSTETRICS & GYNECOLOGY

## 2022-10-13 PROCEDURE — 3079F DIAST BP 80-89 MM HG: CPT | Mod: CPTII,S$GLB,, | Performed by: OBSTETRICS & GYNECOLOGY

## 2022-10-13 PROCEDURE — 1160F RVW MEDS BY RX/DR IN RCRD: CPT | Mod: CPTII,S$GLB,, | Performed by: OBSTETRICS & GYNECOLOGY

## 2022-10-13 PROCEDURE — 3077F PR MOST RECENT SYSTOLIC BLOOD PRESSURE >= 140 MM HG: ICD-10-PCS | Mod: CPTII,S$GLB,, | Performed by: OBSTETRICS & GYNECOLOGY

## 2022-10-13 PROCEDURE — 1159F PR MEDICATION LIST DOCUMENTED IN MEDICAL RECORD: ICD-10-PCS | Mod: CPTII,S$GLB,, | Performed by: OBSTETRICS & GYNECOLOGY

## 2022-10-13 PROCEDURE — 99214 OFFICE O/P EST MOD 30 MIN: CPT | Mod: S$GLB,,, | Performed by: OBSTETRICS & GYNECOLOGY

## 2022-10-13 PROCEDURE — 99214 PR OFFICE/OUTPT VISIT, EST, LEVL IV, 30-39 MIN: ICD-10-PCS | Mod: S$GLB,,, | Performed by: OBSTETRICS & GYNECOLOGY

## 2022-10-13 PROCEDURE — 1160F PR REVIEW ALL MEDS BY PRESCRIBER/CLIN PHARMACIST DOCUMENTED: ICD-10-PCS | Mod: CPTII,S$GLB,, | Performed by: OBSTETRICS & GYNECOLOGY

## 2022-10-13 PROCEDURE — 99999 PR PBB SHADOW E&M-EST. PATIENT-LVL III: ICD-10-PCS | Mod: PBBFAC,,, | Performed by: OBSTETRICS & GYNECOLOGY

## 2022-10-13 RX ORDER — ESTRADIOL 0.1 MG/G
1 CREAM VAGINAL
Qty: 42.5 G | Refills: 6 | Status: SHIPPED | OUTPATIENT
Start: 2022-10-13 | End: 2023-10-13

## 2022-10-13 NOTE — ASSESSMENT & PLAN NOTE
Vulvovaginal atrophy seen on exam. Affirm collected to r/o infection. Discussed twice weekly use of vaginal estrogen at bedtime, do not apply before intercourse. Use an unscented water-based lubricant as needed. RTC next month for annual exam.

## 2022-10-13 NOTE — PROGRESS NOTES
Chief Complaint   Patient presents with    Vaginal Discharge       HPI:   56 y.o.  here today for vaginal odor and irritation. It has been going on for some time (unable to quantify). She thought she was due for annual exam today. Will reschedule to next month.    Also notes vaginal irritation and pain with intercourse. Has never used vaginal estrogen cream. She is not often sexually active due to dryness/discomfort.     LMP Dates from Last 1 Encounters:   LMP: 2017       Labs / Significant Studies  Pap (2021): NILM/HPV neg    Lab Visit on 10/12/2022   Component Date Value Ref Range Status    WBC 10/12/2022 5.90  3.90 - 12.70 K/uL Final    RBC 10/12/2022 4.26  4.00 - 5.40 M/uL Final    Hemoglobin 10/12/2022 12.3  12.0 - 16.0 g/dL Final    Hematocrit 10/12/2022 40.2  37.0 - 48.5 % Final    MCV 10/12/2022 94  82 - 98 fL Final    MCH 10/12/2022 28.9  27.0 - 31.0 pg Final    MCHC 10/12/2022 30.6 (L)  32.0 - 36.0 g/dL Final    RDW 10/12/2022 13.2  11.5 - 14.5 % Final    Platelets 10/12/2022 242  150 - 450 K/uL Final    MPV 10/12/2022 12.7  9.2 - 12.9 fL Final    Immature Granulocytes 10/12/2022 0.2  0.0 - 0.5 % Final    Gran # (ANC) 10/12/2022 2.6  1.8 - 7.7 K/uL Final    Immature Grans (Abs) 10/12/2022 0.01  0.00 - 0.04 K/uL Final    Comment: Mild elevation in immature granulocytes is non specific and   can be seen in a variety of conditions including stress response,   acute inflammation, trauma and pregnancy. Correlation with other   laboratory and clinical findings is essential.      Lymph # 10/12/2022 2.6  1.0 - 4.8 K/uL Final    Mono # 10/12/2022 0.6  0.3 - 1.0 K/uL Final    Eos # 10/12/2022 0.1  0.0 - 0.5 K/uL Final    Baso # 10/12/2022 0.01  0.00 - 0.20 K/uL Final    nRBC 10/12/2022 0  0 /100 WBC Final    Gran % 10/12/2022 44.5  38.0 - 73.0 % Final    Lymph % 10/12/2022 44.4  18.0 - 48.0 % Final    Mono % 10/12/2022 9.7  4.0 - 15.0 % Final    Eosinophil % 10/12/2022 1.0  0.0 - 8.0 % Final     Basophil % 10/12/2022 0.2  0.0 - 1.9 % Final    Differential Method 10/12/2022 Automated   Final    Sodium 10/12/2022 144  136 - 145 mmol/L Final    Potassium 10/12/2022 4.0  3.5 - 5.1 mmol/L Final    Chloride 10/12/2022 106  95 - 110 mmol/L Final    CO2 10/12/2022 30 (H)  23 - 29 mmol/L Final    Glucose 10/12/2022 96  70 - 110 mg/dL Final    BUN 10/12/2022 8  6 - 20 mg/dL Final    Creatinine 10/12/2022 0.8  0.5 - 1.4 mg/dL Final    Calcium 10/12/2022 10.2  8.7 - 10.5 mg/dL Final    Total Protein 10/12/2022 8.3  6.0 - 8.4 g/dL Final    Albumin 10/12/2022 4.1  3.5 - 5.2 g/dL Final    Total Bilirubin 10/12/2022 0.5  0.1 - 1.0 mg/dL Final    Comment: For infants and newborns, interpretation of results should be based  on gestational age, weight and in agreement with clinical  observations.    Premature Infant recommended reference ranges:  Up to 24 hours.............<8.0 mg/dL  Up to 48 hours............<12.0 mg/dL  3-5 days..................<15.0 mg/dL  6-29 days.................<15.0 mg/dL      Alkaline Phosphatase 10/12/2022 109  55 - 135 U/L Final    AST 10/12/2022 123 (H)  10 - 40 U/L Final    ALT 10/12/2022 184 (H)  10 - 44 U/L Final    Anion Gap 10/12/2022 8  8 - 16 mmol/L Final    eGFR 10/12/2022 >60.0  >60 mL/min/1.73 m^2 Final        Past Medical History:   Diagnosis Date    Abnormal Pap smear 2007    Abnormal Pap smear of cervix     ADD (attention deficit disorder)     Anxiety     Depression     Fibroids     Herpes simplex virus (HSV) infection     HSV1.    Hypertension      Past Surgical History:   Procedure Laterality Date    CERVICAL BIOPSY  W/ LOOP ELECTRODE EXCISION  2007    COLONOSCOPY N/A 2/15/2019    Procedure: COLONOSCOPY;  Surgeon: Uri Torrez MD;  Location: [address]);  Service: Endoscopy;  Laterality: N/A;    ESOPHAGOGASTRODUODENOSCOPY N/A 11/26/2021    Procedure: EGD (ESOPHAGOGASTRODUODENOSCOPY) fully vaccinated;  Surgeon: Venkat Astorga MD;  Location: [facility];  Service:  Endoscopy;  Laterality: N/A;    essure      TUBAL LIGATION         Current Outpatient Medications:     amLODIPine (NORVASC) 2.5 MG tablet, Take 1 tablet (2.5 mg total) by mouth once daily., Disp: 90 tablet, Rfl: 3    buPROPion (WELLBUTRIN XL) 150 MG TB24 tablet, Take 1 tablet (150 mg total) by mouth once daily., Disp: 90 tablet, Rfl: 1    EScitalopram oxalate (LEXAPRO) 20 MG tablet, Take 1 tablet (20 mg total) by mouth every evening., Disp: 90 tablet, Rfl: 3    hydroCHLOROthiazide (HYDRODIURIL) 25 MG tablet, Take 1 tablet (25 mg total) by mouth once daily., Disp: 90 tablet, Rfl: 2    loratadine (CLARITIN) 10 mg tablet, Take 10 mg by mouth once daily., Disp: , Rfl:     losartan (COZAAR) 100 MG tablet, Take 1 tablet (100 mg total) by mouth once daily., Disp: 90 tablet, Rfl: 3    meloxicam (MOBIC) 15 MG tablet, Take 1 tablet (15 mg total) by mouth daily as needed for Pain., Disp: 30 tablet, Rfl: 3    [START ON 2022] methylphenidate HCl 36 MG CR tablet, Take 1 tablet (36 mg total) by mouth every morning., Disp: 30 tablet, Rfl: 0    [START ON 2022] methylphenidate HCl 36 MG CR tablet, Take 1 tablet (36 mg total) by mouth once daily., Disp: 30 tablet, Rfl: 0    methylphenidate HCl 36 MG CR tablet, Take 1 tablet (36 mg total) by mouth every morning., Disp: 30 tablet, Rfl: 0    multivitamin with minerals tablet, Take 1 tablet by mouth once daily., Disp: , Rfl:     pantoprazole (PROTONIX) 40 MG tablet, Take 1 tablet (40 mg total) by mouth once daily., Disp: 30 tablet, Rfl: 3    estradioL (ESTRACE) 0.01 % (0.1 mg/gram) vaginal cream, Place 1 g vaginally twice a week. At bedtime as needed, Disp: 42.5 g, Rfl: 6  Review of patient's allergies indicates:   Allergen Reactions    Percocet [oxycodone-acetaminophen]      Other reaction(s): Nausea     OB History    Para Term  AB Living   3 3 3     3   SAB IAB Ectopic Multiple Live Births           3      # Outcome Date GA Lbr Ramon/2nd Weight Sex Delivery Anes  PTL Lv   3 Term 03/25/06    M Vag-Spont  N PAT   2 Term 09/11/03    F Vag-Spont  N PAT   1 Term 05/20/98    F Vag-Spont  N PAT     Social History     Tobacco Use    Smoking status: Never    Smokeless tobacco: Never   Substance Use Topics    Alcohol use: No    Drug use: No     Family History   Problem Relation Age of Onset    Alcohol abuse Mother     Diabetes Mother     Liver disease Mother     Stroke Mother     Panic disorder Mother     Other Father         glaucoma    Diabetes Father     Heart disease Father     Hyperlipidemia Father     Hypertension Father     Kidney disease Father     Glaucoma Father     Depression Father     Osteoporosis Father     Hypertension Brother     Other Sister         thyroid    Thyroid disease Sister     ADD / ADHD Daughter     ADD / ADHD Son     ADD / ADHD Daughter     Panic disorder Daughter     Breast cancer Neg Hx     Colon cancer Neg Hx     Ovarian cancer Neg Hx     Amblyopia Neg Hx     Blindness Neg Hx     Cataracts Neg Hx     Macular degeneration Neg Hx     Retinal detachment Neg Hx     Strabismus Neg Hx        Review of Systems   Negative except as in HPI      Physical Exam   Vitals:    10/13/22 0948   BP: (!) 153/82     Body mass index is 26.14 kg/m².    Physical Exam  Constitutional:       General: She is not in acute distress.     Appearance: Normal appearance.   Genitourinary:      Vulva, bladder and urethral meatus normal.      No vaginal discharge or bleeding.      Moderate vaginal atrophy present.       Right Adnexa: not tender, not full and no mass present.     Left Adnexa: not tender, not full and no mass present.     No cervical motion tenderness or lesion.      No parametrium nodularity or thickening present.     Uterus is not tender.      No uterine mass detected.     Uterus is anteverted.      Bladder is not tender.       Pelvic exam was performed with patient in the lithotomy position.   HENT:      Head: Normocephalic and atraumatic.   Abdominal:      General:  Bowel sounds are normal. There is no distension.      Palpations: Abdomen is soft.      Tenderness: There is no abdominal tenderness. There is no guarding or rebound.   Musculoskeletal:         General: Normal range of motion.      Cervical back: Normal range of motion.   Neurological:      General: No focal deficit present.      Mental Status: She is alert and oriented to person, place, and time.   Skin:     General: Skin is warm and dry.   Psychiatric:         Mood and Affect: Mood normal.         Behavior: Behavior normal.         Thought Content: Thought content normal.   Exam conducted with a chaperone present.        Labs reviewed: Pap, HPV, MMG    ASSESSMENT:   Patient Active Problem List   Diagnosis    Essential hypertension    ADD (attention deficit disorder)    GERD (gastroesophageal reflux disease)    Situational stress    Adjustment disorder with mixed anxiety and depressed mood    Anxiety state    Major depressive disorder, recurrent episode, moderate    Allergic rhinitis    Recurrent UTI    Encounter for well woman exam with routine gynecological exam    High risk HPV infection    Vaginal discharge       PLAN:  Problem List Items Addressed This Visit          Renal/    Vaginal discharge - Primary    Current Assessment & Plan     Vulvovaginal atrophy seen on exam. Affirm collected to r/o infection. Discussed twice weekly use of vaginal estrogen at bedtime, do not apply before intercourse. Use an unscented water-based lubricant as needed. RTC next month for annual exam.          Relevant Medications    estradioL (ESTRACE) 0.01 % (0.1 mg/gram) vaginal cream    Other Relevant Orders    Vaginosis Screen by DNA Probe        Total time spent on this encounter was 20 minutes.  This includes preparing to see the patient;  obtaining/reviewing separately obtained history;  performing a medical exam and/or evaluation;   counseling/educating the patient;  ordering medications, tests, of procedures;    referring/communicating with other health care professionals;  EMR documentation;  interpreting/communicating results to the patient;  and/or care coordination.    Follow up in about 6 weeks (around 11/24/2022) for Annual.       Ashly Rosado MD  Department of Obstetrics & Gynecology  [facility]

## 2022-10-25 ENCOUNTER — TELEPHONE (OUTPATIENT)
Dept: HEPATOLOGY | Facility: CLINIC | Age: 57
End: 2022-10-25
Payer: COMMERCIAL

## 2022-10-25 NOTE — TELEPHONE ENCOUNTER
Referral to hepatology for fatty liver, elevated liver enzymes. Called pt to schedule appt with any SHWETA    Scheduling attempt # 1    Pt scheduled, confirmed appt

## 2022-10-26 ENCOUNTER — OFFICE VISIT (OUTPATIENT)
Dept: HEPATOLOGY | Facility: CLINIC | Age: 57
End: 2022-10-26
Payer: COMMERCIAL

## 2022-10-26 ENCOUNTER — LAB VISIT (OUTPATIENT)
Dept: LAB | Facility: HOSPITAL | Age: 57
End: 2022-10-26
Payer: COMMERCIAL

## 2022-10-26 ENCOUNTER — IMMUNIZATION (OUTPATIENT)
Dept: INTERNAL MEDICINE | Facility: CLINIC | Age: 57
End: 2022-10-26
Payer: COMMERCIAL

## 2022-10-26 VITALS
DIASTOLIC BLOOD PRESSURE: 74 MMHG | HEART RATE: 74 BPM | TEMPERATURE: 98 F | OXYGEN SATURATION: 97 % | SYSTOLIC BLOOD PRESSURE: 143 MMHG | BODY MASS INDEX: 26.59 KG/M2 | WEIGHT: 169.75 LBS

## 2022-10-26 DIAGNOSIS — R74.8 ELEVATED LIVER ENZYMES: ICD-10-CM

## 2022-10-26 DIAGNOSIS — K76.0 FATTY LIVER: ICD-10-CM

## 2022-10-26 DIAGNOSIS — Z23 NEED FOR VACCINATION: Primary | ICD-10-CM

## 2022-10-26 LAB
A1AT SERPL-MCNC: 133 MG/DL (ref 100–190)
ALBUMIN SERPL BCP-MCNC: 4.1 G/DL (ref 3.5–5.2)
ALP SERPL-CCNC: 109 U/L (ref 55–135)
ALT SERPL W/O P-5'-P-CCNC: 194 U/L (ref 10–44)
AST SERPL-CCNC: 125 U/L (ref 10–40)
BILIRUB DIRECT SERPL-MCNC: 0.2 MG/DL (ref 0.1–0.3)
BILIRUB SERPL-MCNC: 0.6 MG/DL (ref 0.1–1)
CERULOPLASMIN SERPL-MCNC: 31 MG/DL (ref 15–45)
CK SERPL-CCNC: 339 U/L (ref 20–180)
FERRITIN SERPL-MCNC: 304 NG/ML (ref 20–300)
HAV IGG SER QL IA: REACTIVE
HBV SURFACE AB SER-ACNC: 13.73 MIU/ML
HBV SURFACE AB SER-ACNC: REACTIVE M[IU]/ML
HBV SURFACE AG SERPL QL IA: NORMAL
HCV AB SERPL QL IA: NORMAL
IGG SERPL-MCNC: 1661 MG/DL (ref 650–1600)
IRON SERPL-MCNC: 99 UG/DL (ref 30–160)
PROT SERPL-MCNC: 8.1 G/DL (ref 6–8.4)
SATURATED IRON: 24 % (ref 20–50)
TOTAL IRON BINDING CAPACITY: 417 UG/DL (ref 250–450)
TRANSFERRIN SERPL-MCNC: 282 MG/DL (ref 200–375)

## 2022-10-26 PROCEDURE — 3077F SYST BP >= 140 MM HG: CPT | Mod: CPTII,S$GLB,, | Performed by: PHYSICIAN ASSISTANT

## 2022-10-26 PROCEDURE — 86790 VIRUS ANTIBODY NOS: CPT | Performed by: PHYSICIAN ASSISTANT

## 2022-10-26 PROCEDURE — 86381 MITOCHONDRIAL ANTIBODY EACH: CPT | Performed by: PHYSICIAN ASSISTANT

## 2022-10-26 PROCEDURE — 1160F PR REVIEW ALL MEDS BY PRESCRIBER/CLIN PHARMACIST DOCUMENTED: ICD-10-PCS | Mod: CPTII,S$GLB,, | Performed by: PHYSICIAN ASSISTANT

## 2022-10-26 PROCEDURE — 99204 PR OFFICE/OUTPT VISIT, NEW, LEVL IV, 45-59 MIN: ICD-10-PCS | Mod: S$GLB,,, | Performed by: PHYSICIAN ASSISTANT

## 2022-10-26 PROCEDURE — 82784 ASSAY IGA/IGD/IGG/IGM EACH: CPT | Performed by: PHYSICIAN ASSISTANT

## 2022-10-26 PROCEDURE — 3077F PR MOST RECENT SYSTOLIC BLOOD PRESSURE >= 140 MM HG: ICD-10-PCS | Mod: CPTII,S$GLB,, | Performed by: PHYSICIAN ASSISTANT

## 2022-10-26 PROCEDURE — 99999 PR PBB SHADOW E&M-EST. PATIENT-LVL V: ICD-10-PCS | Mod: PBBFAC,,, | Performed by: PHYSICIAN ASSISTANT

## 2022-10-26 PROCEDURE — 82728 ASSAY OF FERRITIN: CPT | Performed by: PHYSICIAN ASSISTANT

## 2022-10-26 PROCEDURE — 80076 HEPATIC FUNCTION PANEL: CPT | Performed by: PHYSICIAN ASSISTANT

## 2022-10-26 PROCEDURE — 82550 ASSAY OF CK (CPK): CPT | Performed by: PHYSICIAN ASSISTANT

## 2022-10-26 PROCEDURE — 99999 PR PBB SHADOW E&M-EST. PATIENT-LVL V: CPT | Mod: PBBFAC,,, | Performed by: PHYSICIAN ASSISTANT

## 2022-10-26 PROCEDURE — 36415 COLL VENOUS BLD VENIPUNCTURE: CPT | Performed by: PHYSICIAN ASSISTANT

## 2022-10-26 PROCEDURE — 4010F ACE/ARB THERAPY RXD/TAKEN: CPT | Mod: CPTII,S$GLB,, | Performed by: PHYSICIAN ASSISTANT

## 2022-10-26 PROCEDURE — 3078F PR MOST RECENT DIASTOLIC BLOOD PRESSURE < 80 MM HG: ICD-10-PCS | Mod: CPTII,S$GLB,, | Performed by: PHYSICIAN ASSISTANT

## 2022-10-26 PROCEDURE — 86256 FLUORESCENT ANTIBODY TITER: CPT | Performed by: PHYSICIAN ASSISTANT

## 2022-10-26 PROCEDURE — 0124A COVID-19, MRNA, LNP-S, BIVALENT BOOSTER, PF, 30 MCG/0.3 ML DOSE: CPT | Mod: CV19,PBBFAC | Performed by: INTERNAL MEDICINE

## 2022-10-26 PROCEDURE — 99204 OFFICE O/P NEW MOD 45 MIN: CPT | Mod: S$GLB,,, | Performed by: PHYSICIAN ASSISTANT

## 2022-10-26 PROCEDURE — 82103 ALPHA-1-ANTITRYPSIN TOTAL: CPT | Performed by: PHYSICIAN ASSISTANT

## 2022-10-26 PROCEDURE — 4010F PR ACE/ARB THEARPY RXD/TAKEN: ICD-10-PCS | Mod: CPTII,S$GLB,, | Performed by: PHYSICIAN ASSISTANT

## 2022-10-26 PROCEDURE — 1160F RVW MEDS BY RX/DR IN RCRD: CPT | Mod: CPTII,S$GLB,, | Performed by: PHYSICIAN ASSISTANT

## 2022-10-26 PROCEDURE — 84466 ASSAY OF TRANSFERRIN: CPT | Performed by: PHYSICIAN ASSISTANT

## 2022-10-26 PROCEDURE — 91312 COVID-19, MRNA, LNP-S, BIVALENT BOOSTER, PF, 30 MCG/0.3 ML DOSE: ICD-10-PCS | Mod: S$GLB,,, | Performed by: INTERNAL MEDICINE

## 2022-10-26 PROCEDURE — 86706 HEP B SURFACE ANTIBODY: CPT | Performed by: PHYSICIAN ASSISTANT

## 2022-10-26 PROCEDURE — 82390 ASSAY OF CERULOPLASMIN: CPT | Performed by: PHYSICIAN ASSISTANT

## 2022-10-26 PROCEDURE — 87340 HEPATITIS B SURFACE AG IA: CPT | Performed by: PHYSICIAN ASSISTANT

## 2022-10-26 PROCEDURE — 86038 ANTINUCLEAR ANTIBODIES: CPT | Performed by: PHYSICIAN ASSISTANT

## 2022-10-26 PROCEDURE — 86803 HEPATITIS C AB TEST: CPT | Performed by: PHYSICIAN ASSISTANT

## 2022-10-26 PROCEDURE — 1159F PR MEDICATION LIST DOCUMENTED IN MEDICAL RECORD: ICD-10-PCS | Mod: CPTII,S$GLB,, | Performed by: PHYSICIAN ASSISTANT

## 2022-10-26 PROCEDURE — 91312 COVID-19, MRNA, LNP-S, BIVALENT BOOSTER, PF, 30 MCG/0.3 ML DOSE: CPT | Mod: S$GLB,,, | Performed by: INTERNAL MEDICINE

## 2022-10-26 PROCEDURE — 1159F MED LIST DOCD IN RCRD: CPT | Mod: CPTII,S$GLB,, | Performed by: PHYSICIAN ASSISTANT

## 2022-10-26 PROCEDURE — 3078F DIAST BP <80 MM HG: CPT | Mod: CPTII,S$GLB,, | Performed by: PHYSICIAN ASSISTANT

## 2022-10-26 NOTE — PROGRESS NOTES
[facility]    NEW PATIENT  PCP: DO Tierra Loza      Ms. Jones is a 56 y.o. female with PMH as listed below who presents to clinic for evaluation of elevated liver enzymes.   It appears she has previously had elevated enzymes, but more mild than recent, as well as hepatic steatosis.     In relation to metabolic risk factors:   Lab Results   Component Value Date    HGBA1C 5.6 10/06/2020    CHOL 246 (H) 01/21/2022    TSH 2.604 01/21/2022     Body mass index is 26.59 kg/m².    Lab Results   Component Value Date     (H) 10/12/2022     (H) 10/12/2022    ALKPHOS 109 10/12/2022    BILITOT 0.5 10/12/2022    ALBUMIN 4.1 10/12/2022     10/12/2022       Initial history 10/26/2022  Janki Jones arrives today alone.  She is well-appearing and feels well. Denies symptoms of hepatic decompensation including jaundice, ascites, cognitive problems to suggest hepatic encephalopathy, or GI bleeding.  She reports 20 lbs weight gain since 2020. In regards to lifestyle, her activity levels have also waned.   She reports a history of ulcer/gastritis and intermittent abdominal pain related to this.   Denies symptoms of hepatic decompensation including jaundice, ascites, cognitive problems to suggest hepatic encephalopathy, or GI bleeding.   She reports her mother had cirrhosis, from unknown issue, decompensated and passed because of this.     ETOH: denies  Smoking: denies  Illicit substances: denies  Prior liver biopsy: denies   Prior liver disease: fatty liver  Hepatitis vaccination: reports she has been    PMH Janki has a past medical history of Abnormal Pap smear (2007), Abnormal Pap smear of cervix, ADD (attention deficit disorder), Anxiety, Depression, Fibroids, Herpes simplex virus (HSV) infection, and Hypertension.   PSXH Janki has a past surgical history that includes Tubal ligation; essure; Cervical biopsy  w/ loop electrode excision (2007); Colonoscopy (N/A, 2/15/2019); and Esophagogastroduodenoscopy (N/A, 11/26/2021).   GEMA Shearer's family history includes ADD / ADHD in her daughter, daughter, and son; Alcohol abuse in her mother; Depression in her father; Diabetes in her father and mother; Glaucoma in her father; Heart disease in her father; Hyperlipidemia in her father; Hypertension in her brother and father; Kidney disease in her father; Liver disease in her mother; Osteoporosis in her father; Other in her father and sister; Panic disorder in her daughter and mother; Stroke in her mother; Thyroid disease in her sister.   BRODY Shearer reports that she has never smoked. She has never used smokeless tobacco. She reports that she does not drink alcohol and does not use drugs.   ANGEL Shearer is allergic to percocet [oxycodone-acetaminophen].   MICHELE Shearer has a current medication list which includes the following prescription(s): amlodipine, bupropion, escitalopram oxalate, estradiol, hydrochlorothiazide, loratadine, losartan, meloxicam, [START ON 12/12/2022] methylphenidate hcl, [START ON 12/27/2022] methylphenidate hcl, methylphenidate hcl, multivitamin with minerals, and pantoprazole.         ROS:   GENERAL: Denies fatigue  HEENT: Denies yellowing of eyes   CARDIOVASCULAR: Denies edema  GI: (+) chronic abdominal pain, intermittent, LUQ/epigastric  SKIN: Denies rash, itching       Objective     BP (!) 143/74 (BP Location: Right arm, Patient Position: Sitting, BP Method: Large (Automatic))   Pulse 74   Temp 98 °F (36.7 °C) (Oral)   Wt 77 kg (169 lb 12.1 oz)   LMP  (LMP Unknown) Comment: Last Cycle August 2017  SpO2 97% Comment: RA  BMI 26.59 kg/m²     PHYSICAL EXAM:   Friendly woman, in no acute distress; alert and oriented to person, place and time  EYES: Sclerae anicteric  GI: Soft, non-tender, non-distended. No ascites.  EXTREMITIES:  No edema.  SKIN: Warm and dry. No jaundice. No telangectasias noted. No palmar  erythema.  NEURO:  Normal gait. No asterixis.  PSYCH:  Memory intact. Thought and speech pattern appropriate. Behavior normal    DIAGNOSTICS  Lab Results   Component Value Date     (H) 10/12/2022     (H) 10/12/2022    ALKPHOS 109 10/12/2022    BILITOT 0.5 10/12/2022    ALBUMIN 4.1 10/12/2022     10/12/2022     No results found for: AFP    Prior serologic workup:   Lab Results   Component Value Date    FERRITIN 61 11/10/2020    FESATURATED 24 11/10/2020    HEPBSAG Negative 11/10/2020    HEPCAB Negative 11/10/2020       Imagin ultrasound suggesting hepatic steatosis.    Assessment/Plan     56 y.o. female with:    1. Fatty liver  - Ambulatory referral/consult to Hepatology    2. Elevated liver enzymes  - Ambulatory referral/consult to Hepatology      Orders Placed This Encounter   Procedures    US Abdomen Limited    Hepatitis B Surface Antigen    Hepatitis A antibody, IgG    Hepatitis B Surface Ab, Qualitative    Hepatitis C Antibody    Alpha-1-Antitrypsin    JASBIR Screen w/Reflex    Antimitochondrial Antibody    Anti-Smooth Muscle Antibody    Ceruloplasmin    CK    Ferritin    IgG    Iron and TIBC    Hepatic Function Panel     Labs, ultrasound, and Fibroscan soon.   Follow up in about 6 weeks (around 2022).    I spent 45 minutes on the day of this encounter preparing for, evaluating, treating, and managing this patient.     Thank you for allowing me to participate in the care of Janki Crane PA-C  Hepatology & Liver Transplant

## 2022-10-27 LAB
MITOCHONDRIA AB TITR SER IF: NORMAL {TITER}
SMOOTH MUSCLE AB TITR SER IF: NORMAL {TITER}

## 2022-11-01 ENCOUNTER — TELEPHONE (OUTPATIENT)
Dept: HEPATOLOGY | Facility: CLINIC | Age: 57
End: 2022-11-01
Payer: COMMERCIAL

## 2022-11-01 DIAGNOSIS — R74.8 ELEVATED LIVER ENZYMES: Primary | ICD-10-CM

## 2022-11-01 LAB — ANA SER QL IF: NORMAL

## 2022-11-02 ENCOUNTER — TELEPHONE (OUTPATIENT)
Dept: HEPATOLOGY | Facility: CLINIC | Age: 57
End: 2022-11-02
Payer: COMMERCIAL

## 2022-11-02 NOTE — TELEPHONE ENCOUNTER
Spoke with Mrs. Zurita and got her scheduled for labs in 2 weeks on 11/16/22, appointment reminder will be mailed on 11/2/22

## 2022-11-07 ENCOUNTER — HOSPITAL ENCOUNTER (OUTPATIENT)
Dept: RADIOLOGY | Facility: HOSPITAL | Age: 57
Discharge: HOME | End: 2022-11-07
Attending: INTERNAL MEDICINE
Payer: COMMERCIAL

## 2022-11-07 DIAGNOSIS — Z12.31 ENCOUNTER FOR SCREENING MAMMOGRAM FOR BREAST CANCER: ICD-10-CM

## 2022-11-07 PROCEDURE — 77063 BREAST TOMOSYNTHESIS BI: CPT | Mod: 26,,, | Performed by: RADIOLOGY

## 2022-11-07 PROCEDURE — 77067 MAMMO DIGITAL SCREENING BILAT WITH TOMO: ICD-10-PCS | Mod: 26,,, | Performed by: RADIOLOGY

## 2022-11-07 PROCEDURE — 77067 SCR MAMMO BI INCL CAD: CPT | Mod: 26,,, | Performed by: RADIOLOGY

## 2022-11-07 PROCEDURE — 77063 MAMMO DIGITAL SCREENING BILAT WITH TOMO: ICD-10-PCS | Mod: 26,,, | Performed by: RADIOLOGY

## 2022-11-07 PROCEDURE — 77063 BREAST TOMOSYNTHESIS BI: CPT | Mod: TC

## 2022-11-10 ENCOUNTER — TELEPHONE (OUTPATIENT)
Dept: GASTROENTEROLOGY | Facility: CLINIC | Age: 57
End: 2022-11-10
Payer: COMMERCIAL

## 2022-11-16 ENCOUNTER — LAB VISIT (OUTPATIENT)
Dept: LAB | Facility: HOSPITAL | Age: 57
End: 2022-11-16
Payer: COMMERCIAL

## 2022-11-16 DIAGNOSIS — R74.8 ELEVATED LIVER ENZYMES: ICD-10-CM

## 2022-11-16 LAB
ALBUMIN SERPL BCP-MCNC: 4 G/DL (ref 3.5–5.2)
ALP SERPL-CCNC: 109 U/L (ref 55–135)
ALT SERPL W/O P-5'-P-CCNC: 167 U/L (ref 10–44)
AST SERPL-CCNC: 92 U/L (ref 10–40)
BILIRUB DIRECT SERPL-MCNC: 0.1 MG/DL (ref 0.1–0.3)
BILIRUB SERPL-MCNC: 0.4 MG/DL (ref 0.1–1)
CK SERPL-CCNC: 136 U/L (ref 20–180)
IGG SERPL-MCNC: 1582 MG/DL (ref 650–1600)
PROT SERPL-MCNC: 8 G/DL (ref 6–8.4)

## 2022-11-16 PROCEDURE — 36415 COLL VENOUS BLD VENIPUNCTURE: CPT | Mod: PO | Performed by: PHYSICIAN ASSISTANT

## 2022-11-16 PROCEDURE — 80076 HEPATIC FUNCTION PANEL: CPT | Performed by: PHYSICIAN ASSISTANT

## 2022-11-16 PROCEDURE — 82784 ASSAY IGA/IGD/IGG/IGM EACH: CPT | Performed by: PHYSICIAN ASSISTANT

## 2022-11-16 PROCEDURE — 82550 ASSAY OF CK (CPK): CPT | Performed by: PHYSICIAN ASSISTANT

## 2022-11-21 ENCOUNTER — TELEPHONE (OUTPATIENT)
Dept: HEPATOLOGY | Facility: CLINIC | Age: 57
End: 2022-11-21
Payer: COMMERCIAL

## 2022-11-22 ENCOUNTER — PATIENT MESSAGE (OUTPATIENT)
Dept: ORTHOPEDICS | Facility: CLINIC | Age: 57
End: 2022-11-22
Payer: COMMERCIAL

## 2022-11-22 ENCOUNTER — TELEPHONE (OUTPATIENT)
Dept: ORTHOPEDICS | Facility: CLINIC | Age: 57
End: 2022-11-22
Payer: COMMERCIAL

## 2022-11-22 DIAGNOSIS — M79.641 BILATERAL HAND PAIN: Primary | ICD-10-CM

## 2022-11-22 DIAGNOSIS — M79.642 BILATERAL HAND PAIN: Primary | ICD-10-CM

## 2022-11-22 NOTE — TELEPHONE ENCOUNTER
Spoke with the patient. Informed of X-rays scheduled prior to appointment. Patient verbalized understanding and was thankful.

## 2022-11-23 ENCOUNTER — HOSPITAL ENCOUNTER (OUTPATIENT)
Dept: RADIOLOGY | Facility: HOSPITAL | Age: 57
Discharge: HOME | End: 2022-11-23
Attending: ORTHOPAEDIC SURGERY
Payer: COMMERCIAL

## 2022-11-23 ENCOUNTER — OFFICE VISIT (OUTPATIENT)
Dept: ORTHOPEDICS | Facility: CLINIC | Age: 57
End: 2022-11-23
Payer: COMMERCIAL

## 2022-11-23 VITALS — WEIGHT: 169 LBS | BODY MASS INDEX: 26.53 KG/M2 | HEIGHT: 67 IN

## 2022-11-23 DIAGNOSIS — M79.642 BILATERAL HAND PAIN: ICD-10-CM

## 2022-11-23 DIAGNOSIS — M79.641 BILATERAL HAND PAIN: ICD-10-CM

## 2022-11-23 DIAGNOSIS — G56.01 RIGHT CARPAL TUNNEL SYNDROME: Primary | ICD-10-CM

## 2022-11-23 DIAGNOSIS — G56.03 BILATERAL CARPAL TUNNEL SYNDROME: ICD-10-CM

## 2022-11-23 DIAGNOSIS — G56.02 LEFT CARPAL TUNNEL SYNDROME: ICD-10-CM

## 2022-11-23 PROCEDURE — 4010F ACE/ARB THERAPY RXD/TAKEN: CPT | Mod: CPTII,S$GLB,, | Performed by: ORTHOPAEDIC SURGERY

## 2022-11-23 PROCEDURE — 99999 PR PBB SHADOW E&M-EST. PATIENT-LVL V: CPT | Mod: PBBFAC,,, | Performed by: ORTHOPAEDIC SURGERY

## 2022-11-23 PROCEDURE — 99214 PR OFFICE/OUTPT VISIT, EST, LEVL IV, 30-39 MIN: ICD-10-PCS | Mod: S$GLB,,, | Performed by: ORTHOPAEDIC SURGERY

## 2022-11-23 PROCEDURE — 1160F RVW MEDS BY RX/DR IN RCRD: CPT | Mod: CPTII,S$GLB,, | Performed by: ORTHOPAEDIC SURGERY

## 2022-11-23 PROCEDURE — 99214 OFFICE O/P EST MOD 30 MIN: CPT | Mod: S$GLB,,, | Performed by: ORTHOPAEDIC SURGERY

## 2022-11-23 PROCEDURE — 73130 X-RAY EXAM OF HAND: CPT | Mod: TC,50,PO

## 2022-11-23 PROCEDURE — 3008F PR BODY MASS INDEX (BMI) DOCUMENTED: ICD-10-PCS | Mod: CPTII,S$GLB,, | Performed by: ORTHOPAEDIC SURGERY

## 2022-11-23 PROCEDURE — 1159F PR MEDICATION LIST DOCUMENTED IN MEDICAL RECORD: ICD-10-PCS | Mod: CPTII,S$GLB,, | Performed by: ORTHOPAEDIC SURGERY

## 2022-11-23 PROCEDURE — 73130 XR HAND COMPLETE 3 VIEWS BILATERAL: ICD-10-PCS | Mod: 26,,, | Performed by: RADIOLOGY

## 2022-11-23 PROCEDURE — 1159F MED LIST DOCD IN RCRD: CPT | Mod: CPTII,S$GLB,, | Performed by: ORTHOPAEDIC SURGERY

## 2022-11-23 PROCEDURE — 73130 X-RAY EXAM OF HAND: CPT | Mod: 26,,, | Performed by: RADIOLOGY

## 2022-11-23 PROCEDURE — 99999 PR PBB SHADOW E&M-EST. PATIENT-LVL V: ICD-10-PCS | Mod: PBBFAC,,, | Performed by: ORTHOPAEDIC SURGERY

## 2022-11-23 PROCEDURE — 4010F PR ACE/ARB THEARPY RXD/TAKEN: ICD-10-PCS | Mod: CPTII,S$GLB,, | Performed by: ORTHOPAEDIC SURGERY

## 2022-11-23 PROCEDURE — 1160F PR REVIEW ALL MEDS BY PRESCRIBER/CLIN PHARMACIST DOCUMENTED: ICD-10-PCS | Mod: CPTII,S$GLB,, | Performed by: ORTHOPAEDIC SURGERY

## 2022-11-23 PROCEDURE — 3008F BODY MASS INDEX DOCD: CPT | Mod: CPTII,S$GLB,, | Performed by: ORTHOPAEDIC SURGERY

## 2022-11-23 NOTE — H&P (VIEW-ONLY)
"Janki Jones presents for follow up evaluation of   Encounter Diagnosis   Name Primary?    Bilateral carpal tunnel syndrome      Patient did well after her last steroid injection with improvement in symptoms for about a month. She was scheduled for surgery but it got delayed as she was traveling [place]. She has worsening symptoms carpal tunnel symptoms bilaterally with the right being worse. She is having difficulty performing daily activities due to the pain and weakness on the right. She would like to proceed with surgery at this time.    She had a EMG/NCS in 2016 with Dr. Meyers which showed moderate right carpal tunnel at that time.    Vitals:    11/23/22 1341   Weight: 76.7 kg (169 lb)   Height: 5' 7" (1.702 m)   PainSc:   6     PE:    AA&O x 4.  NAD  HEENT:  NCAT, sclera nonicteric  Lungs:  Respirations are equal and unlabored.  CV:  2+ bilateral upper and lower extremity pulses.  MSK:  Altered sensibility right median distribution right otherwise neurovascularly intact bilaterally.  5/5 thenar and intrinsic musculature strength.  Full range of motion hands, wrists and elbows. +median nerve compression, Phalen's and Tinel's test bilaterally     A/P: Bilateral carpal tunnel, worse on right    1) We have discussed conservative vs. surgical treatment as well as risks, benefits and alternatives for bilateral carpal tunnel syndrome.  Conservative measures have been exhausted and she would like to proceed with surgery. Surgery would include right carpal tunnel release and left carpal tunnel steroid injection. Light use of the hand will be indicated for the first 4-6 weeks.     We have discussed risks of hand surgery which include but are not limited to blood clots in the legs that can travel to the lungs (pulmonary embolism). Pulmonary embolism can cause shortness of breath, chest pain, and even shock. Other risks include urinary tract infection, nausea and vomiting (usually related to pain " medication), chronic pain, bleeding, nerve damage, blood vessel injury, scarring and infection of the hand which can require re-operation. Furthermore, the risks of anesthesia include potential heart, lung, kidney, and liver damage.  Informed consent was obtained.  The patient understands and would like to proceed with surgery in the near future.    2) F/U postop   3) Call with any questions/concerns in the interim        Toma Bowers MD    Please be aware that this note has been generated with the assistance of MModal voice-to-text.  Please excuse any spelling or grammatical errors.

## 2022-12-07 ENCOUNTER — HOSPITAL ENCOUNTER (OUTPATIENT)
Dept: RADIOLOGY | Facility: HOSPITAL | Age: 57
Discharge: HOME | End: 2022-12-07
Attending: PHYSICIAN ASSISTANT
Payer: COMMERCIAL

## 2022-12-07 DIAGNOSIS — K76.0 FATTY LIVER: ICD-10-CM

## 2022-12-07 PROCEDURE — 76705 ECHO EXAM OF ABDOMEN: CPT | Mod: TC

## 2022-12-07 PROCEDURE — 76705 US ABDOMEN LIMITED: ICD-10-PCS | Mod: 26,,, | Performed by: RADIOLOGY

## 2022-12-07 PROCEDURE — 76705 ECHO EXAM OF ABDOMEN: CPT | Mod: 26,,, | Performed by: RADIOLOGY

## 2022-12-12 ENCOUNTER — PATIENT MESSAGE (OUTPATIENT)
Dept: GASTROENTEROLOGY | Facility: CLINIC | Age: 57
End: 2022-12-12
Payer: COMMERCIAL

## 2022-12-14 ENCOUNTER — TELEPHONE (OUTPATIENT)
Dept: HEPATOLOGY | Facility: CLINIC | Age: 57
End: 2022-12-14

## 2022-12-14 ENCOUNTER — PATIENT MESSAGE (OUTPATIENT)
Dept: ORTHOPEDICS | Facility: CLINIC | Age: 57
End: 2022-12-14
Payer: COMMERCIAL

## 2022-12-14 ENCOUNTER — PROCEDURE VISIT (OUTPATIENT)
Dept: HEPATOLOGY | Facility: CLINIC | Age: 57
End: 2022-12-14
Payer: COMMERCIAL

## 2022-12-14 ENCOUNTER — OFFICE VISIT (OUTPATIENT)
Dept: HEPATOLOGY | Facility: CLINIC | Age: 57
End: 2022-12-14
Payer: COMMERCIAL

## 2022-12-14 ENCOUNTER — TELEPHONE (OUTPATIENT)
Dept: ORTHOPEDICS | Facility: CLINIC | Age: 57
End: 2022-12-14
Payer: COMMERCIAL

## 2022-12-14 ENCOUNTER — ANESTHESIA EVENT (OUTPATIENT)
Dept: SURGERY | Facility: HOSPITAL | Age: 57
End: 2022-12-14
Payer: COMMERCIAL

## 2022-12-14 VITALS
RESPIRATION RATE: 16 BRPM | DIASTOLIC BLOOD PRESSURE: 78 MMHG | BODY MASS INDEX: 26.57 KG/M2 | WEIGHT: 169.31 LBS | TEMPERATURE: 98 F | SYSTOLIC BLOOD PRESSURE: 156 MMHG | HEIGHT: 67 IN | HEART RATE: 67 BPM

## 2022-12-14 DIAGNOSIS — K76.0 FATTY LIVER: ICD-10-CM

## 2022-12-14 DIAGNOSIS — R74.8 ELEVATED LIVER ENZYMES: Primary | ICD-10-CM

## 2022-12-14 DIAGNOSIS — R79.89 ELEVATED FERRITIN: ICD-10-CM

## 2022-12-14 DIAGNOSIS — E78.5 HYPERLIPIDEMIA, UNSPECIFIED HYPERLIPIDEMIA TYPE: ICD-10-CM

## 2022-12-14 PROCEDURE — 1159F PR MEDICATION LIST DOCUMENTED IN MEDICAL RECORD: ICD-10-PCS | Mod: CPTII,S$GLB,, | Performed by: PHYSICIAN ASSISTANT

## 2022-12-14 PROCEDURE — 1159F MED LIST DOCD IN RCRD: CPT | Mod: CPTII,S$GLB,, | Performed by: PHYSICIAN ASSISTANT

## 2022-12-14 PROCEDURE — 99214 OFFICE O/P EST MOD 30 MIN: CPT | Mod: S$GLB,,, | Performed by: PHYSICIAN ASSISTANT

## 2022-12-14 PROCEDURE — 1160F RVW MEDS BY RX/DR IN RCRD: CPT | Mod: CPTII,S$GLB,, | Performed by: PHYSICIAN ASSISTANT

## 2022-12-14 PROCEDURE — 76981 USE PARENCHYMA: CPT | Mod: S$GLB,,, | Performed by: PHYSICIAN ASSISTANT

## 2022-12-14 PROCEDURE — 1160F PR REVIEW ALL MEDS BY PRESCRIBER/CLIN PHARMACIST DOCUMENTED: ICD-10-PCS | Mod: CPTII,S$GLB,, | Performed by: PHYSICIAN ASSISTANT

## 2022-12-14 PROCEDURE — 4010F ACE/ARB THERAPY RXD/TAKEN: CPT | Mod: CPTII,S$GLB,, | Performed by: PHYSICIAN ASSISTANT

## 2022-12-14 PROCEDURE — 4010F PR ACE/ARB THEARPY RXD/TAKEN: ICD-10-PCS | Mod: CPTII,S$GLB,, | Performed by: PHYSICIAN ASSISTANT

## 2022-12-14 PROCEDURE — 99999 PR PBB SHADOW E&M-EST. PATIENT-LVL III: ICD-10-PCS | Mod: PBBFAC,,, | Performed by: PHYSICIAN ASSISTANT

## 2022-12-14 PROCEDURE — 76981 FIBROSCAN NEW ORLEANS: ICD-10-PCS | Mod: S$GLB,,, | Performed by: PHYSICIAN ASSISTANT

## 2022-12-14 PROCEDURE — 99999 PR PBB SHADOW E&M-EST. PATIENT-LVL III: CPT | Mod: PBBFAC,,, | Performed by: PHYSICIAN ASSISTANT

## 2022-12-14 PROCEDURE — 99214 PR OFFICE/OUTPT VISIT, EST, LEVL IV, 30-39 MIN: ICD-10-PCS | Mod: S$GLB,,, | Performed by: PHYSICIAN ASSISTANT

## 2022-12-14 RX ORDER — TRAMADOL HYDROCHLORIDE 50 MG/1
50 TABLET ORAL EVERY 6 HOURS
Qty: 25 TABLET | Refills: 0 | Status: SHIPPED | OUTPATIENT
Start: 2022-12-14 | End: 2023-03-14 | Stop reason: ALTCHOICE

## 2022-12-14 RX ORDER — PROMETHAZINE HYDROCHLORIDE 25 MG/1
25 TABLET ORAL EVERY 4 HOURS
Qty: 25 TABLET | Refills: 0 | Status: SHIPPED | OUTPATIENT
Start: 2022-12-14 | End: 2023-03-14

## 2022-12-14 NOTE — ANESTHESIA PREPROCEDURE EVALUATION
12/14/2022  Janki Jones is a 57 y.o., female.    Pt's chart reviewed.  Pt can proceed with scheduled surgery at [facility].    Zion Maddox M.D.      Pre-op Assessment    I have reviewed the Patient Summary Reports.     I have reviewed the Nursing Notes. I have reviewed the NPO Status.   I have reviewed the Medications.     Review of Systems  Anesthesia Hx:  No problems with previous Anesthesia  Denies Family Hx of Anesthesia complications.   Denies Personal Hx of Anesthesia complications.   Hematology/Oncology:  Hematology Normal   Oncology Normal     EENT/Dental:EENT/Dental Normal   Cardiovascular:   Hypertension    Pulmonary:  Pulmonary Normal    Renal/:  Renal/ Normal     Hepatic/GI:   GERD    Musculoskeletal:  Musculoskeletal Normal    Neurological:  Neurology Normal    Endocrine:  Endocrine Normal    Dermatological:  Skin Normal    Psych:   Psychiatric History          Physical Exam  General: Well nourished and Cooperative    Airway:  Mallampati: II   Mouth Opening: Normal  Neck ROM: Normal ROM    Chest/Lungs:  Clear to auscultation        Anesthesia Plan  Type of Anesthesia, risks & benefits discussed:    Anesthesia Type: Gen Natural Airway, MAC  Intra-op Monitoring Plan: Standard ASA Monitors  Post Op Pain Control Plan: multimodal analgesia and IV/PO Opioids PRN  Induction:  IV  Informed Consent: Informed consent signed with the Patient and all parties understand the risks and agree with anesthesia plan.  All questions answered.   ASA Score: 2    Ready For Surgery From Anesthesia Perspective.     .

## 2022-12-14 NOTE — PATIENT INSTRUCTIONS
Fibroscan today suggests fatty liver without fibrosis.  Follow-up in 3 months with lab work.  Nutritionist consultation.  Mediterranean diet.      FATTY LIVER RECOMMENDATIONS:    There is no FDA approved therapy for non-alcoholic fatty liver disease (NAFLD); therefore, lifestyle changes are important:  1. Weight loss goal of 16lbs  2. Mediterranean diet is recommended that focuses on low-carb, low-sugar, and low-processed foods.  3. Exercise, 5 days per week, 30 minutes per day, as tolerated  4. Recommend good control of cholesterol, blood pressure, blood sugar levels (as these are risk factors for fatty liver). Cholesterol lowering medications including statins are typically safe and beneficial (even if liver enzymes are elevated).    5. Limit alcohol consumption, no more than 1 serving(s) of alcohol in any day (1 serving is 5 ounces of wine, 12 ounces of beer, or 1.5 ounces of liquor). Do not recommend daily alcohol use.        In some people, fatty liver can progress to steatohepatitis (inflammatory fatty liver) and possibly to cirrhosis, putting one at increased risk for liver cancer and liver failure in the future. Lifestyle changes can help to decrease this risk.     If interested in seeing a dietician to create a weight loss plan, contact the dietician team at [facility]: [email or web address].  You can also call to schedule a consult with a dietician: [phone]. *Virtual visits are available with one of our dieticians.     If you have diabetes or high blood pressure, you can meet with a Health  through [facility] [address] [facility]. Let us know if you are interested in a referral.     Ask about our fatty liver/HERNANDEZ clinical trials if you have fibrosis / scar tissue related to fatty liver.    *If statins are being considered for HLD, statins are safe in patients with liver disease. Statins can be used to treat dyslipidemia in patients with both NAFLD and HERNANDEZ.      FATTY LIVER DIET  TIPS:    ADD OLIVE OIL. I recommend olive oil daily (in salads or when cooking)  ADD COFFEE. Black coffee has also been found to be potentially beneficial (skip the sweets and creamer). Add 1-2 cups per day,  if you are able to tolerate. Decaf is fine.  BE MINDFUL OF CARBS AND ADDED SUGARS. Try to limit your carb intake to LESS than 30-45 grams of carbs with a meal or LESS than 5-10 grams with any snack (total of any snack foods eaten during that time).   KEEP A FOOD DIARY. Use MyFitness Pal  OR LOSE IT belinda to add up your carbs through the day - the most successful folks on weight loss journey TRACK their progress and food.  LIMIT WHAT YOU ARE DRINKING. Do NOT drink any beverages with calories or carbs (this can lead to high blood sugar and weight gain). Also, some of our patients have been very successful with weight loss using the pre made/planned meal planning services that limit calories and portion size (one example is Sensible Meals but there are many out there). Unsweetened black or green tea is fine! Hibiscus tea is also great and refreshing, especially iced.     Tips for low/moderate carbohydrate diet:  3 meals a day made up of the following:  -- Green vegetables, tomatoes, mushrooms, spaghetti squash, cauliflower, meat, poultry, seafood, eggs   -- Beans (1-1.5 cups) or nuts (1/4 cup): can have 1 x a day.  -- Greek yogurt and fermented foods like kefir, kimchi, saurkraut (be careful if you have swelling issues as lots of salt can worsen swelling or blood pressure)  -- Dressings, seasonings, condiments, etc should have less than 2 g sugars.   -- 1-2 servings of citrus fruits, berries, pineapple or melon a day (1/2 cup)  -- Avoid fried foods  -- Avoid grains, rice, pasta, potatoes, bread, corn, peas, oatmeal, grits, tortillas, crackers, chips  -- No soda, sweet tea, juices or lemonade  -- Use olive/avocado oil rather than vegetable oils or butter.        ADDITIONAL RESOURCES:  Websites with information about  fatty liver and inflammation related to fatty liver (HERNANDEZ): www.nashtruth.com and www.nashactually.com   [facility]: Non-Alcoholic Fatty Liver Disease (NAFLD)    Facebook support group with tips and recipes:   Non-Alcoholic Fatty Liver Disease (NAFLD) Diet & Nutrition Support     Try www.dietdoctor.Fanzo for recipes.

## 2022-12-14 NOTE — PROGRESS NOTES
Hepatology Consultation: [facility]    EST. PATIENT  PCP: DO Tierra Loza      Ms. Jones is a 57 y.o. female with PMH as listed below who presents to clinic for evaluation of elevated liver enzymes.   It appears she has previously had elevated enzymes, but more mild than recent, as well as hepatic steatosis.     In relation to metabolic risk factors:   Lab Results   Component Value Date    HGBA1C 5.6 10/06/2020    CHOL 246 (H) 01/21/2022    TSH 2.604 01/21/2022       BMI 26.52    Lab Results   Component Value Date     (H) 11/16/2022    AST 92 (H) 11/16/2022    ALKPHOS 109 11/16/2022    BILITOT 0.4 11/16/2022    ALBUMIN 4.0 11/16/2022     10/12/2022       Initial history 10/26/2022  Janki Jones arrives today alone.  She is well-appearing and feels well. Denies symptoms of hepatic decompensation including jaundice, ascites, cognitive problems to suggest hepatic encephalopathy, or GI bleeding.  She reports 20 lbs weight gain since 2020. In regards to lifestyle, her activity levels have also waned.   She reports a history of ulcer/gastritis and intermittent abdominal pain related to this.   Denies symptoms of hepatic decompensation including jaundice, ascites, cognitive problems to suggest hepatic encephalopathy, or GI bleeding.   She reports her mother had cirrhosis, from unknown issue, decompensated and passed because of this.     Interval history 12/14/2022:  Janki Jones arrives today alone.   Since our last visit, underwent repeat labs and serologic w/u   Ferritin mildly elevated as well as slight IgG levels  Denies symptoms of hepatic decompensation including jaundice, abdominal distention or lower extremity swelling, hematemesis, melena, or periods of confusion suggestive of hepatic encephalopathy.    ETOH: denies  Smoking: denies  Illicit substances: denies  Prior liver biopsy: denies   Prior liver disease: fatty  liver  Hepatitis vaccination: reports she has been    [facility] Janki has a past medical history of Abnormal Pap smear (2007), Abnormal Pap smear of cervix, ADD (attention deficit disorder), Anxiety, Depression, Fibroids, Herpes simplex virus (HSV) infection, and Hypertension.   PSXH Janki has a past surgical history that includes Tubal ligation; essure; Cervical biopsy w/ loop electrode excision (01/01/2007); Colonoscopy (N/A, 02/15/2019); and Esophagogastroduodenoscopy (N/A, 11/26/2021).    Janki's family history includes ADD / ADHD in her daughter, daughter, and son; Alcohol abuse in her mother; Breast cancer (age of onset: 57) in her sister; Cirrhosis in her mother; Depression in her father; Diabetes in her father and mother; Glaucoma in her father; Heart disease in her father; Hyperlipidemia in her father; Hypertension in her brother and father; Kidney disease in her father; Liver disease in her mother; Osteoporosis in her father; Other in her father and sister; Panic disorder in her daughter and mother; Stroke in her mother; Thyroid disease in her sister.   BRODY Shearer reports that she has never smoked. She has never used smokeless tobacco. She reports that she does not drink alcohol and does not use drugs.   ANGEL Shearer is allergic to percocet [oxycodone-acetaminophen].   MICHELE Shearer has a current medication list which includes the following prescription(s): amlodipine, bupropion, escitalopram oxalate, estradiol, hydrochlorothiazide, loratadine, losartan, meloxicam, methylphenidate hcl, [START ON 12/27/2022] methylphenidate hcl, methylphenidate hcl, multivitamin with minerals, and pantoprazole.         ROS:   GENERAL: Denies fatigue  HEENT: Denies yellowing of eyes   CARDIOVASCULAR: Denies edema  SKIN: Denies rash, itching       Objective     LMP  (LMP Unknown) Comment: Last Cycle August 2017  Vitals with Age-Percentiles 12/14/2022   Length cm 170.2 cm   Height in 67 in   Systolic 163   Diastolic 77   Temp     Pulse 67   Respiration 16   Weight kg 76.8 kg   Weight lbs 169 lb 5 oz   VISIT REPORT    BMI (Calculated) 26.5 kg/m2   Pain Score        PHYSICAL EXAM:   Friendly woman, in no acute distress; alert and oriented to person, place and time  EYES: Sclerae anicteric  SKIN: Warm and dry. No jaundice. No telangectasias noted. No palmar erythema.  NEURO:  Normal gait. No asterixis.  PSYCH:  Memory intact. Thought and speech pattern appropriate. Behavior normal    DIAGNOSTICS  Lab Results   Component Value Date     (H) 2022    AST 92 (H) 2022    ALKPHOS 109 2022    BILITOT 0.4 2022    ALBUMIN 4.0 2022     10/12/2022     No results found for: AFP    Prior serologic workup:   Lab Results   Component Value Date    SMOOTHMUSCAB Negative 1:40 10/26/2022    AMAIFA Negative 1:40 10/26/2022    IGGSERUM 1582 2022    ANASCREEN Negative <1:80 10/26/2022    FERRITIN 304 (H) 10/26/2022    FESATURATED 24 10/26/2022    CERULOPLSM 31.0 10/26/2022    HEPBSAG Non-reactive 10/26/2022    HEPCAB Non-reactive 10/26/2022       Imagin ultrasound suggesting hepatic steatosis.    US Abdomen Limited  Narrative: EXAMINATION:  US ABDOMEN LIMITED    CLINICAL HISTORY:  Fatty (change of) liver, not elsewhere classified    TECHNIQUE:  Limited ultrasound of the right upper quadrant of the abdomen (including pancreas, liver, gallbladder, common bile duct, and spleen) was performed.    COMPARISON:  Abdominal ultrasound 2020.    FINDINGS:  Pancreas: Visualized portions of the pancreas appear unremarkable.    Liver: Normal in size, measuring 14.4 cm. Diffusely increased parenchymal echogenicity consistent with steatosis.   Hepatorenal index measures 1.6, indicating greater than 5% steatosis.  Geographic region of relative parenchymal hypoechogenicity at the gallbladder fossa, likely focal fatty sparing.  No suspicious focal hepatic lesions.    Gallbladder: There is a non-dependent 4 mm echogenic  focus, consistent with cholelithiasis.  There is no gallbladder wall thickening or pericholecystic fluid.  No sonographic Rosado's sign.    Biliary system: The common duct is not dilated, measuring 2 mm.  No intrahepatic ductal dilatation.    Spleen: Normal in size and echotexture, measuring 8.4 x 3.1 cm.    Miscellaneous: No upper abdominal ascites.  Impression: Hepatic steatosis.    Cholelithiasis without evidence of acute cholecystitis.    Electronically signed by resident: Tj Xie  Date:    12/07/2022  Time:    08:56    Electronically signed by: Philippe Rodriguez MD  Date:    12/07/2022  Time:    09:09    Findings Fibroscan 12/14/2022  Median liver stiffness score:  7.1  CAP Reading: dB/m:  273     IQR/med %:  3  Interpretation  Fibrosis interpretation is based on medial liver stiffness - Kilopascal (kPa).     Fibrosis Stage:  F 0-1  Steatosis interpretation is based on controlled attenuation parameter - (dB/m).     Steatosis Grade:  S2    Assessment/Plan     57 y.o. female with:    1. Elevated liver enzymes  - Hemochromatosis DNA Analysis (PCR); Future  - Ambulatory Referral/Consult to Nutrition Services - [facility]; Future  - Ferritin; Future  - Hepatic Function Panel; Future  - Lipid Panel; Future    2. Fatty liver  - Ambulatory Referral/Consult to Nutrition [facility]; Future    3. Elevated ferritin  - Hemochromatosis DNA Analysis (PCR); Future    4. Hyperlipidemia, unspecified hyperlipidemia type  - Ambulatory Referral/Consult to Nutrition Services - [facility]; Future      Orders Placed This Encounter   Procedures    Hemochromatosis DNA Analysis (PCR)    Ferritin    Hepatic Function Panel    Lipid Panel    Ambulatory Referral/Consult to Nutrition [facility]     Reviewed labs, ultrasound, and Fibroscan results thoroughly with patient. All questions answered.  Fibroscan today suggests F0-1 fibrosis, steatosis. Suspect HERNANDEZ. Reviewed w/  Dr Paulson and recommend weight loss with repeat in 3 months. If enzymes do not improve, we can biopsy for clarification.  IgG normalized. Will screen for HH.  Referral to nutritionist. Recommend weight loss of 10 lbs with Mediterranean diet. Join [facility]. Patient motivated to lose weight.  Follow up in about 3 months (around 3/14/2023).    I spent 25 minutes on the day of this encounter preparing for, evaluating, treating, and managing this patient.     Thank you for allowing me to participate in the care of JESSIE RankinC  Hepatology & Liver Transplant

## 2022-12-14 NOTE — TELEPHONE ENCOUNTER
Spoke with the patient. Notified of 5.00 AM arrival time to the [facility].  Informed of current visitor policy.  Reminded of NPO and need for transportation. Patient verbalized understanding to all.

## 2022-12-14 NOTE — TELEPHONE ENCOUNTER
LVM to the patient. Notified of 5.00 AM arrival time to the [facility].  Informed of current visitor policy.  Reminded of NPO and need for transportation. Patient verbalized understanding to all.

## 2022-12-14 NOTE — PROCEDURES
FibroScan [place]    Date/Time: 12/14/2022 9:45 AM  Performed by: Lizett Crane PA-C  Authorized by: Lizett Crane PA-C     Diagnosis:  Cryptogenic and NAFLD    Probe:  M    Universal Protocol: Patient's identity, procedure and site were verified, confirmatory pause was performed.  Discussed procedure including risks and potential complications.  Questions answered.  Patient verbalizes understanding and wishes to proceed with VCTE.     Procedure: After providing explanations of the procedure, patient was placed in the supine position with right arm in maximum abduction to allow optimal exposure of right lateral abdomen.  Patient was briefly assessed, Testing was performed in the mid-axillary location, 50Hz Shear Wave pulses were applied and the resulting Shear Wave and Propagation Speed detected with a 3.5 MHz ultrasonic signal, using the FibroScan probe, Skin to liver capsule distance and liver parenchyma were accessed during the entire examination with the FibroScan probe, Patient was instructed to breathe normally and to abstain from sudden movements during the procedure, allowing for random measurements of liver stiffness. At least 10 Shear Waves were produced, Individual measurements of each Shear Wave were calculated.  Patient tolerated the procedure well with no complications.  Meets discharge criteria as was dismissed.  Rates pain 0 out of 10.  Patient will follow up with ordering provider to review results.    Findings  Median liver stiffness score:  7.1  CAP Reading: dB/m:  273    IQR/med %:  3  Interpretation  Fibrosis interpretation is based on medial liver stiffness - Kilopascal (kPa).    Fibrosis Stage:  F 0-1  Steatosis interpretation is based on controlled attenuation parameter - (dB/m).    Steatosis Grade:  S2

## 2022-12-15 ENCOUNTER — ANESTHESIA (OUTPATIENT)
Dept: SURGERY | Facility: HOSPITAL | Age: 57
End: 2022-12-15
Payer: COMMERCIAL

## 2022-12-15 ENCOUNTER — HOSPITAL ENCOUNTER (OUTPATIENT)
Facility: HOSPITAL | Age: 57
Discharge: HOME | End: 2022-12-15
Attending: ORTHOPAEDIC SURGERY | Admitting: ORTHOPAEDIC SURGERY
Payer: COMMERCIAL

## 2022-12-15 VITALS
TEMPERATURE: 98 F | OXYGEN SATURATION: 92 % | DIASTOLIC BLOOD PRESSURE: 74 MMHG | WEIGHT: 169 LBS | HEIGHT: 67 IN | RESPIRATION RATE: 18 BRPM | SYSTOLIC BLOOD PRESSURE: 152 MMHG | HEART RATE: 57 BPM | BODY MASS INDEX: 26.53 KG/M2

## 2022-12-15 DIAGNOSIS — G56.01 RIGHT CARPAL TUNNEL SYNDROME: Primary | ICD-10-CM

## 2022-12-15 DIAGNOSIS — G56.00 CARPAL TUNNEL SYNDROME: ICD-10-CM

## 2022-12-15 PROCEDURE — 64721 CARPAL TUNNEL SURGERY: CPT | Mod: RT,,, | Performed by: ORTHOPAEDIC SURGERY

## 2022-12-15 PROCEDURE — 25000003 PHARM REV CODE 250: Performed by: STUDENT IN AN ORGANIZED HEALTH CARE EDUCATION/TRAINING PROGRAM

## 2022-12-15 PROCEDURE — 94761 N-INVAS EAR/PLS OXIMETRY MLT: CPT

## 2022-12-15 PROCEDURE — 25000003 PHARM REV CODE 250: Performed by: ORTHOPAEDIC SURGERY

## 2022-12-15 PROCEDURE — 20526 THER INJECTION CARP TUNNEL: CPT | Mod: 59,51,LT, | Performed by: ORTHOPAEDIC SURGERY

## 2022-12-15 PROCEDURE — 25000003 PHARM REV CODE 250: Performed by: NURSE ANESTHETIST, CERTIFIED REGISTERED

## 2022-12-15 PROCEDURE — 63600175 PHARM REV CODE 636 W HCPCS: Performed by: NURSE ANESTHETIST, CERTIFIED REGISTERED

## 2022-12-15 PROCEDURE — 37000008 HC ANESTHESIA 1ST 15 MINUTES: Performed by: ORTHOPAEDIC SURGERY

## 2022-12-15 PROCEDURE — 25000003 PHARM REV CODE 250: Performed by: ANESTHESIOLOGY

## 2022-12-15 PROCEDURE — D9220A PRA ANESTHESIA: Mod: CRNA,,, | Performed by: NURSE ANESTHETIST, CERTIFIED REGISTERED

## 2022-12-15 PROCEDURE — 71000015 HC POSTOP RECOV 1ST HR: Performed by: ORTHOPAEDIC SURGERY

## 2022-12-15 PROCEDURE — D9220A PRA ANESTHESIA: ICD-10-PCS | Mod: ANES,,, | Performed by: ANESTHESIOLOGY

## 2022-12-15 PROCEDURE — 36000707: Performed by: ORTHOPAEDIC SURGERY

## 2022-12-15 PROCEDURE — 37000009 HC ANESTHESIA EA ADD 15 MINS: Performed by: ORTHOPAEDIC SURGERY

## 2022-12-15 PROCEDURE — D9220A PRA ANESTHESIA: Mod: ANES,,, | Performed by: ANESTHESIOLOGY

## 2022-12-15 PROCEDURE — 20526 PR INJECT CARPAL TUNNEL: ICD-10-PCS | Mod: 59,51,LT, | Performed by: ORTHOPAEDIC SURGERY

## 2022-12-15 PROCEDURE — 63600175 PHARM REV CODE 636 W HCPCS: Performed by: ORTHOPAEDIC SURGERY

## 2022-12-15 PROCEDURE — 99900035 HC TECH TIME PER 15 MIN (STAT)

## 2022-12-15 PROCEDURE — 27201423 OPTIME MED/SURG SUP & DEVICES STERILE SUPPLY: Performed by: ORTHOPAEDIC SURGERY

## 2022-12-15 PROCEDURE — 36000706: Performed by: ORTHOPAEDIC SURGERY

## 2022-12-15 PROCEDURE — 71000033 HC RECOVERY, INTIAL HOUR: Performed by: ORTHOPAEDIC SURGERY

## 2022-12-15 PROCEDURE — D9220A PRA ANESTHESIA: ICD-10-PCS | Mod: CRNA,,, | Performed by: NURSE ANESTHETIST, CERTIFIED REGISTERED

## 2022-12-15 PROCEDURE — 64721 PR REVISE MEDIAN N/CARPAL TUNNEL SURG: ICD-10-PCS | Mod: RT,,, | Performed by: ORTHOPAEDIC SURGERY

## 2022-12-15 RX ORDER — BACITRACIN ZINC AND POLYMYXIN B SULFATE 500; 1000 [USP'U]/G; [USP'U]/G
OINTMENT TOPICAL
Status: DISCONTINUED | OUTPATIENT
Start: 2022-12-15 | End: 2022-12-15 | Stop reason: HOSPADM

## 2022-12-15 RX ORDER — METHYLPREDNISOLONE ACETATE 40 MG/ML
INJECTION, SUSPENSION INTRA-ARTICULAR; INTRALESIONAL; INTRAMUSCULAR; SOFT TISSUE
Status: DISCONTINUED | OUTPATIENT
Start: 2022-12-15 | End: 2022-12-15 | Stop reason: HOSPADM

## 2022-12-15 RX ORDER — ONDANSETRON 2 MG/ML
INJECTION INTRAMUSCULAR; INTRAVENOUS
Status: DISCONTINUED | OUTPATIENT
Start: 2022-12-15 | End: 2022-12-15

## 2022-12-15 RX ORDER — DEXAMETHASONE SODIUM PHOSPHATE 4 MG/ML
INJECTION, SOLUTION INTRA-ARTICULAR; INTRALESIONAL; INTRAMUSCULAR; INTRAVENOUS; SOFT TISSUE
Status: DISCONTINUED | OUTPATIENT
Start: 2022-12-15 | End: 2022-12-15

## 2022-12-15 RX ORDER — PROPOFOL 10 MG/ML
VIAL (ML) INTRAVENOUS
Status: DISCONTINUED | OUTPATIENT
Start: 2022-12-15 | End: 2022-12-15

## 2022-12-15 RX ORDER — FENTANYL CITRATE 50 UG/ML
25 INJECTION, SOLUTION INTRAMUSCULAR; INTRAVENOUS EVERY 5 MIN PRN
Status: DISCONTINUED | OUTPATIENT
Start: 2022-12-15 | End: 2022-12-15 | Stop reason: HOSPADM

## 2022-12-15 RX ORDER — MUPIROCIN 20 MG/G
OINTMENT TOPICAL
Status: DISCONTINUED | OUTPATIENT
Start: 2022-12-15 | End: 2023-03-14

## 2022-12-15 RX ORDER — MIDAZOLAM HYDROCHLORIDE 1 MG/ML
INJECTION INTRAMUSCULAR; INTRAVENOUS
Status: DISCONTINUED | OUTPATIENT
Start: 2022-12-15 | End: 2022-12-15

## 2022-12-15 RX ORDER — CELECOXIB 200 MG/1
400 CAPSULE ORAL ONCE
Status: COMPLETED | OUTPATIENT
Start: 2022-12-15 | End: 2022-12-15

## 2022-12-15 RX ORDER — FAMOTIDINE 10 MG/ML
INJECTION INTRAVENOUS
Status: DISCONTINUED | OUTPATIENT
Start: 2022-12-15 | End: 2022-12-15

## 2022-12-15 RX ORDER — ACETAMINOPHEN 500 MG
1000 TABLET ORAL
Status: COMPLETED | OUTPATIENT
Start: 2022-12-15 | End: 2022-12-15

## 2022-12-15 RX ORDER — BUPIVACAINE HYDROCHLORIDE 2.5 MG/ML
INJECTION, SOLUTION EPIDURAL; INFILTRATION; INTRACAUDAL
Status: DISCONTINUED | OUTPATIENT
Start: 2022-12-15 | End: 2022-12-15 | Stop reason: HOSPADM

## 2022-12-15 RX ORDER — LIDOCAINE HYDROCHLORIDE 10 MG/ML
INJECTION, SOLUTION EPIDURAL; INFILTRATION; INTRACAUDAL; PERINEURAL
Status: DISCONTINUED | OUTPATIENT
Start: 2022-12-15 | End: 2022-12-15 | Stop reason: HOSPADM

## 2022-12-15 RX ORDER — LIDOCAINE HYDROCHLORIDE 20 MG/ML
INJECTION INTRAVENOUS
Status: DISCONTINUED | OUTPATIENT
Start: 2022-12-15 | End: 2022-12-15

## 2022-12-15 RX ORDER — CEFAZOLIN SODIUM 1 G/3ML
INJECTION, POWDER, FOR SOLUTION INTRAMUSCULAR; INTRAVENOUS
Status: DISCONTINUED | OUTPATIENT
Start: 2022-12-15 | End: 2022-12-15

## 2022-12-15 RX ORDER — SODIUM CHLORIDE 9 MG/ML
INJECTION, SOLUTION INTRAVENOUS CONTINUOUS
Status: DISCONTINUED | OUTPATIENT
Start: 2022-12-15 | End: 2023-03-14

## 2022-12-15 RX ORDER — HALOPERIDOL 5 MG/ML
0.5 INJECTION INTRAMUSCULAR EVERY 10 MIN PRN
Status: DISCONTINUED | OUTPATIENT
Start: 2022-12-15 | End: 2022-12-15 | Stop reason: HOSPADM

## 2022-12-15 RX ORDER — PROPOFOL 10 MG/ML
VIAL (ML) INTRAVENOUS CONTINUOUS PRN
Status: DISCONTINUED | OUTPATIENT
Start: 2022-12-15 | End: 2022-12-15

## 2022-12-15 RX ORDER — OXYCODONE HYDROCHLORIDE 5 MG/1
5 TABLET ORAL
Status: DISCONTINUED | OUTPATIENT
Start: 2022-12-15 | End: 2022-12-15 | Stop reason: HOSPADM

## 2022-12-15 RX ADMIN — MIDAZOLAM HYDROCHLORIDE 2 MG: 1 INJECTION, SOLUTION INTRAMUSCULAR; INTRAVENOUS at 06:12

## 2022-12-15 RX ADMIN — LIDOCAINE HYDROCHLORIDE 50 MG: 20 INJECTION INTRAVENOUS at 07:12

## 2022-12-15 RX ADMIN — SODIUM CHLORIDE: 9 INJECTION, SOLUTION INTRAVENOUS at 06:12

## 2022-12-15 RX ADMIN — DEXAMETHASONE SODIUM PHOSPHATE 8 MG: 4 INJECTION, SOLUTION INTRAMUSCULAR; INTRAVENOUS at 07:12

## 2022-12-15 RX ADMIN — CEFAZOLIN 2 G: 330 INJECTION, POWDER, FOR SOLUTION INTRAMUSCULAR; INTRAVENOUS at 07:12

## 2022-12-15 RX ADMIN — MUPIROCIN: 20 OINTMENT TOPICAL at 05:12

## 2022-12-15 RX ADMIN — CELECOXIB 400 MG: 200 CAPSULE ORAL at 05:12

## 2022-12-15 RX ADMIN — PROPOFOL 50 MG: 10 INJECTION, EMULSION INTRAVENOUS at 07:12

## 2022-12-15 RX ADMIN — ONDANSETRON 4 MG: 2 INJECTION INTRAMUSCULAR; INTRAVENOUS at 07:12

## 2022-12-15 RX ADMIN — FAMOTIDINE 20 MG: 10 INJECTION, SOLUTION INTRAVENOUS at 06:12

## 2022-12-15 RX ADMIN — OXYCODONE 5 MG: 5 TABLET ORAL at 08:12

## 2022-12-15 RX ADMIN — PROPOFOL 100 MCG/KG/MIN: 10 INJECTION, EMULSION INTRAVENOUS at 07:12

## 2022-12-15 RX ADMIN — ACETAMINOPHEN 1000 MG: 500 TABLET ORAL at 05:12

## 2022-12-15 NOTE — TRANSFER OF CARE
"Anesthesia Transfer of Care Note    Patient: Janki Jones    Procedure(s) Performed: Procedure(s) (LRB):  RELEASE, CARPAL TUNNEL (Right)  INJECTION, STEROID (Left)    Patient location: PACU    Anesthesia Type: general    Transport from OR: Transported from OR on 100% O2 by closed face mask with adequate spontaneous ventilation    Post pain: adequate analgesia    Post assessment: no apparent anesthetic complications and tolerated procedure well    Post vital signs: stable    Level of consciousness: awake and responds to stimulation    Nausea/Vomiting: no nausea/vomiting    Complications: none    Transfer of care protocol was followed      Last vitals:   Visit Vitals  /80   Pulse 65   Temp 36.9 °C (98.4 °F) (Oral)   Resp 16   Ht 5' 7" (1.702 m)   Wt 76.7 kg (169 lb)   LMP  (LMP Unknown)   SpO2 95%   Breastfeeding No   BMI 26.47 kg/m²     "

## 2022-12-15 NOTE — DISCHARGE SUMMARY
[facility] [place] - Surgery (Hospital)  Discharge Note  Short Stay    Procedure(s) (LRB):  RELEASE, CARPAL TUNNEL (Right)  INJECTION, STEROID (Left)      OUTCOME: Patient tolerated treatment/procedure well without complication and is now ready for discharge.    DISPOSITION: Home or Self Care    FINAL DIAGNOSIS:  <principal problem not specified>    FOLLOWUP: In clinic    DISCHARGE INSTRUCTIONS:  No discharge procedures on file.     TIME SPENT ON DISCHARGE: 5 minutes

## 2022-12-15 NOTE — PATIENT INSTRUCTIONS
HAND SURGERY  Care of the Hand after Surgery       Post-Op Care  It is important to follow your orthopaedic surgeon's instructions carefully after you return home. You should ask   someone to check on you that evening. The protocols described here are general in nature. Every person and   every surgery is different so the information given here is for guidance only. If you have questions you should   contact us.   Day of Surgery  The hand may be placed in an ace wrap or a hard splint to protect any surgical repair that was performed at the time of surgery. Do not remove the splint unless advised to do so by your doctor.   The hand and fingers may be numb for quite some time after surgery. This is due to the local anesthetic used at the time of surgery for pain control.  Begin taking liquids and food as soon as you can. You should always eat some solid food, a sandwich or light meal, a little while before taking your pain meds.   Day 3  Things are much the same on the third day after your surgery. Usually you have less pain and feel like doing more. You can remove the dressings at about 72 hours after your surgery unless you have a hard splint. It is good at this time to cover the incision(s) with a regular band-aid(s). It is important to keep the incision absolutely dry while showering or bathing (use two plastic bags over your hand). If you feel that the pain medication you were given after surgery is stronger than you really need you can reduce the dose, take it less frequently or switch to ibuprofen or Tylenol. If you received antibiotics they should be taken until the entire prescription is completed.  Day 10-14  We will see you back after your surgery and remove your stitches. We will review with you what was done in surgery and will talk about rehab and answer any questions you may have.        HAND SURGERY  You can drive if you are comfortable and have regained full finger movements and if you have  sufficient power to control the vehicle. Timing of your return to work is variable according to your occupation and specific surgery. We should discuss this at your follow up visit.  Hand elevation is important to prevent swelling and stiffness of the fingers. One minute of leaving your hand dangling negates four hours of keeping it elevated. Please remember not to walk with your hand hanging down or to sit with your hand resting in your lap. It is fine, however, to lower your hand for light use and you should get back to normal light activities as soon as possible as guided by common sense. There are a number of exercises you should do to prevent stiffness.      Post-operative exercises  Bend your fingers  Relax your hand. Start with your fingers straight and close together. Bend the end and middle joints of your fingers. Keep your wrist and knuckles straight. Moving slowly and smoothly, return your hand to the starting position. Repeat with your other hand. If you can, perform multiple repetitions of this exercise on each hand.  Open your hand wide                     Spread your fingers apart as wide as you can and hold that position. Slowly relax your fingers and bring them together. Return to the open-wide position. Repeat with each hand and gradually add to the number of repetitions.  Make a fist  Start with your fingers straight and spread apart. Make a loose, gentle fist and wrap your thumb around the outside of your fingers. Be careful not to squeeze your fingers together too tightly. Moving slowly and smoothly, return to the starting position. Repeat. Perform this exercise on both hands.  Touch your fingertips  Straighten your fingers and thumb. Bend your thumb across your palm, touching the tip of your thumb to the pad of your hand just below your pinky finger. If you can't make your thumb touch, just stretch as far as you can. Return your thumb to its starting position, as shown in images 1 through 3.   For the next exercise, form the letter O by touching your thumb to each fingertip, as shown in images 4 through 6. Moving slowly and smoothly, touch your index finger to your thumb, then straighten your fingers. Touch your middle finger to your thumb and straighten. Follow with your ring and pinky fingers.  Walk your fingers  Rest your hand on a flat surface, such as a tabletop, with your palm facing down and your fingers spread slightly apart. Moving one finger at a time, slowly walk your fingers toward your thumb. Start by lifting and moving your index finger toward your thumb. Follow by lifting and moving your middle finger toward your thumb. Proceed with moving your ring finger and then your pinky finger toward your thumb. Don't move your wrist or thumb while doing this exercise. Repeat with your other hand.    Common Concerns and Frequently Asked Questions    When to Call the Doctor  Pain, burning, or numbness of the fingers or the back of the hand not relieved by elevation of the arm  Pale or cold finger; bluish nail beds  Red line going up the arm  Excessive swelling  Fever over 101ºF  Pain unrelieved by pain medication    Carpal Tunnel Release  You will have a soft dressing on your hand. It is important to begin your exercises right away and to keep the hand elevated (see above). You can remove your dressing in 72 hours and then cover the incision with a band-aid. Keep the incision dry(see above).      Your  strength generally returns to preoperative levels approximately 3 months after surgery, and pinch strength returns at 6 weeks.    Palmar tenderness, a particularly important consideration in manual laborers, may persist for longer than 6 months. Full recovery of nerve function may not occur, depending on the severity of nerve damage.        HAND SURGERY    Tips for one armed living  It helps to have...   In the shower   Plastic bags and rubber bands to cover bandages - the bags that newspapers come  "in are good to cover the hand and wrist. Otherwise small trash can liners will do. Use two at a time.   Bottle sponge (soft sponge on a long stick) - for the armpit of your "good" hand.   Shower brush   A hair brush in the shower will help you to wash your hair.   Cotton jose cloth bathrobe - to dry your back.    In the bathroom   Toothpaste, shampoo, etc. in flip-top or pump (not screw top) dispensers.   Consider an electric razor.   Flossers (dental floss on a "Y" shaped handle).    In the kitchen   Dycem mat (rubber jar opener mat) - to help open jars, but also keep things from sliding around while you are working on them.    Double suction cup pads ("little Octopus") - to hold items while you use or wash them.   Electric can opener with a lid magnet strong enough to hold the can in the air - for one handed use.   In the bedroom   Back scratcher.   Large sleeve shirts and tops.   Put away clothing which buttons, fastens or snaps in the back or which uses drawstrings.    Sports bra or a camisole instead of a bra.   [employer] nylons can be pulled on one handed - most others can't.   A "wash and wear" haircut.   "

## 2022-12-15 NOTE — BRIEF OP NOTE
[facility] - Surgery (Hospital)  Brief Operative Note    Surgery Date: 12/15/2022     Surgeon(s) and Role:     * Toma Bowers MD - Primary    Assisting Surgeon: None    Pre-op Diagnosis:  Right carpal tunnel syndrome [G56.01]  Left carpal tunnel syndrome [G56.02]    Post-op Diagnosis:  Post-Op Diagnosis Codes:     * Right carpal tunnel syndrome [G56.01]     * Left carpal tunnel syndrome [G56.02]    Procedure(s) (LRB):  RELEASE, CARPAL TUNNEL (Right)  INJECTION, STEROID (Left)    Anesthesia: Local MAC    Operative Findings: carpal tunnel     Estimated Blood Loss: * No values recorded between 12/15/2022  7:19 AM and 12/15/2022  7:45 AM *         Specimens:   Specimen (24h ago, onward)      None              Discharge Note    OUTCOME: Patient tolerated treatment/procedure well without complication and is now ready for discharge.    DISPOSITION: Home or Self Care    FINAL DIAGNOSIS:  <principal problem not specified>    FOLLOWUP: In clinic    DISCHARGE INSTRUCTIONS:  No discharge procedures on file.

## 2022-12-15 NOTE — PLAN OF CARE
Pre op complete. Patient resting comfortably. Call light in reach.  to bedside shortly, belongings in locker. Waiting on anesthesia consent, site derrell, and H&P update.

## 2022-12-15 NOTE — PLAN OF CARE
VSS.  Patient tolerating oral liquids without difficulty.   No apparent s&s of distress noted at this time, no complaints voiced at this time.   Discharge instructions reviewed with patient and  with good verbal feedback received.   Post op medications delivered to bedside.   No DME required.   Patient ready for discharge.

## 2022-12-15 NOTE — OP NOTE
[facility] [place]  Surgery Department  Operative Note    SUMMARY     Date of Procedure: 12/15/2022     Procedure:   1. Right carpal tunnel release, cpt 15066  2. Left carpal tunnel steroid injection, cpt 02492    Surgeon(s) and Role:     * Toma Bowers MD - Primary    Assisting Surgeon: Ruddy Hernández MD    Pre-Operative Diagnosis: Right carpal tunnel syndrome [G56.01]  Left carpal tunnel syndrome [G56.02]    Post-Operative Diagnosis: Post-Op Diagnosis Codes:     * Right carpal tunnel syndrome [G56.01]     * Left carpal tunnel syndrome [G56.02]    Anesthesia: Local MAC    Indication for Procedure: 56 yo female with bilateral carpal tunnel syndrome confirmed by EMG/NCS and had failed conservative treatment. Risks and benefits of the procedure were discussed with the patient and informed consent was obtained.    Description of the Findings of the Procedure: The patient was seen in the preoperative holding area and the right hand was marked. The patient was taken to the OR, placed supine on the table, and a padded hand table was used. After monitored anesthesia care was admitted without difficulty, a time-out procedure was performed identifying the patient, the operative site and the procedure to be performed. 40 mg of methylprednisolone was sterilely injected into the left carpal tunnel. A tourniquet was placed on the arm and the upper extremity was prepped and draped in standard sterile fashion. The upper extremity was elevated and exsanguinated with an Esmarch bandage, and the tourniquet was inflated to 250 mm Hg. 10cc of 1% lidocaine plain and 0.25% bupivacaine was used for a local block of the carpal tunnel. A 2 cm incision was made over the right carpal tunnel.  The palmar fascia was sharply incised.  Sp retractors were used to expose the transverse carpal ligament, this was sharply incised.  A carpal tunnel skid was placed underneath the transverse carpal ligament proximally, and the proximal  transverse carpal ligament as well as the distal forearm fascia was sharply released. The median nerve was found to be significantly compressed through the carpal tunnel. The carpal tunnel skid was replaced distally, and the transverse carpal ligament was released under direct visualization.  The median nerve was found to be completely decompressed. The neurovascular bundles were identified and protected throughout the case. The wound was then irrigated with normal saline using a bulb syringe. 3-0 prolene was used to close the skin in a horizontal mattress fashion. Adaptic, 4x4, cast padding and coban were used to dress the hand. The tourniquet was deflated and the hand and fingers were pink and well-perfused.  All instrument, sponge and needle counts were correct. The patient tolerated the procedure well.  She was taken awake, alert and in good condition to the recovery room.    Complications: No    Estimated Blood Loss (EBL): none           Specimens: none           Condition: Good    Disposition: PACU - hemodynamically stable.    Attestation: I was present and scrubbed for the entire procedure.

## 2022-12-15 NOTE — ANESTHESIA POSTPROCEDURE EVALUATION
Anesthesia Post Evaluation    Patient: Janki Jones    Procedure(s) Performed: Procedure(s) (LRB):  RELEASE, CARPAL TUNNEL (Right)  INJECTION, STEROID (Left)    Final Anesthesia Type: general      Patient location during evaluation: PACU  Patient participation: Yes- Able to Participate  Level of consciousness: awake and alert  Post-procedure vital signs: reviewed and stable  Pain management: adequate  Airway patency: patent    PONV status at discharge: No PONV  Anesthetic complications: no      Cardiovascular status: blood pressure returned to baseline  Respiratory status: unassisted  Hydration status: euvolemic  Follow-up not needed.          Vitals Value Taken Time   /74 12/15/22 0817   Temp 36.7 °C (98 °F) 12/15/22 0815   Pulse 54 12/15/22 0824   Resp 18 12/15/22 0838   SpO2 96 % 12/15/22 0824   Vitals shown include unvalidated device data.      Event Time   Out of Recovery 08:20:00         Pain/Ashly Score: Pain Rating Prior to Med Admin: 5 (12/15/2022  8:38 AM)  Pain Rating Post Med Admin: 3 (12/15/2022  8:38 AM)  Ashly Score: 10 (12/15/2022  8:45 AM)

## 2022-12-21 ENCOUNTER — OFFICE VISIT (OUTPATIENT)
Dept: ORTHOPEDICS | Facility: CLINIC | Age: 57
End: 2022-12-21
Payer: COMMERCIAL

## 2022-12-21 DIAGNOSIS — G56.01 RIGHT CARPAL TUNNEL SYNDROME: Primary | ICD-10-CM

## 2022-12-21 DIAGNOSIS — G56.02 LEFT CARPAL TUNNEL SYNDROME: ICD-10-CM

## 2022-12-21 PROCEDURE — 1160F RVW MEDS BY RX/DR IN RCRD: CPT | Mod: CPTII,S$GLB,, | Performed by: ORTHOPAEDIC SURGERY

## 2022-12-21 PROCEDURE — 4010F ACE/ARB THERAPY RXD/TAKEN: CPT | Mod: CPTII,S$GLB,, | Performed by: ORTHOPAEDIC SURGERY

## 2022-12-21 PROCEDURE — 99999 PR PBB SHADOW E&M-EST. PATIENT-LVL III: ICD-10-PCS | Mod: PBBFAC,,, | Performed by: ORTHOPAEDIC SURGERY

## 2022-12-21 PROCEDURE — 4010F PR ACE/ARB THEARPY RXD/TAKEN: ICD-10-PCS | Mod: CPTII,S$GLB,, | Performed by: ORTHOPAEDIC SURGERY

## 2022-12-21 PROCEDURE — 99024 PR POST-OP FOLLOW-UP VISIT: ICD-10-PCS | Mod: S$GLB,,, | Performed by: ORTHOPAEDIC SURGERY

## 2022-12-21 PROCEDURE — 99024 POSTOP FOLLOW-UP VISIT: CPT | Mod: S$GLB,,, | Performed by: ORTHOPAEDIC SURGERY

## 2022-12-21 PROCEDURE — 1159F MED LIST DOCD IN RCRD: CPT | Mod: CPTII,S$GLB,, | Performed by: ORTHOPAEDIC SURGERY

## 2022-12-21 PROCEDURE — 1160F PR REVIEW ALL MEDS BY PRESCRIBER/CLIN PHARMACIST DOCUMENTED: ICD-10-PCS | Mod: CPTII,S$GLB,, | Performed by: ORTHOPAEDIC SURGERY

## 2022-12-21 PROCEDURE — 1159F PR MEDICATION LIST DOCUMENTED IN MEDICAL RECORD: ICD-10-PCS | Mod: CPTII,S$GLB,, | Performed by: ORTHOPAEDIC SURGERY

## 2022-12-21 PROCEDURE — 99999 PR PBB SHADOW E&M-EST. PATIENT-LVL III: CPT | Mod: PBBFAC,,, | Performed by: ORTHOPAEDIC SURGERY

## 2022-12-21 RX ORDER — HYDROCODONE BITARTRATE AND ACETAMINOPHEN 5; 325 MG/1; MG/1
1 TABLET ORAL EVERY 6 HOURS PRN
Qty: 15 TABLET | Refills: 0 | Status: SHIPPED | OUTPATIENT
Start: 2022-12-21 | End: 2023-03-14

## 2022-12-21 NOTE — PROGRESS NOTES
Janki Jones presents for post-operative evaluation of   Encounter Diagnoses   Name Primary?    Right carpal tunnel syndrome Yes    Left carpal tunnel syndrome    The patient is now 6 days s/p right carpal tunnel release and left carpal tunnel steroid injection.  Overall the patient reports doing well.  The patient reports appropriate postoperative soreness with well controlled overall pain.      PE:    AA&O x 4.  NAD  HEENT:  NCAT, sclera nonicteric  Lungs:  Respirations are equal and unlabored.  CV:  2+ bilateral upper and lower extremity pulses.  MSK: The wound is healing well with no signs of erythema or warmth.  There is no drainage.  Neurovascularly intact and has 5/5 thenar and intrinsic strength.    A/P: Status post above, doing well  1) Continue with light use of the hand, suture removal in OT, start strengthening at 6 weeks post op  2) F/U 6 weeks  3) Call with any questions/concerns in the interim        Toma Bowers MD    Please be aware that this note has been generated with the assistance of MModal voice-to-text.  Please excuse any spelling or grammatical errors.

## 2022-12-27 ENCOUNTER — CLINICAL SUPPORT (OUTPATIENT)
Dept: REHABILITATION | Facility: HOSPITAL | Age: 57
End: 2022-12-27
Attending: ORTHOPAEDIC SURGERY
Payer: COMMERCIAL

## 2022-12-27 DIAGNOSIS — M25.631 DECREASED RANGE OF MOTION OF RIGHT WRIST: ICD-10-CM

## 2022-12-27 DIAGNOSIS — R52 PAIN: ICD-10-CM

## 2022-12-27 DIAGNOSIS — G56.01 RIGHT CARPAL TUNNEL SYNDROME: ICD-10-CM

## 2022-12-27 PROCEDURE — 97165 OT EVAL LOW COMPLEX 30 MIN: CPT

## 2022-12-27 PROCEDURE — 97140 MANUAL THERAPY 1/> REGIONS: CPT

## 2022-12-27 NOTE — PATIENT INSTRUCTIONS
[facility], OCCUP[facility]  HOME EXERCISE PROGRAM            Marilee Villeda, OTR/L

## 2022-12-29 PROBLEM — R52 PAIN: Status: ACTIVE | Noted: 2022-12-29

## 2022-12-29 PROBLEM — M25.631 DECREASED RANGE OF MOTION OF RIGHT WRIST: Status: ACTIVE | Noted: 2022-12-29

## 2022-12-29 NOTE — PLAN OF CARE
[facility]  Occupational Therapy Initial Evaluation    Date: 12/27/2022  Name: Janki [place] [facility]  Clinic Number: [id number]    Therapy Diagnosis:   Encounter Diagnoses   Name Primary?    Right carpal tunnel syndrome     Pain     Decreased range of motion of right wrist      Physician: Toma Bowers MD    Physician Orders: Eval and treat; Patient needs appointment 12/27/2022 for suture removal and initiation of gentle ROM hand and wrist  Medical Diagnosis: G56.01 (ICD-10-CM) - Right carpal tunnel syndrome  Surgical Procedure and Date: 12/15/2022, Carpal tunnel release   Evaluation Date: 12/27/2022  Insurance Authorization Period Expiration: 12/31/2022  Plan of Care Expiration: 8 weeks; 2/24/2023  Date of Return to MD: 02/01/2023  Visit # / Visits authorized: 1 / 1  FOTO: (date and score)    Precautions:  Standard    Time In: 11:02 AM  Time Out: 12:00 PM  Total Appointment Time (timed & untimed codes): 58 minutes      SUBJECTIVE     Date of Onset: 6 years     History of Current Condition/Mechanism of Injury: Janki reports: Has been having carpal tunnel for 6 years. She was originally supposed to have surgery in May but reschedule to recently.     Falls: 1 month ago    Involved Side: Right  Dominant Side: Right  Imaging:  EXAMINATION:  XR HAND COMPLETE 3 VIEWS BILATERAL     CLINICAL HISTORY:  Pain in right hand     FINDINGS:  Hand complete three views bilateral.     Right: There is deformity of the distal phalanx of the 3rd digit from remote trauma.  No acute fracture dislocation bone destruction or erosion seen.     Left: No acute fracture dislocation bone destruction seen.        Electronically signed by: Patrice Rizo MD  Date:                                            11/23/2022  Prior Therapy: No  Occupation:  Yes - computer work, works at home   Working presently: employed  Duties: computer work     Functional Limitations/Social History:    Previous functional status  includes: Independent with all ADLs.     Current Functional Status   Home/Living environment: lives with their family      Limitation of Functional Status as follows:   ADLs/IADLs:     - Feeding: mod I    - Bathing: uses L    - Dressing/Grooming: difficulty donning/doffing pants     - Driving: drove today for first time since sx; difficulty with turning     Leisure: read, crotchet, socialize, volunteer, dance     Pain:  Functional Pain Scale Rating 0-10: Current 7/10, worst 10/10, best 3/10   Location: incision site   Description: Aching, Throbbing, Tight, and Tingling  Aggravating Factors: trying to use it during daily activity; rotation   Easing Factors: rest and tylenol and ibuprofen      Patient's Goals for Therapy: Pt would like to feel much better     Medical History:   Past Medical History:   Diagnosis Date    Abnormal Pap smear 2007    Abnormal Pap smear of cervix     ADD (attention deficit disorder)     Anxiety     Depression     Fibroids     Herpes simplex virus (HSV) infection     HSV1.    Hypertension        Surgical History:    has a past surgical history that includes Tubal ligation; essure; Cervical biopsy w/ loop electrode excision (01/01/2007); Colonoscopy (N/A, 02/15/2019); Esophagogastroduodenoscopy (N/A, 11/26/2021); Carpal tunnel release (Right, 12/15/2022); and Injection of steroid (Left, 12/15/2022).    Medications:   has a current medication list which includes the following prescription(s): amlodipine, bupropion, escitalopram oxalate, estradiol, hydrochlorothiazide, hydrocodone-acetaminophen, loratadine, losartan, meloxicam, methylphenidate hcl, methylphenidate hcl, methylphenidate hcl, multivitamin with minerals, pantoprazole, promethazine, and tramadol, and the following Facility-Administered Medications: sodium chloride 0.9%, ceFAZolin 2 g in dextrose 5 % in water (D5W) 5 % 50 mL IVPB (MB+), and mupirocin.    Allergies:   Review of patient's allergies indicates:   Allergen Reactions     Percocet [oxycodone-acetaminophen]      Other reaction(s): Nausea          OBJECTIVE     Observation/Appearance: 2 inch volar incision; sutures intact     Edema. Measured in centimeters.   12/29/2022 12/29/2022    Left Right   2in. Above elbow     2in. Below elbow     Wrist Crease 15.0 cm 15.3 cm   Figure of 8     MCPs 18.4 cm 18.9 cm       Elbow and Wrist ROM. Measured in degrees.   12/29/2022 12/29/2022    Left Right   Elbow Ext/Flex     Supination/Pronation  WNL/WNL   Wrist Ext/Flex 69/65 70/58   Wrist RD/UD 25/45 16/46     Hand ROM. Measured in degrees.   12/29/2022 12/29/2022    Left Right        Index: MP   Joel to make full composite fist         Thumb: MP 72 62                IP 28 45       Rad ADD/ABD 50 45       Pal ADD/ABD 55 55          Opposition DPC DPC      Strength (Dynamometer) and Pinch Strength (Pinch Gauge)  Measured in pounds.   12/29/2022 12/29/2022    Left Right   Rung II  deferred   Key Pinch     3pt Pinch     2pt Pinch       Sensation: denies numbness and tingling    12/27/2022 12/27/2022    Left Right   [place] Ankur     Normal 1.65-2.83     Diminished Light Touch 3.22-3.61     Diminished Protective 3.84-4.31     Loss of Protective 4.56-6.65     Untestable >6.65     2 Point Discrimination     Static     Dynamic       Manual Muscle Test   12/29/2022 12/29/2022    Left Right   Wrist Extension   Deferred    Wrist Flexion     Radial Deviation     Ulnar Deviation     Supination     Pronation     Elbow Extension     Elbow Flexion       Limitation/Restriction for FOTO initial eval; wrist  Survey    Therapist reviewed FOTO scores for Janki Jones on 12/27/2022.   FOTO documents entered into [employer] - see Media section.    Limitation Score: 27%         Treatment   Total Treatment time (time-based codes) separate from Evaluation: 23 minutes    Janki received the treatments listed below:     Supervised modalities after being cleared for contradictions: Hot Pack - 8 minutes     Manual  therapy techniques: Manual Lymphatic Drainage were applied to the: R hand for 10 minutes, including:  - suture removal   - assistance with hand washing     Therapeutic exercises to develop ROM for 5 minutes, including:  AROM Wrist  Ext/flx  RD/UD   X 10 reps each    Wave, hook, straight fist, composite fist, finger spreads, finger lifts, pinky slides   X 10 reps each              Patient Education and Home Exercises      Education provided:   -     Written Home Exercises Provided: yes.  Exercises were reviewed and Janki was able to demonstrate them prior to the end of the session.  Janki demonstrated good  understanding of the education provided. See EMR under Patient Instructions for exercises provided during therapy sessions.     Pt was advised to perform these exercises free of pain, and to stop performing them if pain occurs.    Patient/Family Education: role of OT, goals for OT, scheduling/cancellations - pt verbalized understanding. Discussed insurance limitations with patient.    ASSESSMENT     Janki Jones is a 57 y.o. female referred to outpatient occupational therapy and presents with a medical diagnosis of carpal tunnel release .  Patient presents with the following therapy deficits: Decreased ROM, Decreased  strength, Decreased pinch strength, Decreased functional hand use, Increased pain, and Edema and demonstrates limitations as described in the chart below. Following medical record review it is determined that pt will benefit from occupational therapy services in order to maximize pain free and/or functional use of right hand. The following goals were discussed with the patient and patient is in agreement with them as to be addressed in the treatment plan. The patient's rehab potential is Good.     Anticipated barriers to occupational therapy: carpal tunnel for 6 years prior to sx   Pt has no cultural, educational or language barriers to learning provided.    Profile and History  Assessment of Occupational Performance Level of Clinical Decision Making Complexity Score   Occupational Profile:   Janki Jones is a 57 y.o. female who lives with their family and is currently employed Janki Jones has difficulty with  ADLs and IADLs as listed previously, which  Affecting herdaily functional abilities.      Comorbidities:    has a past medical history of Abnormal Pap smear, Abnormal Pap smear of cervix, ADD (attention deficit disorder), Anxiety, Depression, Fibroids, Herpes simplex virus (HSV) infection, and Hypertension.    Medical and Therapy History Review:   Brief               Performance Deficits    Physical:  Muscle Power/Strength  Muscle Endurance  Skin Integrity/Scar Formation  Edema   Strength  Pinch Strength  Pain    Cognitive:  No Deficits    Psychosocial:    Habits  Routines  Rituals     Clinical Decision Making:  low    Assessment Process:  Detailed Assessments    Modification/Need for Assistance:  Not Necessary    Intervention Selection:  Several Treatment Options       low  Based on PMHX, co morbidities , data from assessments and functional level of assistance required with task and clinical presentation directly impacting function.           Goals:   The following goals were discussed with the patient and patient is in agreement with them as to be addressed in the treatment plan.   Long Term Goals (LTGs); to be met by discharge.   strength goal once assessed  Pt will improve thumb ROM by 5 degrees   Pt will report pain level no greater than 2/10 during functional daily work and community activities   Pinch strength goal once assessed     Short Term Goals (STGs); to be met within 4 weeks ().  Assess  and pinch when appropriate  Pt will improve wrist ROM by 10 degrees   Pt will report pain level no greater than 3/10 during light activity           PLAN   Plan of Care Certification: 12/27/2022 to 2/27/2023.     Outpatient Occupational Therapy 1-2 times  weekly for 8 weeks to include the following interventions: Paraffin, Fluidotherapy, Manual therapy/joint mobilizations, Modalities for pain management, US 3 mhz, Therapeutic exercises/activities., Strengthening, Edema Control, and Scar Management.      Marilee Villeda OT      I CERTIFY THE NEED FOR THESE SERVICES FURNISHED UNDER THIS PLAN OF TREATMENT AND WHILE UNDER MY CARE  Physician's comments:      Physician's Signature: ___________________________________________________

## 2023-01-03 ENCOUNTER — CLINICAL SUPPORT (OUTPATIENT)
Dept: REHABILITATION | Facility: HOSPITAL | Age: 58
End: 2023-01-03
Payer: COMMERCIAL

## 2023-01-03 ENCOUNTER — TELEPHONE (OUTPATIENT)
Dept: ORTHOPEDICS | Facility: CLINIC | Age: 58
End: 2023-01-03
Payer: COMMERCIAL

## 2023-01-03 DIAGNOSIS — R52 PAIN: Primary | ICD-10-CM

## 2023-01-03 DIAGNOSIS — M25.631 DECREASED RANGE OF MOTION OF RIGHT WRIST: ICD-10-CM

## 2023-01-03 PROCEDURE — 97140 MANUAL THERAPY 1/> REGIONS: CPT

## 2023-01-03 PROCEDURE — 97110 THERAPEUTIC EXERCISES: CPT

## 2023-01-03 RX ORDER — SULFAMETHOXAZOLE AND TRIMETHOPRIM 800; 160 MG/1; MG/1
1 TABLET ORAL 2 TIMES DAILY
Qty: 14 TABLET | Refills: 0 | Status: SHIPPED | OUTPATIENT
Start: 2023-01-03 | End: 2023-01-10

## 2023-01-03 NOTE — PROGRESS NOTES
[facility]  Occupational Therapy Treatment Note    Date: 1/3/2023  Name: Janki [place] [facility]  Clinic Number: [id number]    Therapy Diagnosis:   Encounter Diagnoses   Name Primary?    Pain Yes    Decreased range of motion of right wrist      Physician: Toma Bowers MD      Physician Orders: Eval and treat; Patient needs appointment 12/27/2022 for suture removal and initiation of gentle ROM hand and wrist  Medical Diagnosis: G56.01 (ICD-10-CM) - Right carpal tunnel syndrome  Surgical Procedure and Date: 12/15/2022, Carpal tunnel release   Evaluation Date: 12/27/2022  Insurance Authorization Period Expiration: 12/31/2022  Plan of Care Expiration: 8 weeks; 2/24/2023  Date of Return to MD: 02/01/2023  Visit # / Visits authorized: 1 / 1  FOTO: (date and score)     Precautions:  Standard     Time In: 11:02 AM  Time Out: 12:00 PM  Total Appointment Time (timed & untimed codes): 58 minutes        SUBJECTIVE     Pt reports: I have been doing massage and putting hydrogen peroxide on it. It bubbles and then I wash my hands   She was compliant with home exercise program given last session.   Response to previous treatment: first tx  Functional change: none noted to this date     Pain: 7/10  Location: right hand     OBJECTIVE   Objective Measures updated at progress report unless specified.    Observation/Appearance: 2 inch volar incision; sutures intact      Edema. Measured in centimeters.    12/29/2022 12/29/2022     Left Right   2in. Above elbow       2in. Below elbow       Wrist Crease 15.0 cm 15.3 cm   Figure of 8       MCPs 18.4 cm 18.9 cm         Elbow and Wrist ROM. Measured in degrees.    12/29/2022 12/29/2022     Left Right   Elbow Ext/Flex       Supination/Pronation   WNL/WNL   Wrist Ext/Flex 69/65 70/58   Wrist RD/UD 25/45 16/46      Hand ROM. Measured in degrees.    12/29/2022 12/29/2022     Left Right           Index: MP    Joel to make full composite fist            Thumb: MP 72  62                IP 28 45       Rad ADD/ABD 50 45       Pal ADD/ABD 55 55          Opposition DPC DPC       Strength (Dynamometer) and Pinch Strength (Pinch Gauge)  Measured in pounds.    12/29/2022 12/29/2022     Left Right   Rung II   deferred   Key Pinch       3pt Pinch       2pt Pinch          Sensation: denies numbness and tingling     12/27/2022 12/27/2022     Left Right   [place] Ankur       Normal 1.65-2.83       Diminished Light Touch 3.22-3.61       Diminished Protective 3.84-4.31       Loss of Protective 4.56-6.65       Untestable >6.65       2 Point Discrimination       Static       Dynamic          Manual Muscle Test    12/29/2022 12/29/2022     Left Right   Wrist Extension    Deferred    Wrist Flexion       Radial Deviation       Ulnar Deviation       Supination       Pronation       Elbow Extension       Elbow Flexion          Limitation/Restriction for FOTO initial eval; wrist  Survey     Therapist reviewed FOTO scores for Janki Jones on 12/27/2022.   FOTO documents entered into [employer] - see Media section.     Limitation Score: 27%             Treatment     Janki received the treatments listed below:     Supervised modalities after being cleared for contradictions: Hot Pack - 8 minutes     Manual therapy techniques: scar massage and retrograde massage  were applied to the: for 20 minutes, including:  - retrograde massage   - debridement     Therapeutic exercises to develop ROM for 30   minutes, including:    AROM Wrist  Ext/flx  RD/UD   X 10 reps each    AROM thumb:  Circles (CW/CCW)  Palm abd/add  Rad abd/add  IP blocks  Opposition  Composite flx  Lifts- thumb only  Spreads  TGEs   X 10 reps each    Wrist maze X 3 min   Isospheres X 3 min   In hand manipulation  X 3 containers medium pom poms   Wrist AROM over wedge X 3/10 x 3 ways with small unweighted dowel                              Patient Education and Home Exercises      Education provided:   -  antibiotics from  pharmacy  - fabric bandaids   - Progress towards goals     Written Home Exercises Provided: Patient instructed to cont prior HEP.  Exercises were reviewed and Janki was able to demonstrate them prior to the end of the session.  Janki demonstrated good  understanding of the HEP provided. See EMR under Patient Instructions for exercises provided during therapy sessions.      ASSESSMENT      Sign of possible infection.  Debrided incision. Cont to have excellent ROM. Will cont as davis Shearer is progressing well towards her goals and there are no updates to goals at this time. Pt prognosis is Good.     Pt will continue to benefit from skilled outpatient occupational therapy to address the deficits listed in the problem list on initial evaluation, provide pt/family education and to maximize pt's level of independence in the home and community environment.     Pt's spiritual, cultural and educational needs considered and pt agreeable to plan of care and goals.    Anticipated barriers to occupational therapy: n/a    Goals:  Long Term Goals (LTGs); to be met by discharge.   strength goal once assessed  Pt will improve thumb ROM by 5 degrees   Pt will report pain level no greater than 2/10 during functional daily work and community activities   Pinch strength goal once assessed      Short Term Goals (STGs); to be met within 4 weeks ().  Assess  and pinch when appropriate  Pt will improve wrist ROM by 10 degrees   Pt will report pain level no greater than 3/10 during light activity        PLAN     Continue skilled occupational therapy with individualized plan of care focusing on improving functional independence with use of R hand    Updates/Grading for next session: cont as davis Villeda, OT

## 2023-01-03 NOTE — PATIENT INSTRUCTIONS
[facility]  HOME EXERCISE PROGRAM            Marilee Villeda, OTR/L

## 2023-01-03 NOTE — TELEPHONE ENCOUNTER
ZORAN and informed her of 1/11/23 appointment with Dr. Bowers. Patient verbalized understanding and was thankful.      Rhianna Piña MA  Medical Assistant   [facility]

## 2023-01-06 ENCOUNTER — CLINICAL SUPPORT (OUTPATIENT)
Dept: REHABILITATION | Facility: HOSPITAL | Age: 58
End: 2023-01-06
Payer: COMMERCIAL

## 2023-01-06 DIAGNOSIS — M25.631 DECREASED RANGE OF MOTION OF RIGHT WRIST: ICD-10-CM

## 2023-01-06 DIAGNOSIS — R52 PAIN: Primary | ICD-10-CM

## 2023-01-06 PROCEDURE — 97110 THERAPEUTIC EXERCISES: CPT

## 2023-01-06 NOTE — PROGRESS NOTES
"  [facility]  Occupational Therapy Treatment Note    Date: 1/6/2023  Name: Janki Montana [facility]  Clinic Number: [id number]    Therapy Diagnosis:   Encounter Diagnoses   Name Primary?    Pain Yes    Decreased range of motion of right wrist        Physician: Toma Bowers MD      Physician Orders: Eval and treat; Patient needs appointment 12/27/2022 for suture removal and initiation of gentle ROM hand and wrist  Medical Diagnosis: G56.01 (ICD-10-CM) - Right carpal tunnel syndrome  Surgical Procedure and Date: 12/15/2022, Carpal tunnel release   Evaluation Date: 12/27/2022  Insurance Authorization Period Expiration: 12/31/2022  Plan of Care Expiration: 8 weeks; 2/24/2023  Date of Return to MD: 02/01/2023  Visit # / Visits authorized: 1 / 1  FOTO: (date and score)     Precautions:  Standard       Time In: 09:30 AM  Time Out: 10:15 PM  Total Appointment Time (timed & untimed codes): 45 minutes        SUBJECTIVE     Pt reports: "picked up antibiotics"    She was compliant with home exercise program given last session.   Response to previous treatment: cont to have pain  Functional change: none noted to this date     Pain: 7/10  Location: right hand     OBJECTIVE   Objective Measures updated at progress report unless specified.    Observation/Appearance: 2 inch volar incision; sutures intact      Edema. Measured in centimeters.    12/29/2022 12/29/2022     Left Right   2in. Above elbow       2in. Below elbow       Wrist Crease 15.0 cm 15.3 cm   Figure of 8       MCPs 18.4 cm 18.9 cm         Elbow and Wrist ROM. Measured in degrees.    12/29/2022 12/29/2022     Left Right   Elbow Ext/Flex       Supination/Pronation   WNL/WNL   Wrist Ext/Flex 69/65 70/58   Wrist RD/UD 25/45 16/46      Hand ROM. Measured in degrees.    12/29/2022 12/29/2022     Left Right           Index: MP    Joel to make full composite fist            Thumb: MP 72 62                IP 28 45       Rad ADD/ABD 50 45       Pal " ADD/ABD 55 55          Opposition DPC DPC       Strength (Dynamometer) and Pinch Strength (Pinch Gauge)  Measured in pounds.    12/29/2022 12/29/2022     Left Right   Rung II   deferred   Key Pinch       3pt Pinch       2pt Pinch          Sensation: denies numbness and tingling     12/27/2022 12/27/2022     Left Right   [place] Ankur       Normal 1.65-2.83       Diminished Light Touch 3.22-3.61       Diminished Protective 3.84-4.31       Loss of Protective 4.56-6.65       Untestable >6.65       2 Point Discrimination       Static       Dynamic          Manual Muscle Test    12/29/2022 12/29/2022     Left Right   Wrist Extension    Deferred    Wrist Flexion       Radial Deviation       Ulnar Deviation       Supination       Pronation       Elbow Extension       Elbow Flexion          Limitation/Restriction for FOTO initial eval; wrist  Survey     Therapist reviewed FOTO scores for Janki Jones on 12/27/2022.   FOTO documents entered into [email or web address] - see Media section.     Limitation Score: 27%             Treatment     Janki received the treatments listed below:     Supervised modalities after being cleared for contradictions: Hot Pack - 8 minutes     Manual therapy techniques: scar massage and retrograde massage  were applied to the: for 5 minutes, including:  - retrograde massage   - STM forearm flexors     Therapeutic exercises to develop ROM for 30   minutes, including:    AROM Wrist  Ext/flx  RD/UD   X 10 reps each    AROM thumb:  Circles (CW/CCW)  Palm abd/add  Rad abd/add  IP blocks  Opposition  Composite flx  Lifts- thumb only  Spreads  TGEs   X 10 reps each    Wrist maze X 3 min   Isospheres X 3 min   In hand manipulation  X 3 containers medium pom poms   Wrist AROM over wedge X 3/10 x 3 ways with small unweighted dowel                              Patient Education and Home Exercises      Education provided:   -  antibiotics from pharmacy  - fabric bandaids   - Progress towards goals      Written Home Exercises Provided: Patient instructed to cont prior HEP.  Exercises were reviewed and Janki was able to demonstrate them prior to the end of the session.  Janki demonstrated good  understanding of the HEP provided. See EMR under Patient Instructions for exercises provided during therapy sessions.      ASSESSMENT     Cont to have mod to severe pain along ulnar aspect of hand radiating up her forearm. Pt verbalized decreased tightness following heat and manual. Educated pt to NOT perform dish washing until antibiotics are finished and pain has decreased.     Janki is progressing well towards her goals and there are no updates to goals at this time. Pt prognosis is Good.     Pt will continue to benefit from skilled outpatient occupational therapy to address the deficits listed in the problem list on initial evaluation, provide pt/family education and to maximize pt's level of independence in the home and community environment.     Pt's spiritual, cultural and educational needs considered and pt agreeable to plan of care and goals.    Anticipated barriers to occupational therapy: n/a    Goals:  Long Term Goals (LTGs); to be met by discharge.   strength goal once assessed  Pt will improve thumb ROM by 5 degrees   Pt will report pain level no greater than 2/10 during functional daily work and community activities   Pinch strength goal once assessed      Short Term Goals (STGs); to be met within 4 weeks ().  Assess  and pinch when appropriate  Pt will improve wrist ROM by 10 degrees   Pt will report pain level no greater than 3/10 during light activity        PLAN     Continue skilled occupational therapy with individualized plan of care focusing on improving functional independence with use of R hand    Updates/Grading for next session: cont as davis Villeda, OT

## 2023-01-10 ENCOUNTER — CLINICAL SUPPORT (OUTPATIENT)
Dept: REHABILITATION | Facility: HOSPITAL | Age: 58
End: 2023-01-10
Payer: COMMERCIAL

## 2023-01-10 DIAGNOSIS — M25.631 DECREASED RANGE OF MOTION OF RIGHT WRIST: ICD-10-CM

## 2023-01-10 DIAGNOSIS — R52 PAIN: Primary | ICD-10-CM

## 2023-01-10 PROCEDURE — 97110 THERAPEUTIC EXERCISES: CPT | Mod: CO

## 2023-01-10 PROCEDURE — 97140 MANUAL THERAPY 1/> REGIONS: CPT | Mod: CO

## 2023-01-10 NOTE — PROGRESS NOTES
[facility]  Occupational Therapy Treatment Note    Date: 1/10/2023  Name: Janki [place] [facility]  Clinic Number: [id number]    Therapy Diagnosis:   Encounter Diagnoses   Name Primary?    Pain Yes    Decreased range of motion of right wrist          Physician: Toma Bowers MD      Physician Orders: Eval and treat; Patient needs appointment 12/27/2022 for suture removal and initiation of gentle ROM hand and wrist  Medical Diagnosis: G56.01 (ICD-10-CM) - Right carpal tunnel syndrome  Surgical Procedure and Date: 12/15/2022, Carpal tunnel release   Evaluation Date: 12/27/2022  Insurance Authorization Period Expiration: 12/31/2022  Plan of Care Expiration: 8 weeks; 2/24/2023  Date of Return to MD: 02/01/2023  Visit # / Visits authorized: 3/ 20  FOTO: (date and score)     Precautions:  Standard       Time In: 10 AM  Time Out: 10:45 AM  Total Appointment Time (timed & untimed codes): 45 minutes        SUBJECTIVE     Pt reports: she has been taking antibiotics for 7 days, no drainage in the last day, continued burning at sx site  She was compliant with home exercise program given last session.   Response to previous treatment: improved infection symptoms   Functional change: none      Pain: 7/10  Location: right hand     OBJECTIVE   Objective Measures updated at progress report unless specified.    Observation/Appearance: 2 inch volar incision; sutures intact      Edema. Measured in centimeters.    12/29/2022 12/29/2022     Left Right   2in. Above elbow       2in. Below elbow       Wrist Crease 15.0 cm 15.3 cm   Figure of 8       MCPs 18.4 cm 18.9 cm         Elbow and Wrist ROM. Measured in degrees.    12/29/2022 12/29/2022     Left Right   Elbow Ext/Flex       Supination/Pronation   WNL/WNL   Wrist Ext/Flex 69/65 70/58   Wrist RD/UD 25/45 16/46      Hand ROM. Measured in degrees.    12/29/2022 12/29/2022     Left Right           Index: MP    Joel to make full composite fist            Thumb:  MP 72 62                IP 28 45       Rad ADD/ABD 50 45       Pal ADD/ABD 55 55          Opposition DPC DPC       Strength (Dynamometer) and Pinch Strength (Pinch Gauge)  Measured in pounds.    12/29/2022 12/29/2022     Left Right   Rung II   deferred   Key Pinch       3pt Pinch       2pt Pinch          Sensation: denies numbness and tingling     12/27/2022 12/27/2022     Left Right   [place] Ankur       Normal 1.65-2.83       Diminished Light Touch 3.22-3.61       Diminished Protective 3.84-4.31       Loss of Protective 4.56-6.65       Untestable >6.65       2 Point Discrimination       Static       Dynamic          Manual Muscle Test    12/29/2022 12/29/2022     Left Right   Wrist Extension    Deferred    Wrist Flexion       Radial Deviation       Ulnar Deviation       Supination       Pronation       Elbow Extension       Elbow Flexion          Limitation/Restriction for FOTO initial eval; wrist  Survey     Therapist reviewed FOTO scores for Janki Jones on 12/27/2022.   FOTO documents entered into [employer] - see Media section.     Limitation Score: 27%             Treatment     Janki received the treatments listed below:     Supervised modalities after being cleared for contradictions: Hot Pack - 5 minutes     Manual therapy techniques: scar massage and retrograde massage  were applied to the: for 10 minutes, including:  - retrograde massage   - gentle scar massage with aquaphor to healing incision    Therapeutic exercises to develop ROM for 30 minutes, including:    AROM Wrist  Ext/flx  RD/UD   X 10 reps each    AROM thumb:  Circles (CW/CCW)  Palm abd/add  Rad abd/add  IP blocks  Opposition  Composite flx  Lifts- thumb only  Spreads  TGEs   X 10 reps each    Wrist maze X 3 min   Isospheres X 3 min   In hand manipulation  X 3 containers medium pom poms   Wrist AROM over wedge X 3/10 x 3 ways with small unweighted dowel                              Patient Education and Home Exercises       Education provided:   -  antibiotics from pharmacy  - fabric bandaids   - Progress towards goals     Written Home Exercises Provided: Patient instructed to cont prior HEP.  Exercises were reviewed and Janki was able to demonstrate them prior to the end of the session.  Janki demonstrated good  understanding of the HEP provided. See EMR under Patient Instructions for exercises provided during therapy sessions.      ASSESSMENT      Pt presented to therapy today with wound closure, decr redness and no drainage at incision site indicating infection is clearing. She tolerated session fairly well. Will cont as davis Shearer is progressing well towards her goals and there are no updates to goals at this time. Pt prognosis is Good.     Pt will continue to benefit from skilled outpatient occupational therapy to address the deficits listed in the problem list on initial evaluation, provide pt/family education and to maximize pt's level of independence in the home and community environment.     Pt's spiritual, cultural and educational needs considered and pt agreeable to plan of care and goals.    Anticipated barriers to occupational therapy: n/a    Goals:  Long Term Goals (LTGs); to be met by discharge.   strength goal once assessed  Pt will improve thumb ROM by 5 degrees   Pt will report pain level no greater than 2/10 during functional daily work and community activities   Pinch strength goal once assessed      Short Term Goals (STGs); to be met within 4 weeks ().  Assess  and pinch when appropriate  Pt will improve wrist ROM by 10 degrees   Pt will report pain level no greater than 3/10 during light activity        PLAN     Continue skilled occupational therapy with individualized plan of care focusing on improving functional independence with use of R hand    Updates/Grading for next session: cont as ELVIS [place]        Client Care conference completed with evaluating therapist in  regards to this patients POC as evidenced by co signature of supervising therapist.

## 2023-01-11 ENCOUNTER — OFFICE VISIT (OUTPATIENT)
Dept: ORTHOPEDICS | Facility: CLINIC | Age: 58
End: 2023-01-11
Payer: COMMERCIAL

## 2023-01-11 DIAGNOSIS — G56.01 RIGHT CARPAL TUNNEL SYNDROME: Primary | ICD-10-CM

## 2023-01-11 DIAGNOSIS — G56.02 LEFT CARPAL TUNNEL SYNDROME: ICD-10-CM

## 2023-01-11 PROCEDURE — 1160F PR REVIEW ALL MEDS BY PRESCRIBER/CLIN PHARMACIST DOCUMENTED: ICD-10-PCS | Mod: CPTII,S$GLB,, | Performed by: ORTHOPAEDIC SURGERY

## 2023-01-11 PROCEDURE — 99999 PR PBB SHADOW E&M-EST. PATIENT-LVL III: ICD-10-PCS | Mod: PBBFAC,,, | Performed by: ORTHOPAEDIC SURGERY

## 2023-01-11 PROCEDURE — 99024 PR POST-OP FOLLOW-UP VISIT: ICD-10-PCS | Mod: S$GLB,,, | Performed by: ORTHOPAEDIC SURGERY

## 2023-01-11 PROCEDURE — 1160F RVW MEDS BY RX/DR IN RCRD: CPT | Mod: CPTII,S$GLB,, | Performed by: ORTHOPAEDIC SURGERY

## 2023-01-11 PROCEDURE — 99024 POSTOP FOLLOW-UP VISIT: CPT | Mod: S$GLB,,, | Performed by: ORTHOPAEDIC SURGERY

## 2023-01-11 PROCEDURE — 4010F ACE/ARB THERAPY RXD/TAKEN: CPT | Mod: CPTII,S$GLB,, | Performed by: ORTHOPAEDIC SURGERY

## 2023-01-11 PROCEDURE — 1159F MED LIST DOCD IN RCRD: CPT | Mod: CPTII,S$GLB,, | Performed by: ORTHOPAEDIC SURGERY

## 2023-01-11 PROCEDURE — 99999 PR PBB SHADOW E&M-EST. PATIENT-LVL III: CPT | Mod: PBBFAC,,, | Performed by: ORTHOPAEDIC SURGERY

## 2023-01-11 PROCEDURE — 4010F PR ACE/ARB THEARPY RXD/TAKEN: ICD-10-PCS | Mod: CPTII,S$GLB,, | Performed by: ORTHOPAEDIC SURGERY

## 2023-01-11 PROCEDURE — 1159F PR MEDICATION LIST DOCUMENTED IN MEDICAL RECORD: ICD-10-PCS | Mod: CPTII,S$GLB,, | Performed by: ORTHOPAEDIC SURGERY

## 2023-01-13 ENCOUNTER — CLINICAL SUPPORT (OUTPATIENT)
Dept: REHABILITATION | Facility: HOSPITAL | Age: 58
End: 2023-01-13
Payer: COMMERCIAL

## 2023-01-13 DIAGNOSIS — R52 PAIN: Primary | ICD-10-CM

## 2023-01-13 DIAGNOSIS — M25.631 DECREASED RANGE OF MOTION OF RIGHT WRIST: ICD-10-CM

## 2023-01-13 PROCEDURE — 97110 THERAPEUTIC EXERCISES: CPT | Mod: CO

## 2023-01-13 PROCEDURE — 97140 MANUAL THERAPY 1/> REGIONS: CPT | Mod: CO

## 2023-01-13 NOTE — PROGRESS NOTES
"  [facility]  Occupational Therapy Treatment Note    Date: 1/13/2023  Name: Janki [place] [facility]  Clinic Number: [id number]    Therapy Diagnosis:   Encounter Diagnoses   Name Primary?    Pain Yes    Decreased range of motion of right wrist            Physician: Toma Bowers MD      Physician Orders: Eval and treat; Patient needs appointment 12/27/2022 for suture removal and initiation of gentle ROM hand and wrist  Medical Diagnosis: G56.01 (ICD-10-CM) - Right carpal tunnel syndrome  Surgical Procedure and Date: 12/15/2022, Carpal tunnel release   Evaluation Date: 12/27/2022  Insurance Authorization Period Expiration: 12/31/2022  Plan of Care Expiration: 8 weeks; 2/24/2023  Date of Return to MD: 02/01/2023  Visit # / Visits authorized: 4/ 20  FOTO: (date and score)     Precautions:  Standard       Time In: 9:46 AM  Time Out: 10:36 AM  Total Appointment Time (timed & untimed codes): 50 minutes        SUBJECTIVE     Pt reports: "It's hurting today."  She was compliant with home exercise program given last session.   Response to previous treatment: improved infection symptoms   Functional change: none      Pain: 7/10  Location: right hand     OBJECTIVE   Objective Measures updated at progress report unless specified.    Observation/Appearance: 2 inch volar incision; sutures intact      Edema. Measured in centimeters.    12/29/2022 12/29/2022     Left Right   2in. Above elbow       2in. Below elbow       Wrist Crease 15.0 cm 15.3 cm   Figure of 8       MCPs 18.4 cm 18.9 cm         Elbow and Wrist ROM. Measured in degrees.    12/29/2022 12/29/2022     Left Right   Elbow Ext/Flex       Supination/Pronation   WNL/WNL   Wrist Ext/Flex 69/65 70/58   Wrist RD/UD 25/45 16/46      Hand ROM. Measured in degrees.    12/29/2022 12/29/2022     Left Right           Index: MP    Joel to make full composite fist            Thumb: MP 72 62                IP 28 45       Rad ADD/ABD 50 45       Pal ADD/ABD " 55 55          Opposition DPC DPC       Strength (Dynamometer) and Pinch Strength (Pinch Gauge)  Measured in pounds.    12/29/2022 12/29/2022     Left Right   Rung II   deferred   Key Pinch       3pt Pinch       2pt Pinch          Sensation: denies numbness and tingling     12/27/2022 12/27/2022     Left Right   [place] Ankur       Normal 1.65-2.83       Diminished Light Touch 3.22-3.61       Diminished Protective 3.84-4.31       Loss of Protective 4.56-6.65       Untestable >6.65       2 Point Discrimination       Static       Dynamic          Manual Muscle Test    12/29/2022 12/29/2022     Left Right   Wrist Extension    Deferred    Wrist Flexion       Radial Deviation       Ulnar Deviation       Supination       Pronation       Elbow Extension       Elbow Flexion          Limitation/Restriction for FOTO initial eval; wrist  Survey     Therapist reviewed FOTO scores for Janki Jones on 12/27/2022.   FOTO documents entered into [employer] - see Media section.     Limitation Score: 27%             Treatment     Janki received the treatments listed below:     Supervised modalities after being cleared for contradictions: Hot Pack - 10 minutes     Manual therapy techniques: scar massage and retrograde massage  were applied to the: for 10 minutes, including:  - retrograde massage   - gentle scar massage  to healing incision    Therapeutic exercises to develop ROM for 30 minutes, including:    AROM Wrist  Ext/flx  RD/UD   X 10 reps each    AROM thumb:  Circles (CW/CCW)  Palm abd/add  Rad abd/add  IP blocks  Opposition  Composite flx  Lifts- thumb only  Spreads  TGEs   X 10 reps each    Wrist maze X 3 min   Isospheres X 3 min   In hand manipulation  X 3 containers medium pom poms   Wrist AROM over wedge X 3/10 x 3 ways with small unweighted dowel                              Patient Education and Home Exercises      Education provided:   -  antibiotics from pharmacy  - fabric bandaids   - Progress  towards goals     Written Home Exercises Provided: Patient instructed to cont prior HEP.  Exercises were reviewed and Janki was able to demonstrate them prior to the end of the session.  Janki demonstrated good  understanding of the HEP provided. See EMR under Patient Instructions for exercises provided during therapy sessions.      ASSESSMENT      Sx site displayed no signs of infection today. Reported she is continuing to have pain in her hand. Will cont as davis Shearer is progressing well towards her goals and there are no updates to goals at this time. Pt prognosis is Good.     Pt will continue to benefit from skilled outpatient occupational therapy to address the deficits listed in the problem list on initial evaluation, provide pt/family education and to maximize pt's level of independence in the home and community environment.     Pt's spiritual, cultural and educational needs considered and pt agreeable to plan of care and goals.    Anticipated barriers to occupational therapy: n/a    Goals:  Long Term Goals (LTGs); to be met by discharge.   strength goal once assessed  Pt will improve thumb ROM by 5 degrees   Pt will report pain level no greater than 2/10 during functional daily work and community activities   Pinch strength goal once assessed      Short Term Goals (STGs); to be met within 4 weeks ().  Assess  and pinch when appropriate  Pt will improve wrist ROM by 10 degrees   Pt will report pain level no greater than 3/10 during light activity        PLAN     Continue skilled occupational therapy with individualized plan of care focusing on improving functional independence with use of R hand    Updates/Grading for next session: cont as ELVIS [place]

## 2023-01-18 ENCOUNTER — OFFICE VISIT (OUTPATIENT)
Dept: INTERNAL MEDICINE | Facility: CLINIC | Age: 58
End: 2023-01-18
Payer: COMMERCIAL

## 2023-01-18 VITALS
DIASTOLIC BLOOD PRESSURE: 78 MMHG | BODY MASS INDEX: 25.97 KG/M2 | WEIGHT: 165.44 LBS | SYSTOLIC BLOOD PRESSURE: 136 MMHG | RESPIRATION RATE: 16 BRPM | HEIGHT: 67 IN | OXYGEN SATURATION: 99 % | HEART RATE: 67 BPM | TEMPERATURE: 98 F

## 2023-01-18 DIAGNOSIS — F33.1 MAJOR DEPRESSIVE DISORDER, RECURRENT EPISODE, MODERATE: ICD-10-CM

## 2023-01-18 DIAGNOSIS — F90.1 ATTENTION DEFICIT HYPERACTIVITY DISORDER (ADHD), PREDOMINANTLY HYPERACTIVE TYPE: ICD-10-CM

## 2023-01-18 DIAGNOSIS — I10 ESSENTIAL HYPERTENSION: Primary | ICD-10-CM

## 2023-01-18 PROCEDURE — 3078F DIAST BP <80 MM HG: CPT | Mod: CPTII,S$GLB,, | Performed by: INTERNAL MEDICINE

## 2023-01-18 PROCEDURE — 3075F SYST BP GE 130 - 139MM HG: CPT | Mod: CPTII,S$GLB,, | Performed by: INTERNAL MEDICINE

## 2023-01-18 PROCEDURE — 3075F PR MOST RECENT SYSTOLIC BLOOD PRESS GE 130-139MM HG: ICD-10-PCS | Mod: CPTII,S$GLB,, | Performed by: INTERNAL MEDICINE

## 2023-01-18 PROCEDURE — 3008F PR BODY MASS INDEX (BMI) DOCUMENTED: ICD-10-PCS | Mod: CPTII,S$GLB,, | Performed by: INTERNAL MEDICINE

## 2023-01-18 PROCEDURE — 99999 PR PBB SHADOW E&M-EST. PATIENT-LVL III: ICD-10-PCS | Mod: PBBFAC,,, | Performed by: INTERNAL MEDICINE

## 2023-01-18 PROCEDURE — 1160F PR REVIEW ALL MEDS BY PRESCRIBER/CLIN PHARMACIST DOCUMENTED: ICD-10-PCS | Mod: CPTII,S$GLB,, | Performed by: INTERNAL MEDICINE

## 2023-01-18 PROCEDURE — 1160F RVW MEDS BY RX/DR IN RCRD: CPT | Mod: CPTII,S$GLB,, | Performed by: INTERNAL MEDICINE

## 2023-01-18 PROCEDURE — 1159F PR MEDICATION LIST DOCUMENTED IN MEDICAL RECORD: ICD-10-PCS | Mod: CPTII,S$GLB,, | Performed by: INTERNAL MEDICINE

## 2023-01-18 PROCEDURE — 3078F PR MOST RECENT DIASTOLIC BLOOD PRESSURE < 80 MM HG: ICD-10-PCS | Mod: CPTII,S$GLB,, | Performed by: INTERNAL MEDICINE

## 2023-01-18 PROCEDURE — 99214 OFFICE O/P EST MOD 30 MIN: CPT | Mod: S$GLB,,, | Performed by: INTERNAL MEDICINE

## 2023-01-18 PROCEDURE — 99999 PR PBB SHADOW E&M-EST. PATIENT-LVL III: CPT | Mod: PBBFAC,,, | Performed by: INTERNAL MEDICINE

## 2023-01-18 PROCEDURE — 3008F BODY MASS INDEX DOCD: CPT | Mod: CPTII,S$GLB,, | Performed by: INTERNAL MEDICINE

## 2023-01-18 PROCEDURE — 99214 PR OFFICE/OUTPT VISIT, EST, LEVL IV, 30-39 MIN: ICD-10-PCS | Mod: S$GLB,,, | Performed by: INTERNAL MEDICINE

## 2023-01-18 PROCEDURE — 1159F MED LIST DOCD IN RCRD: CPT | Mod: CPTII,S$GLB,, | Performed by: INTERNAL MEDICINE

## 2023-01-18 RX ORDER — METHYLPHENIDATE HYDROCHLORIDE 36 MG/1
36 TABLET ORAL EVERY MORNING
Qty: 30 TABLET | Refills: 0 | Status: SHIPPED | OUTPATIENT
Start: 2023-03-18 | End: 2023-04-06

## 2023-01-18 RX ORDER — METHYLPHENIDATE HYDROCHLORIDE 36 MG/1
36 TABLET ORAL EVERY MORNING
Qty: 30 TABLET | Refills: 0 | Status: SHIPPED | OUTPATIENT
Start: 2023-01-18 | End: 2023-01-21 | Stop reason: SDUPTHER

## 2023-01-18 RX ORDER — METHYLPHENIDATE HYDROCHLORIDE 36 MG/1
36 TABLET ORAL DAILY
Qty: 30 TABLET | Refills: 0 | Status: SHIPPED | OUTPATIENT
Start: 2023-02-18 | End: 2023-04-06

## 2023-01-18 NOTE — PROGRESS NOTES
Subjective:       Patient ID: Janki Jones is a 57 y.o. female.    Chief Complaint: Follow-up (3 month follow up)    HPI  Pt with HTN, ADD, MDD, L CTS is here for f/u. Requesting refills of her Concerta which has been working well.  Review of Systems   Constitutional:  Negative for activity change, appetite change, chills, diaphoresis, fatigue, fever and unexpected weight change.   HENT:  Negative for postnasal drip, rhinorrhea, sinus pressure/congestion, sneezing, sore throat, trouble swallowing and voice change.    Respiratory:  Negative for cough, shortness of breath and wheezing.    Cardiovascular:  Negative for chest pain, palpitations and leg swelling.   Gastrointestinal:  Negative for abdominal pain, blood in stool, constipation, diarrhea, nausea and vomiting.   Genitourinary:  Negative for dysuria.   Musculoskeletal:  Negative for arthralgias and myalgias.   Integumentary:  Negative for rash and wound.   Allergic/Immunologic: Negative for environmental allergies and food allergies.   Hematological:  Negative for adenopathy. Does not bruise/bleed easily.   Psychiatric/Behavioral:  Positive for decreased concentration. Negative for self-injury and suicidal ideas. The patient is nervous/anxious.        Objective:      Physical Exam  Constitutional:       General: She is not in acute distress.     Appearance: Normal appearance. She is well-developed. She is not diaphoretic.   HENT:      Head: Normocephalic and atraumatic.      Right Ear: External ear normal.      Left Ear: External ear normal.      Nose: Nose normal.      Mouth/Throat:      Pharynx: No oropharyngeal exudate.   Eyes:      General: No scleral icterus.        Right eye: No discharge.         Left eye: No discharge.      Conjunctiva/sclera: Conjunctivae normal.      Pupils: Pupils are equal, round, and reactive to light.   Neck:      Vascular: No JVD.   Cardiovascular:      Rate and Rhythm: Normal rate and regular rhythm.      Pulses:  Normal pulses.      Heart sounds: Normal heart sounds.   Pulmonary:      Effort: Pulmonary effort is normal. No respiratory distress.      Breath sounds: Normal breath sounds. No wheezing, rhonchi or rales.   Abdominal:      General: Bowel sounds are normal. There is no distension.      Palpations: Abdomen is soft.      Tenderness: There is no abdominal tenderness. There is no guarding or rebound.   Musculoskeletal:      Cervical back: Neck supple.      Right lower leg: No edema.      Left lower leg: No edema.   Lymphadenopathy:      Cervical: No cervical adenopathy.   Skin:     General: Skin is warm and dry.      Coloration: Skin is not pale.      Findings: No rash.   Neurological:      General: No focal deficit present.      Mental Status: She is alert and oriented to person, place, and time.      Gait: Gait normal.   Psychiatric:         Behavior: Behavior normal.         Thought Content: Thought content normal.       Assessment:       Problem List Items Addressed This Visit          Psychiatric    Major depressive disorder, recurrent episode, moderate    ADD (attention deficit disorder)    Relevant Medications    methylphenidate HCl 36 MG CR tablet (Start on 3/18/2023)    methylphenidate HCl 36 MG CR tablet (Start on 2/18/2023)    methylphenidate HCl 36 MG CR tablet       Cardiac/Vascular    Essential hypertension - Primary       Plan:    HTN- stable       ADD- stable on Concerta      MDD- stable      F/u in 3 months for annual exam

## 2023-01-20 ENCOUNTER — CLINICAL SUPPORT (OUTPATIENT)
Dept: REHABILITATION | Facility: HOSPITAL | Age: 58
End: 2023-01-20
Payer: COMMERCIAL

## 2023-01-20 DIAGNOSIS — R52 PAIN: Primary | ICD-10-CM

## 2023-01-20 DIAGNOSIS — M25.631 DECREASED RANGE OF MOTION OF RIGHT WRIST: ICD-10-CM

## 2023-01-20 PROCEDURE — 97110 THERAPEUTIC EXERCISES: CPT | Mod: CO

## 2023-01-20 PROCEDURE — 97140 MANUAL THERAPY 1/> REGIONS: CPT | Mod: CO

## 2023-01-20 NOTE — PROGRESS NOTES
[facility]  Occupational Therapy Treatment Note    Date: 1/20/2023  Name: Janki [place] [facility]  Clinic Number: [id number]    Therapy Diagnosis:   Encounter Diagnoses   Name Primary?    Pain Yes    Decreased range of motion of right wrist              Physician: Toma Bowers MD      Physician Orders: Eval and treat; Patient needs appointment 12/27/2022 for suture removal and initiation of gentle ROM hand and wrist  Medical Diagnosis: G56.01 (ICD-10-CM) - Right carpal tunnel syndrome  Surgical Procedure and Date: 12/15/2022, Carpal tunnel release   Evaluation Date: 12/27/2022  Insurance Authorization Period Expiration: 12/31/2022  Plan of Care Expiration: 8 weeks; 2/24/2023  Date of Return to MD: 02/01/2023  Visit # / Visits authorized: 5/ 20  FOTO: (date and score)     Precautions:  Standard       Time In: 9:41 AM  Time Out: 10:34 AM  Total Appointment Time (timed & untimed codes): 53 minutes        SUBJECTIVE     Pt reports: reported she is continuing to have pain at time   She was compliant with home exercise program given last session.   Response to previous treatment: improved infection symptoms   Functional change: none      Pain: 7/10  Location: right hand     OBJECTIVE   Objective Measures updated at progress report unless specified.    Observation/Appearance: 2 inch volar incision; sutures intact      Edema. Measured in centimeters.    12/29/2022 12/29/2022     Left Right   2in. Above elbow       2in. Below elbow       Wrist Crease 15.0 cm 15.3 cm   Figure of 8       MCPs 18.4 cm 18.9 cm         Elbow and Wrist ROM. Measured in degrees.    12/29/2022 12/29/2022     Left Right   Elbow Ext/Flex       Supination/Pronation   WNL/WNL   Wrist Ext/Flex 69/65 70/58   Wrist RD/UD 25/45 16/46      Hand ROM. Measured in degrees.    12/29/2022 12/29/2022     Left Right           Index: MP    Joel to make full composite fist            Thumb: MP 72 62                IP 28 45       Rad  ADD/ABD 50 45       Pal ADD/ABD 55 55          Opposition DPC DPC       Strength (Dynamometer) and Pinch Strength (Pinch Gauge)  Measured in pounds.    12/29/2022 12/29/2022     Left Right   Rung II   deferred   Key Pinch       3pt Pinch       2pt Pinch          Sensation: denies numbness and tingling     12/27/2022 12/27/2022     Left Right   [place] Ankur       Normal 1.65-2.83       Diminished Light Touch 3.22-3.61       Diminished Protective 3.84-4.31       Loss of Protective 4.56-6.65       Untestable >6.65       2 Point Discrimination       Static       Dynamic          Manual Muscle Test    12/29/2022 12/29/2022     Left Right   Wrist Extension    Deferred    Wrist Flexion       Radial Deviation       Ulnar Deviation       Supination       Pronation       Elbow Extension       Elbow Flexion          Limitation/Restriction for FOTO initial eval; wrist  Survey     Therapist reviewed FOTO scores for Janki Jones on 12/27/2022.   FOTO documents entered into [employer] - see Media section.     Limitation Score: 27%             Treatment     Janki received the treatments listed below:     Supervised modalities after being cleared for contradictions: Hot Pack - 10 minutes     Manual therapy techniques: scar massage and retrograde massage  were applied to the: for 10 minutes, including:  - retrograde massage   - gentle scar massage  to healing incision  - cleanup of dead skin over sx site to promote healing    Therapeutic exercises to develop ROM for 33 minutes, including:    AROM Wrist  Ext/flx  RD/UD   X 10 reps each    AROM thumb:  Circles (CW/CCW)  Palm abd/add  Rad abd/add  IP blocks  Opposition  Composite flx  Lifts- thumb only  Spreads  TGEs   X 10 reps each    Wrist maze X 2 min   Isospheres X 2 min   In hand manipulation  X 3 containers medium pom poms   Wrist AROM over wedge X 3/10 x 3 ways with small unweighted dowel    Prayer stretch X30 sec holds (3 sets)     Thumb spool on IF X10 ea way                    Patient Education and Home Exercises      Education provided:   -  antibiotics from pharmacy  - fabric bandaids   - Progress towards goals     Written Home Exercises Provided: Patient instructed to cont prior HEP.  Exercises were reviewed and Janki was able to demonstrate them prior to the end of the session.  Janki demonstrated good  understanding of the HEP provided. See EMR under Patient Instructions for exercises provided during therapy sessions.      ASSESSMENT      Pt tolerated session fairly well. Sx site closed and healed with dense adherent scar tissue. Will cont as davis Shearer is progressing well towards her goals and there are no updates to goals at this time. Pt prognosis is Good.     Pt will continue to benefit from skilled outpatient occupational therapy to address the deficits listed in the problem list on initial evaluation, provide pt/family education and to maximize pt's level of independence in the home and community environment.     Pt's spiritual, cultural and educational needs considered and pt agreeable to plan of care and goals.    Anticipated barriers to occupational therapy: n/a    Goals:  Long Term Goals (LTGs); to be met by discharge.   strength goal once assessed  Pt will improve thumb ROM by 5 degrees   Pt will report pain level no greater than 2/10 during functional daily work and community activities   Pinch strength goal once assessed      Short Term Goals (STGs); to be met within 4 weeks ().  Assess  and pinch when appropriate  Pt will improve wrist ROM by 10 degrees   Pt will report pain level no greater than 3/10 during light activity        PLAN     Continue skilled occupational therapy with individualized plan of care focusing on improving functional independence with use of R hand    Updates/Grading for next session: cont as ELVIS [place]

## 2023-01-20 NOTE — PROGRESS NOTES
Janki Jones presents for post-operative evaluation of   Encounter Diagnoses   Name Primary?    Right carpal tunnel syndrome Yes    Left carpal tunnel syndrome    The patient is now 3 weeks s/p right carpal tunnel release and left carpal tunnel steroid injection.  Overall the patient reports doing well.  The patient reports pain at her incision and tenderness, denies redness or drainage.     PE:    AA&O x 4.  NAD  HEENT:  NCAT, sclera nonicteric  Lungs:  Respirations are equal and unlabored.  CV:  2+ bilateral upper and lower extremity pulses.  MSK: The incision is well healed, tender to palpation.  Full wrist and finger motion.  Neurovascularly intact and has 5/5 thenar and intrinsic musculature strength.        A/P: Status post above, doing well  1) Continue with range of motion and strengthening at 6 weeks post op  2) F/U 6 weeks  3) Call with any questions/concerns in the interim        Toma Bowers MD    Please be aware that this note has been generated with the assistance of [facility] voice-to-text.  Please excuse any spelling or grammatical errors.

## 2023-01-24 ENCOUNTER — CLINICAL SUPPORT (OUTPATIENT)
Dept: REHABILITATION | Facility: HOSPITAL | Age: 58
End: 2023-01-24
Payer: COMMERCIAL

## 2023-01-24 DIAGNOSIS — R52 PAIN: Primary | ICD-10-CM

## 2023-01-24 DIAGNOSIS — M25.631 DECREASED RANGE OF MOTION OF RIGHT WRIST: ICD-10-CM

## 2023-01-24 PROCEDURE — 97022 WHIRLPOOL THERAPY: CPT | Mod: CO

## 2023-01-24 PROCEDURE — 97140 MANUAL THERAPY 1/> REGIONS: CPT | Mod: CO

## 2023-01-24 PROCEDURE — 97110 THERAPEUTIC EXERCISES: CPT | Mod: CO

## 2023-01-24 NOTE — PROGRESS NOTES
[facility]  Occupational Therapy Treatment Note    Date: 1/24/2023  Name: Janki [place] [facility]  Clinic Number: [id number]    Therapy Diagnosis:   Encounter Diagnoses   Name Primary?    Pain Yes    Decreased range of motion of right wrist                Physician: Toma Bowers MD      Physician Orders: Eval and treat; Patient needs appointment 12/27/2022 for suture removal and initiation of gentle ROM hand and wrist  Medical Diagnosis: G56.01 (ICD-10-CM) - Right carpal tunnel syndrome  Surgical Procedure and Date: 12/15/2022, Carpal tunnel release   Evaluation Date: 12/27/2022  Insurance Authorization Period Expiration: 12/31/2022  Plan of Care Expiration: 8 weeks; 2/24/2023  Date of Return to MD: 02/01/2023  Visit # / Visits authorized: 6/ 20  FOTO: (date and score)     Precautions:  Standard       Time In: 10:00 AM  Time Out: 10:45 AM  Total Appointment Time (timed & untimed codes): 45 minutes        SUBJECTIVE     Pt reports: tenderness at sx site is limiting daily activities.  She was compliant with home exercise program given last session.   Response to previous treatment: improved infection symptoms   Functional change: none      Pain: 7/10  Location: right hand     OBJECTIVE   Objective Measures updated at progress report unless specified.    Observation/Appearance: 2 inch volar incision; sutures intact      Edema. Measured in centimeters.    12/29/2022 12/29/2022     Left Right   2in. Above elbow       2in. Below elbow       Wrist Crease 15.0 cm 15.3 cm   Figure of 8       MCPs 18.4 cm 18.9 cm         Elbow and Wrist ROM. Measured in degrees.    12/29/2022 12/29/2022     Left Right   Elbow Ext/Flex       Supination/Pronation   WNL/WNL   Wrist Ext/Flex 69/65 70/58   Wrist RD/UD 25/45 16/46      Hand ROM. Measured in degrees.    12/29/2022 12/29/2022     Left Right           Index: MP    Joel to make full composite fist            Thumb: MP 72 62                IP 28 45        Rad ADD/ABD 50 45       Pal ADD/ABD 55 55          Opposition DPC DPC       Strength (Dynamometer) and Pinch Strength (Pinch Gauge)  Measured in pounds.    12/29/2022 12/29/2022     Left Right   Rung II   deferred   Key Pinch       3pt Pinch       2pt Pinch          Sensation: denies numbness and tingling     12/27/2022 12/27/2022     Left Right   [place] Ankur       Normal 1.65-2.83       Diminished Light Touch 3.22-3.61       Diminished Protective 3.84-4.31       Loss of Protective 4.56-6.65       Untestable >6.65       2 Point Discrimination       Static       Dynamic          Manual Muscle Test    12/29/2022 12/29/2022     Left Right   Wrist Extension    Deferred    Wrist Flexion       Radial Deviation       Ulnar Deviation       Supination       Pronation       Elbow Extension       Elbow Flexion          Limitation/Restriction for FOTO initial eval; wrist  Survey     Therapist reviewed FOTO scores for Janki Jones on 12/27/2022.   FOTO documents entered into [employer] - see Media section.     Limitation Score: 27%             Treatment     Janki received the treatments listed below:     Supervised modalities after being cleared for contradictions: Fluidotherapy: To R hand for 10 min, continuous air, 110 deg, air speed 100 to decrease pain, edema & scar tissue and increased tissue extensibility.      Manual therapy techniques: scar massage and retrograde massage  were applied to the: for 10 minutes, including:  - retrograde massage   - gentle scar massage with vibrating tool and     Therapeutic exercises to develop ROM for 25 minutes, including:    AROM Wrist  Ext/flx  RD/UD   X 10 reps each    AROM thumb:  Circles (CW/CCW)  Palm abd/add  Rad abd/add  IP blocks  Opposition  Composite flx  Lifts- thumb only  Spreads  TGEs   X 10 reps each    Wrist maze X 2 min   Isospheres X 2 min   In hand manipulation  X 3 containers medium pom poms   Wrist AROM over wedge X 3/10 x 3 ways with small unweighted  dowel    Prayer stretch X30 sec holds (3 sets)     Thumb spool on IF X10 ea way   Wrist wheel flex/ext x2 min                Patient Education and Home Exercises      Education provided:   -  antibiotics from pharmacy  - fabric bandaids   - Progress towards goals     Written Home Exercises Provided: Patient instructed to cont prior HEP.  Exercises were reviewed and Janki was able to demonstrate them prior to the end of the session.  Janki demonstrated good  understanding of the HEP provided. See EMR under Patient Instructions for exercises provided during therapy sessions.      ASSESSMENT      Pt tolerated session fairly well. Continues to be limited by pain and tenderness over CTR site. Ed on tapping for pillar pain. Will cont as davis     Janki is progressing well towards her goals and there are no updates to goals at this time. Pt prognosis is Good.     Pt will continue to benefit from skilled outpatient occupational therapy to address the deficits listed in the problem list on initial evaluation, provide pt/family education and to maximize pt's level of independence in the home and community environment.     Pt's spiritual, cultural and educational needs considered and pt agreeable to plan of care and goals.    Anticipated barriers to occupational therapy: n/a    Goals:  Long Term Goals (LTGs); to be met by discharge.   strength goal once assessed  Pt will improve thumb ROM by 5 degrees   Pt will report pain level no greater than 2/10 during functional daily work and community activities   Pinch strength goal once assessed      Short Term Goals (STGs); to be met within 4 weeks ().  Assess  and pinch when appropriate  Pt will improve wrist ROM by 10 degrees   Pt will report pain level no greater than 3/10 during light activity        PLAN     Continue skilled occupational therapy with individualized plan of care focusing on improving functional independence with use of R hand    Updates/Grading  for next session: cont as davis     TRACI Floyd/CHINTAN

## 2023-01-31 ENCOUNTER — CLINICAL SUPPORT (OUTPATIENT)
Dept: REHABILITATION | Facility: HOSPITAL | Age: 58
End: 2023-01-31
Payer: COMMERCIAL

## 2023-01-31 DIAGNOSIS — M25.631 DECREASED RANGE OF MOTION OF RIGHT WRIST: ICD-10-CM

## 2023-01-31 DIAGNOSIS — R52 PAIN: Primary | ICD-10-CM

## 2023-01-31 PROCEDURE — 97112 NEUROMUSCULAR REEDUCATION: CPT

## 2023-01-31 PROCEDURE — 97022 WHIRLPOOL THERAPY: CPT | Mod: 59

## 2023-01-31 PROCEDURE — 97140 MANUAL THERAPY 1/> REGIONS: CPT

## 2023-01-31 PROCEDURE — 97110 THERAPEUTIC EXERCISES: CPT

## 2023-01-31 NOTE — PROGRESS NOTES
[facility]  Occupational Therapy Treatment Note    Date: 1/31/2023  Name: Janki [place] [facility]  Clinic Number: [id number]    Therapy Diagnosis:   Encounter Diagnoses   Name Primary?    Pain Yes    Decreased range of motion of right wrist        Physician: Toma Bowers MD      Physician Orders: Eval and treat; Patient needs appointment 12/27/2022 for suture removal and initiation of gentle ROM hand and wrist  Medical Diagnosis: G56.01 (ICD-10-CM) - Right carpal tunnel syndrome  Surgical Procedure and Date: 12/15/2022, Carpal tunnel release   Evaluation Date: 12/27/2022  Insurance Authorization Period Expiration: 12/31/2022  Plan of Care Expiration: 8 weeks; 2/24/2023  Date of Return to MD: 02/01/2023  Visit # / Visits authorized: 6/ 20  FOTO: (date and score)     Precautions:  Standard       Time In: 10:00 AM  Time Out: 10:55 AM  Total Appointment Time (timed & untimed codes): 55 minutes      6 weeks 5 days (1/31/2023     SUBJECTIVE     Pt reports: burning sensation along ulnar palm, itching surrounding scar   She was compliant with home exercise program given last session.   Response to previous treatment: improved infection symptoms   Functional change: none      Pain: 7/10 (start of tx) 5/10 (following fx)   Location: right hand     OBJECTIVE   Objective Measures updated at progress report unless specified.    Observation/Appearance: 2 inch volar incision; sutures intact      Edema. Measured in centimeters.    12/29/2022 12/29/2022 1/31/2023     Left Right    2in. Above elbow        2in. Below elbow        Wrist Crease 15.0 cm 15.3 cm 15.9 cm   Figure of 8        MCPs 18.4 cm 18.9 cm          Elbow and Wrist ROM. Measured in degrees.    12/29/2022 12/29/2022 1/31/2023      Left Right Right   Elbow Ext/Flex        Supination/Pronation   WNL/WNL    Wrist Ext/Flex 69/65 70/58 66/83   Wrist RD/UD 25/45 16/46 15/40      Hand ROM. Measured in degrees.    12/29/2022 12/29/2022 1/31/2023      Left Right Right            Index: MP    Joel to make full composite fist              Thumb: MP 72 62 58                IP 28 45 62       Rad ADD/ABD 50 45 41       Pal ADD/ABD 55 55 45          Opposition DPC DPC DPC       Strength (Dynamometer) and Pinch Strength (Pinch Gauge)  Measured in pounds.    12/29/2022 12/29/2022     Left Right   Rung II   Deferred    Key Pinch       3pt Pinch       2pt Pinch          Sensation: denies numbness and tingling     12/27/2022 12/27/2022     Left Right   [place] Ankur       Normal 1.65-2.83       Diminished Light Touch 3.22-3.61       Diminished Protective 3.84-4.31       Loss of Protective 4.56-6.65       Untestable >6.65       2 Point Discrimination       Static       Dynamic          Manual Muscle Test    12/29/2022 12/29/2022     Left Right   Wrist Extension    Deferred    Wrist Flexion       Radial Deviation       Ulnar Deviation       Supination       Pronation       Elbow Extension       Elbow Flexion          Limitation/Restriction for FOTO initial eval; wrist  Survey     Therapist reviewed FOTO scores for Janki Jones on 12/27/2022.   FOTO documents entered into [employer] - see Media section.     Limitation Score: 27%             Treatment     Janki received the treatments listed below:     Supervised modalities after being cleared for contradictions: Fluidotherapy: To R hand for 10 min, continuous air, 110 deg, air speed 100 to decrease pain, edema & scar tissue and increased tissue extensibility.      Manual therapy techniques: scar massage and retrograde massage  were applied to the: for 30 minutes, including:  - STM flexor muscle bellies with hawk   - STM with star tool hypothenar, thenar (pulling away from scar)   - desensitization with vibration tool scar massage (vibration near thenar origin while maintaining passive stretch thumb extension)   - KT tape carpal tunnel       Therapeutic exercises to develop ROM for 10 minutes, including:  -  updated objective measurements, please see above   - distal median nerve glides x 10 reps (2 sets)       Neuro re-ed: 10 minutes   - manipulating hand/wrist in rice bin     Patient Education and Home Exercises      Education provided:   - rice bin at home for 10-20 min a day  - pt given desensitization stick  - Progress towards goals     Written Home Exercises Provided: Patient instructed to cont prior HEP.  Exercises were reviewed and Janki was able to demonstrate them prior to the end of the session.  Janki demonstrated good  understanding of the HEP provided. See EMR under Patient Instructions for exercises provided during therapy sessions.      ASSESSMENT     Decreased pain following tx today. Moderate soft tissue tightness and scar adhesion causing nerve irritation. Pt reports severe itching through palm. Added desensitize to HEP     Janki is progressing well towards her goals and there are no updates to goals at this time. Pt prognosis is Good.     Pt will continue to benefit from skilled outpatient occupational therapy to address the deficits listed in the problem list on initial evaluation, provide pt/family education and to maximize pt's level of independence in the home and community environment.     Pt's spiritual, cultural and educational needs considered and pt agreeable to plan of care and goals.    Anticipated barriers to occupational therapy: n/a    Goals:  Long Term Goals (LTGs); to be met by discharge.   strength goal once assessed  Pt will improve thumb ROM by 5 degrees   Pt will report pain level no greater than 2/10 during functional daily work and community activities   Pinch strength goal once assessed      Short Term Goals (STGs); to be met within 4 weeks ().  Assess  and pinch when appropriate  Pt will improve wrist ROM by 10 degrees progressing not yet met (1/31/23)  Pt will report pain level no greater than 3/10 during light activity progressing not yet met (1/31/2023)         PLAN     Continue skilled occupational therapy with individualized plan of care focusing on improving functional independence with use of R hand    Updates/Grading for next session: cont as davis     Marilee Villeda, OT

## 2023-01-31 NOTE — PATIENT INSTRUCTIONS
[facility], OCCUPATIONAL THERAPY  HOME EXERCISE PROGRAM        Desensitization Home Program  Perform these activities 2 times a day. 2-3 min each activity    Massage the hypersensitive area using your other hand. Start with light pressure and gradually increase pressure going in circles. You may use lotion if you prefer.        Rub the hypersensitive areas with different textures of fabric or household materials. Examples include cotton, velvet, velcro, wool, jose cloth, dish cloths, flannel, jeans, felt, scrub brushes, corduroy, Choose 2 textures starting with least irritating and work towards the most irritating.    Fill container with dry food, put hand in container and move around. Examples include:  beans, sand, rice, corn kernels, marbles, etc. Choose 2  textures. Start with the least irritating and work towards the most irritating.     Tap the area that is sensitive. You can use the end of a toothbrush or a pencil eraser to tap the area that hurts. Tap the sensitive areas on the table or other surfaces of other objects. You can start with several layers of towels and decrease the number of towels until you can stand to tap without padding. You can also tap objects like sponges and cloths.        MEDIAN NERVE GLIDING    Complete 10 repetitions of each exercise 4-6 times a day.      Make a fist, keep the wrist straight.  Straighten the fingers, keep the wrist straight.  Gently bend the wrist back, keep the thumb close to the fingers.  Extend the thumb out.  Turn forearm so palm is facing up.  Gently stretch the thumb out using the other hand.    If any of these exercises aggravate your hands, stop, rest and try returning again to the previous exercise performed without pain. Proceed at your own pace using pain tolerance as your guide.

## 2023-02-03 ENCOUNTER — CLINICAL SUPPORT (OUTPATIENT)
Dept: REHABILITATION | Facility: HOSPITAL | Age: 58
End: 2023-02-03
Payer: COMMERCIAL

## 2023-02-03 DIAGNOSIS — R52 PAIN: Primary | ICD-10-CM

## 2023-02-03 DIAGNOSIS — M25.631 DECREASED RANGE OF MOTION OF RIGHT WRIST: ICD-10-CM

## 2023-02-03 PROCEDURE — 97140 MANUAL THERAPY 1/> REGIONS: CPT | Mod: CO

## 2023-02-03 PROCEDURE — 97022 WHIRLPOOL THERAPY: CPT | Mod: CO

## 2023-02-03 PROCEDURE — 97110 THERAPEUTIC EXERCISES: CPT | Mod: CO

## 2023-02-03 NOTE — PROGRESS NOTES
[facility]  Occupational Therapy Treatment Note    Date: 2/3/2023  Name: Janki [place] [facility]  Clinic Number: [id number]    Therapy Diagnosis:   Encounter Diagnoses   Name Primary?    Pain Yes    Decreased range of motion of right wrist          Physician: Toma Bowers MD      Physician Orders: Eval and treat; Patient needs appointment 12/27/2022 for suture removal and initiation of gentle ROM hand and wrist  Medical Diagnosis: G56.01 (ICD-10-CM) - Right carpal tunnel syndrome  Surgical Procedure and Date: 12/15/2022, Carpal tunnel release   Evaluation Date: 12/27/2022  Insurance Authorization Period Expiration: 12/31/2022  Plan of Care Expiration: 8 weeks; 2/24/2023  Date of Return to MD: 02/01/2023  Visit # / Visits authorized: 8/ 20  FOTO: (date and score)     Precautions:  Standard       Time In: 9:48 AM  Time Out: 10:38 AM  Total Appointment Time (timed & untimed codes): 50 minutes      6 weeks 5 days (1/31/2023     SUBJECTIVE     Pt reports: continued itching surrounding scar   She was compliant with home exercise program given last session.   Response to previous treatment: improved infection symptoms   Functional change: none      Pain: 7/10 (start of tx)   Location: right hand     OBJECTIVE   Objective Measures updated at progress report unless specified.    Observation/Appearance: 2 inch volar incision; sutures intact      Edema. Measured in centimeters.    12/29/2022 12/29/2022 1/31/2023     Left Right    2in. Above elbow        2in. Below elbow        Wrist Crease 15.0 cm 15.3 cm 15.9 cm   Figure of 8        MCPs 18.4 cm 18.9 cm          Elbow and Wrist ROM. Measured in degrees.    12/29/2022 12/29/2022 1/31/2023      Left Right Right   Elbow Ext/Flex        Supination/Pronation   WNL/WNL    Wrist Ext/Flex 69/65 70/58 66/83   Wrist RD/UD 25/45 16/46 15/40      Hand ROM. Measured in degrees.    12/29/2022 12/29/2022 1/31/2023     Left Right Right            Index: MP     Joel to make full composite fist              Thumb: MP 72 62 58                IP 28 45 62       Rad ADD/ABD 50 45 41       Pal ADD/ABD 55 55 45          Opposition DPC DPC DPC       Strength (Dynamometer) and Pinch Strength (Pinch Gauge)  Measured in pounds.    12/29/2022 12/29/2022     Left Right   Rung II   Deferred    Key Pinch       3pt Pinch       2pt Pinch          Sensation: denies numbness and tingling     12/27/2022 12/27/2022     Left Right   [place] Ankur       Normal 1.65-2.83       Diminished Light Touch 3.22-3.61       Diminished Protective 3.84-4.31       Loss of Protective 4.56-6.65       Untestable >6.65       2 Point Discrimination       Static       Dynamic          Manual Muscle Test    12/29/2022 12/29/2022     Left Right   Wrist Extension    Deferred    Wrist Flexion       Radial Deviation       Ulnar Deviation       Supination       Pronation       Elbow Extension       Elbow Flexion          Limitation/Restriction for FOTO initial eval; wrist  Survey     Therapist reviewed FOTO scores for Janki Jones on 12/27/2022.   FOTO documents entered into [employer] - see Media section.     Limitation Score: 27%             Treatment     Janki received the treatments listed below:     Supervised modalities after being cleared for contradictions: Fluidotherapy: To R hand for 10 min, continuous air, 110 deg, air speed 100 to decrease pain, edema & scar tissue and increased tissue extensibility.      Manual therapy techniques: scar massage and retrograde massage  were applied to the: for 15 minutes, including:  - STM flexor muscle bellies with hawk   - STM with star tool hypothenar, thenar (pulling away from scar)   - desensitization with vibration tool scar massage (vibration near thenar origin while maintaining passive stretch thumb extension)   - KT tape carpal tunnel NT      Therapeutic exercises to develop ROM for 25 minutes, including:  - wrist extension stretch x30 sec x3  -  isospheres x2 min  - Wrist PRE with 1# x10 ea    - digit abd yellow RB x1 min  - digit add yellow sponges x1min  - in hand manipulation with coins   - distal median nerve glides x 10 reps (2 sets)       Neuro re-ed: 10 minutes NT  - manipulating hand/wrist in rice bin     Patient Education and Home Exercises      Education provided:   - rice bin at home for 10-20 min a day  - pt given desensitization stick  - Progress towards goals     Written Home Exercises Provided: Patient instructed to cont prior HEP.  Exercises were reviewed and Janki was able to demonstrate them prior to the end of the session.  Janki demonstrated good  understanding of the HEP provided. See EMR under Patient Instructions for exercises provided during therapy sessions.      ASSESSMENT     Continue to c/o itching in palm. Pt tolerated session well despite some c/o pinching with in hand manipulation.     Janki is progressing well towards her goals and there are no updates to goals at this time. Pt prognosis is Good.     Pt will continue to benefit from skilled outpatient occupational therapy to address the deficits listed in the problem list on initial evaluation, provide pt/family education and to maximize pt's level of independence in the home and community environment.     Pt's spiritual, cultural and educational needs considered and pt agreeable to plan of care and goals.    Anticipated barriers to occupational therapy: n/a    Goals:  Long Term Goals (LTGs); to be met by discharge.   strength goal once assessed  Pt will improve thumb ROM by 5 degrees   Pt will report pain level no greater than 2/10 during functional daily work and community activities   Pinch strength goal once assessed      Short Term Goals (STGs); to be met within 4 weeks ().  Assess  and pinch when appropriate  Pt will improve wrist ROM by 10 degrees progressing not yet met (1/31/23)  Pt will report pain level no greater than 3/10 during light activity  progressing not yet met (1/31/2023)        PLAN     Continue skilled occupational therapy with individualized plan of care focusing on improving functional independence with use of R hand    Updates/Grading for next session: cont as TRACI [place]/CHINTAN

## 2023-02-08 DIAGNOSIS — Z00.00 ANNUAL PHYSICAL EXAM: ICD-10-CM

## 2023-02-08 DIAGNOSIS — I10 ESSENTIAL HYPERTENSION: Primary | ICD-10-CM

## 2023-02-10 ENCOUNTER — CLINICAL SUPPORT (OUTPATIENT)
Dept: REHABILITATION | Facility: HOSPITAL | Age: 58
End: 2023-02-10
Payer: COMMERCIAL

## 2023-02-10 DIAGNOSIS — M25.631 DECREASED RANGE OF MOTION OF RIGHT WRIST: ICD-10-CM

## 2023-02-10 DIAGNOSIS — R52 PAIN: Primary | ICD-10-CM

## 2023-02-10 PROCEDURE — 97140 MANUAL THERAPY 1/> REGIONS: CPT | Mod: CO

## 2023-02-10 PROCEDURE — 97110 THERAPEUTIC EXERCISES: CPT | Mod: CO

## 2023-02-10 PROCEDURE — 97022 WHIRLPOOL THERAPY: CPT | Mod: CO

## 2023-02-10 PROCEDURE — 97530 THERAPEUTIC ACTIVITIES: CPT | Mod: CO

## 2023-02-10 NOTE — PROGRESS NOTES
[facility]  Occupational Therapy Treatment Note    Date: 2/10/2023  Name: Janki Montana [facility]  Clinic Number: [id number]    Therapy Diagnosis:   Encounter Diagnoses   Name Primary?    Pain Yes    Decreased range of motion of right wrist            Physician: Toma Bowers MD      Physician Orders: Eval and treat; Patient needs appointment 12/27/2022 for suture removal and initiation of gentle ROM hand and wrist  Medical Diagnosis: G56.01 (ICD-10-CM) - Right carpal tunnel syndrome  Surgical Procedure and Date: 12/15/2022, Carpal tunnel release   Evaluation Date: 12/27/2022  Insurance Authorization Period Expiration: 12/31/2022  Plan of Care Expiration: 8 weeks; 2/24/2023  Date of Return to MD: 02/01/2023  Visit # / Visits authorized: 9/ 20  FOTO: (date and score)     Precautions:  Standard       Time In: 9:52 AM  Time Out: 10:43 AM  Total Appointment Time (timed & untimed codes): 51 minutes      6 weeks 5 days (1/31/2023     SUBJECTIVE     Pt reports: continued itching and some sharp pains, difficulty cooking however improving.  She was compliant with home exercise program given last session.   Response to previous treatment: improved infection symptoms   Functional change: none      Pain: 7/10 (start of tx)   Location: right hand     OBJECTIVE   Objective Measures updated at progress report unless specified.    Observation/Appearance: 2 inch volar incision; sutures intact      Edema. Measured in centimeters.    12/29/2022 12/29/2022 1/31/2023     Left Right    2in. Above elbow        2in. Below elbow        Wrist Crease 15.0 cm 15.3 cm 15.9 cm   Figure of 8        MCPs 18.4 cm 18.9 cm          Elbow and Wrist ROM. Measured in degrees.    12/29/2022 12/29/2022 1/31/2023      Left Right Right   Elbow Ext/Flex        Supination/Pronation   WNL/WNL    Wrist Ext/Flex 69/65 70/58 66/83   Wrist RD/UD 25/45 16/46 15/40      Hand ROM. Measured in degrees.    12/29/2022 12/29/2022 1/31/2023      Left Right Right            Index: MP    Joel to make full composite fist              Thumb: MP 72 62 58                IP 28 45 62       Rad ADD/ABD 50 45 41       Pal ADD/ABD 55 55 45          Opposition DPC DPC DPC       Strength (Dynamometer) and Pinch Strength (Pinch Gauge)  Measured in pounds.    12/29/2022 12/29/2022     Left Right   Rung II   Deferred    Key Pinch       3pt Pinch       2pt Pinch          Sensation: denies numbness and tingling     12/27/2022 12/27/2022     Left Right   [place] Ankur       Normal 1.65-2.83       Diminished Light Touch 3.22-3.61       Diminished Protective 3.84-4.31       Loss of Protective 4.56-6.65       Untestable >6.65       2 Point Discrimination       Static       Dynamic          Manual Muscle Test    12/29/2022 12/29/2022     Left Right   Wrist Extension    Deferred    Wrist Flexion       Radial Deviation       Ulnar Deviation       Supination       Pronation       Elbow Extension       Elbow Flexion          Limitation/Restriction for FOTO initial eval; wrist  Survey     Therapist reviewed FOTO scores for Janki Jones on 12/27/2022.   FOTO documents entered into [employer] - see Media section.     Limitation Score: 27%             Treatment     Janki received the treatments listed below:     Supervised modalities after being cleared for contradictions: Fluidotherapy: To R hand for 10 min, continuous air, 110 deg, air speed 100 to decrease pain, edema & scar tissue and increased tissue extensibility.      Manual therapy techniques: scar massage and retrograde massage  were applied to the: for 10 minutes, including:  - STM flexor muscle bellies with hawk   - STM with star tool hypothenar, thenar (pulling away from scar)   - desensitization with vibration tool scar massage (vibration near thenar origin while maintaining passive stretch thumb extension)   - KT tape carpal tunnel NT      Therapeutic exercises to develop ROM for 23 minutes,  including:  - wrist extension stretch x30 sec x3  - isospheres x2 min  - Wrist PRE with 1# x10 ea    - digit abd yellow RB x1 min  - digit add yellow sponges x1min  - in hand manipulation with coins NT  - distal median nerve glides x 10 reps (2 sets)   - blue sponge squeezes         Neuro re-ed: 8 minutes   - manipulating hand/wrist in rice bin     Patient Education and Home Exercises      Education provided:   - rice bin at home for 10-20 min a day  - pt given desensitization stick  - Progress towards goals     Written Home Exercises Provided: Patient instructed to cont prior HEP.  Exercises were reviewed and Janki was able to demonstrate them prior to the end of the session.  Janki demonstrated good  understanding of the HEP provided. See EMR under Patient Instructions for exercises provided during therapy sessions.      ASSESSMENT     Tolerated session well today with no c/o pain. Good tolerance to sponge squeezes. Issue putty next time if pain continues to be minimal.     Janki is progressing well towards her goals and there are no updates to goals at this time. Pt prognosis is Good.     Pt will continue to benefit from skilled outpatient occupational therapy to address the deficits listed in the problem list on initial evaluation, provide pt/family education and to maximize pt's level of independence in the home and community environment.     Pt's spiritual, cultural and educational needs considered and pt agreeable to plan of care and goals.    Anticipated barriers to occupational therapy: n/a    Goals:  Long Term Goals (LTGs); to be met by discharge.   strength goal once assessed  Pt will improve thumb ROM by 5 degrees   Pt will report pain level no greater than 2/10 during functional daily work and community activities   Pinch strength goal once assessed      Short Term Goals (STGs); to be met within 4 weeks ().  Assess  and pinch when appropriate  Pt will improve wrist ROM by 10 degrees  progressing not yet met (1/31/23)  Pt will report pain level no greater than 3/10 during light activity progressing not yet met (1/31/2023)        PLAN     Continue skilled occupational therapy with individualized plan of care focusing on improving functional independence with use of R hand    Updates/Grading for next session: cont as TRACI [place]/CHINTAN

## 2023-02-14 ENCOUNTER — CLINICAL SUPPORT (OUTPATIENT)
Dept: REHABILITATION | Facility: HOSPITAL | Age: 58
End: 2023-02-14
Payer: COMMERCIAL

## 2023-02-14 DIAGNOSIS — R52 PAIN: Primary | ICD-10-CM

## 2023-02-14 DIAGNOSIS — M25.631 DECREASED RANGE OF MOTION OF RIGHT WRIST: ICD-10-CM

## 2023-02-14 PROCEDURE — 97110 THERAPEUTIC EXERCISES: CPT

## 2023-02-14 PROCEDURE — 97022 WHIRLPOOL THERAPY: CPT

## 2023-02-14 NOTE — PROGRESS NOTES
[facility]  Occupational Therapy Treatment Note    Date: 2/14/2023  Name: Janki Montana [facility]  Clinic Number: [id number]    Therapy Diagnosis:   Encounter Diagnoses   Name Primary?    Pain Yes    Decreased range of motion of right wrist              Physician: Toma Bowers MD      Physician Orders: Eval and treat; Patient needs appointment 12/27/2022 for suture removal and initiation of gentle ROM hand and wrist  Medical Diagnosis: G56.01 (ICD-10-CM) - Right carpal tunnel syndrome  Surgical Procedure and Date: 12/15/2022, Carpal tunnel release   Evaluation Date: 12/27/2022  Insurance Authorization Period Expiration: 12/31/2022  Plan of Care Expiration: 8 weeks; 2/24/2023  Date of Return to MD: 02/01/2023  Visit # / Visits authorized: 9/ 20  FOTO: (date and score)     Precautions:  Standard       Time In: 10:00 AM  Time Out: 11:00 AM  Total Appointment Time (timed & untimed codes): 55 minutes      7 weeks 5 days 2/14/2023    SUBJECTIVE     Pt reports: continued itching and pain in the scar  She was compliant with home exercise program given last session.   Response to previous treatment: nothing noted   Functional change: none      Pain: Not rated today  Location: right hand sx site and periphery    OBJECTIVE   Objective Measures updated at progress report unless specified.    Observation/Appearance: sx scar dense and tender upon palpation. Periphery of sx scar also dense and tender to palpation.     Edema. Measured in centimeters.    12/29/2022 12/29/2022 1/31/2023     Left Right    2in. Above elbow        2in. Below elbow        Wrist Crease 15.0 cm 15.3 cm 15.9 cm   Figure of 8        MCPs 18.4 cm 18.9 cm          Elbow and Wrist ROM. Measured in degrees.    12/29/2022 12/29/2022 1/31/2023 2/14/2023     Left Right Right Right   Elbow Ext/Flex         Supination/Pronation   WNL/WNL     Wrist Ext/Flex 69/65 70/58 66/83 68/80   Wrist RD/UD 25/45 16/46 15/40       Hand ROM. Measured  in degrees.    12/29/2022 12/29/2022 1/31/2023     Left Right Right            Index: MP    Joel to make full composite fist              Thumb: MP 72 62 58                IP 28 45 62       Rad ADD/ABD 50 45 41       Pal ADD/ABD 55 55 45          Opposition DPC DPC DPC       Strength (Dynamometer) and Pinch Strength (Pinch Gauge)  Measured in pounds.    2/14/2023 2/14/2023     Right Left(+CTS)   Rung II  20 53    Gonzalez Pinch  12.5 18    3pt Pinch  14  15   2pt Pinch          Sensation: denies numbness and tingling     12/27/2022 12/27/2022     Left Right   [place] Ankur       Normal 1.65-2.83       Diminished Light Touch 3.22-3.61       Diminished Protective 3.84-4.31       Loss of Protective 4.56-6.65       Untestable >6.65       2 Point Discrimination       Static       Dynamic          Manual Muscle Test    12/29/2022 12/29/2022     Left Right   Wrist Extension    Deferred    Wrist Flexion       Radial Deviation       Ulnar Deviation       Supination       Pronation       Elbow Extension       Elbow Flexion          Limitation/Restriction for FOTO initial eval; wrist  Survey     Therapist reviewed FOTO scores for Janki Jones on 12/27/2022.   FOTO documents entered into [employer] - see Media section.     Limitation Score: 27%             Treatment     Janki received the treatments listed below:     Supervised modalities after being cleared for contradictions: Fluidotherapy: To R hand for 10 min, continuous air, 110 deg, air speed 100 to decrease pain, edema & scar tissue and increased tissue extensibility.      Manual therapy techniques: scar massage massage  was applied to the Sx site x 2 min    NT minutes, including:   - STM flexor muscle bellies with hawk   - STM with star tool hypothenar, thenar (pulling away from scar)   - desensitization with vibration tool scar massage (vibration near thenar origin while maintaining passive stretch thumb extension)   - KT tape carpal tunnel  NT      Therapeutic exercises to develop ROM for 45 minutes, including:   -fluidotherapy x 10 min  -- Reassessment of AROM wrist,   - Assessment of  and pinch strengths  - passive wrist extension 10 reps  - aarom wrist flex/ext x 10 reps  - yellow putty twirling x 3 min  - yellow putty squeezing for 3 seconds x 20 reps  - yellow putty pinching 5 longs ea for three-jaw and key pinches  - putty HEP issued   - digit abd yellow RB x1 min  - digit add yellow sponges x 1 min  - therabar rolling x 3 min over sx site  - in hand manipulation with coins NT  - distal median nerve glides x 10 reps (2 sets) NT  -- Wrist PRE with 2# x10 ea     Neuro re-ed: (NT) minutes   - manipulating hand/wrist in rice bin     Patient Education and Home Exercises      Education provided:   - rice bin at home for 10-20 min a day  - pt given desensitization stick  - Progress towards goals     Written Home Exercises Provided: Patient instructed to cont prior HEP.  Exercises were reviewed and Janki was able to demonstrate them prior to the end of the session.  Janki demonstrated good  understanding of the HEP provided. See EMR under Patient Instructions for exercises provided during therapy sessions.      ASSESSMENT     Baseline  and pinch strengths were taken and reflected weakness as expected. Issued putty and putty HEP. Will add US next session to further address the sx site pain and density of the scarring,.    Janik is progressing well towards her goals and there are no updates to goals at this time. Pt prognosis is Good.     Pt will continue to benefit from skilled outpatient occupational therapy to address the deficits listed in the problem list on initial evaluation, provide pt/family education and to maximize pt's level of independence in the home and community environment.     Pt's spiritual, cultural and educational needs considered and pt agreeable to plan of care and goals.    Anticipated barriers to occupational therapy:  n/a    Goals:  Long Term Goals (LTGs); to be met by discharge.   strength goal once assessed  Pt will improve thumb ROM by 5 degrees   Pt will report pain level no greater than 2/10 during functional daily work and community activities   Pinch strength goal once assessed      Short Term Goals (STGs); to be met within 4 weeks ().  Assess  and pinch when appropriate  Pt will improve wrist ROM by 10 degrees progressing not yet met (1/31/23)  Pt will report pain level no greater than 3/10 during light activity progressing not yet met (1/31/2023)        PLAN     Continue skilled occupational therapy with individualized plan of care focusing on improving functional independence with use of R hand    Updates/Grading for next session: Add US next session to sotfen the scarring at the sx site and periphery     Tonya Medellin, OT

## 2023-02-14 NOTE — PATIENT INSTRUCTIONS
[facility]  OCCUPATIONAL THERAPY  HOME EXERCISE PROGRAM     Complete the following strengthening program 2-4x/day.              3 minutes        3sec x 20 reps             5 loga      _       5 logs             Putty pancakes x 2 min    JOSE GUADALUPE Barreto CHT  Certified Hand Therapist  Occupational Therapist

## 2023-02-24 ENCOUNTER — CLINICAL SUPPORT (OUTPATIENT)
Dept: REHABILITATION | Facility: HOSPITAL | Age: 58
End: 2023-02-24
Payer: COMMERCIAL

## 2023-02-24 DIAGNOSIS — R52 PAIN: Primary | ICD-10-CM

## 2023-02-24 DIAGNOSIS — M25.631 DECREASED RANGE OF MOTION OF RIGHT WRIST: ICD-10-CM

## 2023-02-24 PROCEDURE — 97140 MANUAL THERAPY 1/> REGIONS: CPT

## 2023-02-24 PROCEDURE — 97110 THERAPEUTIC EXERCISES: CPT

## 2023-02-24 PROCEDURE — 97035 APP MDLTY 1+ULTRASOUND EA 15: CPT

## 2023-02-24 PROCEDURE — 97022 WHIRLPOOL THERAPY: CPT

## 2023-02-24 NOTE — PROGRESS NOTES
[facility]  Occupational Therapy Treatment Note    Date: 2/24/2023  Name: Janki Montana [facility]  Clinic Number: [id number]    Therapy Diagnosis:   Encounter Diagnoses   Name Primary?    Pain Yes    Decreased range of motion of right wrist        Physician: Toma Bowers MD      Physician Orders: Eval and treat; Patient needs appointment 12/27/2022 for suture removal and initiation of gentle ROM hand and wrist  Medical Diagnosis: G56.01 (ICD-10-CM) - Right carpal tunnel syndrome  Surgical Procedure and Date: 12/15/2022, Carpal tunnel release   Evaluation Date: 12/27/2022  Insurance Authorization Period Expiration: 12/31/2022  Plan of Care Expiration: 8 weeks; 2/24/2023  Date of Return to MD: 02/01/2023  Visit # / Visits authorized: 11/ 20  FOTO: (date and score)     Precautions:  Standard       Time In: 8:30 AM  Time Out: 9:25 AM  Total Appointment Time (timed & untimed codes): 55 minutes          SUBJECTIVE     Pt reports: continued itching and pain in the scar still. She cont to report tenderness at scar. She reports is tolerating putty well.   She was compliant with home exercise program given last session.   Response to previous treatment: tolerating HEP well  Functional change: none noted    Pain: Not rated today, she reported min pain with pressure to scar  Location: right hand sx site and periphery    OBJECTIVE   Objective Measures updated at progress report unless specified.    Observation/Appearance: sx scar dense and tender upon palpation. Periphery of sx scar also dense and tender to palpation.     Edema. Measured in centimeters.    12/29/2022 12/29/2022 1/31/2023     Left Right    2in. Above elbow        2in. Below elbow        Wrist Crease 15.0 cm 15.3 cm 15.9 cm   Figure of 8        MCPs 18.4 cm 18.9 cm          Elbow and Wrist ROM. Measured in degrees.    12/29/2022 12/29/2022 1/31/2023 2/14/2023     Left Right Right Right   Elbow Ext/Flex         Supination/Pronation    WNL/WNL     Wrist Ext/Flex 69/65 70/58 66/83 68/80   Wrist RD/UD 25/45 16/46 15/40       Hand ROM. Measured in degrees.    12/29/2022 12/29/2022 1/31/2023     Left Right Right            Index: MP    Joel to make full composite fist              Thumb: MP 72 62 58                IP 28 45 62       Rad ADD/ABD 50 45 41       Pal ADD/ABD 55 55 45          Opposition DPC DPC DPC       Strength (Dynamometer) and Pinch Strength (Pinch Gauge)  Measured in pounds.    2/14/2023 2/14/2023     Right Left(+CTS)   Rung II  20 53    Gonzalez Pinch  12.5 18    3pt Pinch  14  15   2pt Pinch          Sensation: denies numbness and tingling     12/27/2022 12/27/2022     Left Right   [place] Ankur       Normal 1.65-2.83       Diminished Light Touch 3.22-3.61       Diminished Protective 3.84-4.31       Loss of Protective 4.56-6.65       Untestable >6.65       2 Point Discrimination       Static       Dynamic          Manual Muscle Test    12/29/2022 12/29/2022     Left Right   Wrist Extension    Deferred    Wrist Flexion       Radial Deviation       Ulnar Deviation       Supination       Pronation       Elbow Extension       Elbow Flexion          Limitation/Restriction for FOTO initial eval; wrist  Survey     Therapist reviewed FOTO scores for Janki Jones on 12/27/2022.   FOTO documents entered into [employer] - see Media section.     Limitation Score: 27%             Treatment     Janki received the treatments listed below:     Supervised modalities after being cleared for contradictions: Fluidotherapy: To R hand for 10 min, continuous air, 110 deg, air speed 100 to decrease pain, edema & scar tissue and increased tissue extensibility.    Patient received ultrasound to  R palmar scar area to increase blood flow, circulation, tissue elasticity, pain management and for wound/scar management for 8 minutes @ 3 Mhz, Intensity 0.6 w/cm2 at 100% duty cycle.       Manual therapy techniques: scar massage massage  was applied to the  "Sx site x 8 minutes  - STM flexor muscle bellies with hawk   - STM with star tool hypothenar, thenar (pulling away from scar)   - desensitization with vibration tool scar massage (vibration near thenar origin while maintaining passive stretch thumb extension)   - KT tape carpal tunnel NT      Therapeutic exercises to develop ROM for 29 minutes, including:     - passive wrist extension 10 reps  - aarom wrist flex/ext x 10 reps  - digit abd yellow RB x1 min  - digit add yellow sponges x 1 min  - therabar rolling x 3 min over sx site  - in hand manipulation with coins   - distal median nerve glides x 10 reps (2 sets) NT  -- Wrist PRE with 2# x10 ea (1# today, pt reported 2# too heavy today)  -red digi flex x 20 reps  -yellow digiextend x 20 reps  -Red powerweb pushes and pulls, x 15 reps pain free  -prayer stretches 3 x 30"    NT:- yellow putty twirling x 3 min   - yellow putty squeezing for 3 seconds x 20 reps  - yellow putty pinching 5 longs ea for three-jaw and key pinches    Neuro re-ed: (NT) minutes   - manipulating hand/wrist in rice bin     Patient Education and Home Exercises      Education provided:   -cont HEP and reviewed scar massage   -rice bin at home for 10-20 min a day  - cont desensitization stick  - Progress towards goals     Written Home Exercises Provided: Patient instructed to cont prior HEP.  Exercises were reviewed and Janki was able to demonstrate them prior to the end of the session.  Janki demonstrated good  understanding of the HEP provided. See EMR under Patient Instructions for exercises provided during therapy sessions.      ASSESSMENT     Pt tolerated tx fairly well today. Cont to slowly progress with strengthening and cont scar management. She tolerated ultrasound well today. Pt participated well.    Janki is progressing well towards her goals and there are no updates to goals at this time. Pt prognosis is Good.     Pt will continue to benefit from skilled outpatient " occupational therapy to address the deficits listed in the problem list on initial evaluation, provide pt/family education and to maximize pt's level of independence in the home and community environment.     Pt's spiritual, cultural and educational needs considered and pt agreeable to plan of care and goals.    Anticipated barriers to occupational therapy: n/a    Goals:  Long Term Goals (LTGs); to be met by discharge.   strength goal once assessed  Pt will improve thumb ROM by 5 degrees   Pt will report pain level no greater than 2/10 during functional daily work and community activities   Pinch strength goal once assessed      Short Term Goals (STGs); to be met within 4 weeks ().  Assess  and pinch when appropriate  Pt will improve wrist ROM by 10 degrees progressing not yet met (1/31/23)  Pt will report pain level no greater than 3/10 during light activity progressing not yet met (1/31/2023)        PLAN     Continue skilled occupational therapy with individualized plan of care focusing on improving functional independence with use of R hand    Updates/Grading for next session: cont to progress as tolerated with strengthening and scar management    JOSE GUADALUPE Smith, CHT

## 2023-02-27 NOTE — PROGRESS NOTES
[facility]  Occupational Therapy Treatment Note    Date: 2/28/2023  Name: Janki Montana [facility]  Clinic Number: [id number]    Therapy Diagnosis:   No diagnosis found.      Physician: Toma Bowers MD      Physician Orders: Eval and treat; Patient needs appointment 12/27/2022 for suture removal and initiation of gentle ROM hand and wrist  Medical Diagnosis: G56.01 (ICD-10-CM) - Right carpal tunnel syndrome  Surgical Procedure and Date: 12/15/2022, Carpal tunnel release   Evaluation Date: 12/27/2022  Insurance Authorization Period Expiration: 12/31/2022  Plan of Care Expiration: 8 weeks; 2/24/2023  Date of Return to MD: 02/01/2023  Visit # / Visits authorized: 12/ 20  FOTO: (date and score)     Precautions:  Standard       Time In: *** AM  Time Out: *** AM  Total Appointment Time (timed & untimed codes): *** minutes          SUBJECTIVE     Pt reports: continued itching and pain in the scar still. She cont to report tenderness at scar. She reports is tolerating putty well. ***  She was compliant with home exercise program given last session.   Response to previous treatment: tolerating HEP well  Functional change: none noted    Pain: Not rated today, she reported min pain with pressure to scar  Location: right hand sx site and periphery    OBJECTIVE   Objective Measures updated at progress report unless specified.    Observation/Appearance: sx scar dense and tender upon palpation. Periphery of sx scar also dense and tender to palpation.     Edema. Measured in centimeters.    12/29/2022 12/29/2022 1/31/2023     Left Right    2in. Above elbow        2in. Below elbow        Wrist Crease 15.0 cm 15.3 cm 15.9 cm   Figure of 8        MCPs 18.4 cm 18.9 cm          Elbow and Wrist ROM. Measured in degrees.    12/29/2022 12/29/2022 1/31/2023 2/14/2023     Left Right Right Right   Elbow Ext/Flex         Supination/Pronation   WNL/WNL     Wrist Ext/Flex 69/65 70/58 66/83 68/80   Wrist RD/UD 25/45  16/46 15/40       Hand ROM. Measured in degrees.    12/29/2022 12/29/2022 1/31/2023     Left Right Right            Index: MP    Joel to make full composite fist              Thumb: MP 72 62 58                IP 28 45 62       Rad ADD/ABD 50 45 41       Pal ADD/ABD 55 55 45          Opposition DPC DPC DPC       Strength (Dynamometer) and Pinch Strength (Pinch Gauge)  Measured in pounds.    2/14/2023 2/14/2023     Right Left(+CTS)   Rung II  20 53    Gonzalez Pinch  12.5 18    3pt Pinch  14  15   2pt Pinch          Sensation: denies numbness and tingling     12/27/2022 12/27/2022     Left Right   [place] Ankur       Normal 1.65-2.83       Diminished Light Touch 3.22-3.61       Diminished Protective 3.84-4.31       Loss of Protective 4.56-6.65       Untestable >6.65       2 Point Discrimination       Static       Dynamic          Manual Muscle Test    12/29/2022 12/29/2022     Left Right   Wrist Extension    Deferred    Wrist Flexion       Radial Deviation       Ulnar Deviation       Supination       Pronation       Elbow Extension       Elbow Flexion          Limitation/Restriction for FOTO initial eval; wrist  Survey     Therapist reviewed FOTO scores for Janki Jones on 12/27/2022.   FOTO documents entered into [employer] - see Media section.     Limitation Score: 27%             Treatment     Janki received the treatments listed below:     Supervised modalities after being cleared for contradictions: Fluidotherapy: To R hand for 10 min, continuous air, 110 deg, air speed 100 to decrease pain, edema & scar tissue and increased tissue extensibility.    Patient received ultrasound to  R palmar scar area to increase blood flow, circulation, tissue elasticity, pain management and for wound/scar management for 8 minutes @ 3 Mhz, Intensity 0.6 w/cm2 at 100% duty cycle.       Manual therapy techniques: scar massage massage  was applied to the Sx site x 8 minutes  - STM flexor muscle bellies with hawk   - STM  "with star tool hypothenar, thenar (pulling away from scar)   - desensitization with vibration tool scar massage (vibration near thenar origin while maintaining passive stretch thumb extension)   - KT tape carpal tunnel NT      Therapeutic exercises to develop ROM for *** minutes, including:     - passive wrist extension 10 reps  - aarom wrist flex/ext x 10 reps  - digit abd yellow RB x1 min  - digit add yellow sponges x 1 min  - therabar rolling x 3 min over sx site  - in hand manipulation with coins   - distal median nerve glides x 10 reps (2 sets) NT  -- Wrist PRE with 2# x10 ea (1# today, pt reported 2# too heavy today)  -red digi flex x 20 reps  -yellow digiextend x 20 reps  -Red powerweb pushes and pulls, x 15 reps pain free  -prayer stretches 3 x 30"    NT:- yellow putty twirling x 3 min   - yellow putty squeezing for 3 seconds x 20 reps  - yellow putty pinching 5 longs ea for three-jaw and key pinches    Neuro re-ed: (NT) minutes   - manipulating hand/wrist in rice bin     Patient Education and Home Exercises      Education provided:   -cont HEP and reviewed scar massage   -rice bin at home for 10-20 min a day  - cont desensitization stick  - Progress towards goals     Written Home Exercises Provided: Patient instructed to cont prior HEP.  Exercises were reviewed and Janki was able to demonstrate them prior to the end of the session.  Janki demonstrated good  understanding of the HEP provided. See EMR under Patient Instructions for exercises provided during therapy sessions.      ASSESSMENT     Pt tolerated tx fairly well today. Cont to slowly progress with strengthening and cont scar management. She tolerated ultrasound well today. Pt participated well. ***    Janki is progressing well towards her goals and there are no updates to goals at this time. Pt prognosis is Good.     Pt will continue to benefit from skilled outpatient occupational therapy to address the deficits listed in the problem list on " initial evaluation, provide pt/family education and to maximize pt's level of independence in the home and community environment.     Pt's spiritual, cultural and educational needs considered and pt agreeable to plan of care and goals.    Anticipated barriers to occupational therapy: n/a    Goals:  Long Term Goals (LTGs); to be met by discharge.   strength goal once assessed  Pt will improve thumb ROM by 5 degrees   Pt will report pain level no greater than 2/10 during functional daily work and community activities   Pinch strength goal once assessed      Short Term Goals (STGs); to be met within 4 weeks ().  Assess  and pinch when appropriate  Pt will improve wrist ROM by 10 degrees progressing not yet met (1/31/23)  Pt will report pain level no greater than 3/10 during light activity progressing not yet met (1/31/2023)        PLAN     Continue skilled occupational therapy with individualized plan of care focusing on improving functional independence with use of R hand    Updates/Grading for next session: cont to progress as tolerated with strengthening and scar management    TRACI Floyd/CHINTAN

## 2023-02-28 ENCOUNTER — CLINICAL SUPPORT (OUTPATIENT)
Dept: REHABILITATION | Facility: HOSPITAL | Age: 58
End: 2023-02-28
Payer: COMMERCIAL

## 2023-02-28 DIAGNOSIS — M25.631 DECREASED RANGE OF MOTION OF RIGHT WRIST: ICD-10-CM

## 2023-02-28 DIAGNOSIS — R52 PAIN: Primary | ICD-10-CM

## 2023-02-28 PROCEDURE — 97035 APP MDLTY 1+ULTRASOUND EA 15: CPT

## 2023-02-28 PROCEDURE — 97022 WHIRLPOOL THERAPY: CPT

## 2023-02-28 PROCEDURE — 97110 THERAPEUTIC EXERCISES: CPT

## 2023-02-28 PROCEDURE — 97140 MANUAL THERAPY 1/> REGIONS: CPT

## 2023-02-28 NOTE — PROGRESS NOTES
[facility]  Occupational Therapy Progress Note & Updated POC    Date: 2/28/2023  [facility]  [facility] Number: [id number]    Therapy Diagnosis:   Encounter Diagnoses   Name Primary?    Pain Yes    Decreased range of motion of right wrist          Physician: Toma Bowers MD      Physician Orders: Eval and treat; Patient needs appointment 12/27/2022 for suture removal and initiation of gentle ROM hand and wrist  Medical Diagnosis: G56.01 (ICD-10-CM) - Right carpal tunnel syndrome  Surgical Procedure and Date: 12/15/2022, Carpal tunnel release   Evaluation Date: 12/27/2022  Insurance Authorization Period Expiration: 12/31/2022  Plan of Care Expiration: 8 weeks; updated to 3/31/23  Date of Return to MD: 02/01/2023  Visit # / Visits authorized: 12/ 20  FOTO: (date and score)     Precautions:  Standard       Time In: 10:30 AM  Time Out: 11:30 AM  Total Appointment Time (timed & untimed codes): 60 minutes          SUBJECTIVE     Pt reports: continued itching and pain in the scar still. She cont to report tenderness at scar. She reports it is still hypersensitive.   She was compliant with home exercise program given last session.   Response to previous treatment: tolerating HEP well  Functional change: using hand as able    Pain: Not rated today, she reported m1-2x/chary pain with pressure to scar  Location: right hand sx site and periphery    OBJECTIVE   Objective Measures updated at progress report unless specified.    Observation/Appearance: sx scar dense and tender upon palpation. Periphery of sx scar also dense and tender to palpation.     Edema. Measured in centimeters.    12/29/2022 12/29/2022 1/31/2023     Left Right    2in. Above elbow        2in. Below elbow        Wrist Crease 15.0 cm 15.3 cm 15.9 cm   Figure of 8        MCPs 18.4 cm 18.9 cm          Elbow and Wrist ROM. Measured in degrees.    12/29/2022 12/29/2022 1/31/2023 2/14/2023     Left Right Right Right    Elbow Ext/Flex         Supination/Pronation   WNL/WNL     Wrist Ext/Flex 69/65 70/58 66/83 68/80   Wrist RD/UD 25/45 16/46 15/40       Hand ROM. Measured in degrees.    12/29/2022 12/29/2022 1/31/2023     Left Right Right            Index: MP    Joel to make full composite fist              Thumb: MP 72 62 58                IP 28 45 62       Rad ADD/ABD 50 45 41       Pal ADD/ABD 55 55 45          Opposition DPC DPC DPC       Strength (Dynamometer) and Pinch Strength (Pinch Gauge)  Measured in pounds.    2/14/2023 2/14/2023     Right Left(+CTS)   Rung II  20 53    Gonzalez Pinch  12.5 18    3pt Pinch  14  15   2pt Pinch          Sensation: denies numbness and tingling     12/27/2022 12/27/2022     Left Right   [place] Ankur       Normal 1.65-2.83       Diminished Light Touch 3.22-3.61       Diminished Protective 3.84-4.31       Loss of Protective 4.56-6.65       Untestable >6.65       2 Point Discrimination       Static       Dynamic          Manual Muscle Test    12/29/2022 12/29/2022     Left Right   Wrist Extension    Deferred    Wrist Flexion       Radial Deviation       Ulnar Deviation       Supination       Pronation       Elbow Extension       Elbow Flexion          Limitation/Restriction for FOTO initial eval; wrist  Survey     Therapist reviewed FOTO scores for Janki Jones on 12/27/2022.   FOTO documents entered into [employer] - see Media section.     Limitation Score: 27%             Treatment     Janki received the treatments listed below:     Supervised modalities after being cleared for contradictions: Fluidotherapy: To R hand for 10 min, continuous air, 110 deg, air speed 100 to decrease pain, edema & scar tissue and increased tissue extensibility.    Patient received ultrasound to  R palmar scar area to increase blood flow, circulation, tissue elasticity, pain management and for wound/scar management for 8 minutes @ 3 Mhz, Intensity 0.6 w/cm2 at 100% duty cycle.       Manual therapy  "techniques: scar massage massage  was applied to the Sx site x 8 minutes  - STM flexor muscle bellies with hawk   - STM with star tool hypothenar, thenar (pulling away from scar) (NT)  - desensitization with vibration tool scar massage (vibration near thenar origin while maintaining passive stretch thumb extension)   - KT tape carpal tunnel NT      Therapeutic exercises to develop ROM for 31 minutes, including:     - passive wrist extension 10 reps  - aarom wrist flex/ext x 10 reps  - digit abd yellow RB x1 min  - digit add yellow sponges x 1 min  - therabar rolling x 3 min over sx site  - in hand manipulation with coins   - distal median nerve glides x 10 reps (2 sets) NT  -- Wrist PRE with 2# x10 ea (1# today, pt reported 2# too heavy today)  -red digi flex x 20 reps  -yellow digiextend x 20 reps  -Red powerweb pushes and pulls, x 15 reps pain free  -prayer stretches 3 x 30"    NT:- yellow putty twirling x 3 min   - yellow putty squeezing for 3 seconds x 20 reps  - yellow putty pinching 5 longs ea for three-jaw and key pinches    Neuro re-ed: x 3 minutes   - manipulating hand/wrist in rice bin     Patient Education and Home Exercises      Education provided:   -cont HEP and reviewed scar massage   -rice bin at home for 10-20 min a day  - cont desensitization stick  - Progress towards goals     Written Home Exercises Provided: Patient instructed to cont prior HEP.  Exercises were reviewed and Janki was able to demonstrate them prior to the end of the session.  Janki demonstrated good  understanding of the HEP provided. See EMR under Patient Instructions for exercises provided during therapy sessions.      ASSESSMENT     Pt tolerated tx fairly well today. Cont to slowly progress with strengthening and cont scar management. She is still progressing towards goals. Pt participated well. Goals partially met so far. Will cont with OT for 4 more weeks to address scar management and strengthening.     Janki is " progressing well towards her goals and there are no updates to goals at this time. Pt prognosis is Good.     Pt will continue to benefit from skilled outpatient occupational therapy to address the deficits listed in the problem list on initial evaluation, provide pt/family education and to maximize pt's level of independence in the home and community environment.     Pt's spiritual, cultural and educational needs considered and pt agreeable to plan of care and goals.    Anticipated barriers to occupational therapy: n/a    Goals:  Long Term Goals (LTGs); to be met by discharge.   strength goal once assessed  Pt will improve thumb ROM by 5 degrees   Pt will report pain level no greater than 2/10 during functional daily work and community activities   Pinch strength goal once assessed      Short Term Goals (STGs); to be met within 4 weeks ().  Assess  and pinch when appropriate---met, 2/28/23  Pt will improve wrist ROM by 10 degrees  met (2/28/23)  Pt will report pain level no greater than 3/10 during light activity progressing not yet met (1/31/2023)        PLAN     Continue skilled occupational therapy 1-2x/week for 4 more weeks during certification period 2/28/23 - 3/31/23 with individualized plan of care focusing on improving functional independence with use of R hand    Updates/Grading for next session: cont to progress as tolerated with strengthening and scar management    JOSE GUADALUPE Smith, BLANCAT

## 2023-03-01 NOTE — PLAN OF CARE
[facility]  Occupational Therapy Progress Note & Updated POC    Date: 2/28/2023  [facility]  Cl[facility] Number: [id number]    Therapy Diagnosis:   Encounter Diagnoses   Name Primary?    Pain Yes    Decreased range of motion of right wrist          Physician: Toma Bowers MD      Physician Orders: Eval and treat; Patient needs appointment 12/27/2022 for suture removal and initiation of gentle ROM hand and wrist  Medical Diagnosis: G56.01 (ICD-10-CM) - Right carpal tunnel syndrome  Surgical Procedure and Date: 12/15/2022, Carpal tunnel release   Evaluation Date: 12/27/2022  Insurance Authorization Period Expiration: 12/31/2022  Plan of Care Expiration: 8 weeks; updated to 3/31/23  Date of Return to MD: 02/01/2023  Visit # / Visits authorized: 12/ 20  FOTO: (date and score)     Precautions:  Standard       Time In: 10:30 AM  Time Out: 11:30 AM  Total Appointment Time (timed & untimed codes): 60 minutes          SUBJECTIVE     Pt reports: continued itching and pain in the scar still. She cont to report tenderness at scar. She reports it is still hypersensitive.   She was compliant with home exercise program given last session.   Response to previous treatment: tolerating HEP well  Functional change: using hand as able    Pain: Not rated today, she reported m1-2x/chary pain with pressure to scar  Location: right hand sx site and periphery    OBJECTIVE   Objective Measures updated at progress report unless specified.    Observation/Appearance: sx scar dense and tender upon palpation. Periphery of sx scar also dense and tender to palpation.     Edema. Measured in centimeters.    12/29/2022 12/29/2022 1/31/2023     Left Right    2in. Above elbow        2in. Below elbow        Wrist Crease 15.0 cm 15.3 cm 15.9 cm   Figure of 8        MCPs 18.4 cm 18.9 cm          Elbow and Wrist ROM. Measured in degrees.    12/29/2022 12/29/2022 1/31/2023 2/14/2023     Left Right Right Right    Elbow Ext/Flex         Supination/Pronation   WNL/WNL     Wrist Ext/Flex 69/65 70/58 66/83 68/80   Wrist RD/UD 25/45 16/46 15/40       Hand ROM. Measured in degrees.    12/29/2022 12/29/2022 1/31/2023     Left Right Right            Index: MP    Joel to make full composite fist              Thumb: MP 72 62 58                IP 28 45 62       Rad ADD/ABD 50 45 41       Pal ADD/ABD 55 55 45          Opposition DPC DPC DPC       Strength (Dynamometer) and Pinch Strength (Pinch Gauge)  Measured in pounds.    2/14/2023 2/14/2023     Right Left(+CTS)   Rung II  20 53    Gonzalez Pinch  12.5 18    3pt Pinch  14  15   2pt Pinch          Sensation: denies numbness and tingling     12/27/2022 12/27/2022     Left Right   [place] Ankur       Normal 1.65-2.83       Diminished Light Touch 3.22-3.61       Diminished Protective 3.84-4.31       Loss of Protective 4.56-6.65       Untestable >6.65       2 Point Discrimination       Static       Dynamic          Manual Muscle Test    12/29/2022 12/29/2022     Left Right   Wrist Extension    Deferred    Wrist Flexion       Radial Deviation       Ulnar Deviation       Supination       Pronation       Elbow Extension       Elbow Flexion          Limitation/Restriction for FOTO initial eval; wrist  Survey     Therapist reviewed FOTO scores for Janki Jones on 12/27/2022.   FOTO documents entered into [employer] - see Media section.     Limitation Score: 27%             Treatment     Janki received the treatments listed below:     Supervised modalities after being cleared for contradictions: Fluidotherapy: To R hand for 10 min, continuous air, 110 deg, air speed 100 to decrease pain, edema & scar tissue and increased tissue extensibility.    Patient received ultrasound to  R palmar scar area to increase blood flow, circulation, tissue elasticity, pain management and for wound/scar management for 8 minutes @ 3 Mhz, Intensity 0.6 w/cm2 at 100% duty cycle.       Manual therapy  "techniques: scar massage massage  was applied to the Sx site x 8 minutes  - STM flexor muscle bellies with hawk   - STM with star tool hypothenar, thenar (pulling away from scar) (NT)  - desensitization with vibration tool scar massage (vibration near thenar origin while maintaining passive stretch thumb extension)   - KT tape carpal tunnel NT      Therapeutic exercises to develop ROM for 31 minutes, including:     - passive wrist extension 10 reps  - aarom wrist flex/ext x 10 reps  - digit abd yellow RB x1 min  - digit add yellow sponges x 1 min  - therabar rolling x 3 min over sx site  - in hand manipulation with coins   - distal median nerve glides x 10 reps (2 sets) NT  -- Wrist PRE with 2# x10 ea (1# today, pt reported 2# too heavy today)  -red digi flex x 20 reps  -yellow digiextend x 20 reps  -Red powerweb pushes and pulls, x 15 reps pain free  -prayer stretches 3 x 30"    NT:- yellow putty twirling x 3 min   - yellow putty squeezing for 3 seconds x 20 reps  - yellow putty pinching 5 longs ea for three-jaw and key pinches    Neuro re-ed: x 3 minutes   - manipulating hand/wrist in rice bin     Patient Education and Home Exercises      Education provided:   -cont HEP and reviewed scar massage   -rice bin at home for 10-20 min a day  - cont desensitization stick  - Progress towards goals     Written Home Exercises Provided: Patient instructed to cont prior HEP.  Exercises were reviewed and Janki was able to demonstrate them prior to the end of the session.  Janki demonstrated good  understanding of the HEP provided. See EMR under Patient Instructions for exercises provided during therapy sessions.      ASSESSMENT     Pt tolerated tx fairly well today. Cont to slowly progress with strengthening and cont scar management. She is still progressing towards goals. Pt participated well. Goals partially met so far. Will cont with OT for 4 more weeks to address scar management and strengthening.     Janki is " progressing well towards her goals and there are no updates to goals at this time. Pt prognosis is Good.     Pt will continue to benefit from skilled outpatient occupational therapy to address the deficits listed in the problem list on initial evaluation, provide pt/family education and to maximize pt's level of independence in the home and community environment.     Pt's spiritual, cultural and educational needs considered and pt agreeable to plan of care and goals.    Anticipated barriers to occupational therapy: n/a    Goals:  Long Term Goals (LTGs); to be met by discharge.   strength goal once assessed  Pt will improve thumb ROM by 5 degrees   Pt will report pain level no greater than 2/10 during functional daily work and community activities   Pinch strength goal once assessed      Short Term Goals (STGs); to be met within 4 weeks ().  Assess  and pinch when appropriate---met, 2/28/23  Pt will improve wrist ROM by 10 degrees  met (2/28/23)  Pt will report pain level no greater than 3/10 during light activity progressing not yet met (1/31/2023)        PLAN     Continue skilled occupational therapy 1-2x/week for 4 more weeks during certification period 2/28/23 - 3/31/23 with individualized plan of care focusing on improving functional independence with use of R hand    Updates/Grading for next session: cont to progress as tolerated with strengthening and scar management    JOSE GUADALUPE Smith, BLANCAT          I certify the need for these services furnished under the plan of treatment and while under my care.     ____________________________________________________________________  Physician/Referring Practitioner   Date of Signature

## 2023-03-07 ENCOUNTER — LAB VISIT (OUTPATIENT)
Dept: LAB | Facility: HOSPITAL | Age: 58
End: 2023-03-07
Attending: INTERNAL MEDICINE
Payer: COMMERCIAL

## 2023-03-07 DIAGNOSIS — R74.8 ELEVATED LIVER ENZYMES: ICD-10-CM

## 2023-03-07 LAB
ALBUMIN SERPL BCP-MCNC: 3.9 G/DL (ref 3.5–5.2)
ALP SERPL-CCNC: 130 U/L (ref 55–135)
ALT SERPL W/O P-5'-P-CCNC: 196 U/L (ref 10–44)
AST SERPL-CCNC: 112 U/L (ref 10–40)
BILIRUB DIRECT SERPL-MCNC: 0.1 MG/DL (ref 0.1–0.3)
BILIRUB SERPL-MCNC: 0.4 MG/DL (ref 0.1–1)
CHOLEST SERPL-MCNC: 211 MG/DL (ref 120–199)
CHOLEST/HDLC SERPL: 4.1 {RATIO} (ref 2–5)
FERRITIN SERPL-MCNC: 323 NG/ML (ref 20–300)
HDLC SERPL-MCNC: 52 MG/DL (ref 40–75)
HDLC SERPL: 24.6 % (ref 20–50)
LDLC SERPL CALC-MCNC: 123.8 MG/DL (ref 63–159)
NONHDLC SERPL-MCNC: 159 MG/DL
PROT SERPL-MCNC: 7.9 G/DL (ref 6–8.4)
TRIGL SERPL-MCNC: 176 MG/DL (ref 30–150)

## 2023-03-07 PROCEDURE — 80076 HEPATIC FUNCTION PANEL: CPT | Performed by: PHYSICIAN ASSISTANT

## 2023-03-07 PROCEDURE — 82728 ASSAY OF FERRITIN: CPT | Performed by: PHYSICIAN ASSISTANT

## 2023-03-07 PROCEDURE — 80061 LIPID PANEL: CPT | Performed by: PHYSICIAN ASSISTANT

## 2023-03-07 PROCEDURE — 36415 COLL VENOUS BLD VENIPUNCTURE: CPT | Mod: PO | Performed by: PHYSICIAN ASSISTANT

## 2023-03-08 ENCOUNTER — PATIENT MESSAGE (OUTPATIENT)
Dept: INTERNAL MEDICINE | Facility: CLINIC | Age: 58
End: 2023-03-08
Payer: COMMERCIAL

## 2023-03-10 ENCOUNTER — CLINICAL SUPPORT (OUTPATIENT)
Dept: REHABILITATION | Facility: HOSPITAL | Age: 58
End: 2023-03-10
Payer: COMMERCIAL

## 2023-03-10 DIAGNOSIS — R52 PAIN: Primary | ICD-10-CM

## 2023-03-10 DIAGNOSIS — M25.631 DECREASED RANGE OF MOTION OF RIGHT WRIST: ICD-10-CM

## 2023-03-10 PROCEDURE — 97022 WHIRLPOOL THERAPY: CPT

## 2023-03-10 PROCEDURE — 97110 THERAPEUTIC EXERCISES: CPT

## 2023-03-10 PROCEDURE — 97140 MANUAL THERAPY 1/> REGIONS: CPT

## 2023-03-10 RX ORDER — METHYLPHENIDATE HYDROCHLORIDE 20 MG/1
20 TABLET ORAL 2 TIMES DAILY
Qty: 60 TABLET | Refills: 0 | Status: SHIPPED | OUTPATIENT
Start: 2023-03-10 | End: 2023-04-18 | Stop reason: SDUPTHER

## 2023-03-10 NOTE — PROGRESS NOTES
[facility]  Occupational Therapy Treatment Note     Date: 3/10/2023  Name: [facility]  Clinic Number: [id number]    Therapy Diagnosis:   Encounter Diagnoses   Name Primary?    Pain Yes    Decreased range of motion of right wrist            Physician: Toma Bowers MD      Physician Orders: Eval and treat; Patient needs appointment 12/27/2022 for suture removal and initiation of gentle ROM hand and wrist  Medical Diagnosis: G56.01 (ICD-10-CM) - Right carpal tunnel syndrome  Surgical Procedure and Date: 12/15/2022, Carpal tunnel release   Evaluation Date: 12/27/2022  Insurance Authorization Period Expiration: 12/31/2022  Plan of Care Expiration: 8 weeks; updated to 3/31/23  Date of Return to MD: 02/01/2023  Visit # / Visits authorized: 12/ 20  FOTO: (date and score)     Precautions:  Standard       Time In: 10:30 AM  Time Out: 11:30 AM  Total Appointment Time (timed & untimed codes): 60 minutes          SUBJECTIVE     Pt reports: continued itching and pain in the scar still. She cont to report tenderness at scar. She reports it is still hypersensitive.   She was compliant with home exercise program given last session.   Response to previous treatment: tolerating HEP well  Functional change: using hand as able    Pain: Not rated today, she reported m1-2x/chary pain with pressure to scar  Location: right hand sx site and periphery    OBJECTIVE   Objective Measures updated at progress report unless specified.    Observation/Appearance: sx scar dense and tender upon palpation. Periphery of sx scar also dense and tender to palpation.     Edema. Measured in centimeters.    12/29/2022 12/29/2022 1/31/2023     Left Right    2in. Above elbow        2in. Below elbow        Wrist Crease 15.0 cm 15.3 cm 15.9 cm   Figure of 8        MCPs 18.4 cm 18.9 cm          Elbow and Wrist ROM. Measured in degrees.    12/29/2022 12/29/2022 1/31/2023  2/14/2023 3/10/2023     Left Right Right Right  Right    Elbow Ext/Flex          Supination/Pronation   WNL/WNL      Wrist Ext/Flex 69/65 70/58 66/83 68/80 65/65   Wrist RD/UD 25/45 16/46 15/40        Hand ROM. Measured in degrees.    12/29/2022 12/29/2022 1/31/2023 3/10/2023     Left Right Right Right             Index: MP    Joel to make full composite fist                Thumb: MP 72 62 58 60                IP 28 45 62 57       Rad ADD/ABD 50 45 41 46       Pal ADD/ABD 55 55 45 52          Opposition DPC DPC DPC DPC        Strength (Dynamometer) and Pinch Strength (Pinch Gauge)  Measured in pounds.    2/14/2023 2/14/2023 3/10/2023      Right Left(+CTS) Right   Rung II  20 53  25.5   Gonzalez Pinch  12.5 18  12   3pt Pinch  14  15 12   2pt Pinch           Sensation: denies numbness and tingling     12/27/2022 12/27/2022     Left Right   Medora Ankur       Normal 1.65-2.83       Diminished Light Touch 3.22-3.61       Diminished Protective 3.84-4.31       Loss of Protective 4.56-6.65       Untestable >6.65       2 Point Discrimination       Static       Dynamic          Manual Muscle Test    12/29/2022 12/29/2022     Left Right   Wrist Extension    Deferred    Wrist Flexion       Radial Deviation       Ulnar Deviation       Supination       Pronation       Elbow Extension       Elbow Flexion          Limitation/Restriction for FOTO initial eval; wrist  Survey     Therapist reviewed FOTO scores for Janki Jones on 12/27/2022.   FOTO documents entered into [employer] - see Media section.     Limitation Score: 27%             Treatment     Janki received the treatments listed below:     Supervised modalities after being cleared for contradictions: Fluidotherapy: To R hand for 10 min, continuous air, 110 deg, air speed 100 to decrease pain, edema & scar tissue and increased tissue extensibility.      Patient received ultrasound to  R palmar scar area to increase blood flow, circulation, tissue elasticity, pain management and for wound/scar management for 8  "minutes @ 3 Mhz, Intensity 0.6 w/cm2 at 100% duty cycle.       Manual therapy techniques: scar massage massage  was applied to the Sx site 10 minutes  - STM with star tool hypothenar, thenar   - desensitization with vibration tool scar massage (vibration near thenar origin while maintaining passive stretch thumb extension)   - KT tape carpal tunnel NT      Therapeutic exercises to develop ROM for 32 minutes, including:   - updated objective measurements, please see above   - digit abd yellow RB x1 min  - digit add yellow sponges x 1 min  - therabar rolling x 3 min over sx site  - in hand manipulation with coins   - distal median nerve glides x 10 reps (2 sets) NT  -red digi flex x 20 reps  -yellow digiextend x 20 reps  -Red powerweb pushes and pulls, x 15 reps pain free  -prayer stretches 3 x 30"    NT:- yellow putty twirling x 3 min   - yellow putty squeezing for 3 seconds x 20 reps  - yellow putty pinching 5 longs ea for three-jaw and key pinches    Neuro re-ed: x 3 minutes   - manipulating hand/wrist in rice bin     Patient Education and Home Exercises      Education provided:   -cont HEP and reviewed scar massage   -rice bin at home for 10-20 min a day  - cont desensitization stick  - Progress towards goals     Written Home Exercises Provided: Patient instructed to cont prior HEP.  Exercises were reviewed and Janki was able to demonstrate them prior to the end of the session.  Janki demonstrated good  understanding of the HEP provided. See EMR under Patient Instructions for exercises provided during therapy sessions.      ASSESSMENT     Cont to have mod sensitivity surrounding scar. Symptoms more similar to ulnar nerve irritation. Pt however reported sensitivity has improved. She cont to be unable to apply pressure to palm without pain.     Janki is progressing well towards her goals and there are no updates to goals at this time. Pt prognosis is Good.     Pt will continue to benefit from skilled " outpatient occupational therapy to address the deficits listed in the problem list on initial evaluation, provide pt/family education and to maximize pt's level of independence in the home and community environment.     Pt's spiritual, cultural and educational needs considered and pt agreeable to plan of care and goals.    Anticipated barriers to occupational therapy: n/a    Goals:  Long Term Goals (LTGs); to be met by discharge.   strength goal once assessed  Pt will improve thumb ROM by 5 degrees   Pt will report pain level no greater than 2/10 during functional daily work and community activities   Pinch strength goal once assessed      Short Term Goals (STGs); to be met within 4 weeks ().  Assess  and pinch when appropriate---met, 2/28/23  Pt will improve wrist ROM by 10 degrees  met (2/28/23)  Pt will report pain level no greater than 3/10 during light activity progressing not yet met (1/31/2023)        PLAN     Continue skilled occupational therapy 1-2x/week for 4 more weeks during certification period 2/28/23 - 3/31/23 with individualized plan of care focusing on improving functional independence with use of R hand    Updates/Grading for next session: cont to progress as tolerated with strengthening and scar management    Marilee Villeda, OT

## 2023-03-14 ENCOUNTER — OFFICE VISIT (OUTPATIENT)
Dept: HEPATOLOGY | Facility: CLINIC | Age: 58
End: 2023-03-14
Payer: COMMERCIAL

## 2023-03-14 VITALS — BODY MASS INDEX: 27.34 KG/M2 | HEIGHT: 67 IN | WEIGHT: 174.19 LBS

## 2023-03-14 DIAGNOSIS — E78.5 HYPERLIPIDEMIA, UNSPECIFIED HYPERLIPIDEMIA TYPE: ICD-10-CM

## 2023-03-14 DIAGNOSIS — R79.89 ELEVATED FERRITIN: ICD-10-CM

## 2023-03-14 DIAGNOSIS — K76.0 FATTY LIVER: ICD-10-CM

## 2023-03-14 DIAGNOSIS — R74.8 ELEVATED LIVER ENZYMES: Primary | ICD-10-CM

## 2023-03-14 PROCEDURE — 3008F PR BODY MASS INDEX (BMI) DOCUMENTED: ICD-10-PCS | Mod: CPTII,S$GLB,, | Performed by: PHYSICIAN ASSISTANT

## 2023-03-14 PROCEDURE — 99214 PR OFFICE/OUTPT VISIT, EST, LEVL IV, 30-39 MIN: ICD-10-PCS | Mod: S$GLB,,, | Performed by: PHYSICIAN ASSISTANT

## 2023-03-14 PROCEDURE — 99999 PR PBB SHADOW E&M-EST. PATIENT-LVL IV: CPT | Mod: PBBFAC,,, | Performed by: PHYSICIAN ASSISTANT

## 2023-03-14 PROCEDURE — 1160F PR REVIEW ALL MEDS BY PRESCRIBER/CLIN PHARMACIST DOCUMENTED: ICD-10-PCS | Mod: CPTII,S$GLB,, | Performed by: PHYSICIAN ASSISTANT

## 2023-03-14 PROCEDURE — 4010F PR ACE/ARB THEARPY RXD/TAKEN: ICD-10-PCS | Mod: CPTII,S$GLB,, | Performed by: PHYSICIAN ASSISTANT

## 2023-03-14 PROCEDURE — 3008F BODY MASS INDEX DOCD: CPT | Mod: CPTII,S$GLB,, | Performed by: PHYSICIAN ASSISTANT

## 2023-03-14 PROCEDURE — 1159F MED LIST DOCD IN RCRD: CPT | Mod: CPTII,S$GLB,, | Performed by: PHYSICIAN ASSISTANT

## 2023-03-14 PROCEDURE — 99999 PR PBB SHADOW E&M-EST. PATIENT-LVL IV: ICD-10-PCS | Mod: PBBFAC,,, | Performed by: PHYSICIAN ASSISTANT

## 2023-03-14 PROCEDURE — 99214 OFFICE O/P EST MOD 30 MIN: CPT | Mod: S$GLB,,, | Performed by: PHYSICIAN ASSISTANT

## 2023-03-14 PROCEDURE — 1160F RVW MEDS BY RX/DR IN RCRD: CPT | Mod: CPTII,S$GLB,, | Performed by: PHYSICIAN ASSISTANT

## 2023-03-14 PROCEDURE — 4010F ACE/ARB THERAPY RXD/TAKEN: CPT | Mod: CPTII,S$GLB,, | Performed by: PHYSICIAN ASSISTANT

## 2023-03-14 PROCEDURE — 1159F PR MEDICATION LIST DOCUMENTED IN MEDICAL RECORD: ICD-10-PCS | Mod: CPTII,S$GLB,, | Performed by: PHYSICIAN ASSISTANT

## 2023-03-14 NOTE — PATIENT INSTRUCTIONS
Liver enzymes have not improved  I recommend liver biopsy  Follow-up 10 days after liver biopsy.    Here are some details about the liver biopsy:  - A liver biopsy is a same day procedure (you do not spend the night). Someone needs to bring you, stay with you, and bring you home because you are given medication to make you sleepy. The sedation should make you drowsy and comfortable but will not put you completely to sleep. You will also receive medication to numb to right upper part of your abdomen where the liver is located.   - Once the medication has been given, a thin needle is passed through the skin into the liver to obtain a piece of liver tissue. An ultrasound is used to guide the biopsy.  - You will be kept in recovery for approximately 4 hours to make sure that you are stable to return home.     Possible complications associated with a liver biopsy include pain, bleeding, infection, and organ perforation, although these are not common and the risk is very low.      The sample of liver tissue is then evaluated under a microscope by a pathologist. It can take approximately 7-10 days to get results.     *You should NOT take any aspirin, NSAIDS (advil, aleve, ibuprofen, motrin, naproxen) and fish oil for 7 days before and after the biopsy.    The radiology team will call you to schedule and coordinate. Please inform me of the date of your liver biopsy ([employer] message is fine)  so we can schedule a follow-up 10 days later to review the results.

## 2023-03-14 NOTE — PROGRESS NOTES
Hepatology Consultation: [facility]    EST. PATIENT  PCP: DO Tierra Loza      Ms. Jones is a 57 y.o. female with PMH as listed below who presents to clinic for evaluation of elevated liver enzymes.   It appears she has previously had elevated enzymes, but more mild than recent, as well as hepatic steatosis.     In relation to metabolic risk factors:   Lab Results   Component Value Date    HGBA1C 5.6 10/06/2020    CHOL 211 (H) 03/07/2023    TSH 2.604 01/21/2022       BMI 26.52    Lab Results   Component Value Date     (H) 03/07/2023     (H) 03/07/2023    ALKPHOS 130 03/07/2023    BILITOT 0.4 03/07/2023    ALBUMIN 3.9 03/07/2023     10/12/2022       Initial history 10/26/2022  Janki Jones arrives today alone.  She is well-appearing and feels well. Denies symptoms of hepatic decompensation including jaundice, ascites, cognitive problems to suggest hepatic encephalopathy, or GI bleeding.  She reports 20 lbs weight gain since 2020. In regards to lifestyle, her activity levels have also waned.   She reports a history of ulcer/gastritis and intermittent abdominal pain related to this.   Denies symptoms of hepatic decompensation including jaundice, ascites, cognitive problems to suggest hepatic encephalopathy, or GI bleeding.   She reports her mother had cirrhosis, from unknown issue, decompensated and passed because of this.     Interval history 12/14/2022:  Janki Jones arrives today alone.   Since our last visit, underwent repeat labs and serologic w/u   Ferritin mildly elevated as well as slight IgG levels  Denies symptoms of hepatic decompensation including jaundice, abdominal distention or lower extremity swelling, hematemesis, melena, or periods of confusion suggestive of hepatic encephalopathy.      Interval history 03/14/2023:  Janki Jones arrives today alone.     Since our last visit, she has  actually gained some weight. She had incorporated more of a Mediterranean diet but this did not result in successful weight loss.   Denies symptoms of hepatic decompensation including jaundice, ascites, cognitive problems to suggest hepatic encephalopathy, or GI bleeding.   Feels fine other than central weight gain.    In regards to medications:  Methyphenidate not taking since 2-3 weeks ago  Magnesium Vitamin B12 D3  Buproprion  Escitalopram  HCTZ, Losartan, amlodipine  Pantoprazole      ETOH: denies  Smoking: denies  Illicit substances: denies  Prior liver biopsy: denies   Prior liver disease: fatty liver  Hepatitis vaccination: reports she has been    [facility] Janki has a past medical history of Abnormal Pap smear (2007), Abnormal Pap smear of cervix, ADD (attention deficit disorder), Anxiety, Depression, Fibroids, Herpes simplex virus (HSV) infection, and Hypertension.   PSX Janki has a past surgical history that includes Tubal ligation; essure; Cervical biopsy w/ loop electrode excision (01/01/2007); Colonoscopy (N/A, 02/15/2019); Esophagogastroduodenoscopy (N/A, 11/26/2021); Carpal tunnel release (Right, 12/15/2022); and Injection of steroid (Left, 12/15/2022).    Janki's family history includes ADD / ADHD in her daughter, daughter, and son; Alcohol abuse in her mother; Breast cancer (age of onset: 57) in her sister; Cirrhosis in her mother; Depression in her father; Diabetes in her father and mother; Glaucoma in her father; Heart disease in her father; Hyperlipidemia in her father; Hypertension in her brother and father; Kidney disease in her father; Liver disease in her mother; Osteoporosis in her father; Other in her father and sister; Panic disorder in her daughter and mother; Stroke in her mother; Thyroid disease in her sister.   BRODY Shearer reports that she has never smoked. She has never used smokeless tobacco. She reports that she does not drink alcohol and does not use drugs.   ANGEL Shearer is  "allergic to percocet [oxycodone-acetaminophen].   MICHELE Shearer has a current medication list which includes the following prescription(s): amlodipine, bupropion, escitalopram oxalate, estradiol, hydrochlorothiazide, loratadine, losartan, methylphenidate hcl, [START ON 3/18/2023] methylphenidate hcl, methylphenidate hcl, methylphenidate hcl, and multivitamin with minerals.     ROS:   GENERAL: Denies fatigue  HEENT: Denies yellowing of eyes   CARDIOVASCULAR: Denies edema  SKIN: Denies rash, itching     Objective     Ht 5' 7" (1.702 m)   Wt 79 kg (174 lb 2.6 oz)   LMP  (LMP Unknown) Comment: Last Cycle 2017  BMI 27.28 kg/m²   Vitals with Age-Percentiles 2022   Length cm 170.2 cm   Height in 67 in   Systolic 163   Diastolic 77   Temp    Pulse 67   Respiration 16   Weight kg 76.8 kg   Weight lbs 169 lb 5 oz   VISIT REPORT    BMI (Calculated) 26.5 kg/m2   Pain Score        PHYSICAL EXAM:   Friendly woman, in no acute distress; alert and oriented to person, place and time  EYES: Sclerae anicteric  GI: Central obesity, no palpable hepatomegaly. Soft, nontender. No fluid shift.  SKIN: Warm and dry. No jaundice. No telangectasias noted. No palmar erythema.  NEURO:  Normal gait. No asterixis.  PSYCH:  Memory intact. Thought and speech pattern appropriate. Behavior normal    DIAGNOSTICS  Lab Results   Component Value Date     (H) 2023     (H) 2023    ALKPHOS 130 2023    BILITOT 0.4 2023    ALBUMIN 3.9 2023     10/12/2022     No results found for: AFP    Prior serologic workup:   Lab Results   Component Value Date    SMOOTHMUSCAB Negative 1:40 10/26/2022    AMAIFA Negative 1:40 10/26/2022    IGGSERUM 1582 2022    ANASCREEN Negative <1:80 10/26/2022    FERRITIN 323 (H) 2023    FESATURATED 24 10/26/2022    CERULOPLSM 31.0 10/26/2022    HEPBSAG Non-reactive 10/26/2022    HEPCAB Non-reactive 10/26/2022       Imagin ultrasound suggesting hepatic " steatosis.    US Abdomen Limited  Narrative: EXAMINATION:  US ABDOMEN LIMITED    CLINICAL HISTORY:  Fatty (change of) liver, not elsewhere classified    TECHNIQUE:  Limited ultrasound of the right upper quadrant of the abdomen (including pancreas, liver, gallbladder, common bile duct, and spleen) was performed.    COMPARISON:  Abdominal ultrasound 12/29/2020.    FINDINGS:  Pancreas: Visualized portions of the pancreas appear unremarkable.    Liver: Normal in size, measuring 14.4 cm. Diffusely increased parenchymal echogenicity consistent with steatosis.   Hepatorenal index measures 1.6, indicating greater than 5% steatosis.  Geographic region of relative parenchymal hypoechogenicity at the gallbladder fossa, likely focal fatty sparing.  No suspicious focal hepatic lesions.    Gallbladder: There is a non-dependent 4 mm echogenic focus, consistent with cholelithiasis.  There is no gallbladder wall thickening or pericholecystic fluid.  No sonographic Rosado's sign.    Biliary system: The common duct is not dilated, measuring 2 mm.  No intrahepatic ductal dilatation.    Spleen: Normal in size and echotexture, measuring 8.4 x 3.1 cm.    Miscellaneous: No upper abdominal ascites.  Impression: Hepatic steatosis.    Cholelithiasis without evidence of acute cholecystitis.    Electronically signed by resident: Tj Xie  Date:    12/07/2022  Time:    08:56    Electronically signed by: Philippe Rodriguez MD  Date:    12/07/2022  Time:    09:09    Findings Fibroscan 12/14/2022  Median liver stiffness score:  7.1  CAP Reading: dB/m:  273     IQR/med %:  3  Interpretation  Fibrosis interpretation is based on medial liver stiffness - Kilopascal (kPa).     Fibrosis Stage:  F 0-1  Steatosis interpretation is based on controlled attenuation parameter - (dB/m).     Steatosis Grade:  S2    Assessment/Plan     57 y.o. female with:    1. Elevated liver enzymes  - IR Biopsy Liver; Future  - Ambulatory referral/consult  to [facility]  Interventional RAD; Future    2. Hyperlipidemia, unspecified hyperlipidemia type    3. Fatty liver    4. Elevated ferritin        Orders Placed This Encounter   Procedures    IR Biopsy Liver    Ambulatory referral/consult  to [facility] Interventional RAD     Worsening of liver enzymes after 3 mo trial of diet changes. In the interim patient has gained weight. No considerable clinical symptoms but out of caution we have already decided to obtain liver biopsy  I explained risks of liver biopsy including infection, bleeding, organ perforation. We discussed no NSAIDs 5 days prior to biopsy. IR will reach out with date. Patient knows to reach out to schedule f/u appt with me once bx scheduled.  Follow up for 10 days after liver biopsy..    I spent 25 minutes on the day of this encounter preparing for, evaluating, treating, and managing this patient.     Thank you for allowing me to participate in the care of JESSIE RankinC  Hepatology & Liver Transplant

## 2023-03-21 ENCOUNTER — CLINICAL SUPPORT (OUTPATIENT)
Dept: REHABILITATION | Facility: HOSPITAL | Age: 58
End: 2023-03-21
Payer: COMMERCIAL

## 2023-03-21 DIAGNOSIS — M25.631 DECREASED RANGE OF MOTION OF RIGHT WRIST: ICD-10-CM

## 2023-03-21 DIAGNOSIS — R52 PAIN: Primary | ICD-10-CM

## 2023-03-21 PROCEDURE — 97110 THERAPEUTIC EXERCISES: CPT

## 2023-03-21 PROCEDURE — 97022 WHIRLPOOL THERAPY: CPT

## 2023-03-21 PROCEDURE — 97035 APP MDLTY 1+ULTRASOUND EA 15: CPT

## 2023-03-21 PROCEDURE — 97140 MANUAL THERAPY 1/> REGIONS: CPT

## 2023-03-21 NOTE — PATIENT INSTRUCTIONS
MEDIAN NERVE GLIDING    Complete 10 repetitions of each exercise 4-6 times a day.      Make a fist, keep the wrist straight.  Straighten the fingers, keep the wrist straight.  Gently bend the wrist back, keep the thumb close to the fingers.  Extend the thumb out.  Turn forearm so palm is facing up.  Gently stretch the thumb out using the other hand.    If any of these exercises aggravate your hands, stop, rest and try returning again to the previous exercise performed without pain. Proceed at your own pace using pain tolerance as your guide.    LOBITO Fraire/L

## 2023-03-21 NOTE — PROGRESS NOTES
[facility]  Occupational Therapy Treatment Note     Date: 3/21/2023  Name: Janki Montana [facility]  Clinic Number: [id number]    Therapy Diagnosis:   Encounter Diagnoses   Name Primary?    Pain Yes    Decreased range of motion of right wrist      Physician: Toma Bowers MD      Physician Orders: Eval and treat; Patient needs appointment 12/27/2022 for suture removal and initiation of gentle ROM hand and wrist  Medical Diagnosis: G56.01 (ICD-10-CM) - Right carpal tunnel syndrome  Surgical Procedure and Date: 12/15/2022, Carpal tunnel release   Evaluation Date: 12/27/2022  Insurance Authorization Period Expiration: 12/31/2022  Plan of Care Expiration: 8 weeks; updated to 3/31/23  Date of Return to MD: 02/01/2023  Visit # / Visits authorized: 14/ 20  FOTO: (date and score)     Precautions:  Standard       Time In: 10:30 AM  Time Out: 11:30 AM  Total Appointment Time (timed & untimed codes): 60 minutes      SUBJECTIVE     Pt reports: cont to have mod to severe pain surrounding sx site. Pt also reported she sometimes feels a crampy ache in her RF.   She was compliant with home exercise program given last session.   Response to previous treatment: tolerating HEP well  Functional change: using hand as able    Pain: Not rated today, she reported m1-2x/chary pain with pressure to scar  Location: right hand sx site and periphery    OBJECTIVE   Objective Measures updated at progress report unless specified.    Observation/Appearance: sx scar dense and tender upon palpation. Periphery of sx scar also dense and tender to palpation.     Edema. Measured in centimeters.    12/29/2022 12/29/2022 1/31/2023     Left Right    2in. Above elbow        2in. Below elbow        Wrist Crease 15.0 cm 15.3 cm 15.9 cm   Figure of 8        MCPs 18.4 cm 18.9 cm          Elbow and Wrist ROM. Measured in degrees.    12/29/2022 12/29/2022 1/31/2023  2/14/2023 3/10/2023     Left Right Right Right Right    Elbow Ext/Flex           Supination/Pronation   WNL/WNL      Wrist Ext/Flex 69/65 70/58 66/83 68/80 65/65   Wrist RD/UD 25/45 16/46 15/40        Hand ROM. Measured in degrees.    12/29/2022 12/29/2022 1/31/2023 3/10/2023     Left Right Right Right             Index: MP    Joel to make full composite fist                Thumb: MP 72 62 58 60                IP 28 45 62 57       Rad ADD/ABD 50 45 41 46       Pal ADD/ABD 55 55 45 52          Opposition DPC DPC DPC DPC        Strength (Dynamometer) and Pinch Strength (Pinch Gauge)  Measured in pounds.    2/14/2023 2/14/2023 3/10/2023      Right Left(+CTS) Right   Rung II  20 53  25.5   Gonzalez Pinch  12.5 18  12   3pt Pinch  14  15 12   2pt Pinch           Sensation: denies numbness and tingling     12/27/2022 12/27/2022     Left Right   Dighton Ankur       Normal 1.65-2.83       Diminished Light Touch 3.22-3.61       Diminished Protective 3.84-4.31       Loss of Protective 4.56-6.65       Untestable >6.65       2 Point Discrimination       Static       Dynamic          Manual Muscle Test    12/29/2022 12/29/2022     Left Right   Wrist Extension    Deferred    Wrist Flexion       Radial Deviation       Ulnar Deviation       Supination       Pronation       Elbow Extension       Elbow Flexion          Limitation/Restriction for FOTO initial eval; wrist  Survey     Therapist reviewed FOTO scores for Janki Jones on 12/27/2022.   FOTO documents entered into [employer] - see Media section.     Limitation Score: 27%             Treatment     Janki received the treatments listed below:     Supervised modalities after being cleared for contradictions: Fluidotherapy: To R hand for 10 min, continuous air, 110 deg, air speed 100 to decrease pain, edema & scar tissue and increased tissue extensibility.      Patient received ultrasound to  R palmar scar area to increase blood flow, circulation, tissue elasticity, pain management and for wound/scar management for 8 minutes @ 3 Mhz, Intensity  "0.6 w/cm2 at 100% duty cycle.       Manual therapy techniques: scar massage massage  was applied to the Sx site 10 minutes  - STM with star tool hypothenar, thenar, extrinsic flexors   - desensitization with vibration tool scar massage (vibration near thenar origin while maintaining passive stretch thumb extension)   - KT tape carpal tunnel NT      Therapeutic exercises to develop ROM for 32 minutes, including:   - digit abd yellow RB x1 min  - digit add yellow sponges x 1 min  - therabar rolling x 3 min over sx site  - eccentric wrist flexion/extension x 10 reps each with 1# DB   - ulnar nerve glides (conservative) x 10 reps   - distal median nerve glides x 10 reps (2 sets)   - yellow digiextend x 20 reps  - Red powerweb pushes and pulls, x 15 reps pain free  - prayer stretches 3 x 30"      NT:- yellow putty twirling x 3 min   - yellow putty squeezing for 3 seconds x 20 reps  - yellow putty pinching 5 longs ea for three-jaw and key pinches        Patient Education and Home Exercises      Education provided:   -cont HEP and reviewed scar massage   -rice bin at home for 10-20 min a day  - cont desensitization stick  - Progress towards goals     Written Home Exercises Provided: Patient instructed to cont prior HEP.  Exercises were reviewed and Janki was able to demonstrate them prior to the end of the session.  Janki demonstrated good  understanding of the HEP provided. See EMR under Patient Instructions for exercises provided during therapy sessions.      ASSESSMENT     Cont to have mod sensitivity surrounding scar. Pt verbalized slight ache pain in RF.  Symptoms more similar to ulnar nerve irritation. Pt however reported sensitivity has improved since start of POC.     Janki is progressing well towards her goals and there are no updates to goals at this time. Pt prognosis is Good.     Pt will continue to benefit from skilled outpatient occupational therapy to address the deficits listed in the problem list on " initial evaluation, provide pt/family education and to maximize pt's level of independence in the home and community environment.     Pt's spiritual, cultural and educational needs considered and pt agreeable to plan of care and goals.    Anticipated barriers to occupational therapy: n/a    Goals:  Long Term Goals (LTGs); to be met by discharge.   strength goal once assessed  Pt will improve thumb ROM by 5 degrees   Pt will report pain level no greater than 2/10 during functional daily work and community activities   Pinch strength goal once assessed      Short Term Goals (STGs); to be met within 4 weeks ().  Assess  and pinch when appropriate---met, 2/28/23  Pt will improve wrist ROM by 10 degrees  met (2/28/23)  Pt will report pain level no greater than 3/10 during light activity progressing not yet met (1/31/2023)        PLAN     Continue skilled occupational therapy 1-2x/week for 4 more weeks during certification period 2/28/23 - 3/31/23 with individualized plan of care focusing on improving functional independence with use of R hand    Updates/Grading for next session: cont to progress as tolerated with strengthening and scar management    Marilee Villeda, OT

## 2023-03-26 RX ORDER — LIDOCAINE HYDROCHLORIDE 10 MG/ML
1 INJECTION, SOLUTION EPIDURAL; INFILTRATION; INTRACAUDAL; PERINEURAL ONCE
Status: CANCELLED | OUTPATIENT
Start: 2023-03-26 | End: 2023-03-26

## 2023-03-26 RX ORDER — SODIUM CHLORIDE 9 MG/ML
INJECTION, SOLUTION INTRAVENOUS CONTINUOUS
Status: CANCELLED | OUTPATIENT
Start: 2023-03-26

## 2023-03-28 ENCOUNTER — TELEPHONE (OUTPATIENT)
Dept: INTERVENTIONAL RADIOLOGY/VASCULAR | Facility: HOSPITAL | Age: 58
End: 2023-03-28
Payer: COMMERCIAL

## 2023-03-28 ENCOUNTER — TELEPHONE (OUTPATIENT)
Dept: HEPATOLOGY | Facility: CLINIC | Age: 58
End: 2023-03-28
Payer: COMMERCIAL

## 2023-03-28 NOTE — NURSING
Called and spoke to pt regarding preop instructions for 3/29, reviewed arrival time 0900, bring list of home meds, arrange ride to and from procedure, NPO after midnight, pt stated understood teaching

## 2023-03-29 ENCOUNTER — HOSPITAL ENCOUNTER (OUTPATIENT)
Dept: INTERVENTIONAL RADIOLOGY/VASCULAR | Facility: HOSPITAL | Age: 58
Discharge: HOME | End: 2023-03-29
Attending: PHYSICIAN ASSISTANT | Admitting: RADIOLOGY
Payer: COMMERCIAL

## 2023-03-29 ENCOUNTER — PATIENT MESSAGE (OUTPATIENT)
Dept: INTERVENTIONAL RADIOLOGY/VASCULAR | Facility: HOSPITAL | Age: 58
End: 2023-03-29

## 2023-03-29 VITALS
HEIGHT: 67 IN | OXYGEN SATURATION: 97 % | HEART RATE: 55 BPM | BODY MASS INDEX: 25.11 KG/M2 | SYSTOLIC BLOOD PRESSURE: 123 MMHG | DIASTOLIC BLOOD PRESSURE: 71 MMHG | RESPIRATION RATE: 16 BRPM | WEIGHT: 160 LBS | TEMPERATURE: 98 F

## 2023-03-29 DIAGNOSIS — R74.8 ELEVATED LIVER ENZYMES: ICD-10-CM

## 2023-03-29 DIAGNOSIS — R79.89 ELEVATED LIVER FUNCTION TESTS: ICD-10-CM

## 2023-03-29 LAB
ALBUMIN SERPL BCP-MCNC: 3.9 G/DL (ref 3.5–5.2)
ALP SERPL-CCNC: 110 U/L (ref 55–135)
ALT SERPL W/O P-5'-P-CCNC: 240 U/L (ref 10–44)
ANION GAP SERPL CALC-SCNC: 13 MMOL/L (ref 8–16)
AST SERPL-CCNC: 127 U/L (ref 10–40)
BASOPHILS # BLD AUTO: 0.01 K/UL (ref 0–0.2)
BASOPHILS NFR BLD: 0.2 % (ref 0–1.9)
BILIRUB SERPL-MCNC: 0.4 MG/DL (ref 0.1–1)
BUN SERPL-MCNC: 8 MG/DL (ref 6–20)
CALCIUM SERPL-MCNC: 8.8 MG/DL (ref 8.7–10.5)
CHLORIDE SERPL-SCNC: 102 MMOL/L (ref 95–110)
CO2 SERPL-SCNC: 26 MMOL/L (ref 23–29)
CREAT SERPL-MCNC: 0.9 MG/DL (ref 0.5–1.4)
DIFFERENTIAL METHOD: ABNORMAL
EOSINOPHIL # BLD AUTO: 0.1 K/UL (ref 0–0.5)
EOSINOPHIL NFR BLD: 1.4 % (ref 0–8)
ERYTHROCYTE [DISTWIDTH] IN BLOOD BY AUTOMATED COUNT: 13.2 % (ref 11.5–14.5)
EST. GFR  (NO RACE VARIABLE): >60 ML/MIN/1.73 M^2
GLUCOSE SERPL-MCNC: 104 MG/DL (ref 70–110)
HCT VFR BLD AUTO: 36.3 % (ref 37–48.5)
HGB BLD-MCNC: 11.9 G/DL (ref 12–16)
IMM GRANULOCYTES # BLD AUTO: 0.01 K/UL (ref 0–0.04)
IMM GRANULOCYTES NFR BLD AUTO: 0.2 % (ref 0–0.5)
INR PPP: 1.1 (ref 0.8–1.2)
LYMPHOCYTES # BLD AUTO: 2.1 K/UL (ref 1–4.8)
LYMPHOCYTES NFR BLD: 41 % (ref 18–48)
MCH RBC QN AUTO: 29.2 PG (ref 27–31)
MCHC RBC AUTO-ENTMCNC: 32.8 G/DL (ref 32–36)
MCV RBC AUTO: 89 FL (ref 82–98)
MONOCYTES # BLD AUTO: 0.5 K/UL (ref 0.3–1)
MONOCYTES NFR BLD: 10.3 % (ref 4–15)
NEUTROPHILS # BLD AUTO: 2.4 K/UL (ref 1.8–7.7)
NEUTROPHILS NFR BLD: 46.9 % (ref 38–73)
NRBC BLD-RTO: 0 /100 WBC
PLATELET # BLD AUTO: 226 K/UL (ref 150–450)
PMV BLD AUTO: 11.8 FL (ref 9.2–12.9)
POTASSIUM SERPL-SCNC: 3 MMOL/L (ref 3.5–5.1)
PROT SERPL-MCNC: 8 G/DL (ref 6–8.4)
PROTHROMBIN TIME: 11.5 SEC (ref 9–12.5)
RBC # BLD AUTO: 4.07 M/UL (ref 4–5.4)
SODIUM SERPL-SCNC: 141 MMOL/L (ref 136–145)
WBC # BLD AUTO: 5.15 K/UL (ref 3.9–12.7)

## 2023-03-29 PROCEDURE — 85025 COMPLETE CBC W/AUTO DIFF WBC: CPT | Performed by: PHYSICIAN ASSISTANT

## 2023-03-29 PROCEDURE — 99152 MOD SED SAME PHYS/QHP 5/>YRS: CPT | Performed by: RADIOLOGY

## 2023-03-29 PROCEDURE — 76942 ECHO GUIDE FOR BIOPSY: CPT | Mod: 26,,, | Performed by: RADIOLOGY

## 2023-03-29 PROCEDURE — 88313 SPECIAL STAINS GROUP 2: CPT | Mod: 26,,, | Performed by: PATHOLOGY

## 2023-03-29 PROCEDURE — 47000 IR BIOPSY LIVER: ICD-10-PCS | Mod: ,,, | Performed by: RADIOLOGY

## 2023-03-29 PROCEDURE — 88307 TISSUE EXAM BY PATHOLOGIST: CPT | Mod: 26,,, | Performed by: PATHOLOGY

## 2023-03-29 PROCEDURE — 47000 NEEDLE BIOPSY OF LIVER PERQ: CPT | Performed by: RADIOLOGY

## 2023-03-29 PROCEDURE — A4215 STERILE NEEDLE: HCPCS

## 2023-03-29 PROCEDURE — 80053 COMPREHEN METABOLIC PANEL: CPT | Performed by: PHYSICIAN ASSISTANT

## 2023-03-29 PROCEDURE — 76942 IR BIOPSY LIVER: ICD-10-PCS | Mod: 26,,, | Performed by: RADIOLOGY

## 2023-03-29 PROCEDURE — 88313 SPECIAL STAINS GROUP 2: CPT | Mod: 59 | Performed by: PATHOLOGY

## 2023-03-29 PROCEDURE — 85610 PROTHROMBIN TIME: CPT | Performed by: PHYSICIAN ASSISTANT

## 2023-03-29 PROCEDURE — 99153 MOD SED SAME PHYS/QHP EA: CPT | Performed by: RADIOLOGY

## 2023-03-29 PROCEDURE — 99152 PR MOD CONSCIOUS SEDATION, SAME PHYS, 5+ YRS, FIRST 15 MIN: ICD-10-PCS | Mod: ,,, | Performed by: RADIOLOGY

## 2023-03-29 PROCEDURE — 99152 MOD SED SAME PHYS/QHP 5/>YRS: CPT

## 2023-03-29 PROCEDURE — 25000003 PHARM REV CODE 250: Performed by: RADIOLOGY

## 2023-03-29 PROCEDURE — 63600175 PHARM REV CODE 636 W HCPCS: Performed by: RADIOLOGY

## 2023-03-29 PROCEDURE — 88313 PR  SPECIAL STAINS,GROUP II: ICD-10-PCS | Mod: 26,,, | Performed by: PATHOLOGY

## 2023-03-29 PROCEDURE — 88307 PR  SURG PATH,LEVEL V: ICD-10-PCS | Mod: 26,,, | Performed by: PATHOLOGY

## 2023-03-29 PROCEDURE — 99152 MOD SED SAME PHYS/QHP 5/>YRS: CPT | Mod: ,,, | Performed by: RADIOLOGY

## 2023-03-29 PROCEDURE — 99153 MOD SED SAME PHYS/QHP EA: CPT

## 2023-03-29 PROCEDURE — 88307 TISSUE EXAM BY PATHOLOGIST: CPT | Performed by: PATHOLOGY

## 2023-03-29 PROCEDURE — A4550 SURGICAL TRAYS: HCPCS

## 2023-03-29 RX ORDER — LIDOCAINE HYDROCHLORIDE 10 MG/ML
INJECTION INFILTRATION; PERINEURAL
Status: COMPLETED | OUTPATIENT
Start: 2023-03-29 | End: 2023-03-29

## 2023-03-29 RX ORDER — SODIUM CHLORIDE 9 MG/ML
INJECTION, SOLUTION INTRAVENOUS
Status: COMPLETED | OUTPATIENT
Start: 2023-03-29 | End: 2023-03-29

## 2023-03-29 RX ORDER — MIDAZOLAM HYDROCHLORIDE 1 MG/ML
INJECTION INTRAMUSCULAR; INTRAVENOUS
Status: COMPLETED | OUTPATIENT
Start: 2023-03-29 | End: 2023-03-29

## 2023-03-29 RX ORDER — FENTANYL CITRATE 50 UG/ML
INJECTION, SOLUTION INTRAMUSCULAR; INTRAVENOUS
Status: COMPLETED | OUTPATIENT
Start: 2023-03-29 | End: 2023-03-29

## 2023-03-29 RX ADMIN — SODIUM CHLORIDE 500 ML: 0.9 INJECTION, SOLUTION INTRAVENOUS at 10:03

## 2023-03-29 RX ADMIN — MIDAZOLAM HYDROCHLORIDE 1 MG: 1 INJECTION, SOLUTION INTRAMUSCULAR; INTRAVENOUS at 10:03

## 2023-03-29 RX ADMIN — GELATIN ABSORBABLE SPONGE SIZE 100 1 APPLICATOR: MISC at 10:03

## 2023-03-29 RX ADMIN — FENTANYL CITRATE 50 MCG: 50 INJECTION, SOLUTION INTRAMUSCULAR; INTRAVENOUS at 10:03

## 2023-03-29 RX ADMIN — LIDOCAINE HYDROCHLORIDE 5 ML: 10 INJECTION, SOLUTION INFILTRATION; PERINEURAL at 10:03

## 2023-03-29 NOTE — SEDATION DOCUMENTATION
IR procedure - Liver Biopsy - is complete. Patient tolerated very well. She is awake, alert, and oriented. Vital signs are intact. Sample is labeled and prepared for transport to the lab for ordered tests. Patient will continue sedation monitoring until 10:31. She will then return to IR pre/post to continued the prescribed recovery time of three hours post needle out time.

## 2023-03-29 NOTE — H&P
Interventional Radiology Pre-Procedure History & Physical      Chief Complaint/Reason for Referral: Elevated LFTs    History of Present Illness:  Janki Jones is a 57 y.o. female who presents with elevated LFTs. Referred for liver bx.    Past Medical History:   Diagnosis Date    Abnormal Pap smear 2007    Abnormal Pap smear of cervix     ADD (attention deficit disorder)     Anxiety     Depression     Fibroids     Herpes simplex virus (HSV) infection     HSV1.    Hypertension      Past Surgical History:   Procedure Laterality Date    CARPAL TUNNEL RELEASE Right 12/15/2022    Procedure: RELEASE, CARPAL TUNNEL;  Surgeon: Toma Bowers MD;  Location: [facility];  Service: Orthopedics;  Laterality: Right;    CERVICAL BIOPSY  W/ LOOP ELECTRODE EXCISION  01/01/2007    COLONOSCOPY N/A 02/15/2019    Procedure: COLONOSCOPY;  Surgeon: Uri Torrez MD;  Location: [address]);  Service: Endoscopy;  Laterality: N/A;    ESOPHAGOGASTRODUODENOSCOPY N/A 11/26/2021    Procedure: EGD (ESOPHAGOGASTRODUODENOSCOPY) fully vaccinated;  Surgeon: Venkat Astorga MD;  Location: [facility];  Service: Endoscopy;  Laterality: N/A;    essure      INJECTION OF STEROID Left 12/15/2022    Procedure: INJECTION, STEROID;  Surgeon: Toma Bowers MD;  Location: [facility];  Service: Orthopedics;  Laterality: Left;    TUBAL LIGATION         Allergies:   Review of patient's allergies indicates:   Allergen Reactions    Percocet [oxycodone-acetaminophen]      Other reaction(s): Nausea        Home Meds:   Prior to Admission medications    Medication Sig Start Date End Date Taking? Authorizing Provider   amLODIPine (NORVASC) 2.5 MG tablet Take 1 tablet (2.5 mg total) by mouth once daily. 1/23/23  Yes Daniel Lawrence, DO   buPROPion (WELLBUTRIN XL) 150 MG TB24 tablet Take 1 tablet (150 mg total) by mouth once daily. 1/23/23  Yes Daniel Lawrence, DO   EScitalopram oxalate (LEXAPRO) 20 MG tablet Take 1 tablet (20 mg total) by mouth  every evening. 1/23/23  Yes Daniel Lawrence DO   hydroCHLOROthiazide (HYDRODIURIL) 25 MG tablet Take 1 tablet (25 mg total) by mouth once daily. 1/23/23  Yes Daniel Lawrence DO   loratadine (CLARITIN) 10 mg tablet Take 10 mg by mouth once daily.   Yes Historical Provider   losartan (COZAAR) 100 MG tablet Take 1 tablet (100 mg total) by mouth once daily. 1/23/23  Yes Daniel Lawrence DO   methylphenidate HCl (RITALIN) 20 MG tablet Take 1 tablet (20 mg total) by mouth 2 (two) times daily. 3/10/23  Yes Daniel Lawrence DO   multivitamin with minerals tablet Take 1 tablet by mouth once daily.   Yes Historical Provider   estradioL (ESTRACE) 0.01 % (0.1 mg/gram) vaginal cream Place 1 g vaginally twice a week. At bedtime as needed 10/13/22 10/13/23  Ashly Rosado MD   methylphenidate HCl 36 MG CR tablet Take 1 tablet (36 mg total) by mouth every morning. 3/18/23   Daniel Lawrence DO   methylphenidate HCl 36 MG CR tablet Take 1 tablet (36 mg total) by mouth once daily. 2/18/23   Daniel Lawrence DO   methylphenidate HCl 36 MG CR tablet Take 1 tablet (36 mg total) by mouth every morning. 3/8/23   Daniel Lawrence DO       Anticoagulation/Antiplatelet Meds: no anticoagulation    Review of Systems:   Hematological: no known coagulopathies  Respiratory: no shortness of breath  Cardiovascular: no chest pain  Gastrointestinal: no abdominal pain  Genitourinary: no dysuria  Musculoskeletal: negative  Neurological: no TIA or stroke symptoms     Physical Exam:  Pulse: 60 (03/29/23 0914)  Resp: 16 (03/29/23 0914)  BP: 120/68 (03/29/23 0914)  SpO2: 98 % (03/29/23 0914)    General: WNWD, NAD  HEENT: Normocephalic, sclera anicteric, oropharynx clear  Heart: RRR  Lungs: Symmetric excursions, breathing unlabored  Abd: NTND, soft  Extremities: ESPINOZA  Neuro: Gross nonfocal    Laboratory:  No results found for: INR    Lab Results   Component Value Date    WBC 5.15 03/29/2023    HGB 11.9 (L) 03/29/2023     HCT 36.3 (L) 03/29/2023    MCV 89 03/29/2023     03/29/2023      Lab Results   Component Value Date    GLU 96 10/12/2022     10/12/2022    K 4.0 10/12/2022     10/12/2022    CO2 30 (H) 10/12/2022    BUN 8 10/12/2022    CREATININE 0.8 10/12/2022    CALCIUM 10.2 10/12/2022    MG 1.9 11/10/2020     (H) 03/07/2023     (H) 03/07/2023    ALBUMIN 3.9 03/07/2023    BILITOT 0.4 03/07/2023    BILIDIR 0.1 03/07/2023       Imaging:  RUQ US 12/7/22 reviewed.    Assessment/Plan:  57 y.o. female with elevated LFTs. Will undergo random liver Bx today.    Sedation:  Sedation history: have not been any systemic reactions  ASA: 2 / Mallampati: 3  Sedation plan: Up to moderate (Versed, fentanyl)     Risks (including, but not limited to, pain, bleeding, infection, damage to nearby structures, treatment failure/recurrence, and the need for additional procedures), potential benefits, and alternatives were discussed with the patient. All questions were answered to the best of my abilities. The patient wishes to proceed. Written informed consent was obtained.      Andrew Marsala MD [facility]   Pager [phone]

## 2023-03-31 NOTE — DISCHARGE SUMMARY
Interventional Radiology Short Stay Discharge Summary      Admit Date: 3/29/2023  Discharge Date: 03/29/2023    Hospital Course: Uneventful    Discharge Diagnosis: Elevated LFTs s/p liver bx today    Discharge Condition: Stable    Discharge Disposition: Home    Diet: Resume prior diet    Activity: Activity as tolerated and no driving for today    Follow-up: With referring provider      [facility]  Pager [phone]

## 2023-03-31 NOTE — PROCEDURES
Interventional Radiology Immediate Post-Procedure Note    Pre-Op Diagnosis: Elevated LFTs  Post-Op Diagnosis: Same    Procedure: Coaxial core needle bx under US guidance    Procedure performed by: Favio Rosales MD  Assistants: None    Estimated Blood Loss: Minimal  Specimen Removed: Yes    Findings/description of procedure:  18-ga cores x4 taken from right lobe the liver. Specimens were placed in formalin and submitted to pathology. Tract embolized with Gel-Foam sponge slurry.    No immediate complications. Patient tolerated procedure well. Please see full dictated procedure report for additional details and recommendations.      Favio Rosales MD  [facility]  Pager [phone]

## 2023-04-03 LAB
FINAL PATHOLOGIC DIAGNOSIS: NORMAL
GROSS: NORMAL
Lab: NORMAL

## 2023-04-06 ENCOUNTER — TELEPHONE (OUTPATIENT)
Dept: OBSTETRICS AND GYNECOLOGY | Facility: CLINIC | Age: 58
End: 2023-04-06
Payer: COMMERCIAL

## 2023-04-06 ENCOUNTER — OFFICE VISIT (OUTPATIENT)
Dept: OBSTETRICS AND GYNECOLOGY | Facility: CLINIC | Age: 58
End: 2023-04-06
Payer: COMMERCIAL

## 2023-04-06 VITALS
BODY MASS INDEX: 27.16 KG/M2 | WEIGHT: 173.38 LBS | SYSTOLIC BLOOD PRESSURE: 124 MMHG | DIASTOLIC BLOOD PRESSURE: 82 MMHG

## 2023-04-06 DIAGNOSIS — N94.10 DYSPAREUNIA IN FEMALE: ICD-10-CM

## 2023-04-06 DIAGNOSIS — B35.6 TINEA CRURIS: Primary | ICD-10-CM

## 2023-04-06 DIAGNOSIS — R21 RASH: ICD-10-CM

## 2023-04-06 PROCEDURE — 3079F DIAST BP 80-89 MM HG: CPT | Mod: CPTII,S$GLB,, | Performed by: OBSTETRICS & GYNECOLOGY

## 2023-04-06 PROCEDURE — 1160F PR REVIEW ALL MEDS BY PRESCRIBER/CLIN PHARMACIST DOCUMENTED: ICD-10-PCS | Mod: CPTII,S$GLB,, | Performed by: OBSTETRICS & GYNECOLOGY

## 2023-04-06 PROCEDURE — 3008F BODY MASS INDEX DOCD: CPT | Mod: CPTII,S$GLB,, | Performed by: OBSTETRICS & GYNECOLOGY

## 2023-04-06 PROCEDURE — 99214 PR OFFICE/OUTPT VISIT, EST, LEVL IV, 30-39 MIN: ICD-10-PCS | Mod: S$GLB,,, | Performed by: OBSTETRICS & GYNECOLOGY

## 2023-04-06 PROCEDURE — 4010F ACE/ARB THERAPY RXD/TAKEN: CPT | Mod: CPTII,S$GLB,, | Performed by: OBSTETRICS & GYNECOLOGY

## 2023-04-06 PROCEDURE — 3079F PR MOST RECENT DIASTOLIC BLOOD PRESSURE 80-89 MM HG: ICD-10-PCS | Mod: CPTII,S$GLB,, | Performed by: OBSTETRICS & GYNECOLOGY

## 2023-04-06 PROCEDURE — 3074F PR MOST RECENT SYSTOLIC BLOOD PRESSURE < 130 MM HG: ICD-10-PCS | Mod: CPTII,S$GLB,, | Performed by: OBSTETRICS & GYNECOLOGY

## 2023-04-06 PROCEDURE — 1160F RVW MEDS BY RX/DR IN RCRD: CPT | Mod: CPTII,S$GLB,, | Performed by: OBSTETRICS & GYNECOLOGY

## 2023-04-06 PROCEDURE — 99999 PR PBB SHADOW E&M-EST. PATIENT-LVL II: ICD-10-PCS | Mod: PBBFAC,,, | Performed by: OBSTETRICS & GYNECOLOGY

## 2023-04-06 PROCEDURE — 99214 OFFICE O/P EST MOD 30 MIN: CPT | Mod: S$GLB,,, | Performed by: OBSTETRICS & GYNECOLOGY

## 2023-04-06 PROCEDURE — 3074F SYST BP LT 130 MM HG: CPT | Mod: CPTII,S$GLB,, | Performed by: OBSTETRICS & GYNECOLOGY

## 2023-04-06 PROCEDURE — 1159F PR MEDICATION LIST DOCUMENTED IN MEDICAL RECORD: ICD-10-PCS | Mod: CPTII,S$GLB,, | Performed by: OBSTETRICS & GYNECOLOGY

## 2023-04-06 PROCEDURE — 99999 PR PBB SHADOW E&M-EST. PATIENT-LVL II: CPT | Mod: PBBFAC,,, | Performed by: OBSTETRICS & GYNECOLOGY

## 2023-04-06 PROCEDURE — 1159F MED LIST DOCD IN RCRD: CPT | Mod: CPTII,S$GLB,, | Performed by: OBSTETRICS & GYNECOLOGY

## 2023-04-06 PROCEDURE — 3008F PR BODY MASS INDEX (BMI) DOCUMENTED: ICD-10-PCS | Mod: CPTII,S$GLB,, | Performed by: OBSTETRICS & GYNECOLOGY

## 2023-04-06 PROCEDURE — 4010F PR ACE/ARB THEARPY RXD/TAKEN: ICD-10-PCS | Mod: CPTII,S$GLB,, | Performed by: OBSTETRICS & GYNECOLOGY

## 2023-04-06 RX ORDER — ESTRADIOL 0.1 MG/G
1 CREAM VAGINAL
Qty: 42.5 G | Refills: 6 | Status: SHIPPED | OUTPATIENT
Start: 2023-04-06 | End: 2024-04-05

## 2023-04-06 RX ORDER — CLOTRIMAZOLE AND BETAMETHASONE DIPROPIONATE 10; .64 MG/G; MG/G
CREAM TOPICAL
Qty: 15 G | Refills: 1 | Status: SHIPPED | OUTPATIENT
Start: 2023-04-06

## 2023-04-06 NOTE — ASSESSMENT & PLAN NOTE
Raised hyperpigmented scaling rash in bilateral inguinal regions c/w tinea cruris. Topical Lotrisone Rx sent to pharmacy. Use BID for 7 days and contact the office with no improvement.

## 2023-04-06 NOTE — PROGRESS NOTES
Chief Complaint   Patient presents with    Rash     Pt complains of having a rash on upper thigh close to the vagina. She states she's had it for two weeks and it itches.        HPI:   57 y.o.  here today for rash on upper inner left thigh/groin that started about 2 weeks ago. The rash is raised and itchy. She has a puppy that sometimes sleeps in the bed with her and one day the dog toys/bone were in the bed. She changed the sheets but noticed the itching started soon after. Started taking Zyrtec which helped some with itching. Denies visible insect bites or other pruritic areas.    Doing a lot of outside yard work/spring cleaning, walking and sweating. Denies new soaps, detergents, or other environmental exposures.    The itching is external. Has not used the estrace cream previously prescribed for vulvovaginal atrophy but still notes symptoms including irritation and painful intercourse. She would like the prescription to be resent.      LMP Dates from Last 1 Encounters:   LMP: 2017       Labs / Significant Studies  Pap (2021): NILM/HPV neg  MMG (2022):  BIRADS-1  Colonoscopy (2019): Repeat in 5 years     Hospital Outpatient Visit on 2023   Component Date Value Ref Range Status    WBC 2023 5.15  3.90 - 12.70 K/uL Final    RBC 2023 4.07  4.00 - 5.40 M/uL Final    Hemoglobin 2023 11.9 (L)  12.0 - 16.0 g/dL Final    Hematocrit 2023 36.3 (L)  37.0 - 48.5 % Final    MCV 2023 89  82 - 98 fL Final    MCH 2023 29.2  27.0 - 31.0 pg Final    MCHC 2023 32.8  32.0 - 36.0 g/dL Final    RDW 2023 13.2  11.5 - 14.5 % Final    Platelets 2023 226  150 - 450 K/uL Final    MPV 2023 11.8  9.2 - 12.9 fL Final    Immature Granulocytes 2023 0.2  0.0 - 0.5 % Final    Gran # (ANC) 2023 2.4  1.8 - 7.7 K/uL Final    Immature Grans (Abs) 2023 0.01  0.00 - 0.04 K/uL Final    Comment: Mild elevation in immature granulocytes is non specific  and   can be seen in a variety of conditions including stress response,   acute inflammation, trauma and pregnancy. Correlation with other   laboratory and clinical findings is essential.      Lymph # 03/29/2023 2.1  1.0 - 4.8 K/uL Final    Mono # 03/29/2023 0.5  0.3 - 1.0 K/uL Final    Eos # 03/29/2023 0.1  0.0 - 0.5 K/uL Final    Baso # 03/29/2023 0.01  0.00 - 0.20 K/uL Final    nRBC 03/29/2023 0  0 /100 WBC Final    Gran % 03/29/2023 46.9  38.0 - 73.0 % Final    Lymph % 03/29/2023 41.0  18.0 - 48.0 % Final    Mono % 03/29/2023 10.3  4.0 - 15.0 % Final    Eosinophil % 03/29/2023 1.4  0.0 - 8.0 % Final    Basophil % 03/29/2023 0.2  0.0 - 1.9 % Final    Differential Method 03/29/2023 Automated   Final    Sodium 03/29/2023 141  136 - 145 mmol/L Final    Potassium 03/29/2023 3.0 (L)  3.5 - 5.1 mmol/L Final    Chloride 03/29/2023 102  95 - 110 mmol/L Final    CO2 03/29/2023 26  23 - 29 mmol/L Final    Glucose 03/29/2023 104  70 - 110 mg/dL Final    BUN 03/29/2023 8  6 - 20 mg/dL Final    Creatinine 03/29/2023 0.9  0.5 - 1.4 mg/dL Final    Calcium 03/29/2023 8.8  8.7 - 10.5 mg/dL Final    Total Protein 03/29/2023 8.0  6.0 - 8.4 g/dL Final    Albumin 03/29/2023 3.9  3.5 - 5.2 g/dL Final    Total Bilirubin 03/29/2023 0.4  0.1 - 1.0 mg/dL Final    Comment: For infants and newborns, interpretation of results should be based  on gestational age, weight and in agreement with clinical  observations.    Premature Infant recommended reference ranges:  Up to 24 hours.............<8.0 mg/dL  Up to 48 hours............<12.0 mg/dL  3-5 days..................<15.0 mg/dL  6-29 days.................<15.0 mg/dL      Alkaline Phosphatase 03/29/2023 110  55 - 135 U/L Final    AST 03/29/2023 127 (H)  10 - 40 U/L Final    ALT 03/29/2023 240 (H)  10 - 44 U/L Final    Anion Gap 03/29/2023 13  8 - 16 mmol/L Final    eGFR 03/29/2023 >60  >60 mL/min/1.73 m^2 Final    Prothrombin Time 03/29/2023 11.5  9.0 - 12.5 sec Final    INR 03/29/2023 1.1   0.8 - 1.2 Final    Comment: Coumadin Therapy:  2.0 - 3.0 for INR for all indicators except mechanical heart valves  and antiphospholipid syndromes which should use 2.5 - 3.5.  LOT^040^PT Inn^[id number]      Final Pathologic Diagnosis 03/29/2023    Final                    Value:Liver, random, biopsy:  - Mild steatosis, predominantly macrovesicular, 30% with steatohepatitis.  - Moderate portal inflammation, predominantly lymphocytic with eosinophils  and macrophages.  - Pericellular and periportal fibrosis, stage 2 (of 4).  - See comment.  COMMENT:  The biopsy is adequate for evaluation.  The patient's history of  elevated liver enzymes is noted and select information from the electronic  medical record is reviewed.  The biopsy shows steatosis with steatohepatitis.   There are many causes of steatohepatitis.  If the clinical history is  consistent with nonalcoholic steatohepatitis, this would correspond to a  NAFLD score of 3 (out of 8, 1 for steatosis, 1 for lobular inflammation, 1  for ballooning).  There is also moderate portal inflammation, predominantly  lymphocytic with eosinophils and macrophages present.  Some degree of portal  inflammation can be secondary to steatohepatitis, however this raises the  possibility of a secondary process such as an infec                          tion or drug reaction with  evidence of recovery.  There are no granulomas seen.  There is no significant  plasma cell population seen.  There is pericellular and periportal fibrosis  seen on trichrome stain.  There is no increased iron within hepatocytes on  iron stain.  Appropriate positive controls are examined.      Interp By Yovana White MD, Signed on 04/03/2023 at 09:29    Gross 03/29/2023    Final                    Value:MRN # on requisition [id number]  MRN # on AP label [id number]  Received in formalin, labeled with the patient's id, are 5 rubbery and tan  needle biopsy fragments measuring from 0.4 to 1.9 cm.  Entirely  submitted.  ZKW--1-A    Grossed by JORDAN  [facility]      Disclaimer 03/29/2023    Final                    Value:Unless the case is a 'gross only' or additional testing only, the final  diagnosis for each specimen is based on a microscopic examination of  appropriate tissue sections.     Lab Visit on 03/07/2023   Component Date Value Ref Range Status    Ferritin 03/07/2023 323 (H)  20.0 - 300.0 ng/mL Final    Total Protein 03/07/2023 7.9  6.0 - 8.4 g/dL Final    Albumin 03/07/2023 3.9  3.5 - 5.2 g/dL Final    Total Bilirubin 03/07/2023 0.4  0.1 - 1.0 mg/dL Final    Comment: For infants and newborns, interpretation of results should be based  on gestational age, weight and in agreement with clinical  observations.    Premature Infant recommended reference ranges:  Up to 24 hours.............<8.0 mg/dL  Up to 48 hours............<12.0 mg/dL  3-5 days..................<15.0 mg/dL  6-29 days.................<15.0 mg/dL      Bilirubin, Direct 03/07/2023 0.1  0.1 - 0.3 mg/dL Final    AST 03/07/2023 112 (H)  10 - 40 U/L Final    ALT 03/07/2023 196 (H)  10 - 44 U/L Final    Alkaline Phosphatase 03/07/2023 130  55 - 135 U/L Final    Cholesterol 03/07/2023 211 (H)  120 - 199 mg/dL Final    Comment: The National Cholesterol Education Program (NCEP) has set the  following guidelines (reference ranges) for Cholesterol:  Optimal.....................<200 mg/dL  Borderline High.............200-239 mg/dL  High........................> or = 240 mg/dL      Triglycerides 03/07/2023 176 (H)  30 - 150 mg/dL Final    Comment: The National Cholesterol Education Program (NCEP) has set the  following guidelines (reference values) for triglycerides:  Normal......................<150 mg/dL  Borderline High.............150-199 mg/dL  High........................200-499 mg/dL      HDL 03/07/2023 52  40 - 75 mg/dL Final    Comment: The National Cholesterol Education Program (NCEP) has set the  following guidelines (reference  values) for HDL Cholesterol:  Low...............<40 mg/dL  Optimal...........>60 mg/dL      LDL Cholesterol 03/07/2023 123.8  63.0 - 159.0 mg/dL Final    Comment: The National Cholesterol Education Program (NCEP) has set the  following guidelines (reference values) for LDL Cholesterol:  Optimal.......................<130 mg/dL  Borderline High...............130-159 mg/dL  High..........................160-189 mg/dL  Very High.....................>190 mg/dL      HDL/Cholesterol Ratio 03/07/2023 24.6  20.0 - 50.0 % Final    Total Cholesterol/HDL Ratio 03/07/2023 4.1  2.0 - 5.0 Final    Non-HDL Cholesterol 03/07/2023 159  mg/dL Final    Comment: Risk category and Non-HDL cholesterol goals:  Coronary heart disease (CHD)or equivalent (10-year risk of CHD >20%):  Non-HDL cholesterol goal     <130 mg/dL  Two or more CHD risk factors and 10-year risk of CHD <= 20%:  Non-HDL cholesterol goal     <160 mg/dL  0 to 1 CHD risk factor:  Non-HDL cholesterol goal     <190 mg/dL          Past Medical History:   Diagnosis Date    Abnormal Pap smear 2007    Abnormal Pap smear of cervix     ADD (attention deficit disorder)     Anxiety     Depression     Fibroids     Herpes simplex virus (HSV) infection     HSV1.    Hypertension      Past Surgical History:   Procedure Laterality Date    CARPAL TUNNEL RELEASE Right 12/15/2022    Procedure: RELEASE, CARPAL TUNNEL;  Surgeon: Toma Bowers MD;  Location: [facility];  Service: Orthopedics;  Laterality: Right;    CERVICAL BIOPSY  W/ LOOP ELECTRODE EXCISION  01/01/2007    COLONOSCOPY N/A 02/15/2019    Procedure: COLONOSCOPY;  Surgeon: Uri Torrez MD;  Location: [address];  Service: Endoscopy;  Laterality: N/A;    ESOPHAGOGASTRODUODENOSCOPY N/A 11/26/2021    Procedure: EGD (ESOPHAGOGASTRODUODENOSCOPY) fully vaccinated;  Surgeon: Venkat Astorga MD;  Location: [facility];  Service: Endoscopy;  Laterality: N/A;    essure      INJECTION OF STEROID Left 12/15/2022    Procedure:  INJECTION, STEROID;  Surgeon: Toma Bowers MD;  Location: [facility];  Service: Orthopedics;  Laterality: Left;    TUBAL LIGATION         Current Outpatient Medications:     amLODIPine (NORVASC) 2.5 MG tablet, Take 1 tablet (2.5 mg total) by mouth once daily., Disp: 90 tablet, Rfl: 3    buPROPion (WELLBUTRIN XL) 150 MG TB24 tablet, Take 1 tablet (150 mg total) by mouth once daily., Disp: 90 tablet, Rfl: 1    EScitalopram oxalate (LEXAPRO) 20 MG tablet, Take 1 tablet (20 mg total) by mouth every evening., Disp: 90 tablet, Rfl: 3    hydroCHLOROthiazide (HYDRODIURIL) 25 MG tablet, Take 1 tablet (25 mg total) by mouth once daily., Disp: 90 tablet, Rfl: 2    loratadine (CLARITIN) 10 mg tablet, Take 10 mg by mouth once daily., Disp: , Rfl:     losartan (COZAAR) 100 MG tablet, Take 1 tablet (100 mg total) by mouth once daily., Disp: 90 tablet, Rfl: 3    methylphenidate HCl (RITALIN) 20 MG tablet, Take 1 tablet (20 mg total) by mouth 2 (two) times daily., Disp: 60 tablet, Rfl: 0    multivitamin with minerals tablet, Take 1 tablet by mouth once daily., Disp: , Rfl:     clotrimazole-betamethasone 1-0.05% (LOTRISONE) cream, Twice a day for 5-7 days, Disp: 15 g, Rfl: 1    estradioL (ESTRACE) 0.01 % (0.1 mg/gram) vaginal cream, Place 1 g vaginally twice a week. At bedtime as needed, Disp: 42.5 g, Rfl: 6    estradioL (ESTRACE) 0.01 % (0.1 mg/gram) vaginal cream, Place 1 g vaginally twice a week. At bedtime as needed, Disp: 42.5 g, Rfl: 6  Review of patient's allergies indicates:   Allergen Reactions    Percocet [oxycodone-acetaminophen]      Other reaction(s): Nausea     OB History    Para Term  AB Living   3 3 3     3   SAB IAB Ectopic Multiple Live Births           3      # Outcome Date GA Lbr Ramon/2nd Weight Sex Delivery Anes PTL Lv   3 Term 06    M Vag-Spont  N PAT   2 Term 03    F Vag-Spont  N PAT   1 Term 98    F Vag-Spont  N PAT     Social History     Tobacco Use    Smoking status: Never     Smokeless tobacco: Never   Substance Use Topics    Alcohol use: No    Drug use: No     Family History   Problem Relation Age of Onset    Cirrhosis Mother     Alcohol abuse Mother     Diabetes Mother     Liver disease Mother     Stroke Mother     Panic disorder Mother     Other Father         glaucoma    Diabetes Father     Heart disease Father     Hyperlipidemia Father     Hypertension Father     Kidney disease Father     Glaucoma Father     Depression Father     Osteoporosis Father     Other Sister         thyroid    Thyroid disease Sister     Breast cancer Sister 57    ADD / ADHD Daughter     ADD / ADHD Daughter     Panic disorder Daughter     Hypertension Brother     ADD / ADHD Son     Colon cancer Neg Hx     Ovarian cancer Neg Hx     Amblyopia Neg Hx     Blindness Neg Hx     Cataracts Neg Hx     Macular degeneration Neg Hx     Retinal detachment Neg Hx     Strabismus Neg Hx        Review of Systems   Negative except as in HPI      Physical Exam   Vitals:    04/06/23 1340   BP: 124/82     Body mass index is 27.16 kg/m².    Physical Exam  Constitutional:       General: She is not in acute distress.     Appearance: Normal appearance.   Genitourinary:         HENT:      Head: Normocephalic and atraumatic.   Pulmonary:      Effort: Pulmonary effort is normal.   Abdominal:      General: There is no distension.      Palpations: Abdomen is soft.      Tenderness: There is no abdominal tenderness. There is no guarding or rebound.   Musculoskeletal:         General: Normal range of motion.      Cervical back: Normal range of motion.   Neurological:      General: No focal deficit present.      Mental Status: She is alert and oriented to person, place, and time.   Skin:     General: Skin is warm and dry.   Psychiatric:         Mood and Affect: Mood normal.         Behavior: Behavior normal.         Thought Content: Thought content normal.        Labs reviewed: Pap, HPV, MMG, colonoscopy    ASSESSMENT:   Patient Active  Problem List   Diagnosis    Essential hypertension    ADD (attention deficit disorder)    GERD (gastroesophageal reflux disease)    Situational stress    Adjustment disorder with mixed anxiety and depressed mood    Anxiety state    Major depressive disorder, recurrent episode, moderate    Allergic rhinitis    Recurrent UTI    High risk HPV infection    Vaginal discharge    Pain    Decreased range of motion of right wrist    Tinea cruris    Dyspareunia in female       PLAN:  Problem List Items Addressed This Visit          Derm    Tinea cruris - Primary    Current Assessment & Plan     Raised hyperpigmented scaling rash in bilateral inguinal regions c/w tinea cruris. Topical Lotrisone Rx sent to pharmacy. Use BID for 7 days and contact the office with no improvement.            Relevant Medications    clotrimazole-betamethasone 1-0.05% (LOTRISONE) cream       Renal/    Dyspareunia in female    Relevant Medications    estradioL (ESTRACE) 0.01 % (0.1 mg/gram) vaginal cream     Other Visit Diagnoses       Rash                 Total time spent on this encounter was 20 minutes.  This includes preparing to see the patient;  obtaining/reviewing separately obtained history;  performing a medical exam and/or evaluation;   counseling/educating the patient;  ordering medications, tests, of procedures;   referring/communicating with other health care professionals;  EMR documentation;  interpreting/communicating results to the patient;  and/or care coordination.    Follow up if symptoms worsen or fail to improve.       Ashly Rosado MD  Department of Obstetrics & Gynecology  [facility]

## 2023-04-12 ENCOUNTER — OFFICE VISIT (OUTPATIENT)
Dept: HEPATOLOGY | Facility: CLINIC | Age: 58
End: 2023-04-12
Payer: COMMERCIAL

## 2023-04-12 ENCOUNTER — TELEPHONE (OUTPATIENT)
Dept: HEPATOLOGY | Facility: CLINIC | Age: 58
End: 2023-04-12

## 2023-04-12 DIAGNOSIS — R74.8 ELEVATED LIVER ENZYMES: ICD-10-CM

## 2023-04-12 DIAGNOSIS — K75.81 NASH (NONALCOHOLIC STEATOHEPATITIS): Primary | ICD-10-CM

## 2023-04-12 DIAGNOSIS — F33.1 MAJOR DEPRESSIVE DISORDER, RECURRENT EPISODE, MODERATE: ICD-10-CM

## 2023-04-12 DIAGNOSIS — K74.00 LIVER FIBROSIS: ICD-10-CM

## 2023-04-12 PROCEDURE — 1160F RVW MEDS BY RX/DR IN RCRD: CPT | Mod: CPTII,95,, | Performed by: PHYSICIAN ASSISTANT

## 2023-04-12 PROCEDURE — 99213 OFFICE O/P EST LOW 20 MIN: CPT | Mod: 95,,, | Performed by: PHYSICIAN ASSISTANT

## 2023-04-12 PROCEDURE — 4010F PR ACE/ARB THEARPY RXD/TAKEN: ICD-10-PCS | Mod: CPTII,95,, | Performed by: PHYSICIAN ASSISTANT

## 2023-04-12 PROCEDURE — 1159F MED LIST DOCD IN RCRD: CPT | Mod: CPTII,95,, | Performed by: PHYSICIAN ASSISTANT

## 2023-04-12 PROCEDURE — 1160F PR REVIEW ALL MEDS BY PRESCRIBER/CLIN PHARMACIST DOCUMENTED: ICD-10-PCS | Mod: CPTII,95,, | Performed by: PHYSICIAN ASSISTANT

## 2023-04-12 PROCEDURE — 4010F ACE/ARB THERAPY RXD/TAKEN: CPT | Mod: CPTII,95,, | Performed by: PHYSICIAN ASSISTANT

## 2023-04-12 PROCEDURE — 1159F PR MEDICATION LIST DOCUMENTED IN MEDICAL RECORD: ICD-10-PCS | Mod: CPTII,95,, | Performed by: PHYSICIAN ASSISTANT

## 2023-04-12 PROCEDURE — 99213 PR OFFICE/OUTPT VISIT, EST, LEVL III, 20-29 MIN: ICD-10-PCS | Mod: 95,,, | Performed by: PHYSICIAN ASSISTANT

## 2023-04-12 NOTE — PATIENT INSTRUCTIONS
FATTY LIVER RECOMMENDATIONS:    There is no FDA approved therapy for non-alcoholic fatty liver disease (NAFLD); therefore, lifestyle changes are important:  1. Weight loss goal of 17** lbs  2. Mediterranean diet is recommended that focuses on low-carb, low-sugar, and low-processed foods.  3. Exercise, 5 days per week, 30 minutes per day, as tolerated  4. Recommend good control of cholesterol, blood pressure, blood sugar levels (as these are risk factors for fatty liver). Cholesterol lowering medications including statins are typically safe and beneficial (even if liver enzymes are elevated).    5. Limit alcohol consumption, no more than 1 serving(s) of alcohol in any day (1 serving is 5 ounces of wine, 12 ounces of beer, or 1.5 ounces of liquor). Do not recommend daily alcohol use.        In some people, fatty liver can progress to steatohepatitis (inflammatory fatty liver) and possibly to cirrhosis, putting one at increased risk for liver cancer and liver failure in the future. Lifestyle changes can help to decrease this risk.     If interested in seeing a dietician to create a weight loss plan, contact the dietician team at [facility]: [email or web address].  You can also call to schedule a consult with a dietician: [phone]. *Virtual visits are available with one of our dieticians.     If you have diabetes or high blood pressure, you can meet with a Health  through [facility]'s [employer] program. Let us know if you are interested in a referral.     Ask about our fatty liver/HERNANDEZ clinical trials if you have fibrosis / scar tissue related to fatty liver.    *If statins are being considered for HLD, statins are safe in patients with liver disease. Statins can be used to treat dyslipidemia in patients with both NAFLD and HERNANDEZ.      FATTY LIVER DIET TIPS:    ADD OLIVE OIL. I recommend olive oil daily (in salads or when cooking)  ADD COFFEE. Black coffee has also been found to be potentially  beneficial (skip the sweets and creamer). Add 1-2 cups per day,  if you are able to tolerate. Decaf is fine.  BE MINDFUL OF CARBS AND ADDED SUGARS. Try to limit your carb intake to LESS than 30-45 grams of carbs with a meal or LESS than 5-10 grams with any snack (total of any snack foods eaten during that time).   KEEP A FOOD DIARY. Use MyFitness Pal  OR LOSE IT belinda to add up your carbs through the day - the most successful folks on weight loss journey TRACK their progress and food.  LIMIT WHAT YOU ARE DRINKING. Do NOT drink any beverages with calories or carbs (this can lead to high blood sugar and weight gain). Also, some of our patients have been very successful with weight loss using the pre made/planned meal planning services that limit calories and portion size (one example is Sensible Meals but there are many out there). Unsweetened black or green tea is fine! Hibiscus tea is also great and refreshing, especially iced.     Tips for low/moderate carbohydrate diet:  3 meals a day made up of the following:  -- Green vegetables, tomatoes, mushrooms, spaghetti squash, cauliflower, meat, poultry, seafood, eggs   -- Beans (1-1.5 cups) or nuts (1/4 cup): can have 1 x a day.  -- Greek yogurt and fermented foods like kefir, kimchi, saurkraut (be careful if you have swelling issues as lots of salt can worsen swelling or blood pressure)  -- Dressings, seasonings, condiments, etc should have less than 2 g sugars.   -- 1-2 servings of citrus fruits, berries, pineapple or melon a day (1/2 cup)  -- Avoid fried foods  -- Avoid grains, rice, pasta, potatoes, bread, corn, peas, oatmeal, grits, tortillas, crackers, chips  -- No soda, sweet tea, juices or lemonade  -- Use olive/avocado oil rather than vegetable oils or butter.        ADDITIONAL RESOURCES:  Websites with information about fatty liver and inflammation related to fatty liver (HERNANDEZ): www.nashtruth.Bix and www.nashactually.com   [facility]: Non-Alcoholic Fatty Liver  Disease (NAFLD)    Facebook support group with tips and recipes:   Non-Alcoholic Fatty Liver Disease (NAFLD) Diet & Nutrition Support     Try [email or web address].Haptik for recipes.

## 2023-04-12 NOTE — PROGRESS NOTES
The patient location is: [place]  The chief complaint leading to consultation is: fatty liver, liver biopsy    Visit type: audiovisual    Face to Face time with patient: 15  25 minutes of total time spent on the encounter, which includes face to face time and non-face to face time preparing to see the patient (eg, review of tests), Obtaining and/or reviewing separately obtained history, Documenting clinical information in the electronic or other health record, Independently interpreting results (not separately reported) and communicating results to the patient/family/caregiver, or Care coordination (not separately reported).         Each patient to whom he or she provides medical services by telemedicine is:  (1) informed of the relationship between the physician and patient and the respective role of any other health care provider with respect to management of the patient; and (2) notified that he or she may decline to receive medical services by telemedicine and may withdraw from such care at any time.    Notes:       Hepatology Consultation: [facility]    EST. PATIENT  PCP: DO Tierra Loza      Ms. Jones is a 57 y.o. female with PMH as listed below who presents to clinic for evaluation of elevated liver enzymes.   It appears she has previously had elevated enzymes, but more mild than recent, as well as hepatic steatosis.     In relation to metabolic risk factors:   Lab Results   Component Value Date    HGBA1C 5.6 10/06/2020    CHOL 211 (H) 03/07/2023    TSH 2.604 01/21/2022       BMI 26.52    Lab Results   Component Value Date     (H) 03/29/2023     (H) 03/29/2023    ALKPHOS 110 03/29/2023    BILITOT 0.4 03/29/2023    ALBUMIN 3.9 03/29/2023    INR 1.1 03/29/2023     03/29/2023       Initial history 10/26/2022  Janki Jones arrives today alone.  She is well-appearing and feels well. Denies symptoms of hepatic  decompensation including jaundice, ascites, cognitive problems to suggest hepatic encephalopathy, or GI bleeding.  She reports 20 lbs weight gain since 2020. In regards to lifestyle, her activity levels have also waned.   She reports a history of ulcer/gastritis and intermittent abdominal pain related to this.   Denies symptoms of hepatic decompensation including jaundice, ascites, cognitive problems to suggest hepatic encephalopathy, or GI bleeding.   She reports her mother had cirrhosis, from unknown issue, decompensated and passed because of this.     Interval history 12/14/2022:  Janki Jones arrives today alone.   Since our last visit, underwent repeat labs and serologic w/u   Ferritin mildly elevated as well as slight IgG levels  Denies symptoms of hepatic decompensation including jaundice, abdominal distention or lower extremity swelling, hematemesis, melena, or periods of confusion suggestive of hepatic encephalopathy.      Interval history 03/14/2023:  Janki Jones arrives today alone.     Since our last visit, she has actually gained some weight. She had incorporated more of a Mediterranean diet but this did not result in successful weight loss.   Denies symptoms of hepatic decompensation including jaundice, ascites, cognitive problems to suggest hepatic encephalopathy, or GI bleeding.   Feels fine other than central weight gain.    In regards to medications:  Methyphenidate not taking since 2-3 weeks ago  Magnesium Vitamin B12 D3  Buproprion  Escitalopram  HCTZ, Losartan, amlodipine  Pantoprazole      ETOH: denies  Smoking: denies  Illicit substances: denies  Prior liver biopsy: denies   Prior liver disease: fatty liver  Hepatitis vaccination: reports she has been      Interval history 04/12/2023:  Janki Jones arrives today alone on video   Since our last visit, completed biopsy without any issues thereafter.   Denies symptoms of hepatic decompensation including jaundice,  ascites, cognitive problems to suggest hepatic encephalopathy, or GI bleeding.             PMH Janki has a past medical history of Abnormal Pap smear (2007), Abnormal Pap smear of cervix, ADD (attention deficit disorder), Anxiety, Depression, Fibroids, Herpes simplex virus (HSV) infection, and Hypertension.   PSXH Janki has a past surgical history that includes Tubal ligation; essure; Cervical biopsy w/ loop electrode excision (01/01/2007); Colonoscopy (N/A, 02/15/2019); Esophagogastroduodenoscopy (N/A, 11/26/2021); Carpal tunnel release (Right, 12/15/2022); and Injection of steroid (Left, 12/15/2022).   GEMA Shearer's family history includes ADD / ADHD in her daughter, daughter, and son; Alcohol abuse in her mother; Breast cancer (age of onset: 57) in her sister; Cirrhosis in her mother; Depression in her father; Diabetes in her father and mother; Glaucoma in her father; Heart disease in her father; Hyperlipidemia in her father; Hypertension in her brother and father; Kidney disease in her father; Liver disease in her mother; Osteoporosis in her father; Other in her father and sister; Panic disorder in her daughter and mother; Stroke in her mother; Thyroid disease in her sister.   BRODY Shearer reports that she has never smoked. She has never used smokeless tobacco. She reports that she does not drink alcohol and does not use drugs.   ANGEL Shearer is allergic to percocet [oxycodone-acetaminophen].   MICHELE Shearer has a current medication list which includes the following prescription(s): amlodipine, bupropion, clotrimazole-betamethasone 1-0.05%, escitalopram oxalate, estradiol, estradiol, hydrochlorothiazide, loratadine, losartan, methylphenidate hcl, and multivitamin with minerals.     ROS:   GENERAL: Denies fatigue  HEENT: Denies yellowing of eyes   CARDIOVASCULAR: Denies edema  SKIN: Denies rash, itching     Objective     LMP  (LMP Unknown) Comment: Last Cycle August 2017    Physical exam is limited by video visit:    Friendly woman, in no acute distress; alert and oriented to person, place and time  VITALS: There were no vitals taken for this visit.   SKIN/EYES: No obvious jaundice or yellowing of the eyes.   HENT: Normocephalic, without obvious abnormality.   NECK: Supple.   CARDIOVASCULAR: RENU on video visit  RESPIRATORY: Normal respiratory effort.   GI: RENU hepatosplenomegaly  PSYCH: Thought and speech pattern appropriate.       DIAGNOSTICS  Lab Results   Component Value Date     (H) 2023     (H) 2023    ALKPHOS 110 2023    BILITOT 0.4 2023    ALBUMIN 3.9 2023    INR 1.1 2023     2023     No results found for: AFP    Prior serologic workup:   Lab Results   Component Value Date    SMOOTHMUSCAB Negative 1:40 10/26/2022    AMAIFA Negative 1:40 10/26/2022    IGGSERUM 1582 2022    ANASCREEN Negative <1:80 10/26/2022    FERRITIN 323 (H) 2023    FESATURATED 24 10/26/2022    CERULOPLSM 31.0 10/26/2022    HEPBSAG Non-reactive 10/26/2022    HEPCAB Non-reactive 10/26/2022       Imagin ultrasound suggesting hepatic steatosis.    US Abdomen Limited  Narrative: EXAMINATION:  US ABDOMEN LIMITED    CLINICAL HISTORY:  Fatty (change of) liver, not elsewhere classified    TECHNIQUE:  Limited ultrasound of the right upper quadrant of the abdomen (including pancreas, liver, gallbladder, common bile duct, and spleen) was performed.    COMPARISON:  Abdominal ultrasound 2020.    FINDINGS:  Pancreas: Visualized portions of the pancreas appear unremarkable.    Liver: Normal in size, measuring 14.4 cm. Diffusely increased parenchymal echogenicity consistent with steatosis.   Hepatorenal index measures 1.6, indicating greater than 5% steatosis.  Geographic region of relative parenchymal hypoechogenicity at the gallbladder fossa, likely focal fatty sparing.  No suspicious focal hepatic lesions.    Gallbladder: There is a non-dependent 4 mm echogenic focus,  consistent with cholelithiasis.  There is no gallbladder wall thickening or pericholecystic fluid.  No sonographic Rosado's sign.    Biliary system: The common duct is not dilated, measuring 2 mm.  No intrahepatic ductal dilatation.    Spleen: Normal in size and echotexture, measuring 8.4 x 3.1 cm.    Miscellaneous: No upper abdominal ascites.  Impression: Hepatic steatosis.    Cholelithiasis without evidence of acute cholecystitis.    Electronically signed by resident: Tj Xie  Date:    12/07/2022  Time:    08:56    Electronically signed by: Philippe Rodriguez MD  Date:    12/07/2022  Time:    09:09    Findings Fibroscan 12/14/2022  Median liver stiffness score:  7.1  CAP Reading: dB/m:  273     IQR/med %:  3  Interpretation  Fibrosis interpretation is based on medial liver stiffness - Kilopascal (kPa).     Fibrosis Stage:  F 0-1  Steatosis interpretation is based on controlled attenuation parameter - (dB/m).     Steatosis Grade:  S2      FIB-4 Calculation: 2 at 4/12/2023  3:50 PM      Final Pathologic Diagnosis liver biopsy 4/12/2023 Liver, random, biopsy:   - Mild steatosis, predominantly macrovesicular, 30% with steatohepatitis.   - Moderate portal inflammation, predominantly lymphocytic with eosinophils   and macrophages.   - Pericellular and periportal fibrosis, stage 2 (of 4).   - See comment.   COMMENT:  The biopsy is adequate for evaluation.  The patient's history of   elevated liver enzymes is noted and select information from the electronic   medical record is reviewed.  The biopsy shows steatosis with steatohepatitis.    There are many causes of steatohepatitis.  If the clinical history is   consistent with nonalcoholic steatohepatitis, this would correspond to a   NAFLD score of 3 (out of 8, 1 for steatosis, 1 for lobular inflammation, 1   for ballooning).  There is also moderate portal inflammation, predominantly   lymphocytic with eosinophils and macrophages present.  Some degree of portal    inflammation can be secondary to steatohepatitis, however this raises the   possibility of a secondary process such as an infection or drug reaction with   evidence of recovery.  There are no granulomas seen.  There is no significant   plasma cell population seen.  There is pericellular and periportal fibrosis   seen on trichrome stain.  There is no increased iron within hepatocytes on   iron stain.  Appropriate positive controls are examined.        Assessment/Plan     57 y.o. female with:    1. HERNANDEZ (nonalcoholic steatohepatitis)  - Hepatic Function Panel; Future  - Hemoglobin A1C; Future    2. Liver fibrosis  - Hepatic Function Panel; Future  - Hemoglobin A1C; Future    3. Elevated liver enzymes  - Hepatic Function Panel; Future  - Hemoglobin A1C; Future    4. Major depressive disorder, recurrent episode, moderate  - Ambulatory referral/consult to Psychiatry; Future          Orders Placed This Encounter   Procedures    Hepatic Function Panel    Hemoglobin A1C    Ambulatory referral/consult to Psychiatry         Liver biopsy reviewed revealing HERNANDEZ and 30% steatosis. F2 fibrosis.  Discussed 10% body weight loss, mediterranean diet.   At this juncture recommend no alcohol use or minimization  Discussed possibility of progression to cirrhosis.   Increase exercise, fiber. Avoid juices/sweets/sodas and eat fruits/vegetables rather than drinking them.  Consider changing regimen for depression, specifically bupropion. Will CC PCP. Referral to psychiatry.  Follow up in about 3 months (around 7/12/2023).      I spent 25 minutes on the day of this encounter preparing for, evaluating, treating, and managing this patient.     Thank you for allowing me to participate in the care of JESSIE RankinC  Hepatology & Liver Transplant

## 2023-04-14 ENCOUNTER — TELEPHONE (OUTPATIENT)
Dept: HEPATOLOGY | Facility: CLINIC | Age: 58
End: 2023-04-14
Payer: COMMERCIAL

## 2023-05-11 ENCOUNTER — OFFICE VISIT (OUTPATIENT)
Dept: OPTOMETRY | Facility: CLINIC | Age: 58
End: 2023-05-11
Payer: COMMERCIAL

## 2023-05-11 DIAGNOSIS — H04.123 DRY EYE SYNDROME OF BOTH EYES: Primary | ICD-10-CM

## 2023-05-11 DIAGNOSIS — Z83.511 FAMILY HISTORY OF GLAUCOMA IN FATHER: ICD-10-CM

## 2023-05-11 DIAGNOSIS — H52.4 BILATERAL PRESBYOPIA: ICD-10-CM

## 2023-05-11 PROCEDURE — 4010F ACE/ARB THERAPY RXD/TAKEN: CPT | Mod: CPTII,S$GLB,, | Performed by: OPTOMETRIST

## 2023-05-11 PROCEDURE — 1159F MED LIST DOCD IN RCRD: CPT | Mod: CPTII,S$GLB,, | Performed by: OPTOMETRIST

## 2023-05-11 PROCEDURE — 4010F PR ACE/ARB THEARPY RXD/TAKEN: ICD-10-PCS | Mod: CPTII,S$GLB,, | Performed by: OPTOMETRIST

## 2023-05-11 PROCEDURE — 1160F RVW MEDS BY RX/DR IN RCRD: CPT | Mod: CPTII,S$GLB,, | Performed by: OPTOMETRIST

## 2023-05-11 PROCEDURE — 92015 PR REFRACTION: ICD-10-PCS | Mod: S$GLB,,, | Performed by: OPTOMETRIST

## 2023-05-11 PROCEDURE — 92014 PR EYE EXAM, EST PATIENT,COMPREHESV: ICD-10-PCS | Mod: S$GLB,,, | Performed by: OPTOMETRIST

## 2023-05-11 PROCEDURE — 92015 DETERMINE REFRACTIVE STATE: CPT | Mod: S$GLB,,, | Performed by: OPTOMETRIST

## 2023-05-11 PROCEDURE — 1160F PR REVIEW ALL MEDS BY PRESCRIBER/CLIN PHARMACIST DOCUMENTED: ICD-10-PCS | Mod: CPTII,S$GLB,, | Performed by: OPTOMETRIST

## 2023-05-11 PROCEDURE — 99999 PR PBB SHADOW E&M-EST. PATIENT-LVL III: ICD-10-PCS | Mod: PBBFAC,,, | Performed by: OPTOMETRIST

## 2023-05-11 PROCEDURE — 99999 PR PBB SHADOW E&M-EST. PATIENT-LVL III: CPT | Mod: PBBFAC,,, | Performed by: OPTOMETRIST

## 2023-05-11 PROCEDURE — 92014 COMPRE OPH EXAM EST PT 1/>: CPT | Mod: S$GLB,,, | Performed by: OPTOMETRIST

## 2023-05-11 PROCEDURE — 1159F PR MEDICATION LIST DOCUMENTED IN MEDICAL RECORD: ICD-10-PCS | Mod: CPTII,S$GLB,, | Performed by: OPTOMETRIST

## 2023-05-11 RX ORDER — NEOMYCIN SULFATE, POLYMYXIN B SULFATE AND DEXAMETHASONE 3.5; 10000; 1 MG/ML; [USP'U]/ML; MG/ML
1 SUSPENSION/ DROPS OPHTHALMIC 4 TIMES DAILY
Qty: 5 ML | Refills: 0 | Status: SHIPPED | OUTPATIENT
Start: 2023-05-11 | End: 2023-05-18

## 2023-05-11 RX ORDER — MELOXICAM 15 MG/1
15 TABLET ORAL
COMMUNITY
Start: 2023-04-29 | End: 2023-07-14

## 2023-05-11 NOTE — PROGRESS NOTES
VILMA    LUIS: 10/22  Chief complaint (CC): Patient is here for annual eye exam today.  Patient   states her eyes are dry and burn. Patient also has a FBS OD.Patient does   not use AT's regularly.  Wears OTC readers for near, seems to work fine.   Distance seems fine without glasses.   Glasses? +2.00  Contacts? -  H/o eye surgery, injections or laser: -  H/o eye injury: -  Known eye conditions? See above  Family h/o eye conditions? Father with glaucoma  Eye gtts? AT's prn      (-) Flashes (-)  Floaters (+) Mucous   (-)  Tearing (-) Itching (+) Burning   (-) Headaches (+) Eye Pain/discomfort (+) Irritation   (-)  Redness (-) Double vision (-) Blurry vision    Diabetic? -  A1c? -      Last edited by Linda Ennis on 5/11/2023  9:22 AM.            Assessment /Plan     For exam results, see Encounter Report.      Dry eye syndrome of both eyes  -     neomycin-polymyxin-dexamethasone (MAXITROL) 3.5mg/mL-10,000 unit/mL-0.1 % DrpS; Place 1 drop into both eyes 4 (four) times daily. for 7 days  Dispense: 5 mL; Refill: 0  Recommend Systane Ultra or Refresh Optive BID-TID OU to aid with symptoms of dry eyes.  Rx Maxitrol Qid OU x 7 days.     Family history of glaucoma in father  No e/o glaucoma. Monitor.     Bilateral presbyopia  SRx released to patient. Patient educated on lens options. Normal ocular health. RTC 1 year for routine exam.

## 2023-05-25 ENCOUNTER — PATIENT MESSAGE (OUTPATIENT)
Dept: INTERNAL MEDICINE | Facility: CLINIC | Age: 58
End: 2023-05-25
Payer: COMMERCIAL

## 2023-05-25 RX ORDER — PANTOPRAZOLE SODIUM 40 MG/1
40 TABLET, DELAYED RELEASE ORAL DAILY
Qty: 90 TABLET | Refills: 1 | Status: SHIPPED | OUTPATIENT
Start: 2023-05-25 | End: 2023-06-14 | Stop reason: SDUPTHER

## 2023-05-25 NOTE — TELEPHONE ENCOUNTER
No care due was identified.  [facility] Embedded Care Due Messages. Reference number: [id number].   5/25/2023 10:35:30 AM CDT

## 2023-06-06 ENCOUNTER — OFFICE VISIT (OUTPATIENT)
Dept: INTERNAL MEDICINE | Facility: CLINIC | Age: 58
End: 2023-06-06
Payer: COMMERCIAL

## 2023-06-06 VITALS
HEART RATE: 73 BPM | HEIGHT: 67 IN | RESPIRATION RATE: 18 BRPM | TEMPERATURE: 98 F | DIASTOLIC BLOOD PRESSURE: 78 MMHG | OXYGEN SATURATION: 97 % | SYSTOLIC BLOOD PRESSURE: 128 MMHG | WEIGHT: 176.94 LBS | BODY MASS INDEX: 27.77 KG/M2

## 2023-06-06 DIAGNOSIS — F90.1 ATTENTION DEFICIT HYPERACTIVITY DISORDER (ADHD), PREDOMINANTLY HYPERACTIVE TYPE: ICD-10-CM

## 2023-06-06 DIAGNOSIS — Z00.00 ANNUAL PHYSICAL EXAM: Primary | ICD-10-CM

## 2023-06-06 DIAGNOSIS — K76.0 FATTY LIVER: ICD-10-CM

## 2023-06-06 DIAGNOSIS — K21.9 GASTROESOPHAGEAL REFLUX DISEASE, UNSPECIFIED WHETHER ESOPHAGITIS PRESENT: ICD-10-CM

## 2023-06-06 DIAGNOSIS — F33.1 MAJOR DEPRESSIVE DISORDER, RECURRENT EPISODE, MODERATE: ICD-10-CM

## 2023-06-06 DIAGNOSIS — F41.9 ANXIETY: ICD-10-CM

## 2023-06-06 DIAGNOSIS — I10 ESSENTIAL HYPERTENSION: ICD-10-CM

## 2023-06-06 PROCEDURE — 3074F SYST BP LT 130 MM HG: CPT | Mod: CPTII,S$GLB,, | Performed by: INTERNAL MEDICINE

## 2023-06-06 PROCEDURE — 1159F MED LIST DOCD IN RCRD: CPT | Mod: CPTII,S$GLB,, | Performed by: INTERNAL MEDICINE

## 2023-06-06 PROCEDURE — 99999 PR PBB SHADOW E&M-EST. PATIENT-LVL V: ICD-10-PCS | Mod: PBBFAC,,, | Performed by: INTERNAL MEDICINE

## 2023-06-06 PROCEDURE — 3078F PR MOST RECENT DIASTOLIC BLOOD PRESSURE < 80 MM HG: ICD-10-PCS | Mod: CPTII,S$GLB,, | Performed by: INTERNAL MEDICINE

## 2023-06-06 PROCEDURE — 1160F PR REVIEW ALL MEDS BY PRESCRIBER/CLIN PHARMACIST DOCUMENTED: ICD-10-PCS | Mod: CPTII,S$GLB,, | Performed by: INTERNAL MEDICINE

## 2023-06-06 PROCEDURE — 3008F PR BODY MASS INDEX (BMI) DOCUMENTED: ICD-10-PCS | Mod: CPTII,S$GLB,, | Performed by: INTERNAL MEDICINE

## 2023-06-06 PROCEDURE — 1159F PR MEDICATION LIST DOCUMENTED IN MEDICAL RECORD: ICD-10-PCS | Mod: CPTII,S$GLB,, | Performed by: INTERNAL MEDICINE

## 2023-06-06 PROCEDURE — 99999 PR PBB SHADOW E&M-EST. PATIENT-LVL V: CPT | Mod: PBBFAC,,, | Performed by: INTERNAL MEDICINE

## 2023-06-06 PROCEDURE — 3078F DIAST BP <80 MM HG: CPT | Mod: CPTII,S$GLB,, | Performed by: INTERNAL MEDICINE

## 2023-06-06 PROCEDURE — 4010F PR ACE/ARB THEARPY RXD/TAKEN: ICD-10-PCS | Mod: CPTII,S$GLB,, | Performed by: INTERNAL MEDICINE

## 2023-06-06 PROCEDURE — 3074F PR MOST RECENT SYSTOLIC BLOOD PRESSURE < 130 MM HG: ICD-10-PCS | Mod: CPTII,S$GLB,, | Performed by: INTERNAL MEDICINE

## 2023-06-06 PROCEDURE — 4010F ACE/ARB THERAPY RXD/TAKEN: CPT | Mod: CPTII,S$GLB,, | Performed by: INTERNAL MEDICINE

## 2023-06-06 PROCEDURE — 3008F BODY MASS INDEX DOCD: CPT | Mod: CPTII,S$GLB,, | Performed by: INTERNAL MEDICINE

## 2023-06-06 PROCEDURE — 99396 PREV VISIT EST AGE 40-64: CPT | Mod: S$GLB,,, | Performed by: INTERNAL MEDICINE

## 2023-06-06 PROCEDURE — 1160F RVW MEDS BY RX/DR IN RCRD: CPT | Mod: CPTII,S$GLB,, | Performed by: INTERNAL MEDICINE

## 2023-06-06 PROCEDURE — 99396 PR PREVENTIVE VISIT,EST,40-64: ICD-10-PCS | Mod: S$GLB,,, | Performed by: INTERNAL MEDICINE

## 2023-06-06 RX ORDER — PROPRANOLOL HYDROCHLORIDE 10 MG/1
10 TABLET ORAL 3 TIMES DAILY PRN
Qty: 90 TABLET | Refills: 11 | Status: SHIPPED | OUTPATIENT
Start: 2023-06-06 | End: 2024-02-01 | Stop reason: SDUPTHER

## 2023-06-06 RX ORDER — ESCITALOPRAM OXALATE 10 MG/1
10 TABLET ORAL DAILY
Qty: 90 TABLET | Refills: 3 | Status: SHIPPED | OUTPATIENT
Start: 2023-06-06 | End: 2023-06-14 | Stop reason: SDUPTHER

## 2023-06-06 RX ORDER — METHYLPHENIDATE HYDROCHLORIDE 20 MG/1
20 TABLET ORAL 2 TIMES DAILY
Qty: 60 TABLET | Refills: 0 | Status: SHIPPED | OUTPATIENT
Start: 2023-06-06 | End: 2023-07-30 | Stop reason: SDUPTHER

## 2023-06-06 NOTE — PROGRESS NOTES
Subjective     Patient ID: Janki Jones is a 57 y.o. female.    Chief Complaint: Annual Exam, Anxiety (Panic attacks at night), and Depression    HPI  57 y.o. Female here for annual exam.      Vaccines: Influenza (2021); Tetanus (2017)  Eye exam: current  Mammogram: 11/22  Gyn exam: 11/21  Colonoscopy: 2/19     Exercise: no  Diet: regular  LMP: menopausal     Past Medical History:  2007: Abnormal Pap smear  No date: Abnormal Pap smear of cervix  No date: ADD (attention deficit disorder)  No date: Anxiety  No date: Depression  No date: Fibroids  No date: Fatty liver  No date  GERD  No date: Herpes simplex virus (HSV) infection      Comment:  HSV1.  No date: Hypertension  Past Surgical History:  2007: CERVICAL BIOPSY  W/ LOOP ELECTRODE EXCISION  2/15/2019: COLONOSCOPY; N/A      Comment:  Procedure: COLONOSCOPY;  Surgeon: Uri Torrez MD;                 Location: [address]);  Service: Endoscopy;                 Laterality: N/A;  11/26/2021: ESOPHAGOGASTRODUODENOSCOPY; N/A      Comment:  Procedure: EGD (ESOPHAGOGASTRODUODENOSCOPY) fully                vaccinated;  Surgeon: Venkat Astorga MD;  Location:                [facility];  Service: Endoscopy;  Laterality: N/A;  No date: essure  No date: TUBAL LIGATION  Social History    Socioeconomic History      Marital status:     Tobacco Use      Smoking status: Never Smoker      Smokeless tobacco: Never Used    Substance and Sexual Activity      Alcohol use: No      Drug use: No      Sexual activity: Yes        Partners: Male        Birth control/protection: See Surgical Hx        Comment: .    Social History Narrative      ,  disabled with PTSD (from ), 3 children, working with [employer] , going to school for business, no regular exercise     Review of patient's allergies indicates:   -- Percocet [oxycodone-acetaminophen]     --  Other reaction(s): Nausea  Review of Systems   Constitutional:  Negative  for activity change, appetite change, chills, diaphoresis, fatigue, fever and unexpected weight change.   HENT:  Negative for nasal congestion, mouth sores, postnasal drip, rhinorrhea, sinus pressure/congestion, sneezing, sore throat, trouble swallowing and voice change.    Eyes:  Negative for pain, discharge and visual disturbance.   Respiratory:  Negative for cough, shortness of breath and wheezing.    Cardiovascular:  Negative for chest pain, palpitations and leg swelling.   Gastrointestinal:  Negative for abdominal pain, blood in stool, constipation, diarrhea, nausea and vomiting.   Endocrine: Negative for cold intolerance and heat intolerance.   Genitourinary:  Negative for difficulty urinating, dysuria, frequency, hematuria and urgency.   Musculoskeletal:  Negative for arthralgias and myalgias.   Integumentary:  Negative for rash and wound.   Allergic/Immunologic: Negative for environmental allergies and food allergies.   Neurological:  Negative for dizziness, tremors, seizures, syncope, weakness, light-headedness and headaches.   Hematological:  Negative for adenopathy. Does not bruise/bleed easily.   Psychiatric/Behavioral:  Positive for dysphoric mood. Negative for confusion, self-injury, sleep disturbance and suicidal ideas. The patient is nervous/anxious.         Objective     Physical Exam  Vitals and nursing note reviewed.   Constitutional:       General: She is not in acute distress.     Appearance: Normal appearance. She is well-developed. She is not diaphoretic.   HENT:      Head: Normocephalic and atraumatic.      Right Ear: External ear normal.      Left Ear: External ear normal.      Nose: Nose normal.      Mouth/Throat:      Pharynx: No oropharyngeal exudate.   Eyes:      General: No scleral icterus.        Right eye: No discharge.         Left eye: No discharge.      Conjunctiva/sclera: Conjunctivae normal.      Pupils: Pupils are equal, round, and reactive to light.   Neck:      Thyroid: No  thyromegaly.      Vascular: No JVD.   Cardiovascular:      Rate and Rhythm: Normal rate and regular rhythm.      Pulses: Normal pulses.      Heart sounds: Normal heart sounds. No murmur heard.  Pulmonary:      Effort: Pulmonary effort is normal. No respiratory distress.      Breath sounds: Normal breath sounds. No wheezing, rhonchi or rales.   Chest:      Chest wall: No tenderness.   Abdominal:      General: Bowel sounds are normal. There is no distension.      Palpations: Abdomen is soft.      Tenderness: There is no abdominal tenderness. There is no guarding or rebound.   Musculoskeletal:      Cervical back: Neck supple.      Right lower leg: No edema.      Left lower leg: No edema.   Lymphadenopathy:      Cervical: No cervical adenopathy.   Skin:     General: Skin is warm and dry.      Coloration: Skin is not pale.      Findings: No rash.   Neurological:      General: No focal deficit present.      Mental Status: She is alert and oriented to person, place, and time.      Gait: Gait normal.   Psychiatric:         Behavior: Behavior normal.         Thought Content: Thought content normal.         Judgment: Judgment normal.          Assessment and Plan     1. Annual physical exam  -     CBC Auto Differential; Future; Expected date: 06/06/2023  -     Comprehensive Metabolic Panel; Future; Expected date: 06/06/2023  -     TSH; Future; Expected date: 06/06/2023  -     Lipid Panel; Future; Expected date: 06/06/2023  -     Urinalysis; Future  -     Hemoglobin A1C; Future; Expected date: 06/06/2023    2. Attention deficit hyperactivity disorder (ADHD), predominantly hyperactive type    3. Major depressive disorder, recurrent episode, moderate  -     EScitalopram oxalate (LEXAPRO) 10 MG tablet; Take 1 tablet (10 mg total) by mouth once daily.  Dispense: 90 tablet; Refill: 3    4. Essential hypertension  -     CBC Auto Differential; Future; Expected date: 06/06/2023  -     Comprehensive Metabolic Panel; Future; Expected  date: 06/06/2023  -     TSH; Future; Expected date: 06/06/2023  -     Lipid Panel; Future; Expected date: 06/06/2023  -     Urinalysis; Future  -     Hemoglobin A1C; Future; Expected date: 06/06/2023    5. Gastroesophageal reflux disease, unspecified whether esophagitis present    6. Fatty liver  -     CBC Auto Differential; Future; Expected date: 06/06/2023  -     Comprehensive Metabolic Panel; Future; Expected date: 06/06/2023  -     TSH; Future; Expected date: 06/06/2023  -     Lipid Panel; Future; Expected date: 06/06/2023  -     Urinalysis; Future  -     Hemoglobin A1C; Future; Expected date: 06/06/2023    7. Anxiety    Other orders  -     methylphenidate HCl (RITALIN) 20 MG tablet; Take 1 tablet (20 mg total) by mouth 2 (two) times daily.  Dispense: 60 tablet; Refill: 0  -     propranoloL (INDERAL) 10 MG tablet; Take 1 tablet (10 mg total) by mouth 3 (three) times daily as needed (anxiety).  Dispense: 90 tablet; Refill: 11         Blood work ordered      HTN- stable       ADD- stable on Concerta      MDD- increase Lexapro to 30 mg qd, d/c Wellbutrin      Anxiety- Lexapro as above   -will add Propranolol 10 mg TID PRN anxiety     Fatty liver- followed by Hepatology   -d/c Wellbutrin for now out of an abundance of caution 2/2 elevated LAEs(not sure if this is contributing or not)     GERD- on PPI     F/u in 3 months

## 2023-06-09 ENCOUNTER — LAB VISIT (OUTPATIENT)
Dept: LAB | Facility: HOSPITAL | Age: 58
End: 2023-06-09
Attending: INTERNAL MEDICINE
Payer: COMMERCIAL

## 2023-06-09 DIAGNOSIS — I10 ESSENTIAL HYPERTENSION: ICD-10-CM

## 2023-06-09 DIAGNOSIS — K76.0 FATTY LIVER: ICD-10-CM

## 2023-06-09 DIAGNOSIS — Z00.00 ANNUAL PHYSICAL EXAM: ICD-10-CM

## 2023-06-09 LAB
ALBUMIN SERPL BCP-MCNC: 3.4 G/DL (ref 3.5–5.2)
ALP SERPL-CCNC: 124 U/L (ref 55–135)
ALT SERPL W/O P-5'-P-CCNC: 195 U/L (ref 10–44)
ANION GAP SERPL CALC-SCNC: 10 MMOL/L (ref 8–16)
AST SERPL-CCNC: 126 U/L (ref 10–40)
BASOPHILS # BLD AUTO: 0.03 K/UL (ref 0–0.2)
BASOPHILS NFR BLD: 0.5 % (ref 0–1.9)
BILIRUB SERPL-MCNC: 0.5 MG/DL (ref 0.1–1)
BUN SERPL-MCNC: 8 MG/DL (ref 6–20)
CALCIUM SERPL-MCNC: 8.8 MG/DL (ref 8.7–10.5)
CHLORIDE SERPL-SCNC: 107 MMOL/L (ref 95–110)
CHOLEST SERPL-MCNC: 197 MG/DL (ref 120–199)
CHOLEST/HDLC SERPL: 4.3 {RATIO} (ref 2–5)
CO2 SERPL-SCNC: 28 MMOL/L (ref 23–29)
CREAT SERPL-MCNC: 0.9 MG/DL (ref 0.5–1.4)
DIFFERENTIAL METHOD: ABNORMAL
EOSINOPHIL # BLD AUTO: 0.1 K/UL (ref 0–0.5)
EOSINOPHIL NFR BLD: 1.6 % (ref 0–8)
ERYTHROCYTE [DISTWIDTH] IN BLOOD BY AUTOMATED COUNT: 13.6 % (ref 11.5–14.5)
EST. GFR  (NO RACE VARIABLE): >60 ML/MIN/1.73 M^2
ESTIMATED AVG GLUCOSE: 114 MG/DL (ref 68–131)
GLUCOSE SERPL-MCNC: 92 MG/DL (ref 70–110)
HBA1C MFR BLD: 5.6 % (ref 4–5.6)
HCT VFR BLD AUTO: 37.7 % (ref 37–48.5)
HDLC SERPL-MCNC: 46 MG/DL (ref 40–75)
HDLC SERPL: 23.4 % (ref 20–50)
HGB BLD-MCNC: 11.8 G/DL (ref 12–16)
IMM GRANULOCYTES # BLD AUTO: 0.01 K/UL (ref 0–0.04)
IMM GRANULOCYTES NFR BLD AUTO: 0.2 % (ref 0–0.5)
LDLC SERPL CALC-MCNC: 125.8 MG/DL (ref 63–159)
LYMPHOCYTES # BLD AUTO: 2.6 K/UL (ref 1–4.8)
LYMPHOCYTES NFR BLD: 46.1 % (ref 18–48)
MCH RBC QN AUTO: 28.3 PG (ref 27–31)
MCHC RBC AUTO-ENTMCNC: 31.3 G/DL (ref 32–36)
MCV RBC AUTO: 90 FL (ref 82–98)
MONOCYTES # BLD AUTO: 0.5 K/UL (ref 0.3–1)
MONOCYTES NFR BLD: 9.5 % (ref 4–15)
NEUTROPHILS # BLD AUTO: 2.4 K/UL (ref 1.8–7.7)
NEUTROPHILS NFR BLD: 42.1 % (ref 38–73)
NONHDLC SERPL-MCNC: 151 MG/DL
NRBC BLD-RTO: 0 /100 WBC
PLATELET # BLD AUTO: 192 K/UL (ref 150–450)
PMV BLD AUTO: 13.5 FL (ref 9.2–12.9)
POTASSIUM SERPL-SCNC: 3.5 MMOL/L (ref 3.5–5.1)
PROT SERPL-MCNC: 7.1 G/DL (ref 6–8.4)
RBC # BLD AUTO: 4.17 M/UL (ref 4–5.4)
SODIUM SERPL-SCNC: 145 MMOL/L (ref 136–145)
TRIGL SERPL-MCNC: 126 MG/DL (ref 30–150)
TSH SERPL DL<=0.005 MIU/L-ACNC: 1.39 UIU/ML (ref 0.4–4)
WBC # BLD AUTO: 5.71 K/UL (ref 3.9–12.7)

## 2023-06-09 PROCEDURE — 84443 ASSAY THYROID STIM HORMONE: CPT | Performed by: INTERNAL MEDICINE

## 2023-06-09 PROCEDURE — 80053 COMPREHEN METABOLIC PANEL: CPT | Performed by: INTERNAL MEDICINE

## 2023-06-09 PROCEDURE — 85025 COMPLETE CBC W/AUTO DIFF WBC: CPT | Performed by: INTERNAL MEDICINE

## 2023-06-09 PROCEDURE — 80061 LIPID PANEL: CPT | Performed by: INTERNAL MEDICINE

## 2023-06-09 PROCEDURE — 36415 COLL VENOUS BLD VENIPUNCTURE: CPT | Mod: PO | Performed by: INTERNAL MEDICINE

## 2023-06-09 PROCEDURE — 83036 HEMOGLOBIN GLYCOSYLATED A1C: CPT | Performed by: INTERNAL MEDICINE

## 2023-06-14 DIAGNOSIS — F33.1 MAJOR DEPRESSIVE DISORDER, RECURRENT EPISODE, MODERATE: ICD-10-CM

## 2023-06-14 RX ORDER — AMLODIPINE BESYLATE 2.5 MG/1
2.5 TABLET ORAL DAILY
Qty: 90 TABLET | Refills: 3 | Status: SHIPPED | OUTPATIENT
Start: 2023-06-14 | End: 2023-11-22 | Stop reason: SDUPTHER

## 2023-06-14 RX ORDER — HYDROCHLOROTHIAZIDE 25 MG/1
25 TABLET ORAL DAILY
Qty: 90 TABLET | Refills: 2 | Status: SHIPPED | OUTPATIENT
Start: 2023-06-14 | End: 2023-11-22 | Stop reason: SDUPTHER

## 2023-06-14 RX ORDER — ESCITALOPRAM OXALATE 10 MG/1
10 TABLET ORAL DAILY
Qty: 90 TABLET | Refills: 3 | Status: SHIPPED | OUTPATIENT
Start: 2023-06-14 | End: 2023-11-22 | Stop reason: SDUPTHER

## 2023-06-14 RX ORDER — PANTOPRAZOLE SODIUM 40 MG/1
40 TABLET, DELAYED RELEASE ORAL DAILY
Qty: 90 TABLET | Refills: 1 | Status: SHIPPED | OUTPATIENT
Start: 2023-06-14 | End: 2023-10-24

## 2023-06-14 RX ORDER — LOSARTAN POTASSIUM 100 MG/1
100 TABLET ORAL DAILY
Qty: 90 TABLET | Refills: 3 | Status: SHIPPED | OUTPATIENT
Start: 2023-06-14 | End: 2023-11-22 | Stop reason: SDUPTHER

## 2023-06-14 NOTE — TELEPHONE ENCOUNTER
No care due was identified.  [facility] Embedded Care Due Messages. Reference number: [id number].   6/14/2023 7:58:29 AM CDT

## 2023-07-11 ENCOUNTER — LAB VISIT (OUTPATIENT)
Dept: LAB | Facility: HOSPITAL | Age: 58
End: 2023-07-11
Payer: COMMERCIAL

## 2023-07-11 DIAGNOSIS — K75.81 NASH (NONALCOHOLIC STEATOHEPATITIS): ICD-10-CM

## 2023-07-11 DIAGNOSIS — R74.8 ELEVATED LIVER ENZYMES: ICD-10-CM

## 2023-07-11 DIAGNOSIS — K74.00 LIVER FIBROSIS: ICD-10-CM

## 2023-07-11 LAB
ALBUMIN SERPL BCP-MCNC: 3.6 G/DL (ref 3.5–5.2)
ALP SERPL-CCNC: 120 U/L (ref 55–135)
ALT SERPL W/O P-5'-P-CCNC: 171 U/L (ref 10–44)
AST SERPL-CCNC: 121 U/L (ref 10–40)
BILIRUB DIRECT SERPL-MCNC: 0.1 MG/DL (ref 0.1–0.3)
BILIRUB SERPL-MCNC: 0.3 MG/DL (ref 0.1–1)
ESTIMATED AVG GLUCOSE: 117 MG/DL (ref 68–131)
HBA1C MFR BLD: 5.7 % (ref 4–5.6)
PROT SERPL-MCNC: 7.3 G/DL (ref 6–8.4)

## 2023-07-11 PROCEDURE — 80076 HEPATIC FUNCTION PANEL: CPT | Performed by: PHYSICIAN ASSISTANT

## 2023-07-11 PROCEDURE — 83036 HEMOGLOBIN GLYCOSYLATED A1C: CPT | Performed by: PHYSICIAN ASSISTANT

## 2023-07-11 PROCEDURE — 36415 COLL VENOUS BLD VENIPUNCTURE: CPT | Mod: PO | Performed by: PHYSICIAN ASSISTANT

## 2023-07-14 ENCOUNTER — OFFICE VISIT (OUTPATIENT)
Dept: HEPATOLOGY | Facility: CLINIC | Age: 58
End: 2023-07-14
Payer: COMMERCIAL

## 2023-07-14 VITALS — HEIGHT: 67 IN | BODY MASS INDEX: 27.68 KG/M2 | WEIGHT: 176.38 LBS

## 2023-07-14 DIAGNOSIS — R74.8 ELEVATED LIVER ENZYMES: ICD-10-CM

## 2023-07-14 DIAGNOSIS — K74.00 LIVER FIBROSIS: ICD-10-CM

## 2023-07-14 DIAGNOSIS — K76.0 FATTY LIVER: ICD-10-CM

## 2023-07-14 DIAGNOSIS — E78.5 HYPERLIPIDEMIA, UNSPECIFIED HYPERLIPIDEMIA TYPE: ICD-10-CM

## 2023-07-14 DIAGNOSIS — F33.1 MAJOR DEPRESSIVE DISORDER, RECURRENT EPISODE, MODERATE: ICD-10-CM

## 2023-07-14 DIAGNOSIS — K75.81 NASH (NONALCOHOLIC STEATOHEPATITIS): Primary | ICD-10-CM

## 2023-07-14 DIAGNOSIS — R79.89 ELEVATED FERRITIN: ICD-10-CM

## 2023-07-14 PROCEDURE — 1160F PR REVIEW ALL MEDS BY PRESCRIBER/CLIN PHARMACIST DOCUMENTED: ICD-10-PCS | Mod: CPTII,S$GLB,, | Performed by: PHYSICIAN ASSISTANT

## 2023-07-14 PROCEDURE — 99214 OFFICE O/P EST MOD 30 MIN: CPT | Mod: S$GLB,,, | Performed by: PHYSICIAN ASSISTANT

## 2023-07-14 PROCEDURE — 1159F PR MEDICATION LIST DOCUMENTED IN MEDICAL RECORD: ICD-10-PCS | Mod: CPTII,S$GLB,, | Performed by: PHYSICIAN ASSISTANT

## 2023-07-14 PROCEDURE — 4010F ACE/ARB THERAPY RXD/TAKEN: CPT | Mod: CPTII,S$GLB,, | Performed by: PHYSICIAN ASSISTANT

## 2023-07-14 PROCEDURE — 3044F HG A1C LEVEL LT 7.0%: CPT | Mod: CPTII,S$GLB,, | Performed by: PHYSICIAN ASSISTANT

## 2023-07-14 PROCEDURE — 4010F PR ACE/ARB THEARPY RXD/TAKEN: ICD-10-PCS | Mod: CPTII,S$GLB,, | Performed by: PHYSICIAN ASSISTANT

## 2023-07-14 PROCEDURE — 3008F BODY MASS INDEX DOCD: CPT | Mod: CPTII,S$GLB,, | Performed by: PHYSICIAN ASSISTANT

## 2023-07-14 PROCEDURE — 1160F RVW MEDS BY RX/DR IN RCRD: CPT | Mod: CPTII,S$GLB,, | Performed by: PHYSICIAN ASSISTANT

## 2023-07-14 PROCEDURE — 99214 PR OFFICE/OUTPT VISIT, EST, LEVL IV, 30-39 MIN: ICD-10-PCS | Mod: S$GLB,,, | Performed by: PHYSICIAN ASSISTANT

## 2023-07-14 PROCEDURE — 99999 PR PBB SHADOW E&M-EST. PATIENT-LVL III: ICD-10-PCS | Mod: PBBFAC,,, | Performed by: PHYSICIAN ASSISTANT

## 2023-07-14 PROCEDURE — 1159F MED LIST DOCD IN RCRD: CPT | Mod: CPTII,S$GLB,, | Performed by: PHYSICIAN ASSISTANT

## 2023-07-14 PROCEDURE — 3044F PR MOST RECENT HEMOGLOBIN A1C LEVEL <7.0%: ICD-10-PCS | Mod: CPTII,S$GLB,, | Performed by: PHYSICIAN ASSISTANT

## 2023-07-14 PROCEDURE — 99999 PR PBB SHADOW E&M-EST. PATIENT-LVL III: CPT | Mod: PBBFAC,,, | Performed by: PHYSICIAN ASSISTANT

## 2023-07-14 PROCEDURE — 3008F PR BODY MASS INDEX (BMI) DOCUMENTED: ICD-10-PCS | Mod: CPTII,S$GLB,, | Performed by: PHYSICIAN ASSISTANT

## 2023-07-14 RX ORDER — BUPROPION HYDROCHLORIDE 150 MG/1
TABLET ORAL
COMMUNITY
Start: 2023-06-21 | End: 2023-07-14 | Stop reason: ALTCHOICE

## 2023-07-14 NOTE — PROGRESS NOTES
Hepatology Consultation: [facility]    EST. PATIENT  PCP: DO Tierra Loza      Ms. Jones is a 57 y.o. female with PMH as listed below who presents to clinic for evaluation of elevated liver enzymes.   It appears she has previously had elevated enzymes, but more mild than recent, as well as hepatic steatosis.     In relation to metabolic risk factors:   Lab Results   Component Value Date    HGBA1C 5.7 (H) 07/11/2023    CHOL 197 06/09/2023    TSH 1.388 06/09/2023       BMI 26.52    Lab Results   Component Value Date     (H) 07/11/2023     (H) 07/11/2023    ALKPHOS 120 07/11/2023    BILITOT 0.3 07/11/2023    ALBUMIN 3.6 07/11/2023    INR 1.1 03/29/2023     06/09/2023       Initial history 10/26/2022  Janki Jones arrives today alone.  She is well-appearing and feels well. Denies symptoms of hepatic decompensation including jaundice, ascites, cognitive problems to suggest hepatic encephalopathy, or GI bleeding.  She reports 20 lbs weight gain since 2020. In regards to lifestyle, her activity levels have also waned.   She reports a history of ulcer/gastritis and intermittent abdominal pain related to this.   Denies symptoms of hepatic decompensation including jaundice, ascites, cognitive problems to suggest hepatic encephalopathy, or GI bleeding.   She reports her mother had cirrhosis, from unknown issue, decompensated and passed because of this.     Interval history 12/14/2022:  Janki Jones arrives today alone.   Since our last visit, underwent repeat labs and serologic w/u   Ferritin mildly elevated as well as slight IgG levels  Denies symptoms of hepatic decompensation including jaundice, abdominal distention or lower extremity swelling, hematemesis, melena, or periods of confusion suggestive of hepatic encephalopathy.      Interval history 03/14/2023:  Janki Jones arrives today alone.     Since our  last visit, she has actually gained some weight. She had incorporated more of a Mediterranean diet but this did not result in successful weight loss.   Denies symptoms of hepatic decompensation including jaundice, ascites, cognitive problems to suggest hepatic encephalopathy, or GI bleeding.   Feels fine other than central weight gain.    In regards to medications:  Methyphenidate not taking since 2-3 weeks ago  Magnesium Vitamin B12 D3  Buproprion  Escitalopram  HCTZ, Losartan, amlodipine  Pantoprazole      ETOH: denies  Smoking: denies  Illicit substances: denies  Prior liver biopsy: denies   Prior liver disease: fatty liver  Hepatitis vaccination: reports she has been      Interval history 04/12/2023:  Janki Jones arrives today alone on video   Since our last visit, completed biopsy without any issues thereafter.   Denies symptoms of hepatic decompensation including jaundice, ascites, cognitive problems to suggest hepatic encephalopathy, or GI bleeding.     Interval history 07/14/2023:  Janki Jonse arrives today alone.   Since our last visit, has not lost weight.  She reports night time itchiness.  She is still suffering from severe anxiety, stopped bupropion  Did not see nutritionist.   Reports she has drank alcohol recently, went to celebrate dad's birthday  Denies symptoms of hepatic decompensation including jaundice, abdominal distention or lower extremity swelling, hematemesis, melena, or periods of confusion suggestive of hepatic encephalopathy.        [facility] Janki has a past medical history of Abnormal Pap smear (2007), Abnormal Pap smear of cervix, ADD (attention deficit disorder), Anxiety, Depression, Fatty liver, Fibroids, GERD (gastroesophageal reflux disease), Herpes simplex virus (HSV) infection, and Hypertension.   X Janki has a past surgical history that includes Tubal ligation; essure; Cervical biopsy w/ loop electrode excision (01/01/2007); Colonoscopy (N/A, 02/15/2019);  "Esophagogastroduodenoscopy (N/A, 11/26/2021); Carpal tunnel release (Right, 12/15/2022); and Injection of steroid (Left, 12/15/2022).   GEMA Shearer's family history includes ADD / ADHD in her daughter, daughter, and son; Alcohol abuse in her mother; Breast cancer (age of onset: 57) in her sister; Cirrhosis in her mother; Depression in her father; Diabetes in her father and mother; Glaucoma in her father; Heart disease in her father; Hyperlipidemia in her father; Hypertension in her brother and father; Kidney disease in her father; Liver disease in her mother; Osteoporosis in her father; Other in her father and sister; Panic disorder in her daughter and mother; Stroke in her mother; Thyroid disease in her sister.   BRODY Shearer reports that she has never smoked. She has never used smokeless tobacco. She reports that she does not drink alcohol and does not use drugs.   ANGEL Shearer is allergic to percocet [oxycodone-acetaminophen].   MICHELE Shearer has a current medication list which includes the following prescription(s): amlodipine, clotrimazole-betamethasone 1-0.05%, escitalopram oxalate, escitalopram oxalate, estradiol, estradiol, hydrochlorothiazide, loratadine, losartan, methylphenidate hcl, multivitamin with minerals, pantoprazole, and propranolol.     ROS:   GENERAL: Denies fatigue  HEENT: Denies yellowing of eyes   CARDIOVASCULAR: Denies edema  SKIN: Denies rash,  (+) itching   PSYCH: (+) anxiety, depression    Objective     Ht 5' 7" (1.702 m)   Wt 80 kg (176 lb 5.9 oz)   LMP  (LMP Unknown) Comment: Last Cycle August 2017  BMI 27.62 kg/m²     Friendly woman, in no acute distress; alert and oriented to person, place and time  SKIN/EYES: No obvious jaundice or yellowing of the eyes. No obvious rashes. No palmar erythema or telangiectasias.  HENT: Normocephalic, without obvious abnormality.   NECK: Supple.   RESPIRATORY: Normal respiratory effort.   GI: Non,tender, no palpable hepatosplenomegaly. No fluid shift. " Central obesity  EXT: No edema.   PSYCH: Thought and speech pattern appropriate.       DIAGNOSTICS  Lab Results   Component Value Date     (H) 2023     (H) 2023    ALKPHOS 120 2023    BILITOT 0.3 2023    ALBUMIN 3.6 2023    INR 1.1 2023     2023     No results found for: AFP    Prior serologic workup:   Lab Results   Component Value Date    SMOOTHMUSCAB Negative 1:40 10/26/2022    AMAIFA Negative 1:40 10/26/2022    IGGSERUM 1582 2022    ANASCREEN Negative <1:80 10/26/2022    FERRITIN 323 (H) 2023    FESATURATED 24 10/26/2022    CERULOPLSM 31.0 10/26/2022    HEPBSAG Non-reactive 10/26/2022    HEPCAB Non-reactive 10/26/2022       Imagin ultrasound suggesting hepatic steatosis.    US Abdomen Limited  Narrative: EXAMINATION:  US ABDOMEN LIMITED    CLINICAL HISTORY:  Fatty (change of) liver, not elsewhere classified    TECHNIQUE:  Limited ultrasound of the right upper quadrant of the abdomen (including pancreas, liver, gallbladder, common bile duct, and spleen) was performed.    COMPARISON:  Abdominal ultrasound 2020.    FINDINGS:  Pancreas: Visualized portions of the pancreas appear unremarkable.    Liver: Normal in size, measuring 14.4 cm. Diffusely increased parenchymal echogenicity consistent with steatosis.   Hepatorenal index measures 1.6, indicating greater than 5% steatosis.  Geographic region of relative parenchymal hypoechogenicity at the gallbladder fossa, likely focal fatty sparing.  No suspicious focal hepatic lesions.    Gallbladder: There is a non-dependent 4 mm echogenic focus, consistent with cholelithiasis.  There is no gallbladder wall thickening or pericholecystic fluid.  No sonographic Rosado's sign.    Biliary system: The common duct is not dilated, measuring 2 mm.  No intrahepatic ductal dilatation.    Spleen: Normal in size and echotexture, measuring 8.4 x 3.1 cm.    Miscellaneous: No upper abdominal  ascites.  Impression: Hepatic steatosis.    Cholelithiasis without evidence of acute cholecystitis.    Electronically signed by resident: Tj Xie  Date:    12/07/2022  Time:    08:56    Electronically signed by: Philippe Rodriguez MD  Date:    12/07/2022  Time:    09:09    Findings Fibroscan 12/14/2022  Median liver stiffness score:  7.1  CAP Reading: dB/m:  273  IQR/med %:  3  Interpretation  Fibrosis interpretation is based on medial liver stiffness - Kilopascal (kPa).  Fibrosis Stage:  F 0-1  Steatosis interpretation is based on controlled attenuation parameter - (dB/m).  Steatosis Grade:  S2      FIB-4 Calculation: 2 at 4/12/2023  3:50 PM      Final Pathologic Diagnosis liver biopsy 4/12/2023 Liver, random, biopsy:   - Mild steatosis, predominantly macrovesicular, 30% with steatohepatitis.   - Moderate portal inflammation, predominantly lymphocytic with eosinophils   and macrophages.   - Pericellular and periportal fibrosis, stage 2 (of 4).   - See comment.   COMMENT:  The biopsy is adequate for evaluation.  The patient's history of   elevated liver enzymes is noted and select information from the electronic   medical record is reviewed.  The biopsy shows steatosis with steatohepatitis.    There are many causes of steatohepatitis.  If the clinical history is   consistent with nonalcoholic steatohepatitis, this would correspond to a   NAFLD score of 3 (out of 8, 1 for steatosis, 1 for lobular inflammation, 1   for ballooning).  There is also moderate portal inflammation, predominantly   lymphocytic with eosinophils and macrophages present.  Some degree of portal   inflammation can be secondary to steatohepatitis, however this raises the   possibility of a secondary process such as an infection or drug reaction with   evidence of recovery.  There are no granulomas seen.  There is no significant   plasma cell population seen.  There is pericellular and periportal fibrosis   seen on trichrome stain.  There is no  increased iron within hepatocytes on   iron stain.  Appropriate positive controls are examined.        Assessment/Plan     57 y.o. female with:    1. HERNANDEZ (nonalcoholic steatohepatitis)  - US Abdomen Limited; Future  - Hepatic Function Panel; Future  - Phosphatidylethanol (PETH); Future    2. Liver fibrosis  - US Abdomen Limited; Future  - Hepatic Function Panel; Future  - Phosphatidylethanol (PETH); Future    3. Elevated liver enzymes  - Phosphatidylethanol (PETH); Future    4. Major depressive disorder, recurrent episode, moderate    5. Fatty liver    6. Hyperlipidemia, unspecified hyperlipidemia type  7. Family history of cirrhosis  - maternal         Orders Placed This Encounter   Procedures    US Abdomen Limited    Hepatic Function Panel    Phosphatidylethanol (PETH)         Liver biopsy reviewed revealing HERNANDEZ and 30% steatosis. F2 fibrosis.  Discussed 10% body weight loss, mediterranean diet.   At this juncture recommend no alcohol use or minimization  Discussed possibility of progression to cirrhosis.   Increase exercise, fiber. Avoid juices/sweets/sodas and eat fruits/vegetables rather than drinking them.  No changes since last visit. Enzymes remain high.   Discussed possibility of future & current clinical trials.   Discussed tapering of medications in the future if there is no change in her enzymes despite efforts. However at this juncture risk factors have not improved and biopsy is suggestive of HERNANDEZ.  Follow up in about 2 months (around 9/14/2023). With labs prior.      I spent 25 minutes on the day of this encounter preparing for, evaluating, treating, and managing this patient.     Thank you for allowing me to participate in the care of Janki Crane PA-C  Hepatology & Liver Transplant

## 2023-07-14 NOTE — PATIENT INSTRUCTIONS
Liver labs mildly improved  See nutritionist  Consider coming off of medications - will discuss with PCP   Nutritionist   Follow-up in 3 months    FATTY LIVER RECOMMENDATIONS:    There is no FDA approved therapy for non-alcoholic fatty liver disease (NAFLD); therefore, lifestyle changes are important:  1. Weight loss goal of 17 lbs  2. Mediterranean diet is recommended that focuses on low-carb, low-sugar, and low-processed foods.  3. Exercise, 5 days per week, 30 minutes per day, as tolerated  4. Recommend good control of cholesterol, blood pressure, blood sugar levels (as these are risk factors for fatty liver). Cholesterol lowering medications including statins are typically safe and beneficial (even if liver enzymes are elevated).    5. Limit alcohol consumption, no more than 1 serving(s) of alcohol in any day (1 serving is 5 ounces of wine, 12 ounces of beer, or 1.5 ounces of liquor). Do not recommend daily alcohol use.        In some people, fatty liver can progress to steatohepatitis (inflammatory fatty liver) and possibly to cirrhosis, putting one at increased risk for liver cancer and liver failure in the future. Lifestyle changes can help to decrease this risk.     If interested in seeing a dietician to create a weight loss plan, contact the dietician team at [facility]: [email or web address].  You can also call to schedule a consult with a dietician: [phone]. *Virtual visits are available with one of our dieticians.     If you have diabetes or high blood pressure, you can meet with a Health  through [facility]. Let us know if you are interested in a referral.     Ask about our fatty liver/HERNANDEZ clinical trials if you have fibrosis / scar tissue related to fatty liver.    *If statins are being considered for HLD, statins are safe in patients with liver disease. Statins can be used to treat dyslipidemia in patients with both NAFLD and HERNANDEZ.      FATTY LIVER DIET  TIPS:    ADD OLIVE OIL. I recommend olive oil daily (in salads or when cooking)  ADD COFFEE. Black coffee has also been found to be potentially beneficial (skip the sweets and creamer). Add 1-2 cups per day,  if you are able to tolerate. Decaf is fine.  BE MINDFUL OF CARBS AND ADDED SUGARS. Try to limit your carb intake to LESS than 30-45 grams of carbs with a meal or LESS than 5-10 grams with any snack (total of any snack foods eaten during that time).   KEEP A FOOD DIARY. Use MyFitness Pal  OR LOSE IT belinda to add up your carbs through the day - the most successful folks on weight loss journey TRACK their progress and food.  LIMIT WHAT YOU ARE DRINKING. Do NOT drink any beverages with calories or carbs (this can lead to high blood sugar and weight gain). Also, some of our patients have been very successful with weight loss using the pre made/planned meal planning services that limit calories and portion size (one example is Sensible Meals but there are many out there). Unsweetened black or green tea is fine! Hibiscus tea is also great and refreshing, especially iced.     Tips for low/moderate carbohydrate diet:  3 meals a day made up of the following:  -- Green vegetables, tomatoes, mushrooms, spaghetti squash, cauliflower, meat, poultry, seafood, eggs   -- Beans (1-1.5 cups) or nuts (1/4 cup): can have 1 x a day.  -- Greek yogurt and fermented foods like kefir, kimchi, saurkraut (be careful if you have swelling issues as lots of salt can worsen swelling or blood pressure)  -- Dressings, seasonings, condiments, etc should have less than 2 g sugars.   -- 1-2 servings of citrus fruits, berries, pineapple or melon a day (1/2 cup)  -- Avoid fried foods  -- Avoid grains, rice, pasta, potatoes, bread, corn, peas, oatmeal, grits, tortillas, crackers, chips  -- No soda, sweet tea, juices or lemonade  -- Use olive/avocado oil rather than vegetable oils or butter.        ADDITIONAL RESOURCES:  Websites with information about  fatty liver and inflammation related to fatty liver (HERNANDEZ): www.nashtruth.com and www.nashactually.com   [facility]: Non-Alcoholic Fatty Liver Disease (NAFLD)    Facebook support group with tips and recipes:   Non-Alcoholic Fatty Liver Disease (NAFLD) Diet & Nutrition Support     Try [email or web address].[email or web address] for recipes.

## 2023-07-30 RX ORDER — PANTOPRAZOLE SODIUM 40 MG/1
40 TABLET, DELAYED RELEASE ORAL DAILY
Qty: 90 TABLET | Refills: 1 | Status: CANCELLED | OUTPATIENT
Start: 2023-07-30

## 2023-07-30 NOTE — TELEPHONE ENCOUNTER
No care due was identified.  [facility] Embedded Care Due Messages. Reference number: [id number].   7/30/2023 12:52:17 PM CDT

## 2023-07-30 NOTE — TELEPHONE ENCOUNTER
No care due was identified.  [facility] Embedded Care Due Messages. Reference number: [id number].   7/30/2023 12:51:24 PM CDT

## 2023-07-31 RX ORDER — METHYLPHENIDATE HYDROCHLORIDE 20 MG/1
20 TABLET ORAL 2 TIMES DAILY
Qty: 60 TABLET | Refills: 0 | Status: SHIPPED | OUTPATIENT
Start: 2023-07-31 | End: 2023-09-01 | Stop reason: SDUPTHER

## 2023-07-31 NOTE — TELEPHONE ENCOUNTER
Refill Routing Note     Refill Routing Note   Medication(s) are not appropriate for processing by [facility] for the following reason(s):      Medication outside of protocol    [facility] action(s):  Route Care Due:  None identified            Appointments  past 12m or future 3m with PCP    Date Provider   Last Visit   6/6/2023 Daniel Lawrence,    Next Visit   9/14/2023 Daniel Lawrence DO   ED visits in past 90 days: 0        Note composed:8:48 PM 07/30/2023

## 2023-09-01 RX ORDER — METHYLPHENIDATE HYDROCHLORIDE 20 MG/1
20 TABLET ORAL 2 TIMES DAILY
Qty: 60 TABLET | Refills: 0 | Status: SHIPPED | OUTPATIENT
Start: 2023-09-01 | End: 2023-10-02 | Stop reason: SDUPTHER

## 2023-09-01 NOTE — TELEPHONE ENCOUNTER
No care due was identified.  [facility] Embedded Care Due Messages. Reference number: [id number].   9/01/2023 10:29:49 AM CDT

## 2023-09-28 ENCOUNTER — LAB VISIT (OUTPATIENT)
Dept: LAB | Facility: HOSPITAL | Age: 58
End: 2023-09-28
Payer: COMMERCIAL

## 2023-09-28 ENCOUNTER — TELEPHONE (OUTPATIENT)
Dept: HEPATOLOGY | Facility: CLINIC | Age: 58
End: 2023-09-28
Payer: COMMERCIAL

## 2023-09-28 DIAGNOSIS — Z00.00 ANNUAL PHYSICAL EXAM: ICD-10-CM

## 2023-09-28 DIAGNOSIS — K74.00 LIVER FIBROSIS: ICD-10-CM

## 2023-09-28 DIAGNOSIS — I10 ESSENTIAL HYPERTENSION: ICD-10-CM

## 2023-09-28 DIAGNOSIS — R74.8 ELEVATED LIVER ENZYMES: ICD-10-CM

## 2023-09-28 DIAGNOSIS — K75.81 NASH (NONALCOHOLIC STEATOHEPATITIS): ICD-10-CM

## 2023-09-28 LAB
ALBUMIN SERPL BCP-MCNC: 3.9 G/DL (ref 3.5–5.2)
ALBUMIN SERPL BCP-MCNC: 3.9 G/DL (ref 3.5–5.2)
ALP SERPL-CCNC: 127 U/L (ref 55–135)
ALP SERPL-CCNC: 127 U/L (ref 55–135)
ALT SERPL W/O P-5'-P-CCNC: 296 U/L (ref 10–44)
ALT SERPL W/O P-5'-P-CCNC: 296 U/L (ref 10–44)
ANION GAP SERPL CALC-SCNC: 11 MMOL/L (ref 8–16)
AST SERPL-CCNC: 194 U/L (ref 10–40)
AST SERPL-CCNC: 194 U/L (ref 10–40)
BASOPHILS # BLD AUTO: 0.02 K/UL (ref 0–0.2)
BASOPHILS NFR BLD: 0.4 % (ref 0–1.9)
BILIRUB DIRECT SERPL-MCNC: 0.2 MG/DL (ref 0.1–0.3)
BILIRUB SERPL-MCNC: 0.7 MG/DL (ref 0.1–1)
BILIRUB SERPL-MCNC: 0.7 MG/DL (ref 0.1–1)
BUN SERPL-MCNC: 10 MG/DL (ref 6–20)
CALCIUM SERPL-MCNC: 9.7 MG/DL (ref 8.7–10.5)
CHLORIDE SERPL-SCNC: 101 MMOL/L (ref 95–110)
CHOLEST SERPL-MCNC: 210 MG/DL (ref 120–199)
CHOLEST/HDLC SERPL: 4.5 {RATIO} (ref 2–5)
CO2 SERPL-SCNC: 29 MMOL/L (ref 23–29)
CREAT SERPL-MCNC: 0.8 MG/DL (ref 0.5–1.4)
DIFFERENTIAL METHOD: ABNORMAL
EOSINOPHIL # BLD AUTO: 0.1 K/UL (ref 0–0.5)
EOSINOPHIL NFR BLD: 1.1 % (ref 0–8)
ERYTHROCYTE [DISTWIDTH] IN BLOOD BY AUTOMATED COUNT: 14 % (ref 11.5–14.5)
EST. GFR  (NO RACE VARIABLE): >60 ML/MIN/1.73 M^2
GLUCOSE SERPL-MCNC: 86 MG/DL (ref 70–110)
HCT VFR BLD AUTO: 40.2 % (ref 37–48.5)
HDLC SERPL-MCNC: 47 MG/DL (ref 40–75)
HDLC SERPL: 22.4 % (ref 20–50)
HGB BLD-MCNC: 12.9 G/DL (ref 12–16)
IMM GRANULOCYTES # BLD AUTO: 0.01 K/UL (ref 0–0.04)
IMM GRANULOCYTES NFR BLD AUTO: 0.2 % (ref 0–0.5)
LDLC SERPL CALC-MCNC: 142.4 MG/DL (ref 63–159)
LYMPHOCYTES # BLD AUTO: 2.7 K/UL (ref 1–4.8)
LYMPHOCYTES NFR BLD: 48.2 % (ref 18–48)
MCH RBC QN AUTO: 27.5 PG (ref 27–31)
MCHC RBC AUTO-ENTMCNC: 32.1 G/DL (ref 32–36)
MCV RBC AUTO: 86 FL (ref 82–98)
MONOCYTES # BLD AUTO: 0.5 K/UL (ref 0.3–1)
MONOCYTES NFR BLD: 9.1 % (ref 4–15)
NEUTROPHILS # BLD AUTO: 2.3 K/UL (ref 1.8–7.7)
NEUTROPHILS NFR BLD: 41 % (ref 38–73)
NONHDLC SERPL-MCNC: 163 MG/DL
NRBC BLD-RTO: 0 /100 WBC
PLATELET # BLD AUTO: 205 K/UL (ref 150–450)
PMV BLD AUTO: 12.4 FL (ref 9.2–12.9)
POTASSIUM SERPL-SCNC: 3.2 MMOL/L (ref 3.5–5.1)
PROT SERPL-MCNC: 8.2 G/DL (ref 6–8.4)
PROT SERPL-MCNC: 8.2 G/DL (ref 6–8.4)
RBC # BLD AUTO: 4.69 M/UL (ref 4–5.4)
SODIUM SERPL-SCNC: 141 MMOL/L (ref 136–145)
TRIGL SERPL-MCNC: 103 MG/DL (ref 30–150)
TSH SERPL DL<=0.005 MIU/L-ACNC: 1.36 UIU/ML (ref 0.4–4)
WBC # BLD AUTO: 5.62 K/UL (ref 3.9–12.7)

## 2023-09-28 PROCEDURE — 36415 COLL VENOUS BLD VENIPUNCTURE: CPT | Mod: PO | Performed by: PHYSICIAN ASSISTANT

## 2023-09-28 PROCEDURE — 85025 COMPLETE CBC W/AUTO DIFF WBC: CPT | Performed by: INTERNAL MEDICINE

## 2023-09-28 PROCEDURE — 84443 ASSAY THYROID STIM HORMONE: CPT | Performed by: INTERNAL MEDICINE

## 2023-09-28 PROCEDURE — 80076 HEPATIC FUNCTION PANEL: CPT | Performed by: PHYSICIAN ASSISTANT

## 2023-09-28 PROCEDURE — 80321 ALCOHOLS BIOMARKERS 1OR 2: CPT | Performed by: PHYSICIAN ASSISTANT

## 2023-09-28 PROCEDURE — 80061 LIPID PANEL: CPT | Performed by: INTERNAL MEDICINE

## 2023-09-28 PROCEDURE — 80053 COMPREHEN METABOLIC PANEL: CPT | Performed by: INTERNAL MEDICINE

## 2023-09-30 LAB
CLINICAL BIOCHEMIST REVIEW: NORMAL
PLPETH BLD-MCNC: NORMAL NG/ML
POPETH BLD-MCNC: <10 NG/ML

## 2023-10-02 ENCOUNTER — TELEPHONE (OUTPATIENT)
Dept: HEPATOLOGY | Facility: CLINIC | Age: 58
End: 2023-10-02
Payer: COMMERCIAL

## 2023-10-02 ENCOUNTER — OFFICE VISIT (OUTPATIENT)
Dept: INTERNAL MEDICINE | Facility: CLINIC | Age: 58
End: 2023-10-02
Payer: COMMERCIAL

## 2023-10-02 VITALS
OXYGEN SATURATION: 100 % | HEIGHT: 67 IN | SYSTOLIC BLOOD PRESSURE: 132 MMHG | WEIGHT: 180.69 LBS | BODY MASS INDEX: 28.36 KG/M2 | HEART RATE: 66 BPM | DIASTOLIC BLOOD PRESSURE: 72 MMHG | RESPIRATION RATE: 20 BRPM | TEMPERATURE: 50 F

## 2023-10-02 DIAGNOSIS — M25.561 CHRONIC PAIN OF RIGHT KNEE: ICD-10-CM

## 2023-10-02 DIAGNOSIS — E66.3 OVERWEIGHT (BMI 25.0-29.9): ICD-10-CM

## 2023-10-02 DIAGNOSIS — K76.0 FATTY LIVER: ICD-10-CM

## 2023-10-02 DIAGNOSIS — I10 ESSENTIAL HYPERTENSION: ICD-10-CM

## 2023-10-02 DIAGNOSIS — F33.1 MAJOR DEPRESSIVE DISORDER, RECURRENT EPISODE, MODERATE: ICD-10-CM

## 2023-10-02 DIAGNOSIS — G89.29 CHRONIC PAIN OF RIGHT KNEE: ICD-10-CM

## 2023-10-02 DIAGNOSIS — F41.9 ANXIETY: ICD-10-CM

## 2023-10-02 DIAGNOSIS — F90.1 ATTENTION DEFICIT HYPERACTIVITY DISORDER (ADHD), PREDOMINANTLY HYPERACTIVE TYPE: Primary | ICD-10-CM

## 2023-10-02 PROCEDURE — 1159F MED LIST DOCD IN RCRD: CPT | Mod: CPTII,S$GLB,, | Performed by: INTERNAL MEDICINE

## 2023-10-02 PROCEDURE — 1160F RVW MEDS BY RX/DR IN RCRD: CPT | Mod: CPTII,S$GLB,, | Performed by: INTERNAL MEDICINE

## 2023-10-02 PROCEDURE — 1160F PR REVIEW ALL MEDS BY PRESCRIBER/CLIN PHARMACIST DOCUMENTED: ICD-10-PCS | Mod: CPTII,S$GLB,, | Performed by: INTERNAL MEDICINE

## 2023-10-02 PROCEDURE — 4010F PR ACE/ARB THEARPY RXD/TAKEN: ICD-10-PCS | Mod: CPTII,S$GLB,, | Performed by: INTERNAL MEDICINE

## 2023-10-02 PROCEDURE — 3078F DIAST BP <80 MM HG: CPT | Mod: CPTII,S$GLB,, | Performed by: INTERNAL MEDICINE

## 2023-10-02 PROCEDURE — 3078F PR MOST RECENT DIASTOLIC BLOOD PRESSURE < 80 MM HG: ICD-10-PCS | Mod: CPTII,S$GLB,, | Performed by: INTERNAL MEDICINE

## 2023-10-02 PROCEDURE — 3075F SYST BP GE 130 - 139MM HG: CPT | Mod: CPTII,S$GLB,, | Performed by: INTERNAL MEDICINE

## 2023-10-02 PROCEDURE — 3075F PR MOST RECENT SYSTOLIC BLOOD PRESS GE 130-139MM HG: ICD-10-PCS | Mod: CPTII,S$GLB,, | Performed by: INTERNAL MEDICINE

## 2023-10-02 PROCEDURE — 3008F PR BODY MASS INDEX (BMI) DOCUMENTED: ICD-10-PCS | Mod: CPTII,S$GLB,, | Performed by: INTERNAL MEDICINE

## 2023-10-02 PROCEDURE — 3044F PR MOST RECENT HEMOGLOBIN A1C LEVEL <7.0%: ICD-10-PCS | Mod: CPTII,S$GLB,, | Performed by: INTERNAL MEDICINE

## 2023-10-02 PROCEDURE — 4010F ACE/ARB THERAPY RXD/TAKEN: CPT | Mod: CPTII,S$GLB,, | Performed by: INTERNAL MEDICINE

## 2023-10-02 PROCEDURE — 99999 PR PBB SHADOW E&M-EST. PATIENT-LVL V: CPT | Mod: PBBFAC,,, | Performed by: INTERNAL MEDICINE

## 2023-10-02 PROCEDURE — 3044F HG A1C LEVEL LT 7.0%: CPT | Mod: CPTII,S$GLB,, | Performed by: INTERNAL MEDICINE

## 2023-10-02 PROCEDURE — 1159F PR MEDICATION LIST DOCUMENTED IN MEDICAL RECORD: ICD-10-PCS | Mod: CPTII,S$GLB,, | Performed by: INTERNAL MEDICINE

## 2023-10-02 PROCEDURE — 99214 PR OFFICE/OUTPT VISIT, EST, LEVL IV, 30-39 MIN: ICD-10-PCS | Mod: S$GLB,,, | Performed by: INTERNAL MEDICINE

## 2023-10-02 PROCEDURE — 3008F BODY MASS INDEX DOCD: CPT | Mod: CPTII,S$GLB,, | Performed by: INTERNAL MEDICINE

## 2023-10-02 PROCEDURE — 99999 PR PBB SHADOW E&M-EST. PATIENT-LVL V: ICD-10-PCS | Mod: PBBFAC,,, | Performed by: INTERNAL MEDICINE

## 2023-10-02 PROCEDURE — 99214 OFFICE O/P EST MOD 30 MIN: CPT | Mod: S$GLB,,, | Performed by: INTERNAL MEDICINE

## 2023-10-02 RX ORDER — METHYLPHENIDATE HYDROCHLORIDE 20 MG/1
20 TABLET ORAL 2 TIMES DAILY
Qty: 60 TABLET | Refills: 0 | Status: SHIPPED | OUTPATIENT
Start: 2023-12-02 | End: 2023-12-26 | Stop reason: SDUPTHER

## 2023-10-02 RX ORDER — METHYLPHENIDATE HYDROCHLORIDE 20 MG/1
20 TABLET ORAL 2 TIMES DAILY
Qty: 60 TABLET | Refills: 0 | Status: SHIPPED | OUTPATIENT
Start: 2023-10-02 | End: 2023-11-22 | Stop reason: SDUPTHER

## 2023-10-02 RX ORDER — METHYLPHENIDATE HYDROCHLORIDE 20 MG/1
20 TABLET ORAL 2 TIMES DAILY
Qty: 60 TABLET | Refills: 0 | Status: SHIPPED | OUTPATIENT
Start: 2023-11-02 | End: 2024-02-01 | Stop reason: SDUPTHER

## 2023-10-02 NOTE — TELEPHONE ENCOUNTER
[facility] Liver Pathology Conference Note    Patient:  Janki Jones  MRN:   [id number]  YOB: 1965  Date of Transplant:  N/A  Native Diagnosis: HERNANDEZ    Discussion/Plan:    Presenter: SHWETA - AL Odom    Reason for presenting: diagnosis confirmation and elevated liver function    Concerns for Pathologists: 57 year old woman with HTN suspected HERNANDEZ but worsening liver enzymes, BMI 27. History of anxiety and ADHD, she has been on ritalin, propranolol.         In relation to metabolic risk factors:   Lab Results   Component Value Date    HGBA1C 5.7 (H) 07/11/2023    CHOL 210 (H) 09/28/2023    TSH 1.360 09/28/2023       Prior serologic workup:   Lab Results   Component Value Date    SMOOTHMUSCAB Negative 1:40 10/26/2022    AMAIFA Negative 1:40 10/26/2022    IGGSERUM 1582 11/16/2022    ANASCREEN Negative <1:80 10/26/2022    FERRITIN 323 (H) 03/07/2023    FESATURATED 24 10/26/2022    CERULOPLSM 31.0 10/26/2022    HEPBSAG Non-reactive 10/26/2022    HEPCAB Non-reactive 10/26/2022         Lab Results  WBC (K/uL)   Date Value   09/28/2023 5.62   06/09/2023 5.71   03/29/2023 5.15     PLT (K/uL)   Date Value   09/28/2023 205   06/09/2023 192   03/29/2023 226     INR (no units)   Date Value   03/29/2023 1.1     AST (U/L)   Date Value   09/28/2023 194 (H)   09/28/2023 194 (H)     ALT (U/L)   Date Value   09/28/2023 296 (H)   09/28/2023 296 (H)   07/11/2023 171 (H)     BILITOT (mg/dL)   Date Value   09/28/2023 0.7   09/28/2023 0.7   07/11/2023 0.3     ALKPHOS (U/L)   Date Value   09/28/2023 127   09/28/2023 127   07/11/2023 120     CREATININE (mg/dL)   Date Value   09/28/2023 0.8   06/09/2023 0.9   03/29/2023 0.9

## 2023-10-02 NOTE — PROGRESS NOTES
Subjective     Patient ID: Janki Jones is a 57 y.o. female.    Chief Complaint: Medication Refill (/) and Panic Attack (/)    HPI  Pt with HTN, ADD, MDD, Fatty Liver, L CTS is here for f/u. Requesting refills of her Concerta which has been working well.  Review of Systems   Constitutional:  Negative for activity change, appetite change, chills, diaphoresis, fatigue, fever and unexpected weight change.   HENT:  Negative for postnasal drip, rhinorrhea, sinus pressure/congestion, sneezing, sore throat, trouble swallowing and voice change.    Respiratory:  Negative for cough, shortness of breath and wheezing.    Cardiovascular:  Negative for chest pain, palpitations and leg swelling.   Gastrointestinal:  Negative for abdominal pain, blood in stool, constipation, diarrhea, nausea and vomiting.   Genitourinary:  Negative for dysuria.   Musculoskeletal:  Negative for arthralgias and myalgias.   Integumentary:  Negative for rash and wound.   Allergic/Immunologic: Negative for environmental allergies and food allergies.   Hematological:  Negative for adenopathy. Does not bruise/bleed easily.   Psychiatric/Behavioral:  Positive for dysphoric mood. Negative for self-injury and suicidal ideas. The patient is nervous/anxious.           Objective     Physical Exam  Constitutional:       General: She is not in acute distress.     Appearance: Normal appearance. She is well-developed. She is not diaphoretic.   HENT:      Head: Normocephalic and atraumatic.      Right Ear: External ear normal.      Left Ear: External ear normal.      Nose: Nose normal.      Mouth/Throat:      Pharynx: No oropharyngeal exudate.   Eyes:      General: No scleral icterus.        Right eye: No discharge.         Left eye: No discharge.      Conjunctiva/sclera: Conjunctivae normal.      Pupils: Pupils are equal, round, and reactive to light.   Neck:      Vascular: No JVD.   Cardiovascular:      Rate and Rhythm: Normal rate and regular rhythm.       Pulses: Normal pulses.      Heart sounds: Normal heart sounds.   Pulmonary:      Effort: Pulmonary effort is normal. No respiratory distress.      Breath sounds: Normal breath sounds. No wheezing, rhonchi or rales.   Abdominal:      General: Bowel sounds are normal. There is no distension.      Palpations: Abdomen is soft.      Tenderness: There is no abdominal tenderness. There is no guarding or rebound.   Musculoskeletal:      Cervical back: Neck supple.      Right lower leg: No edema.      Left lower leg: No edema.   Lymphadenopathy:      Cervical: No cervical adenopathy.   Skin:     General: Skin is warm and dry.      Coloration: Skin is not pale.      Findings: No rash.   Neurological:      General: No focal deficit present.      Mental Status: She is alert and oriented to person, place, and time.      Gait: Gait normal.   Psychiatric:         Behavior: Behavior normal.         Thought Content: Thought content normal.            Assessment and Plan     1. Attention deficit hyperactivity disorder (ADHD), predominantly hyperactive type  -     methylphenidate HCl (RITALIN) 20 MG tablet; Take 1 tablet (20 mg total) by mouth 2 (two) times daily.  Dispense: 60 tablet; Refill: 0  -     methylphenidate HCl (RITALIN) 20 MG tablet; Take 1 tablet (20 mg total) by mouth 2 (two) times daily.  Dispense: 60 tablet; Refill: 0  -     methylphenidate HCl (RITALIN) 20 MG tablet; Take 1 tablet (20 mg total) by mouth 2 (two) times daily.  Dispense: 60 tablet; Refill: 0    2. Anxiety    3. Major depressive disorder, recurrent episode, moderate    4. Essential hypertension    5. Fatty liver    6. Overweight (BMI 25.0-29.9)  -     Ambulatory referral/consult to [facility] ([email or web address]); Future; Expected date: 10/09/2023  -     [facility] ASSIGN QUESTIONNAIRE SERIES ([email or web address])  -     [facility] Patient Entered [facility] ([email or web address])    7. Chronic pain of right knee  -     Ambulatory referral/consult to Orthopedics; Future; Expected  date: 10/09/2023         HTN- stable       ADD- stable on Concerta      MDD- on Lexapro 30 mg qd, d/c Wellbutrin      Anxiety- Lexapro as above   -can take Propranolol 10 mg TID PRN anxiety      Fatty liver- followed by Hepatology   -Wellbutrin stopped out of an abundance of caution 2/2 elevated LAEs(not sure if this is contributing or not)      GERD- on PPI     F/u in 3 months

## 2023-10-05 ENCOUNTER — CONFERENCE (OUTPATIENT)
Dept: TRANSPLANT | Facility: CLINIC | Age: 58
End: 2023-10-05
Payer: COMMERCIAL

## 2023-10-05 ENCOUNTER — TELEPHONE (OUTPATIENT)
Dept: ORTHOPEDICS | Facility: CLINIC | Age: 58
End: 2023-10-05
Payer: COMMERCIAL

## 2023-10-05 ENCOUNTER — TELEPHONE (OUTPATIENT)
Dept: HEPATOLOGY | Facility: CLINIC | Age: 58
End: 2023-10-05
Payer: COMMERCIAL

## 2023-10-05 NOTE — TELEPHONE ENCOUNTER
spoke with patient comfirming approval for apptointment on Nov.1st 10:45 am at [facility] location

## 2023-10-05 NOTE — TELEPHONE ENCOUNTER
10/5/23 Liver Pathology Conference:    Liver BIopsy:  Steatohepatitis with resolving injury/ steatosis with ballooning hepatocytes- activated lymphocytes/histocytes. /no plasma cells/ a little interface activity  Fibrosis stage 2/4.

## 2023-10-09 ENCOUNTER — TELEPHONE (OUTPATIENT)
Dept: HEPATOLOGY | Facility: CLINIC | Age: 58
End: 2023-10-09
Payer: COMMERCIAL

## 2023-10-09 NOTE — TELEPHONE ENCOUNTER
I discussed this patient's case with  and their pertinent findings during our weekly Patient Care Conference. I presented their medical history, relevant labs, and imaging to the physician. We discussed their plan of care and current assessment.     DISCUSSION  Reviewed in pathology conference and suspect ongoing HERNANDEZ. Too early to repeat biopsy. Monitor and consider repeat biopsy in the future.

## 2023-10-10 ENCOUNTER — TELEPHONE (OUTPATIENT)
Dept: HEPATOLOGY | Facility: CLINIC | Age: 58
End: 2023-10-10
Payer: COMMERCIAL

## 2023-10-13 ENCOUNTER — PATIENT MESSAGE (OUTPATIENT)
Dept: SPORTS MEDICINE | Facility: CLINIC | Age: 58
End: 2023-10-13
Payer: COMMERCIAL

## 2023-10-13 DIAGNOSIS — M25.561 RIGHT KNEE PAIN, UNSPECIFIED CHRONICITY: Primary | ICD-10-CM

## 2023-10-23 ENCOUNTER — HOSPITAL ENCOUNTER (OUTPATIENT)
Dept: RADIOLOGY | Facility: HOSPITAL | Age: 58
Discharge: HOME | End: 2023-10-23
Attending: STUDENT IN AN ORGANIZED HEALTH CARE EDUCATION/TRAINING PROGRAM
Payer: COMMERCIAL

## 2023-10-23 ENCOUNTER — OFFICE VISIT (OUTPATIENT)
Dept: SPORTS MEDICINE | Facility: CLINIC | Age: 58
End: 2023-10-23
Payer: COMMERCIAL

## 2023-10-23 VITALS — HEART RATE: 68 BPM | DIASTOLIC BLOOD PRESSURE: 86 MMHG | SYSTOLIC BLOOD PRESSURE: 126 MMHG

## 2023-10-23 DIAGNOSIS — G89.29 BILATERAL CHRONIC KNEE PAIN: ICD-10-CM

## 2023-10-23 DIAGNOSIS — M22.2X2 PATELLOFEMORAL PAIN SYNDROME OF BOTH KNEES: ICD-10-CM

## 2023-10-23 DIAGNOSIS — M79.672 HEEL PAIN, BILATERAL: ICD-10-CM

## 2023-10-23 DIAGNOSIS — M25.561 BILATERAL CHRONIC KNEE PAIN: ICD-10-CM

## 2023-10-23 DIAGNOSIS — M25.561 CHRONIC PAIN OF BOTH KNEES: Primary | ICD-10-CM

## 2023-10-23 DIAGNOSIS — M79.671 HEEL PAIN, BILATERAL: ICD-10-CM

## 2023-10-23 DIAGNOSIS — M25.562 BILATERAL CHRONIC KNEE PAIN: ICD-10-CM

## 2023-10-23 DIAGNOSIS — M25.562 CHRONIC PAIN OF BOTH KNEES: Primary | ICD-10-CM

## 2023-10-23 DIAGNOSIS — G89.29 CHRONIC PAIN OF BOTH KNEES: Primary | ICD-10-CM

## 2023-10-23 DIAGNOSIS — M25.561 RIGHT KNEE PAIN, UNSPECIFIED CHRONICITY: ICD-10-CM

## 2023-10-23 DIAGNOSIS — M22.2X1 PATELLOFEMORAL PAIN SYNDROME OF BOTH KNEES: ICD-10-CM

## 2023-10-23 PROCEDURE — 97110 PR THERAPEUTIC EXERCISES: ICD-10-PCS | Mod: S$GLB,,, | Performed by: STUDENT IN AN ORGANIZED HEALTH CARE EDUCATION/TRAINING PROGRAM

## 2023-10-23 PROCEDURE — 99204 PR OFFICE/OUTPT VISIT, NEW, LEVL IV, 45-59 MIN: ICD-10-PCS | Mod: S$GLB,,, | Performed by: STUDENT IN AN ORGANIZED HEALTH CARE EDUCATION/TRAINING PROGRAM

## 2023-10-23 PROCEDURE — 3079F PR MOST RECENT DIASTOLIC BLOOD PRESSURE 80-89 MM HG: ICD-10-PCS | Mod: CPTII,S$GLB,, | Performed by: STUDENT IN AN ORGANIZED HEALTH CARE EDUCATION/TRAINING PROGRAM

## 2023-10-23 PROCEDURE — 3044F PR MOST RECENT HEMOGLOBIN A1C LEVEL <7.0%: ICD-10-PCS | Mod: CPTII,S$GLB,, | Performed by: STUDENT IN AN ORGANIZED HEALTH CARE EDUCATION/TRAINING PROGRAM

## 2023-10-23 PROCEDURE — 3079F DIAST BP 80-89 MM HG: CPT | Mod: CPTII,S$GLB,, | Performed by: STUDENT IN AN ORGANIZED HEALTH CARE EDUCATION/TRAINING PROGRAM

## 2023-10-23 PROCEDURE — 73562 X-RAY EXAM OF KNEE 3: CPT | Mod: 26,,, | Performed by: RADIOLOGY

## 2023-10-23 PROCEDURE — 4010F ACE/ARB THERAPY RXD/TAKEN: CPT | Mod: CPTII,S$GLB,, | Performed by: STUDENT IN AN ORGANIZED HEALTH CARE EDUCATION/TRAINING PROGRAM

## 2023-10-23 PROCEDURE — 97110 THERAPEUTIC EXERCISES: CPT | Mod: S$GLB,,, | Performed by: STUDENT IN AN ORGANIZED HEALTH CARE EDUCATION/TRAINING PROGRAM

## 2023-10-23 PROCEDURE — 73562 X-RAY EXAM OF KNEE 3: CPT | Mod: TC,50,PO

## 2023-10-23 PROCEDURE — 1160F RVW MEDS BY RX/DR IN RCRD: CPT | Mod: CPTII,S$GLB,, | Performed by: STUDENT IN AN ORGANIZED HEALTH CARE EDUCATION/TRAINING PROGRAM

## 2023-10-23 PROCEDURE — 3074F PR MOST RECENT SYSTOLIC BLOOD PRESSURE < 130 MM HG: ICD-10-PCS | Mod: CPTII,S$GLB,, | Performed by: STUDENT IN AN ORGANIZED HEALTH CARE EDUCATION/TRAINING PROGRAM

## 2023-10-23 PROCEDURE — 99999 PR PBB SHADOW E&M-EST. PATIENT-LVL III: ICD-10-PCS | Mod: PBBFAC,,, | Performed by: STUDENT IN AN ORGANIZED HEALTH CARE EDUCATION/TRAINING PROGRAM

## 2023-10-23 PROCEDURE — 99204 OFFICE O/P NEW MOD 45 MIN: CPT | Mod: S$GLB,,, | Performed by: STUDENT IN AN ORGANIZED HEALTH CARE EDUCATION/TRAINING PROGRAM

## 2023-10-23 PROCEDURE — 1159F MED LIST DOCD IN RCRD: CPT | Mod: CPTII,S$GLB,, | Performed by: STUDENT IN AN ORGANIZED HEALTH CARE EDUCATION/TRAINING PROGRAM

## 2023-10-23 PROCEDURE — 1125F AMNT PAIN NOTED PAIN PRSNT: CPT | Mod: CPTII,S$GLB,, | Performed by: STUDENT IN AN ORGANIZED HEALTH CARE EDUCATION/TRAINING PROGRAM

## 2023-10-23 PROCEDURE — 1159F PR MEDICATION LIST DOCUMENTED IN MEDICAL RECORD: ICD-10-PCS | Mod: CPTII,S$GLB,, | Performed by: STUDENT IN AN ORGANIZED HEALTH CARE EDUCATION/TRAINING PROGRAM

## 2023-10-23 PROCEDURE — 73562 XR KNEE ORTHO BILAT: ICD-10-PCS | Mod: 26,,, | Performed by: RADIOLOGY

## 2023-10-23 PROCEDURE — 3044F HG A1C LEVEL LT 7.0%: CPT | Mod: CPTII,S$GLB,, | Performed by: STUDENT IN AN ORGANIZED HEALTH CARE EDUCATION/TRAINING PROGRAM

## 2023-10-23 PROCEDURE — 1160F PR REVIEW ALL MEDS BY PRESCRIBER/CLIN PHARMACIST DOCUMENTED: ICD-10-PCS | Mod: CPTII,S$GLB,, | Performed by: STUDENT IN AN ORGANIZED HEALTH CARE EDUCATION/TRAINING PROGRAM

## 2023-10-23 PROCEDURE — 3074F SYST BP LT 130 MM HG: CPT | Mod: CPTII,S$GLB,, | Performed by: STUDENT IN AN ORGANIZED HEALTH CARE EDUCATION/TRAINING PROGRAM

## 2023-10-23 PROCEDURE — 99999 PR PBB SHADOW E&M-EST. PATIENT-LVL III: CPT | Mod: PBBFAC,,, | Performed by: STUDENT IN AN ORGANIZED HEALTH CARE EDUCATION/TRAINING PROGRAM

## 2023-10-23 PROCEDURE — 4010F PR ACE/ARB THEARPY RXD/TAKEN: ICD-10-PCS | Mod: CPTII,S$GLB,, | Performed by: STUDENT IN AN ORGANIZED HEALTH CARE EDUCATION/TRAINING PROGRAM

## 2023-10-23 PROCEDURE — 1125F PR PAIN SEVERITY QUANTIFIED, PAIN PRESENT: ICD-10-PCS | Mod: CPTII,S$GLB,, | Performed by: STUDENT IN AN ORGANIZED HEALTH CARE EDUCATION/TRAINING PROGRAM

## 2023-10-23 NOTE — PROGRESS NOTES
"CC: bilateral knee pain    57 y.o. Female presents today for evaluation of her bilateral knee pain. Pt reports gradual onset of knee pain for "years"; pt reports prev hx of falling 2 stories and injuring her knees when she was in adolescence. Pt localizes pain to anterior knees. Pt reports she went on a trip to [place], and states pain and swelling increased with long plane rides and increased walking. Pt reports pain is 0/10 today, and 6/10 at worst recently. Pt reports intermittent sharp pains and popping in her knees. Pt reports numbness/tingling in both legs intermittently; pt reports no prev hx of lumbar injuries but states she had to have her coccyx reset following 3 separate pregnancies. Pt notes pain in bilateral posterior ankles as well.     SYMPTOMS:   Pain Score: 0/10 today, 6/10 at worst  Pain location: anterior  Time of onset: "years"  Trauma, injury: gradual onset    Audible pop: yes  Clicking: none  Catching: none  Locking: none  Giving out, instability: yes  Swelling: yes  Theater sign: no  Problems with stairs: has not taken stairs    INTERVENTIONS:   Medications tried: advil (has relief), epsom salt soaks  Braces/devices: compression socks  Physical therapy: none  Injections: none    RELEVANT HISTORY:   Imaging to date: 10/23/23  Previous significant knee injuries: fell 2 stories in childhood, injured knees  Previous knee surgeries: none    Occupation: desk job     REVIEW OF SYSTEMS:   Constitution: Patient denies fever or chills.  Eyes: Patient denies eye pain or vision changes.  HEENT: Patient denies ear pain, sore throat, or nasal discharge.  CVS: Patient denies chest pain.  Lungs: Patient denies shortness of breath or cough.  Abdomen: Patient denies any stomach pain, nausea, vomiting, or diarrhea  Skin: Patient denies skin rash or itching.    Musculoskeletal: Patient reports bilateral heel pain.   Neuro: Patient reports numbness/tingling in upper and lower extremities (prev hx of carpal tunnel). "   Psych: Patient denies any current anxiety or nervousness.    PAST MEDICAL HISTORY:   Past Medical History:   Diagnosis Date    Abnormal Pap smear 2007    Abnormal Pap smear of cervix     ADD (attention deficit disorder)     Anxiety     Depression     Fatty liver     Fibroids     GERD (gastroesophageal reflux disease)     Herpes simplex virus (HSV) infection     HSV1.    Hypertension        PAST SURGICAL HISTORY:  Past Surgical History:   Procedure Laterality Date    CARPAL TUNNEL RELEASE Right 12/15/2022    Procedure: RELEASE, CARPAL TUNNEL;  Surgeon: Toma Bowers MD;  Location: [facility] OR;  Service: Orthopedics;  Laterality: Right;    CERVICAL BIOPSY  W/ LOOP ELECTRODE EXCISION  01/01/2007    COLONOSCOPY N/A 02/15/2019    Procedure: COLONOSCOPY;  Surgeon: Uri Torrez MD;  Location: [facility] ([address] [place] [address]);  Service: Endoscopy;  Laterality: N/A;    ESOPHAGOGASTRODUODENOSCOPY N/A 11/26/2021    Procedure: EGD (ESOPHAGOGASTRODUODENOSCOPY) fully vaccinated;  Surgeon: Venkat Astorga MD;  Location: [facility];  Service: Endoscopy;  Laterality: N/A;    essure      INJECTION OF STEROID Left 12/15/2022    Procedure: INJECTION, STEROID;  Surgeon: Toma Bowers MD;  Location: [facility];  Service: Orthopedics;  Laterality: Left;    TUBAL LIGATION         FAMILY HISTORY:  Family History   Problem Relation Age of Onset    Cirrhosis Mother     Alcohol abuse Mother     Diabetes Mother     Liver disease Mother     Stroke Mother     Panic disorder Mother     Other Father         glaucoma    Diabetes Father     Heart disease Father     Hyperlipidemia Father     Hypertension Father     Kidney disease Father     Glaucoma Father     Depression Father     Osteoporosis Father     Other Sister         thyroid    Thyroid disease Sister     Breast cancer Sister 57    ADD / ADHD Daughter     ADD / ADHD Daughter     Panic disorder Daughter     Hypertension Brother     ADD / ADHD Son     Colon cancer Neg Hx     Ovarian cancer Neg Hx      Amblyopia Neg Hx     Blindness Neg Hx     Cataracts Neg Hx     Macular degeneration Neg Hx     Retinal detachment Neg Hx     Strabismus Neg Hx        SOCIAL HISTORY:  Social History     Socioeconomic History    Marital status:    Tobacco Use    Smoking status: Never    Smokeless tobacco: Never   Substance and Sexual Activity    Alcohol use: No    Drug use: No    Sexual activity: Yes     Partners: Male     Birth control/protection: See Surgical Hx     Comment: .   Social History Narrative    ,  disabled with PTSD (from ), 3 children, working with [employer] , going to school for business, no regular exercise       MEDICATIONS:     Current Outpatient Medications:     amLODIPine (NORVASC) 2.5 MG tablet, Take 1 tablet (2.5 mg total) by mouth once daily., Disp: 90 tablet, Rfl: 3    EScitalopram oxalate (LEXAPRO) 10 MG tablet, Take 1 tablet (10 mg total) by mouth once daily., Disp: 90 tablet, Rfl: 3    EScitalopram oxalate (LEXAPRO) 20 MG tablet, Take 1 tablet (20 mg total) by mouth every evening., Disp: 90 tablet, Rfl: 3    hydroCHLOROthiazide (HYDRODIURIL) 25 MG tablet, Take 1 tablet (25 mg total) by mouth once daily., Disp: 90 tablet, Rfl: 2    loratadine (CLARITIN) 10 mg tablet, Take 10 mg by mouth once daily., Disp: , Rfl:     losartan (COZAAR) 100 MG tablet, Take 1 tablet (100 mg total) by mouth once daily., Disp: 90 tablet, Rfl: 3    methylphenidate HCl (RITALIN) 20 MG tablet, Take 1 tablet (20 mg total) by mouth 2 (two) times daily., Disp: 60 tablet, Rfl: 0    multivitamin with minerals tablet, Take 1 tablet by mouth once daily., Disp: , Rfl:     pantoprazole (PROTONIX) 40 MG tablet, Take 1 tablet (40 mg total) by mouth once daily., Disp: 90 tablet, Rfl: 1    propranoloL (INDERAL) 10 MG tablet, Take 1 tablet (10 mg total) by mouth 3 (three) times daily as needed (anxiety)., Disp: 90 tablet, Rfl: 11    clotrimazole-betamethasone 1-0.05% (LOTRISONE) cream, Twice a day  for 5-7 days (Patient not taking: Reported on 10/23/2023), Disp: 15 g, Rfl: 1    estradioL (ESTRACE) 0.01 % (0.1 mg/gram) vaginal cream, Place 1 g vaginally twice a week. At bedtime as needed (Patient not taking: Reported on 10/23/2023), Disp: 42.5 g, Rfl: 6    [START ON 11/2/2023] methylphenidate HCl (RITALIN) 20 MG tablet, Take 1 tablet (20 mg total) by mouth 2 (two) times daily. (Patient not taking: Reported on 10/23/2023), Disp: 60 tablet, Rfl: 0    [START ON 12/2/2023] methylphenidate HCl (RITALIN) 20 MG tablet, Take 1 tablet (20 mg total) by mouth 2 (two) times daily. (Patient not taking: Reported on 10/23/2023), Disp: 60 tablet, Rfl: 0    ALLERGIES:   Review of patient's allergies indicates:   Allergen Reactions    Percocet [oxycodone-acetaminophen]      Other reaction(s): Nausea        PHYSICAL EXAMINATION:  /86   Pulse 68   LMP  (LMP Unknown) Comment: Last Cycle August 2017  Vitals signs and nursing note have been reviewed.    General: In no acute distress, well developed, well nourished, no diaphoresis  Eyes: EOM full and smooth, no eye redness or discharge  HEENT: normocephalic and atraumatic, neck supple, trachea midline, no nasal discharge  Cardiovascular: no LE edema  Lungs: respirations non-labored, no conversational dyspnea   Neuro: AAOx3, CN2-12 grossly intact  Skin: No rashes, warm and dry  Psychiatric: cooperative, pleasant, mood and affect appropriate for age    Bilateral Knee:   Inspection/Palpation:   -Rubor   -Calor  -Effusion   -Patella ballotable   -Patellar apprehension  -Retinacular tenderness   -Patellar crepitus   Patellar tilt grossly normal     TTP at:  +Joint line right knee  -MCL   -LCL   -Popliteal region   -Quad tendon   +Patella, retropatellar  -Patellar ligament   +Patellar border, med & lat  -Medial condyle   -Lateral condyle     ROM:    Ext: 0°   Flex: 140°   -Discomfort with full flexion    -Bounce-home discomfort     Ligamentous:   -Lachman's   Good endpoints & no  pain with valgus & varus stress    Meniscal:  -Dean's   Other:  ++Gonzales's   -J sign  Abductors 4+/5      IMAGIN. Knee X-ray ordered due to bilateral knee pain. 4 views taken today.   2. X-ray images were reviewed personally by me and then directly with patient.  3. FINDINGS:  Normal bony alignment in AP standing view, mild valgus deformity flexion view, mild degenerative changes medial compartment flexion view, no signs of acute fracture dislocation, soft tissues unremarkable.,    4. IMPRESSION:  Kellgren Sid grade 2 osteoarthritic changes.  No acute osseous abnormalities appreciated.    ASSESSMENT:      ICD-10-CM ICD-9-CM   1. Chronic pain of both knees  M25.561 719.46    M25.562 338.29    G89.29    2. Patellofemoral pain syndrome of both knees  M22.2X1 719.46    M22.2X2    3. Bilateral chronic knee pain  M25.561 719.46    M25.562 338.29    G89.29    4. Heel pain, bilateral  M79.671 729.5    M79.672          PLAN:  1-3: Based on patient history, physical exam findings, and imaging I believe patient's main pain generator is patellofemoral pain syndrome.  Patient was subtle osteoarthritic changes on x-ray.  Patient will be given a home exercise program at today's visit.  We will follow-up in 6 weeks for re-evaluation.  Pending improvement at 6 weeks, may move forward with CSI.    3:  Podiatry referral    Future planning includes - podiatry referral,  HOME EXERCISE PROGRAM (HEP): The patient was taught a homegoing physical therapy regimen for patellofemoral pain syndrome. The patient demonstrated understanding of the exercises and proper technique of their execution. This interaction took 15 minutes and included demonstration of exercise as well as counseling to encourage patient to do exercises daily.        All questions were answered to the best of my ability and all concerns were addressed at this time.    Follow up in 6 week(s) for above, or sooner if needed.      This note is dictated using the  M*Modal Fluency Direct word recognition program. There are word recognition mistakes that are occasionally missed on review.

## 2023-10-24 DIAGNOSIS — M79.642 BILATERAL HAND PAIN: Primary | ICD-10-CM

## 2023-10-24 DIAGNOSIS — M79.641 BILATERAL HAND PAIN: Primary | ICD-10-CM

## 2023-10-24 RX ORDER — PANTOPRAZOLE SODIUM 40 MG/1
40 TABLET, DELAYED RELEASE ORAL
Qty: 90 TABLET | Refills: 3 | Status: SHIPPED | OUTPATIENT
Start: 2023-10-24 | End: 2023-11-22 | Stop reason: SDUPTHER

## 2023-10-24 NOTE — TELEPHONE ENCOUNTER
Refill Decision Note   Janki Jones  is requesting a refill authorization.  Brief Assessment and Rationale for Refill:  Approve     Medication Therapy Plan:         Comments:     Note composed:10:24 AM 10/24/2023

## 2023-10-24 NOTE — TELEPHONE ENCOUNTER
No care due was identified.  [facility] Embedded Care Due Messages. Reference number: [id number].   10/24/2023 5:37:49 AM CDT

## 2023-10-30 ENCOUNTER — OFFICE VISIT (OUTPATIENT)
Dept: PODIATRY | Facility: CLINIC | Age: 58
End: 2023-10-30
Payer: COMMERCIAL

## 2023-10-30 VITALS
DIASTOLIC BLOOD PRESSURE: 71 MMHG | BODY MASS INDEX: 28.34 KG/M2 | SYSTOLIC BLOOD PRESSURE: 127 MMHG | WEIGHT: 180.56 LBS | HEIGHT: 67 IN | HEART RATE: 89 BPM

## 2023-10-30 DIAGNOSIS — M79.672 FOOT PAIN, BILATERAL: ICD-10-CM

## 2023-10-30 DIAGNOSIS — M79.671 FOOT PAIN, BILATERAL: ICD-10-CM

## 2023-10-30 DIAGNOSIS — M79.671 HEEL PAIN, BILATERAL: ICD-10-CM

## 2023-10-30 DIAGNOSIS — M76.62 ACHILLES TENDINITIS OF BOTH LOWER EXTREMITIES: ICD-10-CM

## 2023-10-30 DIAGNOSIS — M79.672 HEEL PAIN, BILATERAL: ICD-10-CM

## 2023-10-30 DIAGNOSIS — G60.9 IDIOPATHIC PERIPHERAL NEUROPATHY: Primary | ICD-10-CM

## 2023-10-30 DIAGNOSIS — M76.61 ACHILLES TENDINITIS OF BOTH LOWER EXTREMITIES: ICD-10-CM

## 2023-10-30 PROCEDURE — 1159F MED LIST DOCD IN RCRD: CPT | Mod: CPTII,S$GLB,, | Performed by: PODIATRIST

## 2023-10-30 PROCEDURE — 99204 OFFICE O/P NEW MOD 45 MIN: CPT | Mod: S$GLB,,, | Performed by: PODIATRIST

## 2023-10-30 PROCEDURE — 3008F BODY MASS INDEX DOCD: CPT | Mod: CPTII,S$GLB,, | Performed by: PODIATRIST

## 2023-10-30 PROCEDURE — 3074F PR MOST RECENT SYSTOLIC BLOOD PRESSURE < 130 MM HG: ICD-10-PCS | Mod: CPTII,S$GLB,, | Performed by: PODIATRIST

## 2023-10-30 PROCEDURE — 99999 PR PBB SHADOW E&M-EST. PATIENT-LVL III: CPT | Mod: PBBFAC,,, | Performed by: PODIATRIST

## 2023-10-30 PROCEDURE — 3044F PR MOST RECENT HEMOGLOBIN A1C LEVEL <7.0%: ICD-10-PCS | Mod: CPTII,S$GLB,, | Performed by: PODIATRIST

## 2023-10-30 PROCEDURE — 3008F PR BODY MASS INDEX (BMI) DOCUMENTED: ICD-10-PCS | Mod: CPTII,S$GLB,, | Performed by: PODIATRIST

## 2023-10-30 PROCEDURE — 3074F SYST BP LT 130 MM HG: CPT | Mod: CPTII,S$GLB,, | Performed by: PODIATRIST

## 2023-10-30 PROCEDURE — 4010F PR ACE/ARB THEARPY RXD/TAKEN: ICD-10-PCS | Mod: CPTII,S$GLB,, | Performed by: PODIATRIST

## 2023-10-30 PROCEDURE — 3044F HG A1C LEVEL LT 7.0%: CPT | Mod: CPTII,S$GLB,, | Performed by: PODIATRIST

## 2023-10-30 PROCEDURE — 1159F PR MEDICATION LIST DOCUMENTED IN MEDICAL RECORD: ICD-10-PCS | Mod: CPTII,S$GLB,, | Performed by: PODIATRIST

## 2023-10-30 PROCEDURE — 4010F ACE/ARB THERAPY RXD/TAKEN: CPT | Mod: CPTII,S$GLB,, | Performed by: PODIATRIST

## 2023-10-30 PROCEDURE — 99999 PR PBB SHADOW E&M-EST. PATIENT-LVL III: ICD-10-PCS | Mod: PBBFAC,,, | Performed by: PODIATRIST

## 2023-10-30 PROCEDURE — 1160F RVW MEDS BY RX/DR IN RCRD: CPT | Mod: CPTII,S$GLB,, | Performed by: PODIATRIST

## 2023-10-30 PROCEDURE — 1160F PR REVIEW ALL MEDS BY PRESCRIBER/CLIN PHARMACIST DOCUMENTED: ICD-10-PCS | Mod: CPTII,S$GLB,, | Performed by: PODIATRIST

## 2023-10-30 PROCEDURE — 3078F DIAST BP <80 MM HG: CPT | Mod: CPTII,S$GLB,, | Performed by: PODIATRIST

## 2023-10-30 PROCEDURE — 3078F PR MOST RECENT DIASTOLIC BLOOD PRESSURE < 80 MM HG: ICD-10-PCS | Mod: CPTII,S$GLB,, | Performed by: PODIATRIST

## 2023-10-30 PROCEDURE — 99204 PR OFFICE/OUTPT VISIT, NEW, LEVL IV, 45-59 MIN: ICD-10-PCS | Mod: S$GLB,,, | Performed by: PODIATRIST

## 2023-10-30 RX ORDER — METHYLPREDNISOLONE 4 MG/1
TABLET ORAL
Qty: 1 EACH | Refills: 0 | Status: SHIPPED | OUTPATIENT
Start: 2023-10-30 | End: 2024-02-01

## 2023-10-30 RX ORDER — BUPROPION HYDROCHLORIDE 150 MG/1
150 TABLET ORAL
COMMUNITY
Start: 2023-10-24 | End: 2024-02-01

## 2023-10-30 RX ORDER — GABAPENTIN 300 MG/1
300 CAPSULE ORAL NIGHTLY
Qty: 30 CAPSULE | Refills: 0 | Status: SHIPPED | OUTPATIENT
Start: 2023-10-30 | End: 2023-11-22 | Stop reason: SDUPTHER

## 2023-10-30 NOTE — PROGRESS NOTES
Subjective:      Patient ID: Janki Jones is a 57 y.o. female.    Chief Complaint:   Heel Pain (Bilateral for abt 2m )    Janki is a 57 y.o. female who presents to the podiatry clinic  with complaint of  bilateral foot pain.     Patient relates she noticed foot pain while in the [place].  This was about 2 months ago.  She did have some swelling on the plane.  She brought both ankle bootie type shoes and sneakers.  She did a lot a walking on uneven ground and noticed that the back of her ankles and side of her feet hurt  Denies injury    Her has been bought a pair of Hoka as which she got slightly better however did not experience complete pain relief  She is also tried ibuprofen and Advil which helps somewhat but not resolves the problem  To feet hurt all the time a lot at night she is getting a sharp pain    Denies history of blood clots  She has had carpal tunnel    She was referred to Podiatry by sports Medicine who was treating her knee pain she is doing some exercises in the knee pain has improved      Past Medical History:   Diagnosis Date    Abnormal Pap smear 2007    Abnormal Pap smear of cervix     ADD (attention deficit disorder)     Anxiety     Depression     Fatty liver     Fibroids     GERD (gastroesophageal reflux disease)     Herpes simplex virus (HSV) infection     HSV1.    Hypertension      Past Surgical History:   Procedure Laterality Date    CARPAL TUNNEL RELEASE Right 12/15/2022    Procedure: RELEASE, CARPAL TUNNEL;  Surgeon: Toma Bowers MD;  Location: [facility];  Service: Orthopedics;  Laterality: Right;    CERVICAL BIOPSY  W/ LOOP ELECTRODE EXCISION  01/01/2007    COLONOSCOPY N/A 02/15/2019    Procedure: COLONOSCOPY;  Surgeon: Uri Torrez MD;  Location: [address]);  Service: Endoscopy;  Laterality: N/A;    ESOPHAGOGASTRODUODENOSCOPY N/A 11/26/2021    Procedure: EGD (ESOPHAGOGASTRODUODENOSCOPY) fully vaccinated;  Surgeon: Venkat Astorga MD;  Location: [facility];   Service: Endoscopy;  Laterality: N/A;    essure      INJECTION OF STEROID Left 12/15/2022    Procedure: INJECTION, STEROID;  Surgeon: Toma Bowers MD;  Location: [facility];  Service: Orthopedics;  Laterality: Left;    TUBAL LIGATION       Current Outpatient Medications on File Prior to Visit   Medication Sig Dispense Refill    amLODIPine (NORVASC) 2.5 MG tablet Take 1 tablet (2.5 mg total) by mouth once daily. 90 tablet 3    buPROPion (WELLBUTRIN XL) 150 MG TB24 tablet Take 150 mg by mouth.      clotrimazole-betamethasone 1-0.05% (LOTRISONE) cream Twice a day for 5-7 days (Patient not taking: Reported on 10/23/2023) 15 g 1    EScitalopram oxalate (LEXAPRO) 10 MG tablet Take 1 tablet (10 mg total) by mouth once daily. 90 tablet 3    EScitalopram oxalate (LEXAPRO) 20 MG tablet Take 1 tablet (20 mg total) by mouth every evening. 90 tablet 3    estradioL (ESTRACE) 0.01 % (0.1 mg/gram) vaginal cream Place 1 g vaginally twice a week. At bedtime as needed (Patient not taking: Reported on 10/23/2023) 42.5 g 6    hydroCHLOROthiazide (HYDRODIURIL) 25 MG tablet Take 1 tablet (25 mg total) by mouth once daily. 90 tablet 2    loratadine (CLARITIN) 10 mg tablet Take 10 mg by mouth once daily.      losartan (COZAAR) 100 MG tablet Take 1 tablet (100 mg total) by mouth once daily. 90 tablet 3    methylphenidate HCl (RITALIN) 20 MG tablet Take 1 tablet (20 mg total) by mouth 2 (two) times daily. 60 tablet 0    methylphenidate HCl (RITALIN) 20 MG tablet Take 1 tablet (20 mg total) by mouth 2 (two) times daily. (Patient not taking: Reported on 10/23/2023) 60 tablet 0    [START ON 12/2/2023] methylphenidate HCl (RITALIN) 20 MG tablet Take 1 tablet (20 mg total) by mouth 2 (two) times daily. (Patient not taking: Reported on 10/23/2023) 60 tablet 0    multivitamin with minerals tablet Take 1 tablet by mouth once daily.      pantoprazole (PROTONIX) 40 MG tablet TAKE 1 TABLET(40 MG) BY MOUTH EVERY DAY 90 tablet 3    propranoloL (INDERAL)  "10 MG tablet Take 1 tablet (10 mg total) by mouth 3 (three) times daily as needed (anxiety). 90 tablet 11     No current facility-administered medications on file prior to visit.     Review of patient's allergies indicates:   Allergen Reactions    Percocet [oxycodone-acetaminophen]      Other reaction(s): Nausea       Review of Systems   Constitutional: Negative for chills, decreased appetite, fever, malaise/fatigue, night sweats, weight gain and weight loss.   Cardiovascular:  Negative for chest pain, claudication, dyspnea on exertion, leg swelling, palpitations and syncope.   Respiratory:  Negative for cough and shortness of breath.    Endocrine: Negative for cold intolerance and heat intolerance.   Hematologic/Lymphatic: Negative for bleeding problem. Does not bruise/bleed easily.   Skin:  Negative for color change, dry skin, flushing, itching, nail changes, poor wound healing, rash, skin cancer, suspicious lesions and unusual hair distribution.   Musculoskeletal:  Positive for arthritis and back pain. Negative for falls, gout, joint pain, joint swelling, muscle cramps, muscle weakness, myalgias, neck pain and stiffness.   Gastrointestinal:  Negative for diarrhea, nausea and vomiting.   Neurological:  Positive for paresthesias. Negative for dizziness, focal weakness, light-headedness, numbness, tremors, vertigo and weakness.   Psychiatric/Behavioral:  Negative for altered mental status and depression. The patient does not have insomnia.    Allergic/Immunologic: Negative.            Objective:       Vitals:    10/30/23 1013   BP: 127/71   Pulse: 89   Weight: 81.9 kg (180 lb 8.9 oz)   Height: 5' 7" (1.702 m)   PainSc:   5   PainLoc: Foot   81.9 kg (180 lb 8.9 oz)     Physical Exam  Vitals reviewed.   Constitutional:       General: She is not in acute distress.     Appearance: She is well-developed. She is not ill-appearing, toxic-appearing or diaphoretic.      Comments: Proper supportive shoegear    "   Cardiovascular:      Pulses:           Dorsalis pedis pulses are 2+ on the right side and 2+ on the left side.        Posterior tibial pulses are 2+ on the right side and 2+ on the left side.      Comments: No significant edema no evidence of vascular compromise  Musculoskeletal:         General: No tenderness or deformity.      Right lower leg: No edema.      Left lower leg: No edema.      Right ankle:      Right Achilles Tendon: Tenderness present.      Left ankle:      Left Achilles Tendon: Tenderness present.      Right foot: Decreased range of motion. No deformity.      Left foot: Decreased range of motion. No deformity.      Comments: Mild tenderness to bilateral Achilles tendon insertions and side-to-side compression of calcaneus also pain mildly along the posterior tibial tendons with range of motion against resistance    No weakness noted strength 5/5 bilateral   Feet:      Right foot:      Protective Sensation: 10 sites tested.  5 sites sensed.      Skin integrity: No ulcer, blister, skin breakdown, erythema, warmth, callus or dry skin.      Toenail Condition: Right toenails are normal.      Left foot:      Protective Sensation: 10 sites tested.  6 sites sensed.      Skin integrity: No ulcer, blister, skin breakdown, erythema, warmth, callus or dry skin.      Toenail Condition: Left toenails are normal.      Comments: Decreased [place] Ank[place] monofilament     No soi  No open lesions  Skin:     General: Skin is warm and dry.      Capillary Refill: Capillary refill takes 2 to 3 seconds.      Coloration: Skin is not pale.      Findings: No erythema or rash.   Neurological:      Mental Status: She is alert and oriented to person, place, and time.      Sensory: Sensory deficit present.      Gait: Gait abnormal.   Psychiatric:         Attention and Perception: Attention normal.         Mood and Affect: Mood normal.         Speech: Speech normal.         Behavior: Behavior normal.         Thought Content:  Thought content normal.         Cognition and Memory: Cognition normal.         Judgment: Judgment normal.               Assessment:       Encounter Diagnoses   Name Primary?    Heel pain, bilateral     Idiopathic peripheral neuropathy Yes    Achilles tendinitis of both lower extremities     Foot pain, bilateral          Plan:       Janki was seen today for heel pain.    Diagnoses and all orders for this visit:    Idiopathic peripheral neuropathy  -     Ambulatory referral/consult to Neurology; Future    Heel pain, bilateral  -     Ambulatory referral/consult to Podiatry    Achilles tendinitis of both lower extremities    Foot pain, bilateral  -     Ambulatory referral/consult to Neurology; Future    Other orders  -     methylPREDNISolone (MEDROL DOSEPACK) 4 mg tablet; use as directed  -     gabapentin (NEURONTIN) 300 MG capsule; Take 1 capsule (300 mg total) by mouth every evening.      I counseled the patient on her conditions, their implications and medical management.    Unclear etiology.  No evidence are concern for DVT blood clot today    Discussed with patient could be a neuritis nerve compression locally from the ankle boots she was wearing or the swelling she experienced after the plane ride  Other saw as possibly lower back etiology patient is having some back discomfort that did get aggravated on the plane ride lower suspicion for knee etiology    Recommend trial of gabapentin and Medrol Dosepak discussed possible side effects of each    Recommend neurology consult    Consider x-rays MRI and further workup if not resolved heel lifts applied to shoes today    Consider physical therapy      Follow up if symptoms worsen or fail to improve.

## 2023-11-10 ENCOUNTER — TELEPHONE (OUTPATIENT)
Dept: NEUROLOGY | Facility: CLINIC | Age: 58
End: 2023-11-10
Payer: COMMERCIAL

## 2023-11-10 ENCOUNTER — PATIENT MESSAGE (OUTPATIENT)
Dept: NEUROLOGY | Facility: CLINIC | Age: 58
End: 2023-11-10
Payer: COMMERCIAL

## 2023-11-15 DIAGNOSIS — Z12.31 OTHER SCREENING MAMMOGRAM: ICD-10-CM

## 2023-11-17 ENCOUNTER — TELEPHONE (OUTPATIENT)
Dept: NEUROLOGY | Facility: CLINIC | Age: 58
End: 2023-11-17
Payer: COMMERCIAL

## 2023-11-17 ENCOUNTER — PATIENT MESSAGE (OUTPATIENT)
Dept: INTERNAL MEDICINE | Facility: CLINIC | Age: 58
End: 2023-11-17
Payer: COMMERCIAL

## 2023-11-17 ENCOUNTER — HOSPITAL ENCOUNTER (OUTPATIENT)
Dept: RADIOLOGY | Facility: HOSPITAL | Age: 58
Discharge: HOME | End: 2023-11-17
Attending: ORTHOPAEDIC SURGERY
Payer: COMMERCIAL

## 2023-11-17 ENCOUNTER — HOSPITAL ENCOUNTER (OUTPATIENT)
Dept: RADIOLOGY | Facility: HOSPITAL | Age: 58
Discharge: HOME | End: 2023-11-17
Attending: PHYSICIAN ASSISTANT
Payer: COMMERCIAL

## 2023-11-17 DIAGNOSIS — M79.641 BILATERAL HAND PAIN: ICD-10-CM

## 2023-11-17 DIAGNOSIS — K74.00 LIVER FIBROSIS: ICD-10-CM

## 2023-11-17 DIAGNOSIS — M79.642 BILATERAL HAND PAIN: ICD-10-CM

## 2023-11-17 DIAGNOSIS — K75.81 NASH (NONALCOHOLIC STEATOHEPATITIS): ICD-10-CM

## 2023-11-17 PROCEDURE — 76705 ECHO EXAM OF ABDOMEN: CPT | Mod: TC

## 2023-11-17 PROCEDURE — 73130 X-RAY EXAM OF HAND: CPT | Mod: 26,,, | Performed by: RADIOLOGY

## 2023-11-17 PROCEDURE — 76705 US ABDOMEN LIMITED: ICD-10-PCS | Mod: 26,,, | Performed by: RADIOLOGY

## 2023-11-17 PROCEDURE — 73130 XR HAND COMPLETE 3 VIEWS BILATERAL: ICD-10-PCS | Mod: 26,,, | Performed by: RADIOLOGY

## 2023-11-17 PROCEDURE — 76705 ECHO EXAM OF ABDOMEN: CPT | Mod: 26,,, | Performed by: RADIOLOGY

## 2023-11-17 PROCEDURE — 73130 X-RAY EXAM OF HAND: CPT | Mod: TC,50

## 2023-11-20 ENCOUNTER — TELEPHONE (OUTPATIENT)
Dept: HEPATOLOGY | Facility: CLINIC | Age: 58
End: 2023-11-20
Payer: COMMERCIAL

## 2023-11-20 NOTE — TELEPHONE ENCOUNTER
----- Message from Erin Hurley NP sent at 11/17/2023  9:17 AM CST -----  Please contact patient to schedule a f/u visit with me or any SHWETA to review US results and plan of care. Virtual visit okay. Thank you!

## 2023-11-22 DIAGNOSIS — F33.1 MAJOR DEPRESSIVE DISORDER, RECURRENT EPISODE, MODERATE: ICD-10-CM

## 2023-11-22 DIAGNOSIS — F90.1 ATTENTION DEFICIT HYPERACTIVITY DISORDER (ADHD), PREDOMINANTLY HYPERACTIVE TYPE: ICD-10-CM

## 2023-11-22 RX ORDER — METHYLPHENIDATE HYDROCHLORIDE 20 MG/1
20 TABLET ORAL 2 TIMES DAILY
Qty: 60 TABLET | Refills: 0 | Status: SHIPPED | OUTPATIENT
Start: 2023-11-22 | End: 2024-02-01 | Stop reason: SDUPTHER

## 2023-11-22 RX ORDER — ESCITALOPRAM OXALATE 10 MG/1
10 TABLET ORAL DAILY
Qty: 90 TABLET | Refills: 3 | Status: SHIPPED | OUTPATIENT
Start: 2023-11-22

## 2023-11-22 RX ORDER — PANTOPRAZOLE SODIUM 40 MG/1
40 TABLET, DELAYED RELEASE ORAL DAILY
Qty: 90 TABLET | Refills: 3 | Status: SHIPPED | OUTPATIENT
Start: 2023-11-22 | End: 2024-01-19 | Stop reason: SDUPTHER

## 2023-11-22 RX ORDER — HYDROCHLOROTHIAZIDE 25 MG/1
25 TABLET ORAL DAILY
Qty: 90 TABLET | Refills: 2 | Status: SHIPPED | OUTPATIENT
Start: 2023-11-22 | End: 2024-01-24 | Stop reason: SDUPTHER

## 2023-11-22 RX ORDER — AMLODIPINE BESYLATE 2.5 MG/1
2.5 TABLET ORAL DAILY
Qty: 90 TABLET | Refills: 3 | Status: SHIPPED | OUTPATIENT
Start: 2023-11-22 | End: 2024-02-24 | Stop reason: SDUPTHER

## 2023-11-22 RX ORDER — GABAPENTIN 300 MG/1
300 CAPSULE ORAL NIGHTLY
Qty: 30 CAPSULE | Refills: 0 | Status: SHIPPED | OUTPATIENT
Start: 2023-11-22 | End: 2023-11-29

## 2023-11-22 RX ORDER — LOSARTAN POTASSIUM 100 MG/1
100 TABLET ORAL DAILY
Qty: 90 TABLET | Refills: 3 | Status: SHIPPED | OUTPATIENT
Start: 2023-11-22 | End: 2024-01-24 | Stop reason: SDUPTHER

## 2023-11-22 NOTE — TELEPHONE ENCOUNTER
Refill Decision Note   Janki Jones  is requesting a refill authorization.  Brief Assessment and Rationale for Refill:  Approve     Medication Therapy Plan:         Comments:     Note composed:4:44 PM 11/22/2023

## 2023-11-22 NOTE — TELEPHONE ENCOUNTER
No care due was identified.  [facility] Embedded Care Due Messages. Reference number: [id number].   11/22/2023 3:57:27 PM CST

## 2023-11-22 NOTE — TELEPHONE ENCOUNTER
No care due was identified.  Health [facility] Embedded Care Due Messages. Reference number: [id number].   11/22/2023 3:59:00 PM CST

## 2023-11-22 NOTE — TELEPHONE ENCOUNTER
No care due was identified.  [facility] Embedded Care Due Messages. Reference number: [id number].   11/22/2023 4:01:19 PM CST

## 2023-11-29 ENCOUNTER — HOSPITAL ENCOUNTER (OUTPATIENT)
Dept: RADIOLOGY | Facility: OTHER | Age: 58
Discharge: HOME | End: 2023-11-29
Attending: STUDENT IN AN ORGANIZED HEALTH CARE EDUCATION/TRAINING PROGRAM
Payer: COMMERCIAL

## 2023-11-29 ENCOUNTER — OFFICE VISIT (OUTPATIENT)
Dept: NEUROLOGY | Facility: CLINIC | Age: 58
End: 2023-11-29
Payer: COMMERCIAL

## 2023-11-29 VITALS
SYSTOLIC BLOOD PRESSURE: 131 MMHG | HEIGHT: 67 IN | WEIGHT: 183 LBS | HEART RATE: 66 BPM | DIASTOLIC BLOOD PRESSURE: 78 MMHG | BODY MASS INDEX: 28.72 KG/M2

## 2023-11-29 DIAGNOSIS — M77.31 CALCANEAL SPUR OF BOTH FEET: Primary | ICD-10-CM

## 2023-11-29 DIAGNOSIS — M79.671 FOOT PAIN, BILATERAL: ICD-10-CM

## 2023-11-29 DIAGNOSIS — G60.9 IDIOPATHIC PERIPHERAL NEUROPATHY: ICD-10-CM

## 2023-11-29 DIAGNOSIS — G60.9 IDIOPATHIC PERIPHERAL NEUROPATHY: Primary | ICD-10-CM

## 2023-11-29 DIAGNOSIS — M79.672 FOOT PAIN, BILATERAL: ICD-10-CM

## 2023-11-29 DIAGNOSIS — M77.32 CALCANEAL SPUR OF BOTH FEET: Primary | ICD-10-CM

## 2023-11-29 PROCEDURE — 3078F DIAST BP <80 MM HG: CPT | Mod: CPTII,S$GLB,, | Performed by: STUDENT IN AN ORGANIZED HEALTH CARE EDUCATION/TRAINING PROGRAM

## 2023-11-29 PROCEDURE — 3075F PR MOST RECENT SYSTOLIC BLOOD PRESS GE 130-139MM HG: ICD-10-PCS | Mod: CPTII,S$GLB,, | Performed by: STUDENT IN AN ORGANIZED HEALTH CARE EDUCATION/TRAINING PROGRAM

## 2023-11-29 PROCEDURE — 73610 X-RAY EXAM OF ANKLE: CPT | Mod: TC,50,FY

## 2023-11-29 PROCEDURE — 4010F PR ACE/ARB THEARPY RXD/TAKEN: ICD-10-PCS | Mod: CPTII,S$GLB,, | Performed by: STUDENT IN AN ORGANIZED HEALTH CARE EDUCATION/TRAINING PROGRAM

## 2023-11-29 PROCEDURE — 99999 PR PBB SHADOW E&M-EST. PATIENT-LVL V: CPT | Mod: PBBFAC,,, | Performed by: STUDENT IN AN ORGANIZED HEALTH CARE EDUCATION/TRAINING PROGRAM

## 2023-11-29 PROCEDURE — 3078F PR MOST RECENT DIASTOLIC BLOOD PRESSURE < 80 MM HG: ICD-10-PCS | Mod: CPTII,S$GLB,, | Performed by: STUDENT IN AN ORGANIZED HEALTH CARE EDUCATION/TRAINING PROGRAM

## 2023-11-29 PROCEDURE — 4010F ACE/ARB THERAPY RXD/TAKEN: CPT | Mod: CPTII,S$GLB,, | Performed by: STUDENT IN AN ORGANIZED HEALTH CARE EDUCATION/TRAINING PROGRAM

## 2023-11-29 PROCEDURE — 3075F SYST BP GE 130 - 139MM HG: CPT | Mod: CPTII,S$GLB,, | Performed by: STUDENT IN AN ORGANIZED HEALTH CARE EDUCATION/TRAINING PROGRAM

## 2023-11-29 PROCEDURE — 1159F MED LIST DOCD IN RCRD: CPT | Mod: CPTII,S$GLB,, | Performed by: STUDENT IN AN ORGANIZED HEALTH CARE EDUCATION/TRAINING PROGRAM

## 2023-11-29 PROCEDURE — 99203 OFFICE O/P NEW LOW 30 MIN: CPT | Mod: S$GLB,,, | Performed by: STUDENT IN AN ORGANIZED HEALTH CARE EDUCATION/TRAINING PROGRAM

## 2023-11-29 PROCEDURE — 99999 PR PBB SHADOW E&M-EST. PATIENT-LVL V: ICD-10-PCS | Mod: PBBFAC,,, | Performed by: STUDENT IN AN ORGANIZED HEALTH CARE EDUCATION/TRAINING PROGRAM

## 2023-11-29 PROCEDURE — 99203 PR OFFICE/OUTPT VISIT, NEW, LEVL III, 30-44 MIN: ICD-10-PCS | Mod: S$GLB,,, | Performed by: STUDENT IN AN ORGANIZED HEALTH CARE EDUCATION/TRAINING PROGRAM

## 2023-11-29 PROCEDURE — 1159F PR MEDICATION LIST DOCUMENTED IN MEDICAL RECORD: ICD-10-PCS | Mod: CPTII,S$GLB,, | Performed by: STUDENT IN AN ORGANIZED HEALTH CARE EDUCATION/TRAINING PROGRAM

## 2023-11-29 PROCEDURE — 3044F HG A1C LEVEL LT 7.0%: CPT | Mod: CPTII,S$GLB,, | Performed by: STUDENT IN AN ORGANIZED HEALTH CARE EDUCATION/TRAINING PROGRAM

## 2023-11-29 PROCEDURE — 1160F PR REVIEW ALL MEDS BY PRESCRIBER/CLIN PHARMACIST DOCUMENTED: ICD-10-PCS | Mod: CPTII,S$GLB,, | Performed by: STUDENT IN AN ORGANIZED HEALTH CARE EDUCATION/TRAINING PROGRAM

## 2023-11-29 PROCEDURE — 3008F BODY MASS INDEX DOCD: CPT | Mod: CPTII,S$GLB,, | Performed by: STUDENT IN AN ORGANIZED HEALTH CARE EDUCATION/TRAINING PROGRAM

## 2023-11-29 PROCEDURE — 73610 XR ANKLE COMPLETE 3 VIEW BILATERAL: ICD-10-PCS | Mod: 26,,, | Performed by: RADIOLOGY

## 2023-11-29 PROCEDURE — 73610 X-RAY EXAM OF ANKLE: CPT | Mod: 26,,, | Performed by: RADIOLOGY

## 2023-11-29 PROCEDURE — 3008F PR BODY MASS INDEX (BMI) DOCUMENTED: ICD-10-PCS | Mod: CPTII,S$GLB,, | Performed by: STUDENT IN AN ORGANIZED HEALTH CARE EDUCATION/TRAINING PROGRAM

## 2023-11-29 PROCEDURE — 1160F RVW MEDS BY RX/DR IN RCRD: CPT | Mod: CPTII,S$GLB,, | Performed by: STUDENT IN AN ORGANIZED HEALTH CARE EDUCATION/TRAINING PROGRAM

## 2023-11-29 PROCEDURE — 3044F PR MOST RECENT HEMOGLOBIN A1C LEVEL <7.0%: ICD-10-PCS | Mod: CPTII,S$GLB,, | Performed by: STUDENT IN AN ORGANIZED HEALTH CARE EDUCATION/TRAINING PROGRAM

## 2023-11-29 RX ORDER — GABAPENTIN 300 MG/1
300 CAPSULE ORAL 3 TIMES DAILY
Qty: 90 CAPSULE | Refills: 11 | Status: SHIPPED | OUTPATIENT
Start: 2023-11-29 | End: 2024-02-01

## 2023-11-29 NOTE — PATIENT INSTRUCTIONS
Start taking gabapentin 300 mg in the morning and 300 mg at night x3 days, THEN increase to 300 mg 3 times daily x3 days, THEN increase to 300 mg in the morning, 300 mg in the afternoon, and 600 mg at night if needed

## 2023-11-29 NOTE — PROGRESS NOTES
"        Patient ID: [id number]  Referring Physician: Yolie Marsh DPM    Chief Complaint/Reason for Consult: pain in feet     Subjective:     HPI  Janki Jones is a 57 y.o. RH female with ADD, depression/anxiety, and HTN. she is presenting today for evaluation of pain in feet.    She had a trip to the [facility] back in September. She had long hours of walking which brought up swelling of feet and pain.  She describes sensation of "sharp needles" in the ankles and heels. She also reports "ants crawling" and itching in both hands.  She denies b/b dysfunction or weakness. Balance is good but walking and standing is affected by pain in the feet.  She reports "slowing" in movements on the left side of the body and speech disturbance 17 years ago which she attributes to epidural for her last child's delivery.  She was hospitalized and evaluated perhaps by MRIs back then, unsure of the results.    Review of Systems   Constitutional:  Negative for fever and unexpected weight change.   Gastrointestinal:  Negative for blood in stool.   Genitourinary:  Negative for hematuria.   Musculoskeletal:  Positive for leg pain. Negative for back pain and gait problem.   Neurological:  Positive for numbness. Negative for facial asymmetry and weakness.   Psychiatric/Behavioral:  Negative for confusion. The patient is nervous/anxious.    All other systems reviewed and are negative.    Past Medical History:  Active Ambulatory Problems     Diagnosis Date Noted    Essential hypertension     Anxiety     ADD (attention deficit disorder)     GERD (gastroesophageal reflux disease) 02/19/2013    Situational stress 04/15/2015    Adjustment disorder with mixed anxiety and depressed mood 06/09/2015    Anxiety state 08/20/2015    Major depressive disorder, recurrent episode, moderate 08/20/2015    Allergic rhinitis 03/08/2016    Recurrent UTI 09/12/2016    High risk HPV infection 11/12/2021    Vaginal discharge 10/13/2022    Pain 12/29/2022 "    Decreased range of motion of right wrist 12/29/2022    Tinea cruris 04/06/2023    Dyspareunia in female 04/06/2023    Fatty liver 06/06/2023     Resolved Ambulatory Problems     Diagnosis Date Noted    No Resolved Ambulatory Problems     Past Medical History:   Diagnosis Date    Abnormal Pap smear 2007    Abnormal Pap smear of cervix     Depression     Fibroids     Herpes simplex virus (HSV) infection     Hypertension        Allergies:  Review of patient's allergies indicates:   Allergen Reactions    Percocet [oxycodone-acetaminophen]      Other reaction(s): Nausea       Pertinent Family History:  Family History   Problem Relation Age of Onset    Cirrhosis Mother     Alcohol abuse Mother     Diabetes Mother     Liver disease Mother     Stroke Mother     Panic disorder Mother     Other Father         glaucoma    Diabetes Father     Heart disease Father     Hyperlipidemia Father     Hypertension Father     Kidney disease Father     Glaucoma Father     Depression Father     Osteoporosis Father     Other Sister         thyroid    Thyroid disease Sister     Breast cancer Sister 57    ADD / ADHD Daughter     ADD / ADHD Daughter     Panic disorder Daughter     Hypertension Brother     ADD / ADHD Son     Colon cancer Neg Hx     Ovarian cancer Neg Hx     Amblyopia Neg Hx     Blindness Neg Hx     Cataracts Neg Hx     Macular degeneration Neg Hx     Retinal detachment Neg Hx     Strabismus Neg Hx      Pertinent Social History:  Social History     Socioeconomic History    Marital status:    Tobacco Use    Smoking status: Never    Smokeless tobacco: Never   Substance and Sexual Activity    Alcohol use: No    Drug use: No    Sexual activity: Yes     Partners: Male     Birth control/protection: See Surgical Hx     Comment: .   Social History Narrative    ,  disabled with PTSD (from ), 3 children, working with [employer] , going to school for business, no regular exercise      Medications:  Current Outpatient Medications   Medication Instructions    amLODIPine (NORVASC) 2.5 mg, Oral, Daily    buPROPion (WELLBUTRIN XL) 150 mg, Oral    clotrimazole-betamethasone 1-0.05% (LOTRISONE) cream Twice a day for 5-7 days    EScitalopram oxalate (LEXAPRO) 20 mg, Oral, Nightly    EScitalopram oxalate (LEXAPRO) 10 mg, Oral, Daily    estradioL (ESTRACE) 1 g, Vaginal, Twice weekly, At bedtime as needed    gabapentin (NEURONTIN) 300 mg, Oral, Nightly    hydroCHLOROthiazide (HYDRODIURIL) 25 mg, Oral, Daily    loratadine (CLARITIN) 10 mg, Oral, Daily    losartan (COZAAR) 100 mg, Oral, Daily    methylphenidate HCl (RITALIN) 20 mg, Oral, 2 times daily    [START ON 12/2/2023] methylphenidate HCl (RITALIN) 20 mg, Oral, 2 times daily    methylphenidate HCl (RITALIN) 20 mg, Oral, 2 times daily    methylPREDNISolone (MEDROL DOSEPACK) 4 mg tablet use as directed    multivitamin with minerals tablet 1 tablet, Oral, Daily    pantoprazole (PROTONIX) 40 mg, Oral, Daily    propranoloL (INDERAL) 10 mg, Oral, 3 times daily PRN        Objective:     Vitals:    11/29/23 0802   BP: 131/78   Pulse: 66        General:  Well-appearing, well-nourished, NAD, cooperative    Neurologic Exam:   Awake, alert and oriented x3  Speech spontaneous and fluent, intact comprehension.   Adequate fund of knowledge, vocabulary.    CN II - CN XII:  PERRLA. EOM intact. No Nystagmus. No ophthalmoplegia.   Facial sensation is normal to light touch.   Facial expression is full and symmetric.   Hearing is intact bilaterally.   Palate elevates symmetrically.   SCM and Trapezius full strength bilaterally.   Tongue is midline.     Motor:  Normal bulk and tone in all four limbs.   There are no atrophy or fasciculations. No tremor.     Shoulder  Abd Shoulder Add Elbow   Flex Elbow  Ext Wrist   Flex Wrist  Ext Finger  Flex Finger  Ext Finger  Abd Finger   Add IO Opposition   Right 5 5 5 5       5    Left 5 5 5 5       5       Hip  Flex Hip  Ext Thigh    Abd Thigh  Add Knee  Flex Knee  Ext Plantar  Flex Dorsiflex   Right 5 5   5 5 5 5   Left 5 5   5 5 5 5     Sensory:  Light touch: decreased sensation on LLE  Proprioception: position intact in BUE.  Vibration: intact throughout  Romberg is negative.    DTRs:   Biceps Brachioradialis Triceps Ernie Patellar Ankle Plantar   Right 2+ 2+  - 2+ 2+    Left 2+ 2+  - 2+ 2+      Coordination:  Finger to nose is normal bilaterally.  Normal fine finger movements and rapid alternating movements.    Gait:  Normal casual, gait  Some difficulty with tandem gait.      Pertinent lab results  Lab Results   Component Value Date    REZPOLPI66RF 40 10/06/2020     Lab Results   Component Value Date    ANASCREEN Negative <1:80 10/26/2022     Lab Results   Component Value Date    NIT11UJXE Negative 01/27/2014    RPR Non-reactive 01/27/2014    HEPAIGG Reactive 10/26/2022    HEPBSAG Non-reactive 10/26/2022    HEPBSAB 13.73 10/26/2022    HEPBSAB Reactive 10/26/2022    HEPBCAB Negative 11/10/2020     Lab Results   Component Value Date    IGGSERUM 1582 11/16/2022    TSH 1.360 09/28/2023    WBC 5.62 09/28/2023    LYMPH 2.7 09/28/2023    LYMPH 48.2 (H) 09/28/2023    RBC 4.69 09/28/2023    HGB 12.9 09/28/2023    HCT 40.2 09/28/2023    MCV 86 09/28/2023     09/28/2023     09/28/2023    K 3.2 (L) 09/28/2023    CO2 29 09/28/2023    BUN 10 09/28/2023    CREATININE 0.8 09/28/2023    CALCIUM 9.7 09/28/2023     (H) 09/28/2023     (H) 09/28/2023     (H) 09/28/2023     (H) 09/28/2023     Pertinent imaging results  *No relevant imaging available to review     Other pertinent studies  None    Assessment:   Janki Jones is a 57 y.o. right-handed female with ADD, depression/anxiety, and HTN who presents for evaluation of recent-onset distal paresthesias and neuropathic pain. This was brought on 2-3 months ago after long hours of walking which would not explain the paresthesias in hands. Also the history of  left-sided weakness and speech disturbance in the past is of concern especially that is not justifiable by the epidural procedure unlike what she has been thinking. Neurological exam is notable for LLE numbness and impaired tandem gait. Less likely to be related to the joints and bones but we will rule out such pathologies via x-ray of ankles. We will complete serum work up for peripheral neuropathy. If all above unrevealing, we will proceed with CNS imaging vs EMG-NCS. In the interim she may increase dose of  gabapentin to tolerance and response.     1. Idiopathic peripheral neuropathy    2. Foot pain, bilateral       Plan:     - X-Ray Ankle Complete 3 View Bilateral; Future  - Copper, Serum; Future  - Heavy Metals Screen, Blood (Quantitative); Future  - HIV 1/2 Ag/Ab (4th Gen); Future  - Hemoglobin A1C; Future  - Immunofixation Electrophoresis; Future  - Protein Electrophoresis, Serum; Future  - RPR; Future  - Zinc; Future  - Vitamin E; Future  - Vitamin B6; Future  - Vitamin B12; Future  - gabapentin (NEURONTIN) 300 MG capsule; Take 1 capsule (300 mg total) by mouth 3 (three) times daily.  Dispense: 90 capsule; Refill: 11  - Follow up: VV in 6-8 weeks with results    Plan was discussed in detail with the patient, who is in agreement.    Time spent on this encounter: 42 minutes. This includes face to face time (obtaining history, documenting clinical information in the EMR, physical exam, discussing the plan with patient) and non-face to face time (such as preparing to see the patient (ie. Chart review, reviewing and interpreting previous labs and imaging), further EMR documentation, ordering tests, independently interpreting results and communicating results to the patient/family/caregiver, or care coordinator).     Disclaimer: This note was partly generated using dictation software which may occasionally result in transcription errors that are missed on review.        Laura Sorensen MD    [facility]  11/29/2023

## 2023-12-08 ENCOUNTER — OFFICE VISIT (OUTPATIENT)
Dept: ORTHOPEDICS | Facility: CLINIC | Age: 58
End: 2023-12-08
Payer: COMMERCIAL

## 2023-12-08 VITALS — BODY MASS INDEX: 28.72 KG/M2 | HEIGHT: 67 IN | WEIGHT: 183 LBS

## 2023-12-08 DIAGNOSIS — M79.672 HEEL PAIN, BILATERAL: ICD-10-CM

## 2023-12-08 DIAGNOSIS — M25.571 PAIN IN JOINT INVOLVING RIGHT ANKLE AND FOOT: Primary | ICD-10-CM

## 2023-12-08 DIAGNOSIS — M79.671 HEEL PAIN, BILATERAL: ICD-10-CM

## 2023-12-08 PROCEDURE — 3008F BODY MASS INDEX DOCD: CPT | Mod: CPTII,S$GLB,, | Performed by: ORTHOPAEDIC SURGERY

## 2023-12-08 PROCEDURE — 4010F PR ACE/ARB THEARPY RXD/TAKEN: ICD-10-PCS | Mod: CPTII,S$GLB,, | Performed by: ORTHOPAEDIC SURGERY

## 2023-12-08 PROCEDURE — 3008F PR BODY MASS INDEX (BMI) DOCUMENTED: ICD-10-PCS | Mod: CPTII,S$GLB,, | Performed by: ORTHOPAEDIC SURGERY

## 2023-12-08 PROCEDURE — 3044F PR MOST RECENT HEMOGLOBIN A1C LEVEL <7.0%: ICD-10-PCS | Mod: CPTII,S$GLB,, | Performed by: ORTHOPAEDIC SURGERY

## 2023-12-08 PROCEDURE — 1159F MED LIST DOCD IN RCRD: CPT | Mod: CPTII,S$GLB,, | Performed by: ORTHOPAEDIC SURGERY

## 2023-12-08 PROCEDURE — 4010F ACE/ARB THERAPY RXD/TAKEN: CPT | Mod: CPTII,S$GLB,, | Performed by: ORTHOPAEDIC SURGERY

## 2023-12-08 PROCEDURE — 1160F RVW MEDS BY RX/DR IN RCRD: CPT | Mod: CPTII,S$GLB,, | Performed by: ORTHOPAEDIC SURGERY

## 2023-12-08 PROCEDURE — 99999 PR PBB SHADOW E&M-EST. PATIENT-LVL IV: ICD-10-PCS | Mod: PBBFAC,,, | Performed by: ORTHOPAEDIC SURGERY

## 2023-12-08 PROCEDURE — 3044F HG A1C LEVEL LT 7.0%: CPT | Mod: CPTII,S$GLB,, | Performed by: ORTHOPAEDIC SURGERY

## 2023-12-08 PROCEDURE — 1159F PR MEDICATION LIST DOCUMENTED IN MEDICAL RECORD: ICD-10-PCS | Mod: CPTII,S$GLB,, | Performed by: ORTHOPAEDIC SURGERY

## 2023-12-08 PROCEDURE — 99203 OFFICE O/P NEW LOW 30 MIN: CPT | Mod: S$GLB,,, | Performed by: ORTHOPAEDIC SURGERY

## 2023-12-08 PROCEDURE — 99203 PR OFFICE/OUTPT VISIT, NEW, LEVL III, 30-44 MIN: ICD-10-PCS | Mod: S$GLB,,, | Performed by: ORTHOPAEDIC SURGERY

## 2023-12-08 PROCEDURE — 99999 PR PBB SHADOW E&M-EST. PATIENT-LVL IV: CPT | Mod: PBBFAC,,, | Performed by: ORTHOPAEDIC SURGERY

## 2023-12-08 PROCEDURE — 1160F PR REVIEW ALL MEDS BY PRESCRIBER/CLIN PHARMACIST DOCUMENTED: ICD-10-PCS | Mod: CPTII,S$GLB,, | Performed by: ORTHOPAEDIC SURGERY

## 2023-12-08 NOTE — PROGRESS NOTES
Subjective:      Patient ID: Janki Jones is a 58 y.o. female.  This is a new patient to me    Chief Complaint:  Bilateral heel pain    HPI:  This is a 58-year-old female who presents with about a three month history of insidious pain in the back of both heels.  She states she was out of the country in the [place] doing a lot of walking in September when she developed bilateral swelling and pain in her heels.  She subsequently was seen in the sports medicine clinic and was referred to Podiatry and was seen on 10/30/2023 by Dr. Marsh for her bilateral heel pain as well as intermittent sharp pains at night.  It was felt that she had Achilles tendinitis but also may have some type of neuropathy.  She was started on gabapentin and also prescribed a Medrol Dosepak and was referred to neurology.  Seen by Neurology about a week ago and is awaiting some test results.  She was then referred to see me for the same complaints.  She reports that her symptoms have not improved over time but she is no longer having any swelling.  She reports symptoms that would be consistent with Achilles tendinitis but also reports nighttime sharp pains.  She states that it hurts to rest the back of her heels on the bed because it causes sharp pain.  She reports that her right heel hurts worse than her left heel.  She states she is never had problems with her heels before.    Past Medical History:   Diagnosis Date    Abnormal Pap smear 2007    Abnormal Pap smear of cervix     ADD (attention deficit disorder)     Anxiety     Depression     Fatty liver     Fibroids     GERD (gastroesophageal reflux disease)     Herpes simplex virus (HSV) infection     HSV1.    Hypertension        Current Outpatient Medications:     amLODIPine (NORVASC) 2.5 MG tablet, Take 1 tablet (2.5 mg total) by mouth once daily., Disp: 90 tablet, Rfl: 3    clotrimazole-betamethasone 1-0.05% (LOTRISONE) cream, Twice a day for 5-7 days, Disp: 15 g, Rfl: 1     "EScitalopram oxalate (LEXAPRO) 10 MG tablet, Take 1 tablet (10 mg total) by mouth once daily., Disp: 90 tablet, Rfl: 3    EScitalopram oxalate (LEXAPRO) 20 MG tablet, Take 1 tablet (20 mg total) by mouth every evening., Disp: 90 tablet, Rfl: 3    estradioL (ESTRACE) 0.01 % (0.1 mg/gram) vaginal cream, Place 1 g vaginally twice a week. At bedtime as needed, Disp: 42.5 g, Rfl: 6    gabapentin (NEURONTIN) 300 MG capsule, Take 1 capsule (300 mg total) by mouth 3 (three) times daily., Disp: 90 capsule, Rfl: 11    hydroCHLOROthiazide (HYDRODIURIL) 25 MG tablet, Take 1 tablet (25 mg total) by mouth once daily., Disp: 90 tablet, Rfl: 2    loratadine (CLARITIN) 10 mg tablet, Take 10 mg by mouth once daily., Disp: , Rfl:     losartan (COZAAR) 100 MG tablet, Take 1 tablet (100 mg total) by mouth once daily., Disp: 90 tablet, Rfl: 3    methylphenidate HCl (RITALIN) 20 MG tablet, Take 1 tablet (20 mg total) by mouth 2 (two) times daily., Disp: 60 tablet, Rfl: 0    methylphenidate HCl (RITALIN) 20 MG tablet, Take 1 tablet (20 mg total) by mouth 2 (two) times daily., Disp: 60 tablet, Rfl: 0    methylphenidate HCl (RITALIN) 20 MG tablet, Take 1 tablet (20 mg total) by mouth 2 (two) times daily., Disp: 60 tablet, Rfl: 0    methylPREDNISolone (MEDROL DOSEPACK) 4 mg tablet, use as directed, Disp: 1 each, Rfl: 0    multivitamin with minerals tablet, Take 1 tablet by mouth once daily., Disp: , Rfl:     pantoprazole (PROTONIX) 40 MG tablet, Take 1 tablet (40 mg total) by mouth once daily., Disp: 90 tablet, Rfl: 3    propranoloL (INDERAL) 10 MG tablet, Take 1 tablet (10 mg total) by mouth 3 (three) times daily as needed (anxiety)., Disp: 90 tablet, Rfl: 11    buPROPion (WELLBUTRIN XL) 150 MG TB24 tablet, Take 150 mg by mouth., Disp: , Rfl:   Review of patient's allergies indicates:   Allergen Reactions    Percocet [oxycodone-acetaminophen]      Other reaction(s): Nausea       Ht 5' 7" (1.702 m)   Wt 83 kg (182 lb 15.7 oz)   LMP  (LMP " Unknown) Comment: Last Cycle August 2017  BMI 28.66 kg/m²     ROS:  Negative for chest pain, shortness of breath, fevers, or unexplained weight loss      Objective:    Ortho Exam      This is a well-developed well-nourished female who walks in with a normal gait.  On standing inspection she has plantigrade alignment of her feet.  I do not appreciate any obvious swelling or prominences in the back of her heels.  On sitting exam she has full active motion of her ankle and subtalar joints but does report some pain in the back of her heels with ankle motion.  She has some tenderness bilaterally in the back of both heels around the insertion of the Achilles tendons.  She has good function of all of the other tendons about her ankle.  She has good distal pulses and normal sensation.      Assessment:     Imaging:  I reviewed bilateral ankle x-rays that were done recently which reveal no obvious bone or joint abnormalities.  There is some mild calcification near the insertion of the right Achilles tendon.  She has bilateral calcaneal spurs plantarly.  There is no evidence of any arthritis.        1. Pain in joint involving right ankle and foot    2. Heel pain, bilateral          Plan:       Orders Placed This Encounter    MRI Ankle Without Contrast Right     Recommendation:  The etiology of this pain is not clear to me but may be related to her Achilles tendon insertions.  Because of the continued pain not improving with time I am going to obtain an MRI of the more symptomatic right ankle to evaluate the Achilles tendon.  I am also going to his dispensed to her a fracture boot to use while we await the MRI to see if this helps with her symptoms.      Follow-up with results of MRI

## 2023-12-21 ENCOUNTER — HOSPITAL ENCOUNTER (OUTPATIENT)
Dept: RADIOLOGY | Facility: HOSPITAL | Age: 58
Discharge: HOME | End: 2023-12-21
Attending: INTERNAL MEDICINE
Payer: COMMERCIAL

## 2023-12-21 VITALS — HEIGHT: 67 IN | WEIGHT: 182 LBS | BODY MASS INDEX: 28.56 KG/M2

## 2023-12-21 DIAGNOSIS — Z12.31 OTHER SCREENING MAMMOGRAM: ICD-10-CM

## 2023-12-21 PROCEDURE — 77067 MAMMO DIGITAL SCREENING BILAT WITH TOMO: ICD-10-PCS | Mod: 26,,, | Performed by: RADIOLOGY

## 2023-12-21 PROCEDURE — 77063 MAMMO DIGITAL SCREENING BILAT WITH TOMO: ICD-10-PCS | Mod: 26,,, | Performed by: RADIOLOGY

## 2023-12-21 PROCEDURE — 77063 BREAST TOMOSYNTHESIS BI: CPT | Mod: 26,,, | Performed by: RADIOLOGY

## 2023-12-21 PROCEDURE — 77067 SCR MAMMO BI INCL CAD: CPT | Mod: 26,,, | Performed by: RADIOLOGY

## 2023-12-21 PROCEDURE — 77067 SCR MAMMO BI INCL CAD: CPT | Mod: TC

## 2023-12-26 DIAGNOSIS — F90.1 ATTENTION DEFICIT HYPERACTIVITY DISORDER (ADHD), PREDOMINANTLY HYPERACTIVE TYPE: ICD-10-CM

## 2023-12-26 NOTE — TELEPHONE ENCOUNTER
No care due was identified.  [facility] Embedded Care Due Messages. Reference number: [id number].   12/26/2023 11:40:45 AM CST

## 2023-12-27 RX ORDER — METHYLPHENIDATE HYDROCHLORIDE 20 MG/1
20 TABLET ORAL 2 TIMES DAILY
Qty: 60 TABLET | Refills: 0 | Status: SHIPPED | OUTPATIENT
Start: 2023-12-27 | End: 2024-01-19 | Stop reason: SDUPTHER

## 2023-12-30 ENCOUNTER — HOSPITAL ENCOUNTER (OUTPATIENT)
Dept: RADIOLOGY | Facility: HOSPITAL | Age: 58
Discharge: HOME | End: 2023-12-30
Attending: ORTHOPAEDIC SURGERY
Payer: COMMERCIAL

## 2023-12-30 DIAGNOSIS — M79.671 HEEL PAIN, BILATERAL: ICD-10-CM

## 2023-12-30 DIAGNOSIS — M79.672 HEEL PAIN, BILATERAL: ICD-10-CM

## 2023-12-30 DIAGNOSIS — M25.571 PAIN IN JOINT INVOLVING RIGHT ANKLE AND FOOT: ICD-10-CM

## 2023-12-30 PROCEDURE — 73721 MRI ANKLE WITHOUT CONTRAST RIGHT: ICD-10-PCS | Mod: 26,RT,, | Performed by: RADIOLOGY

## 2023-12-30 PROCEDURE — 73721 MRI JNT OF LWR EXTRE W/O DYE: CPT | Mod: TC,RT

## 2023-12-30 PROCEDURE — 73721 MRI JNT OF LWR EXTRE W/O DYE: CPT | Mod: 26,RT,, | Performed by: RADIOLOGY

## 2024-01-02 ENCOUNTER — OFFICE VISIT (OUTPATIENT)
Dept: ORTHOPEDICS | Facility: CLINIC | Age: 59
End: 2024-01-02
Payer: COMMERCIAL

## 2024-01-02 VITALS — WEIGHT: 182.13 LBS | HEIGHT: 67 IN | BODY MASS INDEX: 28.58 KG/M2

## 2024-01-02 DIAGNOSIS — M76.62 ACHILLES TENDINITIS OF BOTH LOWER EXTREMITIES: Primary | ICD-10-CM

## 2024-01-02 DIAGNOSIS — M76.61 ACHILLES TENDINITIS OF BOTH LOWER EXTREMITIES: Primary | ICD-10-CM

## 2024-01-02 PROCEDURE — 1159F MED LIST DOCD IN RCRD: CPT | Mod: CPTII,S$GLB,, | Performed by: ORTHOPAEDIC SURGERY

## 2024-01-02 PROCEDURE — 3008F BODY MASS INDEX DOCD: CPT | Mod: CPTII,S$GLB,, | Performed by: ORTHOPAEDIC SURGERY

## 2024-01-02 PROCEDURE — 99213 OFFICE O/P EST LOW 20 MIN: CPT | Mod: S$GLB,,, | Performed by: ORTHOPAEDIC SURGERY

## 2024-01-02 PROCEDURE — 99999 PR PBB SHADOW E&M-EST. PATIENT-LVL IV: CPT | Mod: PBBFAC,,, | Performed by: ORTHOPAEDIC SURGERY

## 2024-01-02 PROCEDURE — 1160F RVW MEDS BY RX/DR IN RCRD: CPT | Mod: CPTII,S$GLB,, | Performed by: ORTHOPAEDIC SURGERY

## 2024-01-02 NOTE — PROGRESS NOTES
Janki Jones  Returns today for follow-up.  This is a 58-year-old female who presented to me on 12/08/2023 with a three-month history of bilateral posterior heel pain that began after doing a lot of walking when she was out of the country in September.  She was initially seen in Sports Medicine Clinic and was referred to Podiatry and was seen on 10/30/2023 and was treated with a Medrol Dosepak and gabapentin and was referred to neurology because of neurogenic complaints.  She reported symptoms to me that were mostly consistent with the Achilles tendinitis but she also reported some nighttime sharp pains when she rested the back of her heels on the bed.  She reported that her right heel was worse than the left and because of the chronicity of her symptoms I ordered an MRI of the right ankle.  She returns and reports that her symptoms are minimally improved since I last saw her.    MRI results:  MRI of the right ankle performed on 12/30/2023 reveals mild distal Achilles tendinosis at the insertion.    Examination:  Examination of the heels reveals some continued mild tenderness at the insertion of the Achilles tendons..    Impression:   1. Achilles tendinitis of both lower extremities  Ambulatory referral/consult to Physical/Occupational Therapy        Recommendation:  I reviewed the MRI and explained that she has mild tendinitis and I would recommend continued nonoperativ treatment with time and physical therapy.  I reassured her that she has not at risk for any type of tendon rupture.    Follow-up in three months if necessary

## 2024-01-03 NOTE — PROGRESS NOTES
[facility]   Physical Therapy Initial Evaluation      Name: Janki Montana [facility]  Clinic Number: [id number]    Therapy Diagnosis:   Encounter Diagnoses   Name Primary?    Achilles tendinitis of both lower extremities Yes    Decreased range of motion of ankle, unspecified laterality         Physician: Dereje Johnson MD    Physician Orders: PT Eval and Treat   Medical Diagnosis from Referral:  Achilles tendinitis of both lower extremities [M76.61, M76.62]   Evaluation Date: 1/5/2024  Authorization Period Expiration: 1/1/2025  Plan of Care Expiration: 4/5/2024  Progress Note Due: 2/5/2024  Date of Surgery: NA  Visit # / Visits authorized: 1/ 1   FOTO: 1/ 3    Precautions: Standard     Time In: 0805 AM  Time Out: 0858 AM  Total Billable Time: 53 minutes    Subjective     Date of onset: September 2023    History of current condition - Janki presents to clinic with bilateral heel pain R>L. She says that the pain started several months ago when on a trip out of the country. Since then she has seen a podiatrist and Sports med. She has been prescribed gabapentin and Medrol Dosepak with mild relief. Reports pins and needles and 24/7 sharp pain. She also mentioned that first thing in the morning she has a lot of pain in bilateral feet. Patient mentioned that she was given a CAM boot but because it limited her driving she has not been wearing it.    Falls: 0    Imaging: MRI studies: 12/30/23  Impression:     1. Plantar fasciitis affecting the central cord of the plantar aponeurosis at the level of the calcaneus.  No partial or full-thickness tear.  2. Mild distal/insertional Achilles tendinosis with a small enthesophyte.  3. Peroneus longus tendinosis.  4. Chronic ATFL sprain.    Prior Therapy: yes for other ailments  Social History: 1-story lives with spouse  Occupation: works from home  Prior Level of Function: IND  Current Level of Function: IND but most difficulty with walking/standing  "long periods, first steps in the morning, house chores    Pain:  Current 6/10, worst 10/10, best 6/10   Location: bilateral  achilles R>L  Description: Numb, Sharp, and pins and needles  Aggravating Factors: Standing, Laying supine, Walking  Easing Factors:  ibuprofen, alternating heat and ice    Patients goals: "I want to get back to my walks and simple stuff like grocery shop without pain"     Medical History:   Past Medical History:   Diagnosis Date    Abnormal Pap smear 2007    Abnormal Pap smear of cervix     ADD (attention deficit disorder)     Anxiety     Depression     Fatty liver     Fibroids     GERD (gastroesophageal reflux disease)     Herpes simplex virus (HSV) infection     HSV1.    Hypertension        Surgical History:   Janki Jones  has a past surgical history that includes Tubal ligation; essure; Cervical biopsy w/ loop electrode excision (01/01/2007); Colonoscopy (N/A, 02/15/2019); Esophagogastroduodenoscopy (N/A, 11/26/2021); Carpal tunnel release (Right, 12/15/2022); and Injection of steroid (Left, 12/15/2022).    Medications:   Janki has a current medication list which includes the following prescription(s): amlodipine, bupropion, clotrimazole-betamethasone 1-0.05%, escitalopram oxalate, escitalopram oxalate, estradiol, gabapentin, hydrochlorothiazide, loratadine, losartan, methylphenidate hcl, methylphenidate hcl, methylphenidate hcl, methylprednisolone, multivitamin with minerals, pantoprazole, and propranolol.    Allergies:   Review of patient's allergies indicates:   Allergen Reactions    Percocet [oxycodone-acetaminophen]      Other reaction(s): Nausea        Objective        Posture: pronated foot in resting position    Active Range of Motion:   Ankle Right Left   DF (knee extended) 1 neutral   Plantarflexion 37 42   Inversion 50 42   Eversion 4 7      Strength:  Ankle Right Left   Dorsiflexion 5/5 5/5   Plantarflexion 4+/5 * 5/5   Inversion 5/5 5/5   Eversion 5/5 5/5 "     Special Tests:  Anterior Drawer NT   Talar tilt NT   Squeeze test NT   Chery Neg         Joint Mobility: TCN joint = normal    Palpation: tenderness in bilateral plantar fascia, plantar surface of calcaneus, and achilles insertion on calcaneus    Sensation: tingling in R foot, WNL onL    Functional Tests:     Double leg squat: increased foot pronation on R and increasing calcaneal pain on R  SL heel raise: increased pain in R  heel, but able to perform          Intake Outcome Measure for FOTO Foot Survey    Therapist reviewed FOTO scores for Janki Jones on 1/5/2024.   FOTO report - see Media section or FOTO account episode details.    Intake Score: 36%         Treatment     Total Treatment time (time-based codes) separate from Evaluation: 15 minutes     Janki received the treatments listed below:      therapeutic exercises to develop strength, endurance, and ROM for 15 minutes including:  Heel raise (knee straight) x10  Soleus heel raise x10  4-way ankle isotonics x10 ea SIMONA, RTB    Next visit initiate:  Towel scrunch/toe curls  Plantar fascia roll out      Patient Education and Home Exercises     Education provided:   - Educated patient on anatomy and physiology relevant to current condition, self-care/home and pain management, prognosis, POC, goals and HEP.    Written Home Exercises Provided: yes. Exercises were reviewed and Janki was able to demonstrate them prior to the end of the session.  Janki demonstrated good  understanding of the education provided. See EMR under Patient Instructions for exercises provided during therapy sessions.    Assessment      Janki is a 58 y.o. female referred to outpatient Physical Therapy with a medical diagnosis of Achilles tendinitis of both lower extremities that has progressively gotten worse since September.  Patient presents with increasing pain and reports of constant numbness and tingling in the R foot along with decreased AROM, tissue  extensibility/flexibility. As a result she  has been most limited with standing/walking for long periods, squatting, and laying supine as the pressure on her heels increase pain. No any swelling, bruising or any other signs of acute inflammation. Refer to subjective and objective sections for further detail. Presentation is consistent with referral diagnosis. Patient would benefit from skilled intervention in order to achieve patient's goals and optimal functional status.    Patient prognosis is Good.   Patient will benefit from skilled outpatient Physical Therapy to address the deficits stated above and in the chart below, provide patient /family education, and to maximize patientt's level of independence.     Plan of care discussed with patient: Yes  Patient's spiritual, cultural and educational needs considered and patient is agreeable to the plan of care and goals as stated below:     Anticipated Barriers for therapy: chronicity of condition    Medical Necessity is demonstrated by the following  History  Co-morbidities and personal factors that may impact the plan of care [x] LOW: no personal factors / co-morbidities  [] MODERATE: 1-2 personal factors / co-morbidities  [] HIGH: 3+ personal factors / co-morbidities    Moderate / High Support Documentation:   Co-morbidities affecting plan of care: NA    Personal Factors:   no deficits     Examination  Body Structures and Functions, activity limitations and participation restrictions that may impact the plan of care [x] LOW: addressing 1-2 elements  [] MODERATE: 3+ elements  [] HIGH: 4+ elements (please support below)    Moderate / High Support Documentation: NA     Clinical Presentation [x] LOW: stable  [] MODERATE: Evolving  [] HIGH: Unstable     Decision Making/ Complexity Score: low       Goals:  Short Term Goals: 4 weeks   1) Patient to verbalize and demonstrate compliance with HEP.   2) Patient will improve L DF AROM to 5 degrees for optimal functional  gait.  3) Patient to report pain at </=4/10 for 3 consecutive visits.  4) Patient to report improved standing tolerance for at least 20 minutes to prepare snack/meal.    Long Term Goals: 6 weeks   1) Patient will be independent with HEP.  2) Patient to report improved tolerance for standing/walking for at least 40-45 minutes with minimal pain or limitations due to heel pain.  3) Patient to improve LEFS score to >/= 40/80 for improved overall functional mobility.    Plan     Plan of care Certification: 1/5/2024 to 4/5/2024.    Outpatient Physical Therapy 2 times weekly for 6 weeks to include the following interventions: Gait Training, Manual Therapy, Neuromuscular Re-ed, Patient Education, Self Care, Therapeutic Activities, Therapeutic Exercise, and modalities prn .     Ashyl Torrez PT        Physician's Signature: _________________________________________ Date: ________________

## 2024-01-05 ENCOUNTER — CLINICAL SUPPORT (OUTPATIENT)
Dept: REHABILITATION | Facility: HOSPITAL | Age: 59
End: 2024-01-05
Attending: ORTHOPAEDIC SURGERY
Payer: COMMERCIAL

## 2024-01-05 DIAGNOSIS — M76.61 ACHILLES TENDINITIS OF BOTH LOWER EXTREMITIES: Primary | ICD-10-CM

## 2024-01-05 DIAGNOSIS — M25.673 DECREASED RANGE OF MOTION OF ANKLE, UNSPECIFIED LATERALITY: ICD-10-CM

## 2024-01-05 DIAGNOSIS — M76.62 ACHILLES TENDINITIS OF BOTH LOWER EXTREMITIES: Primary | ICD-10-CM

## 2024-01-05 PROCEDURE — 97161 PT EVAL LOW COMPLEX 20 MIN: CPT | Mod: PO

## 2024-01-05 PROCEDURE — 97110 THERAPEUTIC EXERCISES: CPT | Mod: PO

## 2024-01-05 NOTE — PLAN OF CARE
[facility]   Physical Therapy Initial Evaluation      Name: Janki Montana [facility]  Clinic Number: [id number]    Therapy Diagnosis:   Encounter Diagnoses   Name Primary?    Achilles tendinitis of both lower extremities Yes    Decreased range of motion of ankle, unspecified laterality         Physician: Dereje Johnson MD    Physician Orders: PT Eval and Treat   Medical Diagnosis from Referral:  Achilles tendinitis of both lower extremities [M76.61, M76.62]   Evaluation Date: 1/5/2024  Authorization Period Expiration: 1/1/2025  Plan of Care Expiration: 4/5/2024  Progress Note Due: 2/5/2024  Date of Surgery: NA  Visit # / Visits authorized: 1/ 1   FOTO: 1/ 3    Precautions: Standard     Time In: 0805 AM  Time Out: 0858 AM  Total Billable Time: 53 minutes    Subjective     Date of onset: September 2023    History of current condition - Janki presents to clinic with bilateral heel pain R>L. She says that the pain started several months ago when on a trip out of the country. Since then she has seen a podiatrist and Sports med. She has been prescribed gabapentin and Medrol Dosepak with mild relief. Reports pins and needles and 24/7 sharp pain. She also mentioned that first thing in the morning she has a lot of pain in bilateral feet. Patient mentioned that she was given a CAM boot but because it limited her driving she has not been wearing it.    Falls: 0    Imaging: MRI studies: 12/30/23  Impression:     1. Plantar fasciitis affecting the central cord of the plantar aponeurosis at the level of the calcaneus.  No partial or full-thickness tear.  2. Mild distal/insertional Achilles tendinosis with a small enthesophyte.  3. Peroneus longus tendinosis.  4. Chronic ATFL sprain.    Prior Therapy: yes for other ailments  Social History: 1-story lives with spouse  Occupation: works from home  Prior Level of Function: IND  Current Level of Function: IND but most difficulty with walking/standing  "long periods, first steps in the morning, house chores    Pain:  Current 6/10, worst 10/10, best 6/10   Location: bilateral  achilles R>L  Description: Numb, Sharp, and pins and needles  Aggravating Factors: Standing, Laying supine, Walking  Easing Factors: ibuprofen, alternating heat and ice    Patients goals: "I want to get back to my walks and simple stuff like grocery shop without pain"     Medical History:   Past Medical History:   Diagnosis Date    Abnormal Pap smear 2007    Abnormal Pap smear of cervix     ADD (attention deficit disorder)     Anxiety     Depression     Fatty liver     Fibroids     GERD (gastroesophageal reflux disease)     Herpes simplex virus (HSV) infection     HSV1.    Hypertension        Surgical History:   Janki Jones  has a past surgical history that includes Tubal ligation; essure; Cervical biopsy w/ loop electrode excision (01/01/2007); Colonoscopy (N/A, 02/15/2019); Esophagogastroduodenoscopy (N/A, 11/26/2021); Carpal tunnel release (Right, 12/15/2022); and Injection of steroid (Left, 12/15/2022).    Medications:   Janki has a current medication list which includes the following prescription(s): amlodipine, bupropion, clotrimazole-betamethasone 1-0.05%, escitalopram oxalate, escitalopram oxalate, estradiol, gabapentin, hydrochlorothiazide, loratadine, losartan, methylphenidate hcl, methylphenidate hcl, methylphenidate hcl, methylprednisolone, multivitamin with minerals, pantoprazole, and propranolol.    Allergies:   Review of patient's allergies indicates:   Allergen Reactions    Percocet [oxycodone-acetaminophen]      Other reaction(s): Nausea        Objective        Posture: pronated foot in resting position    Active Range of Motion:   Ankle Right Left   DF (knee extended) 1 neutral   Plantarflexion 37 42   Inversion 50 42   Eversion 4 7      Strength:  Ankle Right Left   Dorsiflexion 5/5 5/5   Plantarflexion 4+/5 * 5/5   Inversion 5/5 5/5   Eversion 5/5 5/5 "     Special Tests:  Anterior Drawer NT   Talar tilt NT   Squeeze test NT   Chery Neg         Joint Mobility: TCN joint = normal    Palpation: tenderness in bilateral plantar fascia, plantar surface of calcaneus, and achilles insertion on calcaneus    Sensation: tingling in R foot, WNL onL    Functional Tests:     Double leg squat: increased foot pronation on R and increasing calcaneal pain on R  SL heel raise: increased pain in R  heel, but able to perform          Intake Outcome Measure for FOTO Foot Survey    Therapist reviewed FOTO scores for Janki Jones on 1/5/2024.   FOTO report - see Media section or FOTO account episode details.    Intake Score: 36%         Treatment     Total Treatment time (time-based codes) separate from Evaluation: 15 minutes     Janki received the treatments listed below:      therapeutic exercises to develop strength, endurance, and ROM for 15 minutes including:  Heel raise (knee straight) x10  Soleus heel raise x10  4-way ankle isotonics x10 ea SIMONA, RTB    Next visit initiate:  Towel scrunch/toe curls  Plantar fascia roll out      Patient Education and Home Exercises     Education provided:   - Educated patient on anatomy and physiology relevant to current condition, self-care/home and pain management, prognosis, POC, goals and HEP.    Written Home Exercises Provided: yes. Exercises were reviewed and Janki was able to demonstrate them prior to the end of the session.  Janki demonstrated good  understanding of the education provided. See EMR under Patient Instructions for exercises provided during therapy sessions.    Assessment      Janki is a 58 y.o. female referred to outpatient Physical Therapy with a medical diagnosis of Achilles tendinitis of both lower extremities that has progressively gotten worse since September.  Patient presents with increasing pain and reports of constant numbness and tingling in the R foot along with decreased AROM, tissue  extensibility/flexibility. As a result she  has been most limited with standing/walking for long periods, squatting, and laying supine as the pressure on her heels increase pain. No any swelling, bruising or any other signs of acute inflammation. Refer to subjective and objective sections for further detail. Presentation is consistent with referral diagnosis. Patient would benefit from skilled intervention in order to achieve patient's goals and optimal functional status.    Patient prognosis is Good.   Patient will benefit from skilled outpatient Physical Therapy to address the deficits stated above and in the chart below, provide patient /family education, and to maximize patientt's level of independence.     Plan of care discussed with patient: Yes  Patient's spiritual, cultural and educational needs considered and patient is agreeable to the plan of care and goals as stated below:     Anticipated Barriers for therapy: chronicity of condition    Medical Necessity is demonstrated by the following  History  Co-morbidities and personal factors that may impact the plan of care [x] LOW: no personal factors / co-morbidities  [] MODERATE: 1-2 personal factors / co-morbidities  [] HIGH: 3+ personal factors / co-morbidities    Moderate / High Support Documentation:   Co-morbidities affecting plan of care: NA    Personal Factors:   no deficits     Examination  Body Structures and Functions, activity limitations and participation restrictions that may impact the plan of care [x] LOW: addressing 1-2 elements  [] MODERATE: 3+ elements  [] HIGH: 4+ elements (please support below)    Moderate / High Support Documentation: NA     Clinical Presentation [x] LOW: stable  [] MODERATE: Evolving  [] HIGH: Unstable     Decision Making/ Complexity Score: low       Goals:  Short Term Goals: 4 weeks   1) Patient to verbalize and demonstrate compliance with HEP.   2) Patient will improve L DF AROM to 5 degrees for optimal functional  gait.  3) Patient to report pain at </=4/10 for 3 consecutive visits.  4) Patient to report improved standing tolerance for at least 20 minutes to prepare snack/meal.    Long Term Goals: 6 weeks   1) Patient will be independent with HEP.  2) Patient to report improved tolerance for standing/walking for at least 40-45 minutes with minimal pain or limitations due to heel pain.  3) Patient to improve LEFS score to >/= 40/80 for improved overall functional mobility.    Plan     Plan of care Certification: 1/5/2024 to 4/5/2024.    Outpatient Physical Therapy 2 times weekly for 6 weeks to include the following interventions: Gait Training, Manual Therapy, Neuromuscular Re-ed, Patient Education, Self Care, Therapeutic Activities, Therapeutic Exercise, and modalities prn.     Ashly Torrez PT        Physician's Signature: _________________________________________ Date: ________________

## 2024-01-19 DIAGNOSIS — F90.1 ATTENTION DEFICIT HYPERACTIVITY DISORDER (ADHD), PREDOMINANTLY HYPERACTIVE TYPE: ICD-10-CM

## 2024-01-19 RX ORDER — PANTOPRAZOLE SODIUM 40 MG/1
40 TABLET, DELAYED RELEASE ORAL DAILY
Qty: 90 TABLET | Refills: 3 | Status: SHIPPED | OUTPATIENT
Start: 2024-01-19 | End: 2024-02-24 | Stop reason: SDUPTHER

## 2024-01-19 RX ORDER — METHYLPHENIDATE HYDROCHLORIDE 20 MG/1
20 TABLET ORAL 2 TIMES DAILY
Qty: 60 TABLET | Refills: 0 | Status: SHIPPED | OUTPATIENT
Start: 2024-01-19 | End: 2024-02-01 | Stop reason: SDUPTHER

## 2024-01-19 NOTE — TELEPHONE ENCOUNTER
No care due was identified.  [facility] Embedded Care Due Messages. Reference number: [id number].   1/19/2024 9:13:01 AM CST

## 2024-01-19 NOTE — TELEPHONE ENCOUNTER
No care due was identified.  [facility] Embedded Care Due Messages. Reference number: [id number].   1/19/2024 9:13:41 AM CST

## 2024-01-23 ENCOUNTER — TELEPHONE (OUTPATIENT)
Dept: NEUROLOGY | Facility: CLINIC | Age: 59
End: 2024-01-23
Payer: COMMERCIAL

## 2024-01-24 ENCOUNTER — OFFICE VISIT (OUTPATIENT)
Dept: NEUROLOGY | Facility: CLINIC | Age: 59
End: 2024-01-24
Payer: COMMERCIAL

## 2024-01-24 DIAGNOSIS — M72.2 PLANTAR FASCIITIS: ICD-10-CM

## 2024-01-24 DIAGNOSIS — M79.671 FOOT PAIN, BILATERAL: Primary | ICD-10-CM

## 2024-01-24 DIAGNOSIS — M77.32 CALCANEAL SPUR OF BOTH FEET: ICD-10-CM

## 2024-01-24 DIAGNOSIS — M79.672 FOOT PAIN, BILATERAL: Primary | ICD-10-CM

## 2024-01-24 DIAGNOSIS — M77.31 CALCANEAL SPUR OF BOTH FEET: ICD-10-CM

## 2024-01-24 PROCEDURE — 4010F ACE/ARB THERAPY RXD/TAKEN: CPT | Mod: CPTII,95,, | Performed by: STUDENT IN AN ORGANIZED HEALTH CARE EDUCATION/TRAINING PROGRAM

## 2024-01-24 PROCEDURE — 99213 OFFICE O/P EST LOW 20 MIN: CPT | Mod: 95,,, | Performed by: STUDENT IN AN ORGANIZED HEALTH CARE EDUCATION/TRAINING PROGRAM

## 2024-01-24 RX ORDER — LOSARTAN POTASSIUM 100 MG/1
100 TABLET ORAL DAILY
Qty: 90 TABLET | Refills: 2 | Status: SHIPPED | OUTPATIENT
Start: 2024-01-24 | End: 2024-02-24 | Stop reason: SDUPTHER

## 2024-01-24 RX ORDER — HYDROCHLOROTHIAZIDE 25 MG/1
25 TABLET ORAL DAILY
Qty: 90 TABLET | Refills: 2 | Status: SHIPPED | OUTPATIENT
Start: 2024-01-24 | End: 2024-02-24 | Stop reason: SDUPTHER

## 2024-01-24 NOTE — TELEPHONE ENCOUNTER
Refill Decision Note   Janki Jones  is requesting a refill authorization.  Brief Assessment and Rationale for Refill:  Approve     Medication Therapy Plan:         Comments:     Note composed:11:55 AM 01/24/2024

## 2024-01-24 NOTE — TELEPHONE ENCOUNTER
No care due was identified.  [facility] Embedded Care Due Messages. Reference number: [id number].   1/24/2024 10:35:04 AM CST

## 2024-01-24 NOTE — TELEPHONE ENCOUNTER
No care due was identified.  [facility] Embedded Care Due Messages. Reference number: [id number].   1/24/2024 10:33:30 AM CST

## 2024-01-24 NOTE — PROGRESS NOTES
"        Patient ID: [id number]  The patient location is: she is at home, [place]   The chief complaint leading to consultation is: f/u on foot pain    Visit type: audiovisual    Face to Face time with patient: 14  20 minutes of total time spent on the encounter, which includes face to face time and non-face to face time preparing to see the patient (eg, review of tests), Obtaining and/or reviewing separately obtained history, Documenting clinical information in the electronic or other health record, Independently interpreting results (not separately reported) and communicating results to the patient/family/caregiver, or Care coordination (not separately reported).     Each patient to whom he or she provides medical services by telemedicine is:  (1) informed of the relationship between the physician and patient and the respective role of any other health care provider with respect to management of the patient; and (2) notified that he or she may decline to receive medical services by telemedicine and may withdraw from such care at any time.    Notes:      Subjective:     HPI  Janki Jones is a 58 y.o. RH female with ADD, depression/anxiety, and HTN. she is presenting today for follow up.    Interval history (01/24/2024):  Further work up by orthopedics revealed plantar fasciitis after obtaining MRI of the foot.   She has stopped gabapentin because it was not helping.  She has been going to PT, pain occurs less frequently but still lingering. She is not on any oral medications for pain.   She is following up with PCP regarding elevated transaminases, underwent US of liver. Bupropion was stopped with concerns to have caused fatty liver.     Initial HPI (11/29/2023):  She had a trip to the [facility] back in September. She had long hours of walking which brought up swelling of feet and pain.  She describes sensation of "sharp needles" in the ankles and heels. She also reports "ants crawling" and itching in " "both hands.  She denies b/b dysfunction or weakness. Balance is good but walking and standing is affected by pain in the feet.  She reports "slowing" in movements on the left side of the body and speech disturbance 17 years ago which she attributes to epidural for her last child's delivery.  She was hospitalized and evaluated perhaps by MRIs back then, unsure of the results.    Review of Systems   Constitutional:  Negative for fever and unexpected weight change.   Gastrointestinal:  Negative for blood in stool.   Genitourinary:  Negative for hematuria.   Musculoskeletal:  Positive for leg pain. Negative for back pain and gait problem.   Neurological:  Positive for numbness. Negative for facial asymmetry and weakness.   Psychiatric/Behavioral:  Negative for confusion. The patient is nervous/anxious.    All other systems reviewed and are negative.    Past Medical History:  Active Ambulatory Problems     Diagnosis Date Noted    Essential hypertension     Anxiety     ADD (attention deficit disorder)     GERD (gastroesophageal reflux disease) 02/19/2013    Situational stress 04/15/2015    Adjustment disorder with mixed anxiety and depressed mood 06/09/2015    Anxiety state 08/20/2015    Major depressive disorder, recurrent episode, moderate 08/20/2015    Allergic rhinitis 03/08/2016    Recurrent UTI 09/12/2016    High risk HPV infection 11/12/2021    Vaginal discharge 10/13/2022    Pain 12/29/2022    Decreased range of motion of right wrist 12/29/2022    Tinea cruris 04/06/2023    Dyspareunia in female 04/06/2023    Fatty liver 06/06/2023    Achilles tendinitis of both lower extremities 01/05/2024    Decreased ROM of ankle 01/05/2024     Resolved Ambulatory Problems     Diagnosis Date Noted    No Resolved Ambulatory Problems     Past Medical History:   Diagnosis Date    Abnormal Pap smear 2007    Abnormal Pap smear of cervix     Depression     Fibroids     Herpes simplex virus (HSV) infection     Hypertension  "       Allergies:  Review of patient's allergies indicates:   Allergen Reactions    Percocet [oxycodone-acetaminophen]      Other reaction(s): Nausea       Pertinent Family History:  Family History   Problem Relation Age of Onset    Cirrhosis Mother     Alcohol abuse Mother     Diabetes Mother     Liver disease Mother     Stroke Mother     Panic disorder Mother     Other Father         glaucoma    Diabetes Father     Heart disease Father     Hyperlipidemia Father     Hypertension Father     Kidney disease Father     Glaucoma Father     Depression Father     Osteoporosis Father     Other Sister         thyroid    Thyroid disease Sister     Breast cancer Sister 57    ADD / ADHD Daughter     ADD / ADHD Daughter     Panic disorder Daughter     Hypertension Brother     ADD / ADHD Son     Colon cancer Neg Hx     Ovarian cancer Neg Hx     Amblyopia Neg Hx     Blindness Neg Hx     Cataracts Neg Hx     Macular degeneration Neg Hx     Retinal detachment Neg Hx     Strabismus Neg Hx      Pertinent Social History:  Social History     Socioeconomic History    Marital status:    Tobacco Use    Smoking status: Never    Smokeless tobacco: Never   Substance and Sexual Activity    Alcohol use: No    Drug use: No    Sexual activity: Yes     Partners: Male     Birth control/protection: See Surgical Hx     Comment: .   Social History Narrative    ,  disabled with PTSD (from ), 3 children, working with [employer] , going to school for business, no regular exercise     Medications:  Current Outpatient Medications   Medication Instructions    amLODIPine (NORVASC) 2.5 mg, Oral, Daily    buPROPion (WELLBUTRIN XL) 150 mg, Oral    clotrimazole-betamethasone 1-0.05% (LOTRISONE) cream Twice a day for 5-7 days    EScitalopram oxalate (LEXAPRO) 20 mg, Oral, Nightly    EScitalopram oxalate (LEXAPRO) 10 mg, Oral, Daily    estradioL (ESTRACE) 1 g, Vaginal, Twice weekly, At bedtime as needed    gabapentin  (NEURONTIN) 300 mg, Oral, 3 times daily    hydroCHLOROthiazide (HYDRODIURIL) 25 mg, Oral, Daily    loratadine (CLARITIN) 10 mg, Oral, Daily    losartan (COZAAR) 100 mg, Oral, Daily    methylphenidate HCl (RITALIN) 20 mg, Oral, 2 times daily    methylphenidate HCl (RITALIN) 20 mg, Oral, 2 times daily    methylphenidate HCl (RITALIN) 20 mg, Oral, 2 times daily    methylPREDNISolone (MEDROL DOSEPACK) 4 mg tablet use as directed    multivitamin with minerals tablet 1 tablet, Oral, Daily    pantoprazole (PROTONIX) 40 mg, Oral, Daily    propranoloL (INDERAL) 10 mg, Oral, 3 times daily PRN        Objective:     *exam is limited due to the virtual nature of this visit    General:  Well-appearing, well-nourished, NAD, cooperative    Neurologic Exam:   Awake, alert and oriented x3  Speech spontaneous and fluent, intact comprehension.   Adequate fund of knowledge, vocabulary.  EOM intact. No ophthalmoplegia.   Facial expression is full and symmetric.   Hearing is intact.  Antigravity in BUE. No tremor.      Pertinent lab results  Lab Results   Component Value Date    ALPHATOCOPHE 1427 11/29/2023    KGBFNYDJ24 862 11/29/2023    VITAMINB6 8 11/29/2023    UYRSPSMB89YX 40 10/06/2020    ZINC 87 11/29/2023    COPPER 1334 11/29/2023     Lab Results   Component Value Date    ANASCREEN Negative <1:80 10/26/2022     Lab Results   Component Value Date    KIQ88PBJP Non-reactive 11/29/2023    RPR Non-reactive 11/29/2023    HEPAIGG Reactive 10/26/2022    HEPBSAG Non-reactive 10/26/2022    HEPBSAB 13.73 10/26/2022    HEPBSAB Reactive 10/26/2022    HEPBCAB Negative 11/10/2020     Lab Results   Component Value Date    IGGSERUM 1582 11/16/2022    TSH 1.360 09/28/2023    WBC 5.62 09/28/2023    LYMPH 2.7 09/28/2023    LYMPH 48.2 (H) 09/28/2023    RBC 4.69 09/28/2023    HGB 12.9 09/28/2023    HCT 40.2 09/28/2023    MCV 86 09/28/2023     09/28/2023     09/28/2023    K 3.2 (L) 09/28/2023    CO2 29 09/28/2023    BUN 10 09/28/2023     CREATININE 0.8 09/28/2023    CALCIUM 9.7 09/28/2023     (H) 09/28/2023     (H) 09/28/2023     (H) 09/28/2023     (H) 09/28/2023     Pertinent imaging results    Results for orders placed or performed during the hospital encounter of 12/30/23 (from the past 2160 hour(s))   MRI Ankle Without Contrast Right    Impression    1. Plantar fasciitis affecting the central cord of the plantar aponeurosis at the level of the calcaneus.  No partial or full-thickness tear.  2. Mild distal/insertional Achilles tendinosis with a small enthesophyte.  3. Peroneus longus tendinosis.  4. Chronic ATFL sprain.      Electronically signed by: Shai Veloz MD  Date:    12/30/2023  Time:    13:45     Other pertinent studies  None    Assessment:   Janki Jones is a 58 y.o. right-handed female with ADD, depression/anxiety, and HTN who had initially presented for evaluation of recent-onset distal paresthesias and pain in feet that was brought on by long hours of walking while on a trip. Neurological exam was notable for LLE numbness and impaired tandem gait for which we completed serum work up to investigate common causes of neuropathy. The results were unrevealing. There were bone spurs noted on x-ray of ankle/foot for which I referred her to orthopedics. Now she has a confirmed diagnosis of plantar fasciitis by MRI. She is slightly improving with PT. She might benefit from anti inflammatory options but I'd defer to her orthopedic doctor. She may return to our clinic as needed.     1. Foot pain, bilateral    2. Plantar fasciitis    3. Calcaneal spur of both feet        Plan was discussed in detail with the patient, who is in agreement.    Disclaimer: This note was partly generated using dictation software which may occasionally result in transcription errors that are missed on review.        Laura Sorensen MD    [facility]  01/24/2024

## 2024-01-24 NOTE — TELEPHONE ENCOUNTER
Refill Routing Note   Medication(s) are not appropriate for processing by [facility] for the following reason(s):        Required labs abnormal:     [facility] action(s):  Defer      Medication Therapy Plan:         Appointments  past 12m or future 3m with PCP    Date Provider   Last Visit   10/2/2023 Daniel Lawrence, DO   Next Visit   1/24/2024 Daniel Lawrence, DO   ED visits in past 90 days: 0        Note composed:11:55 AM 01/24/2024

## 2024-02-01 ENCOUNTER — OFFICE VISIT (OUTPATIENT)
Dept: INTERNAL MEDICINE | Facility: CLINIC | Age: 59
End: 2024-02-01
Payer: COMMERCIAL

## 2024-02-01 VITALS
SYSTOLIC BLOOD PRESSURE: 132 MMHG | DIASTOLIC BLOOD PRESSURE: 80 MMHG | HEART RATE: 69 BPM | HEIGHT: 67 IN | OXYGEN SATURATION: 99 % | BODY MASS INDEX: 28.44 KG/M2 | TEMPERATURE: 97 F | WEIGHT: 181.19 LBS

## 2024-02-01 DIAGNOSIS — I10 ESSENTIAL HYPERTENSION: ICD-10-CM

## 2024-02-01 DIAGNOSIS — K21.9 GASTROESOPHAGEAL REFLUX DISEASE, UNSPECIFIED WHETHER ESOPHAGITIS PRESENT: ICD-10-CM

## 2024-02-01 DIAGNOSIS — F90.1 ATTENTION DEFICIT HYPERACTIVITY DISORDER (ADHD), PREDOMINANTLY HYPERACTIVE TYPE: Primary | ICD-10-CM

## 2024-02-01 DIAGNOSIS — F41.9 ANXIETY: ICD-10-CM

## 2024-02-01 DIAGNOSIS — F33.1 MAJOR DEPRESSIVE DISORDER, RECURRENT EPISODE, MODERATE: ICD-10-CM

## 2024-02-01 DIAGNOSIS — K76.0 FATTY LIVER: ICD-10-CM

## 2024-02-01 PROCEDURE — 3079F DIAST BP 80-89 MM HG: CPT | Mod: CPTII,S$GLB,, | Performed by: INTERNAL MEDICINE

## 2024-02-01 PROCEDURE — 4010F ACE/ARB THERAPY RXD/TAKEN: CPT | Mod: CPTII,S$GLB,, | Performed by: INTERNAL MEDICINE

## 2024-02-01 PROCEDURE — 3075F SYST BP GE 130 - 139MM HG: CPT | Mod: CPTII,S$GLB,, | Performed by: INTERNAL MEDICINE

## 2024-02-01 PROCEDURE — 1159F MED LIST DOCD IN RCRD: CPT | Mod: CPTII,S$GLB,, | Performed by: INTERNAL MEDICINE

## 2024-02-01 PROCEDURE — 3008F BODY MASS INDEX DOCD: CPT | Mod: CPTII,S$GLB,, | Performed by: INTERNAL MEDICINE

## 2024-02-01 PROCEDURE — 99214 OFFICE O/P EST MOD 30 MIN: CPT | Mod: S$GLB,,, | Performed by: INTERNAL MEDICINE

## 2024-02-01 PROCEDURE — 1160F RVW MEDS BY RX/DR IN RCRD: CPT | Mod: CPTII,S$GLB,, | Performed by: INTERNAL MEDICINE

## 2024-02-01 PROCEDURE — 99999 PR PBB SHADOW E&M-EST. PATIENT-LVL IV: CPT | Mod: PBBFAC,,, | Performed by: INTERNAL MEDICINE

## 2024-02-01 RX ORDER — METHYLPHENIDATE HYDROCHLORIDE 20 MG/1
20 TABLET ORAL 2 TIMES DAILY
Qty: 60 TABLET | Refills: 0 | Status: SHIPPED | OUTPATIENT
Start: 2024-04-01

## 2024-02-01 RX ORDER — METHYLPHENIDATE HYDROCHLORIDE 20 MG/1
20 TABLET ORAL 2 TIMES DAILY
Qty: 60 TABLET | Refills: 0 | Status: SHIPPED | OUTPATIENT
Start: 2024-02-01 | End: 2024-02-24 | Stop reason: SDUPTHER

## 2024-02-01 RX ORDER — PROPRANOLOL HYDROCHLORIDE 10 MG/1
10 TABLET ORAL 3 TIMES DAILY PRN
Qty: 90 TABLET | Refills: 11 | Status: SHIPPED | OUTPATIENT
Start: 2024-02-01 | End: 2024-03-28 | Stop reason: SDUPTHER

## 2024-02-01 RX ORDER — METHYLPHENIDATE HYDROCHLORIDE 20 MG/1
20 TABLET ORAL 2 TIMES DAILY
Qty: 60 TABLET | Refills: 0 | Status: SHIPPED | OUTPATIENT
Start: 2024-03-01

## 2024-02-01 NOTE — PROGRESS NOTES
Subjective     Patient ID: Janki Jones is a 58 y.o. female.    Chief Complaint: Follow-up    HPI  Pt with HTN, ADD, MDD, Fatty Liver, L CTS is here for f/u. Requesting refills of her Concerta which has been working well.   Review of Systems   Constitutional:  Negative for activity change, appetite change, chills, diaphoresis, fatigue, fever and unexpected weight change.   HENT:  Negative for postnasal drip, rhinorrhea, sinus pressure/congestion, sneezing, sore throat, trouble swallowing and voice change.    Respiratory:  Negative for cough, shortness of breath and wheezing.    Cardiovascular:  Negative for chest pain, palpitations and leg swelling.   Gastrointestinal:  Negative for abdominal pain, blood in stool, constipation, diarrhea, nausea and vomiting.   Genitourinary:  Negative for dysuria.   Musculoskeletal:  Negative for arthralgias and myalgias.   Integumentary:  Negative for rash and wound.   Allergic/Immunologic: Negative for environmental allergies and food allergies.   Hematological:  Negative for adenopathy. Does not bruise/bleed easily.   Psychiatric/Behavioral:  Positive for decreased concentration and dysphoric mood. Negative for self-injury and suicidal ideas. The patient is nervous/anxious.           Objective     Physical Exam  Constitutional:       General: She is not in acute distress.     Appearance: Normal appearance. She is well-developed. She is not diaphoretic.   HENT:      Head: Normocephalic and atraumatic.      Right Ear: External ear normal.      Left Ear: External ear normal.      Nose: Nose normal.      Mouth/Throat:      Pharynx: No oropharyngeal exudate.   Eyes:      General: No scleral icterus.        Right eye: No discharge.         Left eye: No discharge.      Conjunctiva/sclera: Conjunctivae normal.      Pupils: Pupils are equal, round, and reactive to light.   Neck:      Vascular: No JVD.   Cardiovascular:      Rate and Rhythm: Normal rate and regular rhythm.       Pulses: Normal pulses.      Heart sounds: Normal heart sounds.   Pulmonary:      Effort: Pulmonary effort is normal. No respiratory distress.      Breath sounds: Normal breath sounds. No wheezing, rhonchi or rales.   Abdominal:      General: Bowel sounds are normal. There is no distension.      Palpations: Abdomen is soft.      Tenderness: There is no abdominal tenderness. There is no guarding or rebound.   Musculoskeletal:      Cervical back: Neck supple.      Right lower leg: No edema.      Left lower leg: No edema.   Lymphadenopathy:      Cervical: No cervical adenopathy.   Skin:     General: Skin is warm and dry.      Coloration: Skin is not pale.      Findings: No rash.   Neurological:      General: No focal deficit present.      Mental Status: She is alert and oriented to person, place, and time.      Gait: Gait normal.   Psychiatric:         Behavior: Behavior normal.         Thought Content: Thought content normal.            Assessment and Plan     1. Attention deficit hyperactivity disorder (ADHD), predominantly hyperactive type  -     methylphenidate HCl (RITALIN) 20 MG tablet; Take 1 tablet (20 mg total) by mouth 2 (two) times daily.  Dispense: 60 tablet; Refill: 0  -     methylphenidate HCl (RITALIN) 20 MG tablet; Take 1 tablet (20 mg total) by mouth 2 (two) times daily.  Dispense: 60 tablet; Refill: 0  -     methylphenidate HCl (RITALIN) 20 MG tablet; Take 1 tablet (20 mg total) by mouth 2 (two) times daily.  Dispense: 60 tablet; Refill: 0    2. Major depressive disorder, recurrent episode, moderate    3. Anxiety  -     propranoloL (INDERAL) 10 MG tablet; Take 1 tablet (10 mg total) by mouth 3 (three) times daily as needed (anxiety).  Dispense: 90 tablet; Refill: 11    4. Essential hypertension    5. Fatty liver    6. Gastroesophageal reflux disease, unspecified whether esophagitis present         HTN- stable       ADD- stable on Concerta      MDD- fair control on Lexapro 30 mg qd, d/c Wellbutrin       Anxiety- Lexapro as above   -can take Propranolol 10 mg TID PRN anxiety      Fatty liver- followed by Hepatology   -Wellbutrin stopped out of an abundance of caution 2/2 elevated LAEs(not sure if this is contributing or not)      GERD- on PPI      F/u in 3 months

## 2024-02-24 DIAGNOSIS — F90.1 ATTENTION DEFICIT HYPERACTIVITY DISORDER (ADHD), PREDOMINANTLY HYPERACTIVE TYPE: ICD-10-CM

## 2024-02-24 NOTE — TELEPHONE ENCOUNTER
No care due was identified.  [facility] Embedded Care Due Messages. Reference number: [id number].   2/24/2024 10:56:49 AM CST

## 2024-02-27 RX ORDER — AMLODIPINE BESYLATE 2.5 MG/1
2.5 TABLET ORAL DAILY
Qty: 90 TABLET | Refills: 3 | Status: SHIPPED | OUTPATIENT
Start: 2024-02-27 | End: 2024-03-28 | Stop reason: SDUPTHER

## 2024-02-27 RX ORDER — HYDROCHLOROTHIAZIDE 25 MG/1
25 TABLET ORAL DAILY
Qty: 90 TABLET | Refills: 2 | Status: SHIPPED | OUTPATIENT
Start: 2024-02-27 | End: 2024-03-28 | Stop reason: SDUPTHER

## 2024-02-27 RX ORDER — METHYLPHENIDATE HYDROCHLORIDE 20 MG/1
20 TABLET ORAL 2 TIMES DAILY
Qty: 60 TABLET | Refills: 0 | Status: SHIPPED | OUTPATIENT
Start: 2024-02-27 | End: 2024-03-28 | Stop reason: SDUPTHER

## 2024-02-27 RX ORDER — LOSARTAN POTASSIUM 100 MG/1
100 TABLET ORAL DAILY
Qty: 90 TABLET | Refills: 2 | Status: SHIPPED | OUTPATIENT
Start: 2024-02-27 | End: 2024-03-28 | Stop reason: SDUPTHER

## 2024-02-27 RX ORDER — ESCITALOPRAM OXALATE 20 MG/1
20 TABLET ORAL NIGHTLY
Qty: 90 TABLET | Refills: 3 | Status: SHIPPED | OUTPATIENT
Start: 2024-02-27 | End: 2024-03-28 | Stop reason: SDUPTHER

## 2024-02-27 RX ORDER — PANTOPRAZOLE SODIUM 40 MG/1
40 TABLET, DELAYED RELEASE ORAL DAILY
Qty: 90 TABLET | Refills: 3 | Status: SHIPPED | OUTPATIENT
Start: 2024-02-27 | End: 2024-03-28 | Stop reason: SDUPTHER

## 2024-03-17 NOTE — PROGRESS NOTES
Janki Jones presents for follow up evaluation of   Encounter Diagnoses   Name Primary?    Left carpal tunnel syndrome Yes    Cubital tunnel syndrome on right     Cubital tunnel syndrome on left     Chronic pain of left hand      History of Present Illness  The patient presents for evaluation of bilateral hand pain.    She has involuntary spasms in her bilateral hands while driving. The left hand is itchy and painful. Her hands feel normal currently, but she feels tingling in the small and ring fingers on the left. Her thumb feels normal. Her hands have been feeling weak and she has been dropping things. She denies any neck pain. She wears braces at night.     Patient is over 1 year s/p right carpal tunnel release and left carpal tunnel injection. She reports she continues to have numbness and tingling in her hand, she attributes this to her crocheting.  She also complains of stiffness in her wrist.  She is also here for her left wrist numbness and tingling of her whole hand.  She states that this has been increasing in pain over the last few months.  Vitals:    03/18/24 0858   PainSc:   6   PainLoc: Hand       PE:    AA&O x 4.  NAD  HEENT:  NCAT, sclera nonicteric  Lungs:  Respirations are equal and unlabored.  CV:  2+ bilateral upper and lower extremity pulses.  MSK:   Physical Exam  Mild tingling in the small and ring fingers on the left.    + compression and Tinels bilateral cubital tunnels and left wrist. Neurovascularly intact bilaterally.  5/5 thenar and intrinsic musculature strength.  Full range of motion hands, wrists and elbows.      Diagnostic studies and other clinical records review:  Results    EMG/NCS 2.16.16: Right moderate carpal tunnel    Assessment/Plan:   Encounter Diagnoses   Name Primary?    Left carpal tunnel syndrome Yes    Cubital tunnel syndrome on right     Cubital tunnel syndrome on left     Chronic pain of left hand      Assessment & Plan  1. Bilateral hand pain.  Her last  nerve conduction study was in 2016. I will obtain a new nerve conduction study. She was advised to avoid sleeping with her elbows bent. She was advised to submerging her hands in ice water for 1 to 2 minutes to help with the swelling.    Follow-up  The patient will follow up after the nerve conduction study.  We have discussed the natural history of Carpal tunnel syndrome and cubital tunnel syndrome including treatment options such as splinting, oral and topical anti-inflammatories, cortisone injections and surgery.. I have recommended an EMG/NCS to assess the severity of Her carpal tunnel syndrome    Follow up after EMG/NCS for results review  Call with any questions/concerns in the interim            Toma Bowers MD    Please be aware that this note has been generated with the assistance of "[employer] voice-to-text.  Please excuse any spelling or grammatical errors.    This note was generated with the assistance of ambient listening technology. Verbal consent was obtained by the patient and accompanying visitor(s) for the recording of patient appointment to facilitate this note. I attest to having reviewed and edited the generated note for accuracy, though some syntax or spelling errors may persist. Please contact the author of this note for any clarification.

## 2024-03-18 ENCOUNTER — OFFICE VISIT (OUTPATIENT)
Dept: ORTHOPEDICS | Facility: CLINIC | Age: 59
End: 2024-03-18
Payer: COMMERCIAL

## 2024-03-18 VITALS — HEIGHT: 67 IN | BODY MASS INDEX: 28.41 KG/M2 | WEIGHT: 181 LBS

## 2024-03-18 DIAGNOSIS — M79.642 CHRONIC PAIN OF LEFT HAND: ICD-10-CM

## 2024-03-18 DIAGNOSIS — G56.22 CUBITAL TUNNEL SYNDROME ON LEFT: ICD-10-CM

## 2024-03-18 DIAGNOSIS — G56.02 LEFT CARPAL TUNNEL SYNDROME: Primary | ICD-10-CM

## 2024-03-18 DIAGNOSIS — G56.21 CUBITAL TUNNEL SYNDROME ON RIGHT: ICD-10-CM

## 2024-03-18 DIAGNOSIS — G89.29 CHRONIC PAIN OF LEFT HAND: ICD-10-CM

## 2024-03-18 PROCEDURE — 4010F ACE/ARB THERAPY RXD/TAKEN: CPT | Mod: CPTII,S$GLB,, | Performed by: ORTHOPAEDIC SURGERY

## 2024-03-18 PROCEDURE — 99999 PR PBB SHADOW E&M-EST. PATIENT-LVL III: CPT | Mod: PBBFAC,,, | Performed by: ORTHOPAEDIC SURGERY

## 2024-03-18 PROCEDURE — 1159F MED LIST DOCD IN RCRD: CPT | Mod: CPTII,S$GLB,, | Performed by: ORTHOPAEDIC SURGERY

## 2024-03-18 PROCEDURE — 1160F RVW MEDS BY RX/DR IN RCRD: CPT | Mod: CPTII,S$GLB,, | Performed by: ORTHOPAEDIC SURGERY

## 2024-03-18 PROCEDURE — 3008F BODY MASS INDEX DOCD: CPT | Mod: CPTII,S$GLB,, | Performed by: ORTHOPAEDIC SURGERY

## 2024-03-18 PROCEDURE — 99214 OFFICE O/P EST MOD 30 MIN: CPT | Mod: S$GLB,,, | Performed by: ORTHOPAEDIC SURGERY

## 2024-03-28 DIAGNOSIS — F41.9 ANXIETY: ICD-10-CM

## 2024-03-28 DIAGNOSIS — F90.1 ATTENTION DEFICIT HYPERACTIVITY DISORDER (ADHD), PREDOMINANTLY HYPERACTIVE TYPE: ICD-10-CM

## 2024-03-28 RX ORDER — PROPRANOLOL HYDROCHLORIDE 10 MG/1
10 TABLET ORAL 3 TIMES DAILY PRN
Qty: 90 TABLET | Refills: 11 | Status: SHIPPED | OUTPATIENT
Start: 2024-03-28 | End: 2024-04-26 | Stop reason: SDUPTHER

## 2024-03-28 RX ORDER — AMLODIPINE BESYLATE 2.5 MG/1
2.5 TABLET ORAL DAILY
Qty: 90 TABLET | Refills: 3 | Status: SHIPPED | OUTPATIENT
Start: 2024-03-28 | End: 2024-04-26 | Stop reason: SDUPTHER

## 2024-03-28 RX ORDER — ESCITALOPRAM OXALATE 20 MG/1
20 TABLET ORAL NIGHTLY
Qty: 90 TABLET | Refills: 3 | Status: SHIPPED | OUTPATIENT
Start: 2024-03-28 | End: 2024-04-26 | Stop reason: SDUPTHER

## 2024-03-28 RX ORDER — PANTOPRAZOLE SODIUM 40 MG/1
40 TABLET, DELAYED RELEASE ORAL DAILY
Qty: 90 TABLET | Refills: 3 | Status: SHIPPED | OUTPATIENT
Start: 2024-03-28 | End: 2024-04-26 | Stop reason: SDUPTHER

## 2024-03-28 RX ORDER — LOSARTAN POTASSIUM 100 MG/1
100 TABLET ORAL DAILY
Qty: 90 TABLET | Refills: 2 | Status: SHIPPED | OUTPATIENT
Start: 2024-03-28 | End: 2024-04-26 | Stop reason: SDUPTHER

## 2024-03-28 RX ORDER — METHYLPHENIDATE HYDROCHLORIDE 20 MG/1
20 TABLET ORAL 2 TIMES DAILY
Qty: 60 TABLET | Refills: 0 | Status: SHIPPED | OUTPATIENT
Start: 2024-03-28 | End: 2024-04-26 | Stop reason: SDUPTHER

## 2024-03-28 RX ORDER — HYDROCHLOROTHIAZIDE 25 MG/1
25 TABLET ORAL DAILY
Qty: 90 TABLET | Refills: 2 | Status: SHIPPED | OUTPATIENT
Start: 2024-03-28 | End: 2024-04-26 | Stop reason: SDUPTHER

## 2024-04-02 ENCOUNTER — OFFICE VISIT (OUTPATIENT)
Dept: ORTHOPEDICS | Facility: CLINIC | Age: 59
End: 2024-04-02
Payer: COMMERCIAL

## 2024-04-02 VITALS — WEIGHT: 181 LBS | BODY MASS INDEX: 28.41 KG/M2 | HEIGHT: 67 IN

## 2024-04-02 DIAGNOSIS — M72.2 PLANTAR FASCIITIS, BILATERAL: Primary | ICD-10-CM

## 2024-04-02 DIAGNOSIS — M79.2 NEUROGENIC PAIN OF FOOT: ICD-10-CM

## 2024-04-02 PROCEDURE — 99999 PR PBB SHADOW E&M-EST. PATIENT-LVL III: CPT | Mod: PBBFAC,,, | Performed by: ORTHOPAEDIC SURGERY

## 2024-04-02 PROCEDURE — 3008F BODY MASS INDEX DOCD: CPT | Mod: CPTII,S$GLB,, | Performed by: ORTHOPAEDIC SURGERY

## 2024-04-02 PROCEDURE — 20551 NJX 1 TENDON ORIGIN/INSJ: CPT | Mod: S$GLB,,, | Performed by: ORTHOPAEDIC SURGERY

## 2024-04-02 PROCEDURE — 1159F MED LIST DOCD IN RCRD: CPT | Mod: CPTII,S$GLB,, | Performed by: ORTHOPAEDIC SURGERY

## 2024-04-02 PROCEDURE — 1160F RVW MEDS BY RX/DR IN RCRD: CPT | Mod: CPTII,S$GLB,, | Performed by: ORTHOPAEDIC SURGERY

## 2024-04-02 PROCEDURE — 4010F ACE/ARB THERAPY RXD/TAKEN: CPT | Mod: CPTII,S$GLB,, | Performed by: ORTHOPAEDIC SURGERY

## 2024-04-02 PROCEDURE — 99213 OFFICE O/P EST LOW 20 MIN: CPT | Mod: 25,S$GLB,, | Performed by: ORTHOPAEDIC SURGERY

## 2024-04-02 RX ORDER — METHYLPREDNISOLONE ACETATE 40 MG/ML
40 INJECTION, SUSPENSION INTRA-ARTICULAR; INTRALESIONAL; INTRAMUSCULAR; SOFT TISSUE
Status: COMPLETED | OUTPATIENT
Start: 2024-04-02 | End: 2024-04-02

## 2024-04-02 RX ADMIN — METHYLPREDNISOLONE ACETATE 40 MG: 40 INJECTION, SUSPENSION INTRA-ARTICULAR; INTRALESIONAL; INTRAMUSCULAR; SOFT TISSUE at 01:04

## 2024-04-02 NOTE — PROGRESS NOTES
Janki Jones  Returns today for follow-up.  This is a 58-year-old female who initially presented to me on 12/08/2023 with a three-month history of bilateral heel pain that began after doing a lot of walking when she was out of the country in [place].  She was initially seen in [facility] and was then referred to Podiatry and was seen on 10/30/2023 and treated with a Medrol Dosepak and gabapentin and referred to neurology because of neurogenic complaints.  When I initially saw her it seem that her symptoms were mostly consistent with Achilles tendinitis but did report nighttime sharp resting pain.  I ordered an MRI scan of the right ankle that was performed on 12/30/2023 which revealed bilateral plantar fasciitis and mild distal Achilles tendinitis.  I only ordered an MRI of the right side because it was her more symptomatic side.  I recommended therapy but she reports that she was not able to complete a course of therapy.  She has been seen by Neurology in his scheduled for EMG nerve conduction studies at the end of this month.  She reports that her symptoms are not really any better and reports that the pain is mainly on the bottom of her heel and radiates distally into her toes.  This would be more consistent with plantar fasciitis.    Examination:  She walks in today with a normal gait.  On sitting exam she has tenderness over the plantar aspect of the heel and the medial tuberosity of the calcaneus as well as some mild tenderness over the abductor hallucis muscle bilaterally.  She does not have any tenderness about the Achilles tendon on either side today.  She has painless motion of her ankle and subtalar joint.  She is normal function and strength of all the tendons about her ankle.  She is neurovascularly intact    Impression:  1. Plantar fasciitis, bilateral  methylPREDNISolone acetate injection 40 mg      2. Neurogenic pain of foot, tarsal tunnel ?          Recommendation:  It  appears that her mechanical symptoms maybe due to plantar fasciitis and I offered to do corticosteroid injections.  She agreed to the injections.  After verbal consent and sterile prep I injected the origin of both plantar fascia with 2 cc lidocaine and 40 mg of methylprednisolone.  I suppose that she may have some degree of distal tarsal tunnel syndrome which could account for her neurogenic symptoms in her foot.  We will see how she responds to the injections and we will await the EMG nerve conduction studies.    Follow-up in six weeks

## 2024-04-26 ENCOUNTER — TELEPHONE (OUTPATIENT)
Dept: OBSTETRICS AND GYNECOLOGY | Facility: CLINIC | Age: 59
End: 2024-04-26
Payer: COMMERCIAL

## 2024-04-26 DIAGNOSIS — F90.1 ATTENTION DEFICIT HYPERACTIVITY DISORDER (ADHD), PREDOMINANTLY HYPERACTIVE TYPE: ICD-10-CM

## 2024-04-26 DIAGNOSIS — F41.9 ANXIETY: ICD-10-CM

## 2024-04-26 RX ORDER — AMLODIPINE BESYLATE 2.5 MG/1
2.5 TABLET ORAL DAILY
Qty: 90 TABLET | Refills: 3 | Status: SHIPPED | OUTPATIENT
Start: 2024-04-26

## 2024-04-26 RX ORDER — ESCITALOPRAM OXALATE 20 MG/1
20 TABLET ORAL NIGHTLY
Qty: 90 TABLET | Refills: 3 | Status: SHIPPED | OUTPATIENT
Start: 2024-04-26

## 2024-04-26 RX ORDER — PANTOPRAZOLE SODIUM 40 MG/1
40 TABLET, DELAYED RELEASE ORAL DAILY
Qty: 90 TABLET | Refills: 3 | Status: SHIPPED | OUTPATIENT
Start: 2024-04-26 | End: 2024-05-27 | Stop reason: SDUPTHER

## 2024-04-26 RX ORDER — METHYLPHENIDATE HYDROCHLORIDE 20 MG/1
20 TABLET ORAL 2 TIMES DAILY
Qty: 60 TABLET | Refills: 0 | Status: SHIPPED | OUTPATIENT
Start: 2024-04-26 | End: 2024-05-27 | Stop reason: SDUPTHER

## 2024-04-26 RX ORDER — PROPRANOLOL HYDROCHLORIDE 10 MG/1
10 TABLET ORAL 3 TIMES DAILY PRN
Qty: 90 TABLET | Refills: 3 | Status: SHIPPED | OUTPATIENT
Start: 2024-04-26 | End: 2025-04-26

## 2024-04-26 RX ORDER — HYDROCHLOROTHIAZIDE 25 MG/1
25 TABLET ORAL DAILY
Qty: 90 TABLET | Refills: 3 | Status: SHIPPED | OUTPATIENT
Start: 2024-04-26 | End: 2024-05-27 | Stop reason: SDUPTHER

## 2024-04-26 RX ORDER — LOSARTAN POTASSIUM 100 MG/1
100 TABLET ORAL DAILY
Qty: 90 TABLET | Refills: 1 | Status: SHIPPED | OUTPATIENT
Start: 2024-04-26 | End: 2024-05-27 | Stop reason: SDUPTHER

## 2024-04-26 NOTE — TELEPHONE ENCOUNTER
Refill Decision Note   Janki Jones  is requesting a refill authorization.  Brief Assessment and Rationale for Refill:  Approve     Medication Therapy Plan:         Comments:     Note composed:1:56 PM 04/26/2024

## 2024-04-26 NOTE — TELEPHONE ENCOUNTER
No care due was identified.  [facility] Embedded Care Due Messages. Reference number: [id number].   4/26/2024 9:23:28 AM CDT

## 2024-04-26 NOTE — TELEPHONE ENCOUNTER
No care due was identified.  [facility] Embedded Care Due Messages. Reference number: [id number].   4/26/2024 9:24:15 AM CDT

## 2024-04-30 ENCOUNTER — PROCEDURE VISIT (OUTPATIENT)
Dept: NEUROLOGY | Facility: CLINIC | Age: 59
End: 2024-04-30
Payer: COMMERCIAL

## 2024-04-30 DIAGNOSIS — R20.2 NUMBNESS AND TINGLING IN LEFT HAND: ICD-10-CM

## 2024-04-30 DIAGNOSIS — G56.02 LEFT CARPAL TUNNEL SYNDROME: ICD-10-CM

## 2024-04-30 DIAGNOSIS — R20.2 NUMBNESS AND TINGLING IN RIGHT HAND: ICD-10-CM

## 2024-04-30 DIAGNOSIS — R20.0 NUMBNESS AND TINGLING IN LEFT HAND: ICD-10-CM

## 2024-04-30 DIAGNOSIS — R20.0 NUMBNESS AND TINGLING IN RIGHT HAND: ICD-10-CM

## 2024-04-30 PROCEDURE — 95886 MUSC TEST DONE W/N TEST COMP: CPT | Mod: S$GLB,,, | Performed by: PSYCHIATRY & NEUROLOGY

## 2024-04-30 PROCEDURE — 99214 OFFICE O/P EST MOD 30 MIN: CPT | Mod: 25,S$GLB,, | Performed by: PSYCHIATRY & NEUROLOGY

## 2024-04-30 PROCEDURE — 95913 NRV CNDJ TEST 13/> STUDIES: CPT | Mod: S$GLB,,, | Performed by: PSYCHIATRY & NEUROLOGY

## 2024-04-30 NOTE — PROCEDURES
EMG W/ ULTRASOUND AND NERVE CONDUCTION TEST 2 Extremities    Date/Time: 4/30/2024 9:00 AM    Performed by: Jomar Cardenas MD  Authorized by: Toma Bowers MD                                                                 [facility] [address] Neurology    Subjective:       Patient ID: Janki Jones is a 58 y.o. female here for a EMG focused evaluation for arm and hand pain. Previous visits and diagnostic evaluation reviewed.  Patient has a history of right carpal tunnel release procedure in December of 2022.  She states surgery was very successful in reducing numbness and cramping.  She does describe some stiffness and occasional numbness persisting in the right hand.  She also describes worsening numbness of the left hand.  She is right-handed.  She denies severe neck pain.  Symptoms have been progressively worsening and severe at times.   HPI  Review of patient's allergies indicates:   Allergen Reactions    Percocet [oxycodone-acetaminophen]      Other reaction(s): Nausea      There were no vitals filed for this visit.   Chief Complaint: No chief complaint on file.    Past Medical History:   Diagnosis Date    Abnormal Pap smear 2007    Abnormal Pap smear of cervix     ADD (attention deficit disorder)     Anxiety     Depression     Fatty liver     Fibroids     GERD (gastroesophageal reflux disease)     Herpes simplex virus (HSV) infection     HSV1.    Hypertension       Social History     Socioeconomic History    Marital status:    Tobacco Use    Smoking status: Never    Smokeless tobacco: Never   Substance and Sexual Activity    Alcohol use: No    Drug use: No    Sexual activity: Yes     Partners: Male     Birth control/protection: See Surgical Hx     Comment: .   Social History Narrative    ,  disabled with PTSD (from ), 3 children, working with [employer] , going to school for business, no regular exercise      Review of Systems:   No  Fever  No SOB  No vomiting  No visual disturbance      Objective:      Physical Exam    Constitutional: Patient appears well-nourished.   Head: Normocephalic and atraumatic.   Mouth/Throat: Oropharynx is clear and moist.   Pulmonary/Chest: Effort normal.   Abdominal: Soft.   Skin: Skin is warm and dry.      General:  Patient is alert and cooperative.  Affect:  Patient is appropriate to surroundings and environment.  Language:  Speech is fluent.  HEENT:  There are no outward signs of trauma to head or face.  Cranial Nerves:  Pupils are equal round and reactive to light. Extra-ocular movements are intact. Face, tongue, and palate are symmetrical.  Motor:  Patient exhibits normal strength testing in bilateral proximal and distal upper extremities.  Reflexes:  Symmetrical in bilateral upper extremities.  Gait:  Ambulation is independent without use of cane or walker without signs of ataxia or circumduction.  Cerebellar:  Normal finger to nose testing without dysmetria.  Sensory:  Intact to sensory modalities tested.  Musculoskeletal:  There is no severe tenderness to palpation and manipulation of the cervical spine region.   Assessment:       We reviewed and discussed at length results of EMG of bilateral upper extremities performed today notable for mild right and moderate left carpal tunnel syndrome without significant evidence of ulnar neuropathy or cervical radiculopathy. These findings are available via media section of chart review.   1. Left carpal tunnel syndrome    2. Numbness and tingling in right hand    3. Numbness and tingling in left hand              Plan:       We discussed treatment options at length. Recommend patient keep appointment with referring provider.       We specifically discussed the pathophysiology of carpal tunnel syndrome and treatment options including bracing, injections and possible surgical intervention.     I spent a total of 35 minutes on the day of the visit. This includes face to  face time and non-face to face time preparing to see the patient (eg, review of tests), obtaining and/or reviewing separately obtained history, documenting clinical information in the electronic or other health record, independently interpreting results and communicating results to the patient/family/caregiver, or care coordinator  .  Jomar Cardenas MD, FAAN   04/30/2024   9:48 AM     A dictation device was used to produce this document. Use of such devices sometimes results in grammatical errors or replacement of words that sound similarly.

## 2024-05-02 ENCOUNTER — TELEPHONE (OUTPATIENT)
Dept: INTERNAL MEDICINE | Facility: CLINIC | Age: 59
End: 2024-05-02

## 2024-05-02 ENCOUNTER — OFFICE VISIT (OUTPATIENT)
Dept: INTERNAL MEDICINE | Facility: CLINIC | Age: 59
End: 2024-05-02
Payer: COMMERCIAL

## 2024-05-02 ENCOUNTER — LAB VISIT (OUTPATIENT)
Dept: LAB | Facility: HOSPITAL | Age: 59
End: 2024-05-02
Attending: INTERNAL MEDICINE
Payer: COMMERCIAL

## 2024-05-02 VITALS
HEART RATE: 73 BPM | BODY MASS INDEX: 28.93 KG/M2 | DIASTOLIC BLOOD PRESSURE: 81 MMHG | RESPIRATION RATE: 19 BRPM | OXYGEN SATURATION: 99 % | WEIGHT: 184.31 LBS | HEIGHT: 67 IN | SYSTOLIC BLOOD PRESSURE: 144 MMHG

## 2024-05-02 DIAGNOSIS — F33.1 MAJOR DEPRESSIVE DISORDER, RECURRENT EPISODE, MODERATE: ICD-10-CM

## 2024-05-02 DIAGNOSIS — R94.31 ABNORMAL EKG: ICD-10-CM

## 2024-05-02 DIAGNOSIS — Z00.00 ANNUAL PHYSICAL EXAM: Primary | ICD-10-CM

## 2024-05-02 DIAGNOSIS — K76.0 FATTY LIVER: ICD-10-CM

## 2024-05-02 DIAGNOSIS — I10 ESSENTIAL HYPERTENSION: ICD-10-CM

## 2024-05-02 DIAGNOSIS — K21.9 GASTROESOPHAGEAL REFLUX DISEASE, UNSPECIFIED WHETHER ESOPHAGITIS PRESENT: ICD-10-CM

## 2024-05-02 DIAGNOSIS — R06.09 DYSPNEA ON EXERTION: ICD-10-CM

## 2024-05-02 DIAGNOSIS — F41.9 ANXIETY: Primary | ICD-10-CM

## 2024-05-02 DIAGNOSIS — F90.1 ATTENTION DEFICIT HYPERACTIVITY DISORDER (ADHD), PREDOMINANTLY HYPERACTIVE TYPE: ICD-10-CM

## 2024-05-02 LAB
ALBUMIN SERPL BCP-MCNC: 3.6 G/DL (ref 3.5–5.2)
ALP SERPL-CCNC: 144 U/L (ref 55–135)
ALT SERPL W/O P-5'-P-CCNC: 254 U/L (ref 10–44)
ANION GAP SERPL CALC-SCNC: 9 MMOL/L (ref 8–16)
AST SERPL-CCNC: 157 U/L (ref 10–40)
BASOPHILS # BLD AUTO: 0.02 K/UL (ref 0–0.2)
BASOPHILS NFR BLD: 0.3 % (ref 0–1.9)
BILIRUB SERPL-MCNC: 0.4 MG/DL (ref 0.1–1)
BNP SERPL-MCNC: 54 PG/ML (ref 0–99)
BUN SERPL-MCNC: 8 MG/DL (ref 6–20)
CALCIUM SERPL-MCNC: 9.5 MG/DL (ref 8.7–10.5)
CHLORIDE SERPL-SCNC: 98 MMOL/L (ref 95–110)
CO2 SERPL-SCNC: 29 MMOL/L (ref 23–29)
CREAT SERPL-MCNC: 0.8 MG/DL (ref 0.5–1.4)
DIFFERENTIAL METHOD BLD: ABNORMAL
EOSINOPHIL # BLD AUTO: 0 K/UL (ref 0–0.5)
EOSINOPHIL NFR BLD: 0.6 % (ref 0–8)
ERYTHROCYTE [DISTWIDTH] IN BLOOD BY AUTOMATED COUNT: 14.2 % (ref 11.5–14.5)
EST. GFR  (NO RACE VARIABLE): >60 ML/MIN/1.73 M^2
GLUCOSE SERPL-MCNC: 76 MG/DL (ref 70–110)
HCT VFR BLD AUTO: 41.4 % (ref 37–48.5)
HGB BLD-MCNC: 12.9 G/DL (ref 12–16)
IMM GRANULOCYTES # BLD AUTO: 0.01 K/UL (ref 0–0.04)
IMM GRANULOCYTES NFR BLD AUTO: 0.2 % (ref 0–0.5)
LYMPHOCYTES # BLD AUTO: 2.5 K/UL (ref 1–4.8)
LYMPHOCYTES NFR BLD: 39.4 % (ref 18–48)
MCH RBC QN AUTO: 28.5 PG (ref 27–31)
MCHC RBC AUTO-ENTMCNC: 31.2 G/DL (ref 32–36)
MCV RBC AUTO: 92 FL (ref 82–98)
MONOCYTES # BLD AUTO: 0.6 K/UL (ref 0.3–1)
MONOCYTES NFR BLD: 8.5 % (ref 4–15)
NEUTROPHILS # BLD AUTO: 3.3 K/UL (ref 1.8–7.7)
NEUTROPHILS NFR BLD: 51 % (ref 38–73)
NRBC BLD-RTO: 0 /100 WBC
OHS QRS DURATION: 86 MS
OHS QTC CALCULATION: 424 MS
PLATELET # BLD AUTO: 194 K/UL (ref 150–450)
PMV BLD AUTO: 13.5 FL (ref 9.2–12.9)
POTASSIUM SERPL-SCNC: 2.9 MMOL/L (ref 3.5–5.1)
PROT SERPL-MCNC: 7.9 G/DL (ref 6–8.4)
RBC # BLD AUTO: 4.52 M/UL (ref 4–5.4)
SODIUM SERPL-SCNC: 136 MMOL/L (ref 136–145)
WBC # BLD AUTO: 6.44 K/UL (ref 3.9–12.7)

## 2024-05-02 PROCEDURE — 3008F BODY MASS INDEX DOCD: CPT | Mod: CPTII,S$GLB,, | Performed by: INTERNAL MEDICINE

## 2024-05-02 PROCEDURE — 85025 COMPLETE CBC W/AUTO DIFF WBC: CPT | Performed by: INTERNAL MEDICINE

## 2024-05-02 PROCEDURE — 3079F DIAST BP 80-89 MM HG: CPT | Mod: CPTII,S$GLB,, | Performed by: INTERNAL MEDICINE

## 2024-05-02 PROCEDURE — 80053 COMPREHEN METABOLIC PANEL: CPT | Performed by: INTERNAL MEDICINE

## 2024-05-02 PROCEDURE — 83880 ASSAY OF NATRIURETIC PEPTIDE: CPT | Performed by: INTERNAL MEDICINE

## 2024-05-02 PROCEDURE — 93010 ELECTROCARDIOGRAM REPORT: CPT | Mod: S$GLB,,, | Performed by: INTERNAL MEDICINE

## 2024-05-02 PROCEDURE — 36415 COLL VENOUS BLD VENIPUNCTURE: CPT | Mod: PO | Performed by: INTERNAL MEDICINE

## 2024-05-02 PROCEDURE — 99214 OFFICE O/P EST MOD 30 MIN: CPT | Mod: S$GLB,,, | Performed by: INTERNAL MEDICINE

## 2024-05-02 PROCEDURE — 93005 ELECTROCARDIOGRAM TRACING: CPT | Mod: S$GLB,,, | Performed by: INTERNAL MEDICINE

## 2024-05-02 PROCEDURE — 99999 PR PBB SHADOW E&M-EST. PATIENT-LVL IV: CPT | Mod: PBBFAC,,, | Performed by: INTERNAL MEDICINE

## 2024-05-02 PROCEDURE — 4010F ACE/ARB THERAPY RXD/TAKEN: CPT | Mod: CPTII,S$GLB,, | Performed by: INTERNAL MEDICINE

## 2024-05-02 PROCEDURE — 3077F SYST BP >= 140 MM HG: CPT | Mod: CPTII,S$GLB,, | Performed by: INTERNAL MEDICINE

## 2024-05-02 NOTE — PROGRESS NOTES
Subjective     Patient ID: Janki Jones is a 58 y.o. female.    Chief Complaint: Follow-up, Shortness of Breath, and Depression    HPI  Pt with HTN, ADD, MDD, Fatty Liver, L CTS is here for f/u. Requesting refills of her Concerta which has been working well. Her main issue is dyspnea with exertion which has been getting worse over the last few months. It seems to resolve at rest. A/s of palpitations. No orthopnea/leg swelling, additional cardiac symptoms or hx of CAD/MI.   Review of Systems   Constitutional:  Negative for activity change, appetite change, chills, diaphoresis, fatigue, fever and unexpected weight change.   HENT:  Negative for postnasal drip, rhinorrhea, sinus pressure/congestion, sneezing, sore throat, trouble swallowing and voice change.    Respiratory:  Positive for shortness of breath (with exertion). Negative for cough and wheezing.    Cardiovascular:  Negative for chest pain, palpitations and leg swelling.   Gastrointestinal:  Negative for abdominal pain, blood in stool, constipation, diarrhea, nausea and vomiting.   Genitourinary:  Negative for dysuria.   Musculoskeletal:  Negative for arthralgias and myalgias.   Integumentary:  Negative for rash and wound.   Allergic/Immunologic: Negative for environmental allergies and food allergies.   Hematological:  Negative for adenopathy. Does not bruise/bleed easily.   Psychiatric/Behavioral:  Positive for decreased concentration and dysphoric mood. Negative for self-injury and suicidal ideas. The patient is nervous/anxious.           Objective     Physical Exam  Constitutional:       General: She is not in acute distress.     Appearance: Normal appearance. She is well-developed. She is not diaphoretic.   HENT:      Head: Normocephalic and atraumatic.      Right Ear: External ear normal.      Left Ear: External ear normal.      Nose: Nose normal.      Mouth/Throat:      Pharynx: No oropharyngeal exudate.   Eyes:      General: No scleral  icterus.        Right eye: No discharge.         Left eye: No discharge.      Conjunctiva/sclera: Conjunctivae normal.      Pupils: Pupils are equal, round, and reactive to light.   Neck:      Vascular: No JVD.   Cardiovascular:      Rate and Rhythm: Normal rate and regular rhythm.      Pulses: Normal pulses.      Heart sounds: Normal heart sounds.   Pulmonary:      Effort: Pulmonary effort is normal. No respiratory distress.      Breath sounds: Normal breath sounds. No wheezing, rhonchi or rales.   Abdominal:      General: Bowel sounds are normal. There is no distension.      Palpations: Abdomen is soft.      Tenderness: There is no abdominal tenderness. There is no guarding or rebound.   Musculoskeletal:      Cervical back: Neck supple.      Right lower leg: No edema.      Left lower leg: No edema.   Lymphadenopathy:      Cervical: No cervical adenopathy.   Skin:     General: Skin is warm and dry.      Coloration: Skin is not pale.      Findings: No rash.   Neurological:      General: No focal deficit present.      Mental Status: She is alert and oriented to person, place, and time.      Gait: Gait normal.   Psychiatric:         Behavior: Behavior normal.         Thought Content: Thought content normal.            Assessment and Plan     1. Anxiety    2. Attention deficit hyperactivity disorder (ADHD), predominantly hyperactive type    3. Major depressive disorder, recurrent episode, moderate    4. Essential hypertension  -     CBC Auto Differential; Future; Expected date: 05/02/2024  -     Comprehensive Metabolic Panel; Future; Expected date: 05/02/2024    5. Fatty liver    6. Gastroesophageal reflux disease, unspecified whether esophagitis present    7. Dyspnea on exertion  -     EKG 12-lead; Future; Expected date: 05/02/2024  -     BNP; Future; Expected date: 05/02/2024  -     Stress Echo Which stress agent will be used? Treadmill Exercise; Color Flow Doppler? No; Future    8. Abnormal EKG  -     Stress Echo  Which stress agent will be used? Treadmill Exercise; Color Flow Doppler? No; Future         Dyspnea with exertion- check labs/ECG/stress ECHO     HTN- stable       ADD- stable on Concerta      MDD- fair control on Lexapro 30 mg qd, Wellbutrin d/c      Anxiety- Lexapro as above   -can take Propranolol 10 mg TID PRN anxiety      Fatty liver- followed by Hepatology   -Wellbutrin stopped out of an abundance of caution 2/2 elevated LAEs(not sure if this is contributing or not)      GERD- on PPI      F/u in 3 months for annual exam

## 2024-05-03 ENCOUNTER — HOSPITAL ENCOUNTER (OUTPATIENT)
Dept: CARDIOLOGY | Facility: HOSPITAL | Age: 59
Discharge: HOME | End: 2024-05-03
Attending: INTERNAL MEDICINE
Payer: COMMERCIAL

## 2024-05-03 VITALS
DIASTOLIC BLOOD PRESSURE: 70 MMHG | HEIGHT: 67 IN | SYSTOLIC BLOOD PRESSURE: 123 MMHG | WEIGHT: 184 LBS | HEART RATE: 60 BPM | BODY MASS INDEX: 28.88 KG/M2

## 2024-05-03 DIAGNOSIS — R06.09 DYSPNEA ON EXERTION: ICD-10-CM

## 2024-05-03 DIAGNOSIS — R94.31 ABNORMAL EKG: ICD-10-CM

## 2024-05-03 LAB
ASCENDING AORTA: 3.27 CM
BSA FOR ECHO PROCEDURE: 1.99 M2
CV ECHO LV RWT: 0.34 CM
CV STRESS BASE HR: 60 BPM
DIASTOLIC BLOOD PRESSURE: 70 MMHG
DOP CALC LVOT AREA: 2.7 CM2
DOP CALC LVOT DIAMETER: 1.85 CM
DOP CALC LVOT PEAK VEL: 1.26 M/S
DOP CALC LVOT STROKE VOLUME: 76.89 CM3
DOP CALCLVOT PEAK VEL VTI: 28.62 CM
E WAVE DECELERATION TIME: 187.56 MSEC
E/A RATIO: 1.53
E/E' RATIO: 8.82 M/S
ECHO LV POSTERIOR WALL: 0.7 CM (ref 0.6–1.1)
EJECTION FRACTION: 65 %
FRACTIONAL SHORTENING: 29 % (ref 28–44)
INTERVENTRICULAR SEPTUM: 0.74 CM (ref 0.6–1.1)
LA MAJOR: 5.31 CM
LA MINOR: 5.41 CM
LA WIDTH: 3.05 CM
LEFT ATRIUM SIZE: 3.61 CM
LEFT ATRIUM VOLUME INDEX MOD: 24.9 ML/M2
LEFT ATRIUM VOLUME INDEX: 25.7 ML/M2
LEFT ATRIUM VOLUME MOD: 48.47 CM3
LEFT ATRIUM VOLUME: 50.16 CM3
LEFT INTERNAL DIMENSION IN SYSTOLE: 2.97 CM (ref 2.1–4)
LEFT VENTRICLE DIASTOLIC VOLUME INDEX: 39.41 ML/M2
LEFT VENTRICLE DIASTOLIC VOLUME: 76.85 ML
LEFT VENTRICLE MASS INDEX: 45 G/M2
LEFT VENTRICLE SYSTOLIC VOLUME INDEX: 17.5 ML/M2
LEFT VENTRICLE SYSTOLIC VOLUME: 34.07 ML
LEFT VENTRICULAR INTERNAL DIMENSION IN DIASTOLE: 4.16 CM (ref 3.5–6)
LEFT VENTRICULAR MASS: 86.82 G
LV LATERAL E/E' RATIO: 6.82 M/S
LV SEPTAL E/E' RATIO: 12.5 M/S
MV A" WAVE DURATION": 10.28 MSEC
MV PEAK A VEL: 0.49 M/S
MV PEAK E VEL: 0.75 M/S
MV STENOSIS PRESSURE HALF TIME: 54.39 MS
MV VALVE AREA P 1/2 METHOD: 4.04 CM2
OHS CV CPX 1 MINUTE RECOVERY HEART RATE: 85 BPM
OHS CV CPX 85 PERCENT MAX PREDICTED HEART RATE MALE: 139
OHS CV CPX ESTIMATED METS: 6
OHS CV CPX MAX PREDICTED HEART RATE: 164
OHS CV CPX PATIENT IS FEMALE: 1
OHS CV CPX PATIENT IS MALE: 0
OHS CV CPX PEAK DIASTOLIC BLOOD PRESSURE: 70 MMHG
OHS CV CPX PEAK HEAR RATE: 122 BPM
OHS CV CPX PEAK RATE PRESSURE PRODUCT: NORMAL
OHS CV CPX PEAK SYSTOLIC BLOOD PRESSURE: 127 MMHG
OHS CV CPX PERCENT MAX PREDICTED HEART RATE ACHIEVED: 78
OHS CV CPX RATE PRESSURE PRODUCT PRESENTING: 7380
OHS CV RV/LV RATIO: 0.59 CM
PISA TR MAX VEL: 2.51 M/S
PULM VEIN S/D RATIO: 1.21
PV PEAK D VEL: 0.43 M/S
PV PEAK S VEL: 0.52 M/S
RA MAJOR: 5.18 CM
RA PRESSURE ESTIMATED: 3 MMHG
RA WIDTH: 2.53 CM
RIGHT VENTRICULAR END-DIASTOLIC DIMENSION: 2.47 CM
RV TB RVSP: 6 MMHG
SINUS: 3.4 CM
STJ: 2.41 CM
STRESS ECHO POST EXERCISE DUR MIN: 3 MINUTES
STRESS ECHO POST EXERCISE DUR SEC: 48 SECONDS
SYSTOLIC BLOOD PRESSURE: 123 MMHG
TDI LATERAL: 0.11 M/S
TDI SEPTAL: 0.06 M/S
TDI: 0.09 M/S
TR MAX PG: 25 MMHG
TRICUSPID ANNULAR PLANE SYSTOLIC EXCURSION: 3.7 CM
TV REST PULMONARY ARTERY PRESSURE: 28 MMHG
Z-SCORE OF LEFT VENTRICULAR DIMENSION IN END DIASTOLE: -2.9
Z-SCORE OF LEFT VENTRICULAR DIMENSION IN END SYSTOLE: -1.11

## 2024-05-03 PROCEDURE — 93351 STRESS TTE COMPLETE: CPT | Mod: 26,,, | Performed by: INTERNAL MEDICINE

## 2024-05-03 PROCEDURE — 93351 STRESS TTE COMPLETE: CPT | Mod: PO

## 2024-05-27 DIAGNOSIS — F33.1 MAJOR DEPRESSIVE DISORDER, RECURRENT EPISODE, MODERATE: ICD-10-CM

## 2024-05-27 DIAGNOSIS — F90.1 ATTENTION DEFICIT HYPERACTIVITY DISORDER (ADHD), PREDOMINANTLY HYPERACTIVE TYPE: ICD-10-CM

## 2024-05-27 RX ORDER — LOSARTAN POTASSIUM 100 MG/1
100 TABLET ORAL DAILY
Qty: 90 TABLET | Refills: 3 | Status: SHIPPED | OUTPATIENT
Start: 2024-05-27

## 2024-05-27 RX ORDER — PANTOPRAZOLE SODIUM 40 MG/1
40 TABLET, DELAYED RELEASE ORAL DAILY
Qty: 90 TABLET | Refills: 3 | Status: SHIPPED | OUTPATIENT
Start: 2024-05-27

## 2024-05-27 RX ORDER — ESCITALOPRAM OXALATE 10 MG/1
10 TABLET ORAL DAILY
Qty: 90 TABLET | Refills: 3 | Status: SHIPPED | OUTPATIENT
Start: 2024-05-27

## 2024-05-27 NOTE — TELEPHONE ENCOUNTER
No care due was identified.  Health [facility] Embedded Care Due Messages. Reference number: [id number].   5/27/2024 10:49:09 AM CDT

## 2024-05-27 NOTE — TELEPHONE ENCOUNTER
Refill Routing Note   Medication(s) are not appropriate for processing by [facility] for the following reason(s):        Required labs abnormal: Hctz - hypokalemia  Outside of protocol: Ritalin    [facility] action(s):  Defer  Route  Approve               Appointments  past 12m or future 3m with PCP    Date Provider   Last Visit   5/2/2024 Daniel Lawrence, DO   Next Visit   5/27/2024 Daniel Lawrence, DO   ED visits in past 90 days: 0        Note composed:5:07 PM 05/27/2024

## 2024-05-28 RX ORDER — HYDROCHLOROTHIAZIDE 25 MG/1
25 TABLET ORAL DAILY
Qty: 90 TABLET | Refills: 3 | Status: SHIPPED | OUTPATIENT
Start: 2024-05-28

## 2024-05-28 RX ORDER — METHYLPHENIDATE HYDROCHLORIDE 20 MG/1
20 TABLET ORAL 2 TIMES DAILY
Qty: 60 TABLET | Refills: 0 | Status: SHIPPED | OUTPATIENT
Start: 2024-05-28

## 2024-06-04 ENCOUNTER — OFFICE VISIT (OUTPATIENT)
Dept: OBSTETRICS AND GYNECOLOGY | Facility: CLINIC | Age: 59
End: 2024-06-04
Payer: COMMERCIAL

## 2024-06-04 VITALS
DIASTOLIC BLOOD PRESSURE: 78 MMHG | SYSTOLIC BLOOD PRESSURE: 126 MMHG | BODY MASS INDEX: 28.62 KG/M2 | WEIGHT: 182.75 LBS

## 2024-06-04 DIAGNOSIS — Z12.11 COLON CANCER SCREENING: ICD-10-CM

## 2024-06-04 DIAGNOSIS — Z01.419 ENCOUNTER FOR WELL WOMAN EXAM WITH ROUTINE GYNECOLOGICAL EXAM: Primary | ICD-10-CM

## 2024-06-04 DIAGNOSIS — N95.1 HOT FLASHES DUE TO MENOPAUSE: ICD-10-CM

## 2024-06-04 PROCEDURE — 3078F DIAST BP <80 MM HG: CPT | Mod: CPTII,S$GLB,, | Performed by: OBSTETRICS & GYNECOLOGY

## 2024-06-04 PROCEDURE — 1160F RVW MEDS BY RX/DR IN RCRD: CPT | Mod: CPTII,S$GLB,, | Performed by: OBSTETRICS & GYNECOLOGY

## 2024-06-04 PROCEDURE — 99396 PREV VISIT EST AGE 40-64: CPT | Mod: S$GLB,,, | Performed by: OBSTETRICS & GYNECOLOGY

## 2024-06-04 PROCEDURE — 3074F SYST BP LT 130 MM HG: CPT | Mod: CPTII,S$GLB,, | Performed by: OBSTETRICS & GYNECOLOGY

## 2024-06-04 PROCEDURE — 4010F ACE/ARB THERAPY RXD/TAKEN: CPT | Mod: CPTII,S$GLB,, | Performed by: OBSTETRICS & GYNECOLOGY

## 2024-06-04 PROCEDURE — 3044F HG A1C LEVEL LT 7.0%: CPT | Mod: CPTII,S$GLB,, | Performed by: OBSTETRICS & GYNECOLOGY

## 2024-06-04 PROCEDURE — 1159F MED LIST DOCD IN RCRD: CPT | Mod: CPTII,S$GLB,, | Performed by: OBSTETRICS & GYNECOLOGY

## 2024-06-04 PROCEDURE — 99999 PR PBB SHADOW E&M-EST. PATIENT-LVL IV: CPT | Mod: PBBFAC,,, | Performed by: OBSTETRICS & GYNECOLOGY

## 2024-06-04 PROCEDURE — 3008F BODY MASS INDEX DOCD: CPT | Mod: CPTII,S$GLB,, | Performed by: OBSTETRICS & GYNECOLOGY

## 2024-06-04 NOTE — PROGRESS NOTES
"Chief Complaint: Annual exam    Chief Complaint   Patient presents with    Well Woman       HPI:   58 y.o.  here today for annual exam. She is postmenopausal and denies any irregular menstrual bleeding or post-coital bleeding. Patient denies abnormal breast symptoms. Sister with breast cancer diagnosed at 57 last April, s/p mastectomy, maternal aunt with breast cancer, otherwise negative FH of breast, uterine, ovarian or colon cancer. Patient denies vaginal discharge, odor. She denies itching, irritation, abdominal pain, urinary complaints, or change in bowel or bladder habits. She is not regularly sexually active, . She has been vaccinated against COVID-19. She has not received Shingles vaccine.     Initial depression screening positive. On further questioning she reports significant stress (financial and family-related) and occasional thoughts of running away or things "piling up on top of her" but does not want to take her life. Had a cardiac stress test a few weeks ago for increased chest pain and palpitations, normal cardiac workup.     Reports increasing menopause symptoms like hot flashes and wants to measure hormones. Unsure if she would be interested in HRT.    Labs / Significant Studies    Pap (2021): NILM/HPV neg  MMG (2023): BIRADS-1  Colonoscopy (2019): Repeat 5 years, needs    Past Medical History:   Diagnosis Date    Abnormal Pap smear     Abnormal Pap smear of cervix     ADD (attention deficit disorder)     Anxiety     Depression     Fatty liver     Fibroids     GERD (gastroesophageal reflux disease)     Herpes simplex virus (HSV) infection     HSV1.    Hypertension     Plantar fasciitis      Past Surgical History:   Procedure Laterality Date    CARPAL TUNNEL RELEASE Right 12/15/2022    Procedure: RELEASE, CARPAL TUNNEL;  Surgeon: Toma Bowers MD;  Location: [facility];  Service: Orthopedics;  Laterality: Right;    CERVICAL BIOPSY  W/ LOOP ELECTRODE EXCISION  2007    " COLONOSCOPY N/A 02/15/2019    Procedure: COLONOSCOPY;  Surgeon: Uri Torrez MD;  [facility]  Service: Endoscopy;  Laterality: N/A;    ESOPHAGOGASTRODUODENOSCOPY N/A 11/26/2021    Procedure: EGD (ESOPHAGOGASTRODUODENOSCOPY) fully vaccinated;  Surgeon: Venkat Astorga MD;  Location: [facility] ENDO;  Service: Endoscopy;  Laterality: N/A;    essure      INJECTION OF STEROID Left 12/15/2022    Procedure: INJECTION, STEROID;  Surgeon: Toma Bowers MD;  Location: [facility] OR;  Service: Orthopedics;  Laterality: Left;    TUBAL LIGATION         Current Outpatient Medications:     amLODIPine (NORVASC) 2.5 MG tablet, Take 1 tablet (2.5 mg total) by mouth once daily., Disp: 90 tablet, Rfl: 3    EScitalopram oxalate (LEXAPRO) 10 MG tablet, Take 1 tablet (10 mg total) by mouth once daily. Take with 20 mg for total dose 30 mg daily, Disp: 90 tablet, Rfl: 3    EScitalopram oxalate (LEXAPRO) 20 MG tablet, Take 1 tablet (20 mg total) by mouth every evening., Disp: 90 tablet, Rfl: 3    hydroCHLOROthiazide (HYDRODIURIL) 25 MG tablet, Take 1 tablet (25 mg total) by mouth once daily., Disp: 90 tablet, Rfl: 3    losartan (COZAAR) 100 MG tablet, Take 1 tablet (100 mg total) by mouth once daily., Disp: 90 tablet, Rfl: 3    methylphenidate HCl (RITALIN) 20 MG tablet, Take 1 tablet (20 mg total) by mouth 2 (two) times daily., Disp: 60 tablet, Rfl: 0    methylphenidate HCl (RITALIN) 20 MG tablet, Take 1 tablet (20 mg total) by mouth 2 (two) times daily., Disp: 60 tablet, Rfl: 0    methylphenidate HCl (RITALIN) 20 MG tablet, Take 1 tablet (20 mg total) by mouth 2 (two) times daily., Disp: 60 tablet, Rfl: 0    multivitamin with minerals tablet, Take 1 tablet by mouth once daily., Disp: , Rfl:     pantoprazole (PROTONIX) 40 MG tablet, Take 1 tablet (40 mg total) by mouth once daily., Disp: 90 tablet, Rfl: 3    propranoloL (INDERAL) 10 MG tablet, Take 1 tablet (10 mg total) by mouth 3 (three) times daily as needed (anxiety).,  Disp: 90 tablet, Rfl: 3    clotrimazole-betamethasone 1-0.05% (LOTRISONE) cream, Twice a day for 5-7 days (Patient not taking: Reported on 2024), Disp: 15 g, Rfl: 1    estradioL (ESTRACE) 0.01 % (0.1 mg/gram) vaginal cream, Place 1 g vaginally twice a week. At bedtime as needed, Disp: 42.5 g, Rfl: 6    loratadine (CLARITIN) 10 mg tablet, Take 10 mg by mouth once daily. (Patient not taking: Reported on 2024), Disp: , Rfl:   Review of patient's allergies indicates:   Allergen Reactions    Percocet [oxycodone-acetaminophen]      Other reaction(s): Nausea     OB History    Para Term  AB Living   3 3 3     3   SAB IAB Ectopic Multiple Live Births           3      # Outcome Date GA Lbr Ramon/2nd Weight Sex Type Anes PTL Lv   3 Term 06    M Vag-Spont  N PAT   2 Term 03    F Vag-Spont  N PAT   1 Term 98    F Vag-Spont  N PAT     Social History     Tobacco Use    Smoking status: Never    Smokeless tobacco: Never   Substance Use Topics    Alcohol use: No    Drug use: No     Family History   Problem Relation Name Age of Onset    Other Father          glaucoma    Diabetes Father      Heart disease Father      Hyperlipidemia Father      Hypertension Father      Kidney disease Father      Glaucoma Father      Depression Father      Osteoporosis Father      Cirrhosis Mother      Alcohol abuse Mother      Diabetes Mother      Liver disease Mother      Stroke Mother      Panic disorder Mother      Hypertension Brother      Other Sister          thyroid    Thyroid disease Sister      Breast cancer Sister  57    Glaucoma Sister      ADD / ADHD Daughter 20     ADD / ADHD Daughter 15     Panic disorder Daughter 15     ADD / ADHD Son 12     Colon cancer Neg Hx      Ovarian cancer Neg Hx      Amblyopia Neg Hx      Blindness Neg Hx      Cataracts Neg Hx      Macular degeneration Neg Hx      Retinal detachment Neg Hx      Strabismus Neg Hx         Review of Systems   Negative except as in HPI      Physical Exam   Vitals:    06/04/24 1422   BP: 126/78     Body mass index is 28.62 kg/m².    Physical Exam  Constitutional:       General: She is not in acute distress.     Appearance: Normal appearance.     Genitourinary:    Vulva, vagina, uterus and urethral meatus normal.   The external female genitalia was normal.   No external genitalia lesions identified,Genitalia hair distrobution normal .   No vaginal discharge or bleeding in the vagina.    No vaginal prolapse present.     Mild vaginal atrophy present.  Right adnexum displays no mass, no tenderness and no fullness. Left adnexum displays no mass, no tenderness and no fullness. Cervix is normal. Cervix exhibits no motion tenderness and no lesion. Uerus contour normal  Uterus is not tender and no mass.    Uterus is anteverted.   Breasts:     Right: No inverted nipple, mass, nipple discharge or skin change.      Left: No inverted nipple, mass, nipple discharge or skin change.   HENT:      Head: Normocephalic and atraumatic.   Eyes:      Extraocular Movements: Extraocular movements intact.      Pupils: Pupils are equal, round, and reactive to light.   Neck:      Thyroid: No thyroid mass, thyromegaly or thyroid tenderness.   Pulmonary:      Effort: Pulmonary effort is normal.   Abdominal:      General: Abdomen is flat. There is no distension.      Palpations: Abdomen is soft.      Tenderness: There is no abdominal tenderness. There is no guarding or rebound.   Musculoskeletal:         General: Normal range of motion.      Cervical back: Normal range of motion.   Lymphadenopathy:      Cervical: No cervical adenopathy.      Upper Body:      Right upper body: No supraclavicular or axillary adenopathy.      Left upper body: No supraclavicular or axillary adenopathy.   Neurological:      General: No focal deficit present.      Mental Status: She is alert and oriented to person, place, and time.   Skin:     General: Skin is warm and dry.   Psychiatric:         Mood  and Affect: Mood normal.         Behavior: Behavior normal.   Vitals reviewed.          ASSESSMENT:   Annual Well Women Exam  Patient Active Problem List   Diagnosis    Essential hypertension    Anxiety    ADD (attention deficit disorder)    GERD (gastroesophageal reflux disease)    Situational stress    Adjustment disorder with mixed anxiety and depressed mood    Anxiety state    Major depressive disorder, recurrent episode, moderate    Allergic rhinitis    Recurrent UTI    High risk HPV infection    Vaginal discharge    Pain    Decreased range of motion of right wrist    Tinea cruris    Dyspareunia in female    Fatty liver    Achilles tendinitis of both lower extremities    Decreased ROM of ankle    Encounter for well woman exam with routine gynecological exam    Hot flashes due to menopause     Health Maintenance Due   Topic Date Due    Pneumococcal Vaccines (Age 0-64) (1 of 2 - PCV) Never done    Shingles Vaccine (1 of 2) Never done    COVID-19 Vaccine (4 - 2023-24 season) 09/01/2023    Colorectal Cancer Screening  02/15/2024    Eye Exam  05/11/2024     Health Maintenance Topics with due status: Not Due       Topic Last Completion Date    TETANUS VACCINE 05/31/2017    Cervical Cancer Screening 11/12/2021    Mammogram 12/21/2023    Hemoglobin A1c (Prediabetes) 06/05/2024    Lipid Panel 06/05/2024       PLAN:  Problem List Items Addressed This Visit          Renal/    Encounter for well woman exam with routine gynecological exam - Primary    Current Assessment & Plan     Normal breast and pelvic exams except as noted. Pap/cotesting up to date. Continue breast self awareness. MMG up to date, order for colonoscopy placed. Recommend 30 minutes of exercise 5 times weekly. Counseled patient on promoting a healthy lifestyle including regular weightbearing physical activity (30 minutes on most days of the week), adequate nutrition (protein, calcium, and vitamin D), continued smoking cessation, no or moderate alcohol  intake, and fall prevention. Follow up with PCP as scheduled for routine health maintenance.     Patient deemed not a danger to herself or other and denies true SI/HI with plan. Information given for finding a therapist that accepts her insurance locally. Patient counseled to reach back out if she is unable to find a provider in a timely manner, and counseled to go immediately to the nearest ER with thoughts of harming herself or others. She voiced understanding and is amenable to the plan.            Hot flashes due to menopause    Current Assessment & Plan     Counseled that measuring hormones is not necessary to prescribe HRT and that her estradiol levels are likely undetectable as she is no longer having menstrual cycles, however patient interested in measuring hormones. Can schedule a hormone consult if desired.          Relevant Orders    Estradiol (Completed)    Follicle Stimulating Hormone (Completed)    Testosterone, Free (Completed)     Other Visit Diagnoses       Colon cancer screening        Relevant Orders    Ambulatory referral/consult to Endo Procedure             Follow up if symptoms worsen or fail to improve.       Ashly Rosado MD  Department of Obstetrics & Gynecology  [facility]

## 2024-06-05 ENCOUNTER — LAB VISIT (OUTPATIENT)
Dept: LAB | Facility: HOSPITAL | Age: 59
End: 2024-06-05
Attending: INTERNAL MEDICINE
Payer: COMMERCIAL

## 2024-06-05 DIAGNOSIS — N95.1 HOT FLASHES DUE TO MENOPAUSE: ICD-10-CM

## 2024-06-05 DIAGNOSIS — Z00.00 ANNUAL PHYSICAL EXAM: ICD-10-CM

## 2024-06-05 LAB
ALBUMIN SERPL BCP-MCNC: 3.8 G/DL (ref 3.5–5.2)
ALP SERPL-CCNC: 128 U/L (ref 55–135)
ALT SERPL W/O P-5'-P-CCNC: 351 U/L (ref 10–44)
ANION GAP SERPL CALC-SCNC: 9 MMOL/L (ref 8–16)
AST SERPL-CCNC: 232 U/L (ref 10–40)
BASOPHILS # BLD AUTO: 0.02 K/UL (ref 0–0.2)
BASOPHILS NFR BLD: 0.4 % (ref 0–1.9)
BILIRUB SERPL-MCNC: 0.6 MG/DL (ref 0.1–1)
BUN SERPL-MCNC: 10 MG/DL (ref 6–20)
CALCIUM SERPL-MCNC: 9.6 MG/DL (ref 8.7–10.5)
CHLORIDE SERPL-SCNC: 101 MMOL/L (ref 95–110)
CHOLEST SERPL-MCNC: 217 MG/DL (ref 120–199)
CHOLEST/HDLC SERPL: 4.5 {RATIO} (ref 2–5)
CO2 SERPL-SCNC: 30 MMOL/L (ref 23–29)
CREAT SERPL-MCNC: 0.8 MG/DL (ref 0.5–1.4)
DIFFERENTIAL METHOD BLD: ABNORMAL
EOSINOPHIL # BLD AUTO: 0.1 K/UL (ref 0–0.5)
EOSINOPHIL NFR BLD: 1.1 % (ref 0–8)
ERYTHROCYTE [DISTWIDTH] IN BLOOD BY AUTOMATED COUNT: 14.1 % (ref 11.5–14.5)
EST. GFR  (NO RACE VARIABLE): >60 ML/MIN/1.73 M^2
ESTIMATED AVG GLUCOSE: 120 MG/DL (ref 68–131)
ESTRADIOL SERPL-MCNC: <10 PG/ML
FSH SERPL-ACNC: 40.89 MIU/ML
GLUCOSE SERPL-MCNC: 101 MG/DL (ref 70–110)
HBA1C MFR BLD: 5.8 % (ref 4–5.6)
HCT VFR BLD AUTO: 40 % (ref 37–48.5)
HDLC SERPL-MCNC: 48 MG/DL (ref 40–75)
HDLC SERPL: 22.1 % (ref 20–50)
HGB BLD-MCNC: 12.7 G/DL (ref 12–16)
IMM GRANULOCYTES # BLD AUTO: 0.02 K/UL (ref 0–0.04)
IMM GRANULOCYTES NFR BLD AUTO: 0.4 % (ref 0–0.5)
LDLC SERPL CALC-MCNC: 147.8 MG/DL (ref 63–159)
LYMPHOCYTES # BLD AUTO: 2.6 K/UL (ref 1–4.8)
LYMPHOCYTES NFR BLD: 48.5 % (ref 18–48)
MCH RBC QN AUTO: 28.5 PG (ref 27–31)
MCHC RBC AUTO-ENTMCNC: 31.8 G/DL (ref 32–36)
MCV RBC AUTO: 90 FL (ref 82–98)
MONOCYTES # BLD AUTO: 0.5 K/UL (ref 0.3–1)
MONOCYTES NFR BLD: 8.9 % (ref 4–15)
NEUTROPHILS # BLD AUTO: 2.2 K/UL (ref 1.8–7.7)
NEUTROPHILS NFR BLD: 40.7 % (ref 38–73)
NONHDLC SERPL-MCNC: 169 MG/DL
NRBC BLD-RTO: 0 /100 WBC
PLATELET # BLD AUTO: 193 K/UL (ref 150–450)
PMV BLD AUTO: 13.5 FL (ref 9.2–12.9)
POTASSIUM SERPL-SCNC: 3 MMOL/L (ref 3.5–5.1)
PROT SERPL-MCNC: 8 G/DL (ref 6–8.4)
RBC # BLD AUTO: 4.46 M/UL (ref 4–5.4)
SODIUM SERPL-SCNC: 140 MMOL/L (ref 136–145)
TRIGL SERPL-MCNC: 106 MG/DL (ref 30–150)
TSH SERPL DL<=0.005 MIU/L-ACNC: 1.57 UIU/ML (ref 0.4–4)
WBC # BLD AUTO: 5.4 K/UL (ref 3.9–12.7)

## 2024-06-05 PROCEDURE — 36415 COLL VENOUS BLD VENIPUNCTURE: CPT | Mod: PN | Performed by: OBSTETRICS & GYNECOLOGY

## 2024-06-05 PROCEDURE — 80053 COMPREHEN METABOLIC PANEL: CPT | Performed by: INTERNAL MEDICINE

## 2024-06-05 PROCEDURE — 83001 ASSAY OF GONADOTROPIN (FSH): CPT | Performed by: OBSTETRICS & GYNECOLOGY

## 2024-06-05 PROCEDURE — 84443 ASSAY THYROID STIM HORMONE: CPT | Performed by: INTERNAL MEDICINE

## 2024-06-05 PROCEDURE — 82670 ASSAY OF TOTAL ESTRADIOL: CPT | Performed by: OBSTETRICS & GYNECOLOGY

## 2024-06-05 PROCEDURE — 84402 ASSAY OF FREE TESTOSTERONE: CPT | Performed by: OBSTETRICS & GYNECOLOGY

## 2024-06-05 PROCEDURE — 83036 HEMOGLOBIN GLYCOSYLATED A1C: CPT | Performed by: INTERNAL MEDICINE

## 2024-06-05 PROCEDURE — 85025 COMPLETE CBC W/AUTO DIFF WBC: CPT | Performed by: INTERNAL MEDICINE

## 2024-06-05 PROCEDURE — 80061 LIPID PANEL: CPT | Performed by: INTERNAL MEDICINE

## 2024-06-07 LAB — TESTOST FREE SERPL-MCNC: 0.6 PG/ML

## 2024-06-09 PROBLEM — Z01.419 ENCOUNTER FOR WELL WOMAN EXAM WITH ROUTINE GYNECOLOGICAL EXAM: Status: ACTIVE | Noted: 2024-06-09

## 2024-06-09 PROBLEM — N95.1 HOT FLASHES DUE TO MENOPAUSE: Status: ACTIVE | Noted: 2024-06-09

## 2024-06-10 NOTE — ASSESSMENT & PLAN NOTE
Normal breast and pelvic exams except as noted. Pap/cotesting up to date. Continue breast self awareness. MMG up to date, order for colonoscopy placed. Recommend 30 minutes of exercise 5 times weekly. Counseled patient on promoting a healthy lifestyle including regular weightbearing physical activity (30 minutes on most days of the week), adequate nutrition (protein, calcium, and vitamin D), continued smoking cessation, no or moderate alcohol intake, and fall prevention. Follow up with PCP as scheduled for routine health maintenance.     Patient deemed not a danger to herself or other and denies true SI/HI with plan. Information given for finding a therapist that accepts her insurance locally. Patient counseled to reach back out if she is unable to find a provider in a timely manner, and counseled to go immediately to the nearest ER with thoughts of harming herself or others. She voiced understanding and is amenable to the plan.

## 2024-06-10 NOTE — ASSESSMENT & PLAN NOTE
Counseled that measuring hormones is not necessary to prescribe HRT and that her estradiol levels are likely undetectable as she is no longer having menstrual cycles, however patient interested in measuring hormones. Can schedule a hormone consult if desired.

## 2024-06-21 DIAGNOSIS — F90.1 ATTENTION DEFICIT HYPERACTIVITY DISORDER (ADHD), PREDOMINANTLY HYPERACTIVE TYPE: ICD-10-CM

## 2024-06-21 DIAGNOSIS — F41.9 ANXIETY: ICD-10-CM

## 2024-06-21 DIAGNOSIS — F33.1 MAJOR DEPRESSIVE DISORDER, RECURRENT EPISODE, MODERATE: ICD-10-CM

## 2024-06-21 RX ORDER — HYDROCHLOROTHIAZIDE 25 MG/1
25 TABLET ORAL DAILY
Qty: 90 TABLET | Refills: 3 | Status: SHIPPED | OUTPATIENT
Start: 2024-06-21

## 2024-06-21 RX ORDER — PANTOPRAZOLE SODIUM 40 MG/1
40 TABLET, DELAYED RELEASE ORAL DAILY
Qty: 90 TABLET | Refills: 3 | Status: SHIPPED | OUTPATIENT
Start: 2024-06-21

## 2024-06-21 RX ORDER — ESCITALOPRAM OXALATE 10 MG/1
10 TABLET ORAL DAILY
Qty: 90 TABLET | Refills: 3 | Status: SHIPPED | OUTPATIENT
Start: 2024-06-21

## 2024-06-21 RX ORDER — LOSARTAN POTASSIUM 100 MG/1
100 TABLET ORAL DAILY
Qty: 90 TABLET | Refills: 3 | Status: SHIPPED | OUTPATIENT
Start: 2024-06-21

## 2024-06-21 RX ORDER — PROPRANOLOL HYDROCHLORIDE 10 MG/1
10 TABLET ORAL 3 TIMES DAILY PRN
Qty: 90 TABLET | Refills: 3 | Status: SHIPPED | OUTPATIENT
Start: 2024-06-21 | End: 2025-06-21

## 2024-06-21 RX ORDER — ESCITALOPRAM OXALATE 20 MG/1
20 TABLET ORAL NIGHTLY
Qty: 90 TABLET | Refills: 3 | Status: SHIPPED | OUTPATIENT
Start: 2024-06-21

## 2024-06-21 RX ORDER — METHYLPHENIDATE HYDROCHLORIDE 20 MG/1
20 TABLET ORAL 2 TIMES DAILY
Qty: 60 TABLET | Refills: 0 | Status: SHIPPED | OUTPATIENT
Start: 2024-06-21

## 2024-06-21 RX ORDER — AMLODIPINE BESYLATE 2.5 MG/1
2.5 TABLET ORAL DAILY
Qty: 90 TABLET | Refills: 3 | Status: SHIPPED | OUTPATIENT
Start: 2024-06-21

## 2024-06-21 NOTE — TELEPHONE ENCOUNTER
No care due was identified.  Health [facility] Embedded Care Due Messages. Reference number: [id number].   6/21/2024 11:15:53 AM CDT

## 2024-07-26 ENCOUNTER — PATIENT OUTREACH (OUTPATIENT)
Dept: ADMINISTRATIVE | Facility: HOSPITAL | Age: 59
End: 2024-07-26
Payer: COMMERCIAL

## 2024-07-26 NOTE — PROGRESS NOTES
Population Health Chart Review & Patient Outreach Details      Additional [facility] Health Notes:               Updates Requested / Reviewed:      Care Everywhere, , and Immunizations Reconciliation Completed or Queried: [place]         Health Maintenance Topics Overdue:      [facility] Score: 1     Eye Exam                       Health Maintenance Topic(s) Outreach Outcomes & Actions Taken:    Primary Care Appt - Outreach Outcomes & Actions Taken  : Primary Care Appt Scheduled    Colorectal Cancer Screening - Outreach Outcomes & Actions Taken  : Colonoscopy Case Request / Referral / Home Test Order Placed

## 2024-08-01 ENCOUNTER — OFFICE VISIT (OUTPATIENT)
Dept: INTERNAL MEDICINE | Facility: CLINIC | Age: 59
End: 2024-08-01
Payer: COMMERCIAL

## 2024-08-01 VITALS
OXYGEN SATURATION: 98 % | HEART RATE: 60 BPM | TEMPERATURE: 98 F | BODY MASS INDEX: 27.68 KG/M2 | SYSTOLIC BLOOD PRESSURE: 112 MMHG | RESPIRATION RATE: 16 BRPM | WEIGHT: 176.38 LBS | DIASTOLIC BLOOD PRESSURE: 68 MMHG | HEIGHT: 67 IN

## 2024-08-01 DIAGNOSIS — N94.10 DYSPAREUNIA IN FEMALE: ICD-10-CM

## 2024-08-01 DIAGNOSIS — F33.1 MAJOR DEPRESSIVE DISORDER, RECURRENT EPISODE, MODERATE: ICD-10-CM

## 2024-08-01 DIAGNOSIS — F90.1 ATTENTION DEFICIT HYPERACTIVITY DISORDER (ADHD), PREDOMINANTLY HYPERACTIVE TYPE: ICD-10-CM

## 2024-08-01 DIAGNOSIS — K76.0 FATTY LIVER: Primary | ICD-10-CM

## 2024-08-01 DIAGNOSIS — I10 ESSENTIAL HYPERTENSION: ICD-10-CM

## 2024-08-01 PROCEDURE — 99999 PR PBB SHADOW E&M-EST. PATIENT-LVL IV: CPT | Mod: PBBFAC,,, | Performed by: INTERNAL MEDICINE

## 2024-08-01 RX ORDER — METHYLPHENIDATE HYDROCHLORIDE 20 MG/1
20 TABLET ORAL 2 TIMES DAILY
Qty: 60 TABLET | Refills: 0 | Status: SHIPPED | OUTPATIENT
Start: 2024-09-01

## 2024-08-01 RX ORDER — ESTRADIOL 0.1 MG/G
1 CREAM VAGINAL
Qty: 42.5 G | Refills: 6 | Status: SHIPPED | OUTPATIENT
Start: 2024-08-01

## 2024-08-01 RX ORDER — ESCITALOPRAM OXALATE 10 MG/1
TABLET ORAL
Qty: 90 TABLET | Refills: 3 | Status: SHIPPED | OUTPATIENT
Start: 2024-08-01

## 2024-08-01 RX ORDER — ESCITALOPRAM OXALATE 20 MG/1
TABLET ORAL
Qty: 90 TABLET | Refills: 3 | Status: SHIPPED | OUTPATIENT
Start: 2024-08-01

## 2024-08-01 RX ORDER — METHYLPHENIDATE HYDROCHLORIDE 20 MG/1
20 TABLET ORAL 2 TIMES DAILY
Qty: 60 TABLET | Refills: 0 | Status: SHIPPED | OUTPATIENT
Start: 2024-10-01

## 2024-08-01 RX ORDER — METHYLPHENIDATE HYDROCHLORIDE 20 MG/1
20 TABLET ORAL 2 TIMES DAILY
Qty: 60 TABLET | Refills: 0 | Status: SHIPPED | OUTPATIENT
Start: 2024-08-01

## 2024-08-01 NOTE — PROGRESS NOTES
Subjective     Patient ID: Janki Jones is a 58 y.o. female.    Chief Complaint: Annual Exam    HPI  58 y.o. Female here for annual exam.      Vaccines: Influenza (2021); Tetanus (2017); Shingrix (will consider)  Eye exam: current  Mammogram: 12/23  Gyn exam: current  Colonoscopy: 2/19- needs repeat     Exercise: no  Diet: regular  LMP: menopausal    Past Medical History:  2007: Abnormal Pap smear  No date: Abnormal Pap smear of cervix  No date: ADD (attention deficit disorder)  No date: Anxiety  No date: Depression  No date: Fatty liver  No date: Fibroids  No date: GERD (gastroesophageal reflux disease)  No date: Herpes simplex virus (HSV) infection      Comment:  HSV1.  No date: Hypertension  No date: Plantar fasciitis  Past Surgical History:  12/15/2022: CARPAL TUNNEL RELEASE; Right      Comment:  Procedure: RELEASE, CARPAL TUNNEL;  Surgeon: Toma Bowers MD;  Location: [facility];  Service: Orthopedics;                 Laterality: Right;  01/01/2007: CERVICAL BIOPSY  W/ LOOP ELECTRODE EXCISION  02/15/2019: COLONOSCOPY; N/A      Comment:  Procedure: COLONOSCOPY;  Surgeon: Uri Torrez MD;                 [address]  Service: Endoscopy;                 Laterality: N/A;  11/26/2021: ESOPHAGOGASTRODUODENOSCOPY; N/A      Comment:  Procedure: EGD (ESOPHAGOGASTRODUODENOSCOPY) fully                vaccinated;  Surgeon: Venkat Astorga MD;  Location:                [facility];  Service: Endoscopy;  Laterality: N/A;  No date: essure  12/15/2022: INJECTION OF STEROID; Left      Comment:  Procedure: INJECTION, STEROID;  Surgeon: Toma Bowers MD;  Location: [facility];  Service: Orthopedics;                 Laterality: Left;  No date: TUBAL LIGATION  Social History    Socioeconomic History      Marital status:     Tobacco Use      Smoking status: Never      Smokeless tobacco: Never    Substance and Sexual Activity      Alcohol use: No      Drug use: No       Sexual activity: Yes        Partners: Male        Birth control/protection: See Surgical Hx        Comment: .    Social History Narrative      ,  disabled with PTSD (from ), 3 children, working with [employer] , going to school for business, no regular exercise    Review of patient's allergies indicates:   -- Percocet [oxycodone-acetaminophen]     --  Other reaction(s): Nausea  Janki Jones had no medications administered during this visit.  Review of Systems   Constitutional:  Negative for activity change, appetite change, chills, diaphoresis, fatigue, fever and unexpected weight change.   HENT:  Negative for postnasal drip, rhinorrhea, sinus pressure/congestion, sneezing, sore throat, trouble swallowing and voice change.    Respiratory:  Negative for cough, shortness of breath and wheezing.    Cardiovascular:  Negative for chest pain, palpitations and leg swelling.   Gastrointestinal:  Negative for abdominal pain, blood in stool, constipation, diarrhea, nausea and vomiting.   Genitourinary:  Negative for dysuria.   Musculoskeletal:  Negative for arthralgias and myalgias.   Integumentary:  Negative for rash and wound.   Allergic/Immunologic: Negative for environmental allergies and food allergies.   Hematological:  Negative for adenopathy. Does not bruise/bleed easily.          Objective     Physical Exam  Constitutional:       General: She is not in acute distress.     Appearance: Normal appearance. She is well-developed. She is not diaphoretic.   HENT:      Head: Normocephalic and atraumatic.      Right Ear: External ear normal.      Left Ear: External ear normal.      Nose: Nose normal.      Mouth/Throat:      Pharynx: No oropharyngeal exudate.   Eyes:      General: No scleral icterus.        Right eye: No discharge.         Left eye: No discharge.      Conjunctiva/sclera: Conjunctivae normal.      Pupils: Pupils are equal, round, and reactive to light.   Neck:       Vascular: No JVD.   Cardiovascular:      Rate and Rhythm: Normal rate and regular rhythm.      Pulses: Normal pulses.      Heart sounds: Normal heart sounds.   Pulmonary:      Effort: Pulmonary effort is normal. No respiratory distress.      Breath sounds: Normal breath sounds. No wheezing, rhonchi or rales.   Abdominal:      General: Bowel sounds are normal. There is no distension.      Palpations: Abdomen is soft.      Tenderness: There is no abdominal tenderness. There is no guarding or rebound.   Musculoskeletal:      Cervical back: Neck supple.      Right lower leg: No edema.      Left lower leg: No edema.   Lymphadenopathy:      Cervical: No cervical adenopathy.   Skin:     General: Skin is warm and dry.      Coloration: Skin is not pale.      Findings: No rash.   Neurological:      General: No focal deficit present.      Mental Status: She is alert and oriented to person, place, and time.      Gait: Gait normal.   Psychiatric:         Behavior: Behavior normal.         Thought Content: Thought content normal.            Assessment and Plan     1. Fatty liver  -     Ambulatory referral/consult to Hepatology; Future; Expected date: 08/08/2024    2. Major depressive disorder, recurrent episode, moderate  -     EScitalopram oxalate (LEXAPRO) 10 MG tablet; Take with 20 mg AND 10 mg for total dose 30 mg daily  Dispense: 90 tablet; Refill: 3  -     EScitalopram oxalate (LEXAPRO) 20 MG tablet; Take with 20 mg AND 10 mg for total dose 30 mg daily  Dispense: 90 tablet; Refill: 3    3. Dyspareunia in female  -     estradioL (ESTRACE) 0.01 % (0.1 mg/gram) vaginal cream; Place 1 g vaginally twice a week. At bedtime as needed  Dispense: 42.5 g; Refill: 6    4. Attention deficit hyperactivity disorder (ADHD), predominantly hyperactive type  -     methylphenidate HCl (RITALIN) 20 MG tablet; Take 1 tablet (20 mg total) by mouth 2 (two) times daily.  Dispense: 60 tablet; Refill: 0  -     methylphenidate HCl (RITALIN) 20 MG  tablet; Take 1 tablet (20 mg total) by mouth 2 (two) times daily.  Dispense: 60 tablet; Refill: 0  -     methylphenidate HCl (RITALIN) 20 MG tablet; Take 1 tablet (20 mg total) by mouth 2 (two) times daily.  Dispense: 60 tablet; Refill: 0    5. Essential hypertension        Blood work reviewed with pt     HTN- stable       ADD- stable on Concerta      MDD- fair control on Lexapro 30 mg qd, Wellbutrin d/c      Anxiety- Lexapro as above   -can take Propranolol 10 mg TID PRN anxiety      Fatty liver- referral back to Hepatology   -Wellbutrin stopped out of an abundance of caution 2/2 elevated LAEs(not sure if this is contributing or not)      GERD- on PPI     F/u in 3 months

## 2024-08-07 ENCOUNTER — CLINICAL SUPPORT (OUTPATIENT)
Dept: ENDOSCOPY | Facility: HOSPITAL | Age: 59
End: 2024-08-07
Attending: OBSTETRICS & GYNECOLOGY
Payer: COMMERCIAL

## 2024-08-07 ENCOUNTER — TELEPHONE (OUTPATIENT)
Dept: ENDOSCOPY | Facility: HOSPITAL | Age: 59
End: 2024-08-07

## 2024-08-07 DIAGNOSIS — Z12.11 COLON CANCER SCREENING: ICD-10-CM

## 2024-08-07 RX ORDER — SODIUM, POTASSIUM,MAG SULFATES 17.5-3.13G
SOLUTION, RECONSTITUTED, ORAL ORAL
Qty: 1 KIT | Refills: 0 | Status: SHIPPED | OUTPATIENT
Start: 2024-08-07

## 2024-08-27 ENCOUNTER — OFFICE VISIT (OUTPATIENT)
Dept: ORTHOPEDICS | Facility: CLINIC | Age: 59
End: 2024-08-27
Payer: COMMERCIAL

## 2024-08-27 VITALS — HEIGHT: 67 IN | BODY MASS INDEX: 27.68 KG/M2 | WEIGHT: 176.38 LBS

## 2024-08-27 DIAGNOSIS — M72.2 PLANTAR FASCIITIS, BILATERAL: Primary | ICD-10-CM

## 2024-08-27 PROCEDURE — 99999 PR PBB SHADOW E&M-EST. PATIENT-LVL III: CPT | Mod: PBBFAC,,, | Performed by: ORTHOPAEDIC SURGERY

## 2024-08-27 PROCEDURE — 3008F BODY MASS INDEX DOCD: CPT | Mod: CPTII,S$GLB,, | Performed by: ORTHOPAEDIC SURGERY

## 2024-08-27 PROCEDURE — 3044F HG A1C LEVEL LT 7.0%: CPT | Mod: CPTII,S$GLB,, | Performed by: ORTHOPAEDIC SURGERY

## 2024-08-27 PROCEDURE — 20551 NJX 1 TENDON ORIGIN/INSJ: CPT | Mod: S$GLB,,, | Performed by: ORTHOPAEDIC SURGERY

## 2024-08-27 PROCEDURE — 1159F MED LIST DOCD IN RCRD: CPT | Mod: CPTII,S$GLB,, | Performed by: ORTHOPAEDIC SURGERY

## 2024-08-27 PROCEDURE — 99213 OFFICE O/P EST LOW 20 MIN: CPT | Mod: 25,S$GLB,, | Performed by: ORTHOPAEDIC SURGERY

## 2024-08-27 PROCEDURE — 4010F ACE/ARB THERAPY RXD/TAKEN: CPT | Mod: CPTII,S$GLB,, | Performed by: ORTHOPAEDIC SURGERY

## 2024-08-27 RX ORDER — METHYLPREDNISOLONE ACETATE 40 MG/ML
40 INJECTION, SUSPENSION INTRA-ARTICULAR; INTRALESIONAL; INTRAMUSCULAR; SOFT TISSUE
Status: COMPLETED | OUTPATIENT
Start: 2024-08-27 | End: 2024-08-27

## 2024-08-27 RX ADMIN — METHYLPREDNISOLONE ACETATE 40 MG: 40 INJECTION, SUSPENSION INTRA-ARTICULAR; INTRALESIONAL; INTRAMUSCULAR; SOFT TISSUE at 10:08

## 2024-08-27 NOTE — PROGRESS NOTES
Janki Jones  Returns today for follow-up.  This is a 58-year-old female who initially presented to me on 12/08/2023 with a three-month history of bilateral heel pain after doing a lot of walking when she was out of the country.  When I saw her I felt that her symptoms were more consistent with a Achilles tendinitis.  An MRI scan that was performed on 12/30/2023 revealed bilateral plantar fasciitis and mild distal Achilles tendinitis.  The right side was her more symptomatic side.  She had also been seen by Neurology for neurogenic complaints and was awaiting EMG/nerve conduction studies.  She returned to see me on 04/02/2024 mainly complaining of pain on the bottom of her heels that was radiating distally to her toes and was more consistent with plantar fasciitis.  She was still awaiting her EMG/nerve conduction studies.  I suggested doing injections for pain relief at that time.  She was to follow-up six weeks later.  She returns today and reports that the injections gave her good relief for a little over three months.  She reports that her symptoms have returned.  She did not have the EMG/nerve conduction studies done but it sounds like her symptoms are mainly consistent with plantar fasciitis at this time.  She is getting ready to travel out of the country and would like to get more shots.  She also wanted to discuss possible surgery.      Examination: She walks in today with a normal gait.  On sitting exam she has tenderness over the origin of the plantar fascia bilaterally.  There is no significant tenderness over the abductor hallucis muscles today.  She has painless motion of her ankle and subtalar joints bilaterally.  She has normal function and strength of all the tendons about her ankle and she is neurovascularly intact.    Impression:  1. Plantar fasciitis, bilateral  methylPREDNISolone acetate injection 40 mg        Recommendation:  After verbal consent and sterile prep I injected the origin  of the plantar fascia on both feet with 2 cc lidocaine and 40 mg of methylprednisolone.  We had some discussion about partial plantar fasciotomy surgery.  She is going to think about this and see how much relief she gets from these injections.  Hopefully the problem will resolve on its own.

## 2024-09-05 DIAGNOSIS — F90.1 ATTENTION DEFICIT HYPERACTIVITY DISORDER (ADHD), PREDOMINANTLY HYPERACTIVE TYPE: ICD-10-CM

## 2024-09-05 DIAGNOSIS — F41.9 ANXIETY: ICD-10-CM

## 2024-09-05 DIAGNOSIS — N94.10 DYSPAREUNIA IN FEMALE: ICD-10-CM

## 2024-09-05 DIAGNOSIS — F33.1 MAJOR DEPRESSIVE DISORDER, RECURRENT EPISODE, MODERATE: ICD-10-CM

## 2024-09-05 RX ORDER — METHYLPHENIDATE HYDROCHLORIDE 20 MG/1
20 TABLET ORAL 2 TIMES DAILY
Qty: 60 TABLET | Refills: 0 | Status: SHIPPED | OUTPATIENT
Start: 2024-09-05

## 2024-09-05 RX ORDER — ESTRADIOL 0.1 MG/G
1 CREAM VAGINAL
Qty: 42.5 G | Refills: 6 | Status: SHIPPED | OUTPATIENT
Start: 2024-09-05

## 2024-09-05 RX ORDER — PROPRANOLOL HYDROCHLORIDE 10 MG/1
10 TABLET ORAL 3 TIMES DAILY PRN
Qty: 90 TABLET | Refills: 3 | Status: SHIPPED | OUTPATIENT
Start: 2024-09-05 | End: 2025-09-05

## 2024-09-05 RX ORDER — LOSARTAN POTASSIUM 100 MG/1
100 TABLET ORAL DAILY
Qty: 90 TABLET | Refills: 3 | Status: SHIPPED | OUTPATIENT
Start: 2024-09-05

## 2024-09-05 RX ORDER — PANTOPRAZOLE SODIUM 40 MG/1
40 TABLET, DELAYED RELEASE ORAL DAILY
Qty: 90 TABLET | Refills: 3 | Status: SHIPPED | OUTPATIENT
Start: 2024-09-05

## 2024-09-05 RX ORDER — AMLODIPINE BESYLATE 2.5 MG/1
2.5 TABLET ORAL DAILY
Qty: 90 TABLET | Refills: 3 | Status: SHIPPED | OUTPATIENT
Start: 2024-09-05

## 2024-09-05 RX ORDER — ESCITALOPRAM OXALATE 20 MG/1
TABLET ORAL
Qty: 90 TABLET | Refills: 3 | Status: SHIPPED | OUTPATIENT
Start: 2024-09-05

## 2024-09-05 RX ORDER — HYDROCHLOROTHIAZIDE 25 MG/1
25 TABLET ORAL DAILY
Qty: 90 TABLET | Refills: 3 | Status: SHIPPED | OUTPATIENT
Start: 2024-09-05

## 2024-09-05 NOTE — TELEPHONE ENCOUNTER
No care due was identified.  [facility] Embedded Care Due Messages. Reference number: [id number].   9/05/2024 10:50:36 AM CDT

## 2024-10-01 ENCOUNTER — PATIENT MESSAGE (OUTPATIENT)
Dept: INTERNAL MEDICINE | Facility: CLINIC | Age: 59
End: 2024-10-01
Payer: COMMERCIAL

## 2024-10-01 DIAGNOSIS — F33.1 MAJOR DEPRESSIVE DISORDER, RECURRENT EPISODE, MODERATE: ICD-10-CM

## 2024-10-01 DIAGNOSIS — F90.1 ATTENTION DEFICIT HYPERACTIVITY DISORDER (ADHD), PREDOMINANTLY HYPERACTIVE TYPE: ICD-10-CM

## 2024-10-01 DIAGNOSIS — F41.9 ANXIETY: ICD-10-CM

## 2024-10-01 RX ORDER — LOSARTAN POTASSIUM 100 MG/1
100 TABLET ORAL DAILY
Qty: 90 TABLET | Refills: 3 | Status: SHIPPED | OUTPATIENT
Start: 2024-10-01

## 2024-10-01 RX ORDER — ESCITALOPRAM OXALATE 20 MG/1
TABLET ORAL
Qty: 90 TABLET | Refills: 3 | Status: SHIPPED | OUTPATIENT
Start: 2024-10-01

## 2024-10-01 RX ORDER — AMLODIPINE BESYLATE 2.5 MG/1
2.5 TABLET ORAL DAILY
Qty: 90 TABLET | Refills: 3 | Status: SHIPPED | OUTPATIENT
Start: 2024-10-01

## 2024-10-01 RX ORDER — HYDROCHLOROTHIAZIDE 25 MG/1
25 TABLET ORAL DAILY
Qty: 90 TABLET | Refills: 3 | Status: SHIPPED | OUTPATIENT
Start: 2024-10-01

## 2024-10-01 RX ORDER — PANTOPRAZOLE SODIUM 40 MG/1
40 TABLET, DELAYED RELEASE ORAL DAILY
Qty: 90 TABLET | Refills: 3 | Status: SHIPPED | OUTPATIENT
Start: 2024-10-01

## 2024-10-01 RX ORDER — PROPRANOLOL HYDROCHLORIDE 10 MG/1
10 TABLET ORAL 3 TIMES DAILY PRN
Qty: 90 TABLET | Refills: 3 | Status: SHIPPED | OUTPATIENT
Start: 2024-10-01 | End: 2025-10-01

## 2024-10-01 RX ORDER — METHYLPHENIDATE HYDROCHLORIDE 20 MG/1
20 TABLET ORAL 2 TIMES DAILY
Qty: 60 TABLET | Refills: 0 | Status: SHIPPED | OUTPATIENT
Start: 2024-10-01

## 2024-10-01 NOTE — TELEPHONE ENCOUNTER
No care due was identified.  [facility] Embedded Care Due Messages. Reference number: [id number].   10/01/2024 12:53:06 PM CDT

## 2024-10-04 ENCOUNTER — TELEPHONE (OUTPATIENT)
Dept: ENDOSCOPY | Facility: HOSPITAL | Age: 59
End: 2024-10-04
Payer: COMMERCIAL

## 2024-10-04 NOTE — TELEPHONE ENCOUNTER
Spoke to pt for pre-call to confirm scheduled Colonoscopy and patient verbalized understanding of the following:       Date of Procedure (s)  verified 10/11/24  Arrival Time 7:40 AM verified.  Location of Procedure(s) [facility] 4th Floor verified.  NPO status reinforced. Ok to continue clear liquids up until 4 hours prior to the Endoscopy procedure.   Denies blood thinners and GLP-1s.  Pt confirmed receipt of prep instructions and Rx prep (if applicable).  [facility] provided to [facility]  Pt confirmed ride home after procedure if procedure requires anesthesia.   Pre-call screening questionnaire reviewed and completed with patient.   Appointment details are tentative, including check-in time.  If the patient begins taking any blood thinning medications, injectable weight loss/diabetes medications (other than insulin), or Adipex (phentermine) patient was instructed to contact the endoscopy scheduling department as soon as possible.  Patient was advised to call the endoscopy scheduling department if any questions or concerns arise.       [facility]oscopy Scheduling Department

## 2024-10-11 ENCOUNTER — HOSPITAL ENCOUNTER (OUTPATIENT)
Facility: HOSPITAL | Age: 59
Discharge: HOME | End: 2024-10-11
Attending: SURGERY | Admitting: SURGERY
Payer: COMMERCIAL

## 2024-10-11 ENCOUNTER — ANESTHESIA EVENT (OUTPATIENT)
Dept: ENDOSCOPY | Facility: HOSPITAL | Age: 59
End: 2024-10-11
Payer: COMMERCIAL

## 2024-10-11 ENCOUNTER — ANESTHESIA (OUTPATIENT)
Dept: ENDOSCOPY | Facility: HOSPITAL | Age: 59
End: 2024-10-11
Payer: COMMERCIAL

## 2024-10-11 VITALS
RESPIRATION RATE: 16 BRPM | DIASTOLIC BLOOD PRESSURE: 63 MMHG | OXYGEN SATURATION: 100 % | TEMPERATURE: 97 F | SYSTOLIC BLOOD PRESSURE: 107 MMHG | HEART RATE: 52 BPM

## 2024-10-11 DIAGNOSIS — Z12.11 ENCOUNTER FOR SCREENING COLONOSCOPY: ICD-10-CM

## 2024-10-11 PROCEDURE — 45385 COLONOSCOPY W/LESION REMOVAL: CPT | Mod: 33,,, | Performed by: SURGERY

## 2024-10-11 PROCEDURE — 45380 COLONOSCOPY AND BIOPSY: CPT | Mod: 33,59 | Performed by: SURGERY

## 2024-10-11 PROCEDURE — 45385 COLONOSCOPY W/LESION REMOVAL: CPT | Mod: 33 | Performed by: SURGERY

## 2024-10-11 PROCEDURE — 37000009 HC ANESTHESIA EA ADD 15 MINS: Performed by: SURGERY

## 2024-10-11 PROCEDURE — 88305 TISSUE EXAM BY PATHOLOGIST: CPT | Mod: 26,,, | Performed by: STUDENT IN AN ORGANIZED HEALTH CARE EDUCATION/TRAINING PROGRAM

## 2024-10-11 PROCEDURE — 45380 COLONOSCOPY AND BIOPSY: CPT | Mod: 33,59,, | Performed by: SURGERY

## 2024-10-11 PROCEDURE — 37000008 HC ANESTHESIA 1ST 15 MINUTES: Performed by: SURGERY

## 2024-10-11 PROCEDURE — 27201089 HC SNARE, DISP (ANY): Performed by: SURGERY

## 2024-10-11 PROCEDURE — 63600175 PHARM REV CODE 636 W HCPCS: Performed by: NURSE ANESTHETIST, CERTIFIED REGISTERED

## 2024-10-11 PROCEDURE — 27201012 HC FORCEPS, HOT/COLD, DISP: Performed by: SURGERY

## 2024-10-11 PROCEDURE — 88305 TISSUE EXAM BY PATHOLOGIST: CPT | Performed by: STUDENT IN AN ORGANIZED HEALTH CARE EDUCATION/TRAINING PROGRAM

## 2024-10-11 RX ORDER — HYDROCORTISONE 25 MG/G
CREAM TOPICAL 2 TIMES DAILY
Qty: 28 G | Refills: 1 | Status: SHIPPED | OUTPATIENT
Start: 2024-10-11

## 2024-10-11 RX ORDER — PROPOFOL 10 MG/ML
VIAL (ML) INTRAVENOUS
Status: DISCONTINUED | OUTPATIENT
Start: 2024-10-11 | End: 2024-10-11

## 2024-10-11 RX ORDER — LIDOCAINE HYDROCHLORIDE 20 MG/ML
INJECTION INTRAVENOUS
Status: DISCONTINUED | OUTPATIENT
Start: 2024-10-11 | End: 2024-10-11

## 2024-10-11 RX ORDER — SODIUM CHLORIDE 9 MG/ML
INJECTION, SOLUTION INTRAVENOUS CONTINUOUS
Status: DISCONTINUED | OUTPATIENT
Start: 2024-10-11 | End: 2024-10-11 | Stop reason: HOSPADM

## 2024-10-11 RX ORDER — PROPOFOL 10 MG/ML
VIAL (ML) INTRAVENOUS CONTINUOUS PRN
Status: DISCONTINUED | OUTPATIENT
Start: 2024-10-11 | End: 2024-10-11

## 2024-10-11 RX ADMIN — LIDOCAINE HYDROCHLORIDE 30 MG: 20 INJECTION INTRAVENOUS at 09:10

## 2024-10-11 RX ADMIN — PROPOFOL 50 MG: 10 INJECTION, EMULSION INTRAVENOUS at 09:10

## 2024-10-11 RX ADMIN — PROPOFOL 150 MCG/KG/MIN: 10 INJECTION, EMULSION INTRAVENOUS at 09:10

## 2024-10-11 NOTE — RESEARCH
EFFECT OF EDUCATIONAL VIDEO ON PATIENT ANXIETY AND SATISFACTION FOLLOWING COLONOSCOPY  IRB #: [id number]   - CORNELIO FIELDS MD      Informed Consent - CONSENT ONLY Group   SubjID  [id number]         I met with the patient in the Endoscopy Pre-Op Area for a detailed discussion and review of the consent. The consent document contains information regarding the release of HIPAA protected information for the purposes of research. Opportunity for questions was provided. All questions were answered to her satisfaction. She verbalized understanding and a willingness to participate. The consent form was then signed by her and Torito Calloway  at 08:30am prior to any subject related procedures being performed.  Family member was not present.     A copy of the signed consent was provided to her along with the contact information for the study staff.  She was encouraged to call in the future if she had any additional questions regarding the study.     Study staff at this visit was Torito Calloway MD.

## 2024-10-11 NOTE — TRANSFER OF CARE
Anesthesia Transfer of Care Note    Patient: Janki Jones    Procedure(s) Performed: Procedure(s) (LRB):  COLONOSCOPY (N/A)    Patient location: PACU    Anesthesia Type: general    Transport from OR: Transported from OR on room air with adequate spontaneous ventilation    Post pain: adequate analgesia    Post assessment: no apparent anesthetic complications and tolerated procedure well    Post vital signs: stable    Level of consciousness: awake, alert and oriented    Nausea/Vomiting: no nausea/vomiting    Complications: none    Transfer of care protocol was followed      Last vitals: Visit Vitals  LMP  (LMP Unknown)   Breastfeeding No

## 2024-10-11 NOTE — ANESTHESIA PREPROCEDURE EVALUATION
Pre-operative evaluation for Procedure(s) (LRB):  COLONOSCOPY (N/A)    Janki Jones is a 58 y.o. female     ECHO (if on file):  No results found for this or any previous visit.      Patient Active Problem List   Diagnosis    Essential hypertension    Anxiety    ADD (attention deficit disorder)    GERD (gastroesophageal reflux disease)    Situational stress    Adjustment disorder with mixed anxiety and depressed mood    Anxiety state    Major depressive disorder, recurrent episode, moderate    Allergic rhinitis    Recurrent UTI    High risk HPV infection    Vaginal discharge    Pain    Decreased range of motion of right wrist    Tinea cruris    Dyspareunia in female    Fatty liver    Achilles tendinitis of both lower extremities    Decreased ROM of ankle    Encounter for well woman exam with routine gynecological exam    Hot flashes due to menopause       Review of patient's allergies indicates:   Allergen Reactions    Percocet [oxycodone-acetaminophen]      Other reaction(s): Nausea       No current facility-administered medications on file prior to encounter.     Current Outpatient Medications on File Prior to Encounter   Medication Sig Dispense Refill    clotrimazole-betamethasone 1-0.05% (LOTRISONE) cream Twice a day for 5-7 days 15 g 1    EScitalopram oxalate (LEXAPRO) 10 MG tablet Take with 20 mg AND 10 mg for total dose 30 mg daily 90 tablet 3    loratadine (CLARITIN) 10 mg tablet Take 10 mg by mouth once daily.      methylphenidate HCl (RITALIN) 20 MG tablet Take 1 tablet (20 mg total) by mouth 2 (two) times daily. 60 tablet 0    methylphenidate HCl (RITALIN) 20 MG tablet Take 1 tablet (20 mg total) by mouth 2 (two) times daily. 60 tablet 0    multivitamin with minerals tablet Take 1 tablet by mouth once daily.      sodium,potassium,mag sulfates (SUPREP BOWEL PREP KIT) 17.5-3.13-1.6 gram SolR Follow instructions given by Endoscopy scheduling nurse 1 kit 0       Past Surgical History:   Procedure  "Laterality Date    CARPAL TUNNEL RELEASE Right 12/15/2022    Procedure: RELEASE, CARPAL TUNNEL;  Surgeon: Toma Bowers MD;  Location: [facility];  Service: Orthopedics;  Laterality: Right;    CERVICAL BIOPSY  W/ LOOP ELECTRODE EXCISION  2007    COLONOSCOPY N/A 02/15/2019    Procedure: COLONOSCOPY;  Surgeon: Uri Torrez MD;  Location: [facility] ENDO ([address] [place] [address]);  Service: Endoscopy;  Laterality: N/A;    ESOPHAGOGASTRODUODENOSCOPY N/A 2021    Procedure: EGD (ESOPHAGOGASTRODUODENOSCOPY) fully vaccinated;  Surgeon: Venkat Astorga MD;  Location: [facility] ENDO;  Service: Endoscopy;  Laterality: N/A;    essure      INJECTION OF STEROID Left 12/15/2022    Procedure: INJECTION, STEROID;  Surgeon: Toma Bowers MD;  Location: [facility];  Service: Orthopedics;  Laterality: Left;    TUBAL LIGATION         Social History     Socioeconomic History    Marital status:    Tobacco Use    Smoking status: Never    Smokeless tobacco: Never   Substance and Sexual Activity    Alcohol use: No    Drug use: No    Sexual activity: Yes     Partners: Male     Birth control/protection: See Surgical Hx     Comment: .   Social History Narrative    ,  disabled with PTSD (from ), 3 children, working with [employer] , going to school for business, no regular exercise         CBC: No results for input(s): "WBC", "RBC", "HGB", "HCT", "PLT", "MCV", "MCH", "MCHC" in the last 72 hours.    CMP: No results for input(s): "NA", "K", "CL", "CO2", "BUN", "CREATININE", "GLU", "MG", "PHOS", "CALCIUM", "ALBUMIN", "PROT", "ALKPHOS", "ALT", "AST", "BILITOT" in the last 72 hours.    INR  No results for input(s): "PT", "INR", "PROTIME", "APTT" in the last 72 hours.        Diagnostic Studies:      EKD Echo:  Results for orders placed or performed in visit on 13   2D echo with color flow doppler    Collection Time: 13 12:00 AM   Result Value Ref Range    EF + QEF 65     Diastolic Dysfunction No  "           Pre-op Assessment    I have reviewed the Patient Summary Reports.     I have reviewed the Nursing Notes. I have reviewed the NPO Status.   I have reviewed the Medications.     Review of Systems  Anesthesia Hx:  No problems with previous Anesthesia             Denies Family Hx of Anesthesia complications.    Denies Personal Hx of Anesthesia complications.                    Hematology/Oncology:  Hematology Normal   Oncology Normal                                   EENT/Dental:  EENT/Dental Normal           Cardiovascular:     Hypertension                                        Pulmonary:  Pulmonary Normal                       Renal/:  Renal/ Normal                 Hepatic/GI:     GERD             Musculoskeletal:  Musculoskeletal Normal                Neurological:  Neurology Normal                                      Endocrine:  Endocrine Normal            Dermatological:  Skin Normal    Psych:  Psychiatric History                  Physical Exam  General: Cooperative, Well nourished, Alert and Oriented    Airway:  Mallampati: II / II  Mouth Opening: Normal  TM Distance: Normal  Tongue: Normal  Neck ROM: Normal ROM    Chest/Lungs:  Normal Respiratory Rate    Heart:  Rate: Normal        Anesthesia Plan  Type of Anesthesia, risks & benefits discussed:    Anesthesia Type: Gen Natural Airway  Intra-op Monitoring Plan: Standard ASA Monitors  Post Op Pain Control Plan: multimodal analgesia and IV/PO Opioids PRN  Induction:  IV  Informed Consent: Informed consent signed with the Patient and all parties understand the risks and agree with anesthesia plan.  All questions answered.   ASA Score: 2  Day of Surgery Review of History & Physical: H&P Update referred to the surgeon/provider.    Ready For Surgery From Anesthesia Perspective.     .

## 2024-10-11 NOTE — H&P
[facility] 4th Floor  History & Physical    Subjective:      Chief Complaint/Reason for Admission: colonoscopy    Janki Jones is a 58 y.o. female who presents for colonoscopy.  Last scope was 2019 with 1 hyperplastic polyp removed.  Denies changes in bowel habits or family history of colon cancer.  Does report a symptomatic hemorrhoid.      Past Medical History:   Diagnosis Date    Abnormal Pap smear 2007    Abnormal Pap smear of cervix     ADD (attention deficit disorder)     Anxiety     Depression     Fatty liver     Fibroids     GERD (gastroesophageal reflux disease)     Herpes simplex virus (HSV) infection     HSV1.    Hypertension     Plantar fasciitis      Past Surgical History:   Procedure Laterality Date    CARPAL TUNNEL RELEASE Right 12/15/2022    Procedure: RELEASE, CARPAL TUNNEL;  Surgeon: Toma Bowers MD;  Location: [facility];  Service: Orthopedics;  Laterality: Right;    CERVICAL BIOPSY  W/ LOOP ELECTRODE EXCISION  01/01/2007    COLONOSCOPY N/A 02/15/2019    Procedure: COLONOSCOPY;  Surgeon: Uri Torrez MD;  Location: [address]);  Service: Endoscopy;  Laterality: N/A;    ESOPHAGOGASTRODUODENOSCOPY N/A 11/26/2021    Procedure: EGD (ESOPHAGOGASTRODUODENOSCOPY) fully vaccinated;  Surgeon: Venkat Astorga MD;  Location: [facility];  Service: Endoscopy;  Laterality: N/A;    essure      INJECTION OF STEROID Left 12/15/2022    Procedure: INJECTION, STEROID;  Surgeon: Toma Bowers MD;  Location: [facility];  Service: Orthopedics;  Laterality: Left;    TUBAL LIGATION       Social History     Tobacco Use    Smoking status: Never    Smokeless tobacco: Never   Substance Use Topics    Alcohol use: No    Drug use: No       PTA Medications   Medication Sig    amLODIPine (NORVASC) 2.5 MG tablet Take 1 tablet (2.5 mg total) by mouth once daily.    EScitalopram oxalate (LEXAPRO) 20 MG tablet Take with 20 mg AND 10 mg for total dose 30 mg daily    hydroCHLOROthiazide (HYDRODIURIL) 25  MG tablet Take 1 tablet (25 mg total) by mouth once daily.    loratadine (CLARITIN) 10 mg tablet Take 10 mg by mouth once daily.    losartan (COZAAR) 100 MG tablet Take 1 tablet (100 mg total) by mouth once daily.    methylphenidate HCl (RITALIN) 20 MG tablet Take 1 tablet (20 mg total) by mouth 2 (two) times daily.    multivitamin with minerals tablet Take 1 tablet by mouth once daily.    pantoprazole (PROTONIX) 40 MG tablet Take 1 tablet (40 mg total) by mouth once daily.    clotrimazole-betamethasone 1-0.05% (LOTRISONE) cream Twice a day for 5-7 days    EScitalopram oxalate (LEXAPRO) 10 MG tablet Take with 20 mg AND 10 mg for total dose 30 mg daily    estradioL (ESTRACE) 0.01 % (0.1 mg/gram) vaginal cream Place 1 g vaginally twice a week. At bedtime as needed    methylphenidate HCl (RITALIN) 20 MG tablet Take 1 tablet (20 mg total) by mouth 2 (two) times daily.    methylphenidate HCl (RITALIN) 20 MG tablet Take 1 tablet (20 mg total) by mouth 2 (two) times daily.    propranoloL (INDERAL) 10 MG tablet Take 1 tablet (10 mg total) by mouth 3 (three) times daily as needed (anxiety).    sodium,potassium,mag sulfates (SUPREP BOWEL PREP KIT) 17.5-3.13-1.6 gram SolR Follow instructions given by Endoscopy scheduling nurse     Review of patient's allergies indicates:   Allergen Reactions    Percocet [oxycodone-acetaminophen]      Other reaction(s): Nausea        Review of Systems   All other systems reviewed and are negative.      Objective:      Vital Signs (Most Recent)       Vital Signs Range (Last 24H):       Physical Exam  Vitals reviewed.   Constitutional:       Appearance: Normal appearance.   HENT:      Head: Normocephalic and atraumatic.      Nose: Nose normal.      Mouth/Throat:      Mouth: Mucous membranes are moist.   Eyes:      Extraocular Movements: Extraocular movements intact.      Pupils: Pupils are equal, round, and reactive to light.   Cardiovascular:      Rate and Rhythm: Normal rate and regular rhythm.    Pulmonary:      Effort: Pulmonary effort is normal.   Abdominal:      General: Abdomen is flat.   Musculoskeletal:         General: Normal range of motion.   Skin:     General: Skin is warm.      Capillary Refill: Capillary refill takes less than 2 seconds.   Neurological:      General: No focal deficit present.      Mental Status: She is alert and oriented to person, place, and time.   Psychiatric:         Mood and Affect: Mood normal.         Behavior: Behavior normal.         Data Review:  NA   ECG:  NA .     Assessment:      Ms. Jones presents for colonoscopy    Plan:    Risks, benefits and alternatives of procedure reviewed and consent signed

## 2024-10-11 NOTE — PROVATION PATIENT INSTRUCTIONS
Discharge Summary/Instructions after an Endoscopic Procedure  Patient Name: Janki Jones  Patient MRN: [id number]  Patient YOB: 1965 Friday, October 11, 2024  Ana Flores MD  Dear patient,  As a result of recent federal legislation (The Federal Cures Act), you may   receive lab or pathology results from your procedure in your [facility]   account before your physician is able to contact you. Your physician or   their representative will relay the results to you with their   recommendations at their soonest availability.  Thank you,  RESTRICTIONS:  During your procedure today, you received medications for sedation.  These   medications may affect your judgment, balance and coordination.  Therefore,   for 24 hours, you have the following restrictions:   - DO NOT drive a car, operate machinery, make legal/financial decisions,   sign important papers or drink alcohol.    ACTIVITY:  Today: no heavy lifting, straining or running due to procedural   sedation/anesthesia.  The following day: return to full activity including work.  DIET:  Eat and drink normally unless instructed otherwise.     TREATMENT FOR COMMON SIDE EFFECTS:  - Mild abdominal pain, nausea, belching, bloating or excessive gas:  rest,   eat lightly and use a heating pad.  - Sore Throat: treat with throat lozenges and/or gargle with warm salt   water.  - Because air was used during the procedure, expelling large amounts of air   from your rectum or belching is normal.  - If a bowel prep was taken, you may not have a bowel movement for 1-3 days.    This is normal.  SYMPTOMS TO WATCH FOR AND REPORT TO YOUR PHYSICIAN:  1. Abdominal pain or bloating, other than gas cramps.  2. Chest pain.  3. Back pain.  4. Signs of infection such as: chills or fever occurring within 24 hours   after the procedure.  5. Rectal bleeding, which would show as bright red, maroon, or black stools.   (A tablespoon of blood from the rectum is not serious, especially if    hemorrhoids are present.)  6. Vomiting.  7. Weakness or dizziness.  GO DIRECTLY TO THE NEAREST EMERGENCY ROOM IF YOU HAVE ANY OF THE FOLLOWING:      Difficulty breathing              Chills and/or fever over 101 F   Persistent vomiting and/or vomiting blood   Severe abdominal pain   Severe chest pain   Black, tarry stools   Bleeding- more than one tablespoon   Any other symptom or condition that you feel may need urgent attention  Your doctor recommends these additional instructions:  If any biopsies were taken, your doctors clinic will contact you in 1 to 2   weeks with any results.  - Patient has a contact number available for emergencies.  The signs and   symptoms of potential delayed complications were discussed with the   patient.  Return to normal activities tomorrow.  Written discharge   instructions were provided to the patient.   - Resume previous diet.   - Continue present medications.   - Discharge patient to home (ambulatory).   - Repeat colonoscopy date to be determined after pending pathology results   are reviewed for surveillance.  For questions, problems or results please call your physician - Ana Flores MD at Work:  [phone].  [facility], EMERGENCY ROOM PHONE NUMBER: [phone]  IF A COMPLICATION OR EMERGENCY SITUATION ARISES AND YOU ARE UNABLE TO REACH   YOUR PHYSICIAN - GO DIRECTLY TO THE EMERGENCY ROOM.  MD Ana Chan MD  10/11/2024 9:36:36 AM  This report has been verified and signed electronically.  Dear patient,  As a result of recent federal legislation (The Federal Cures Act), you may   receive lab or pathology results from your procedure in your [facility]   account before your physician is able to contact you. Your physician or   their representative will relay the results to you with their   recommendations at their soonest availability.  Thank you,  PROVATION

## 2024-10-11 NOTE — ANESTHESIA POSTPROCEDURE EVALUATION
Anesthesia Post Evaluation    Patient: Janki Jones    Procedure(s) Performed: Procedure(s) (LRB):  COLONOSCOPY (N/A)    Final Anesthesia Type: general      Patient location during evaluation: [facility]  Patient participation: Yes- Able to Participate  Level of consciousness: awake and alert and oriented  Post-procedure vital signs: reviewed and stable  Pain management: adequate  Airway patency: patent  ASAD mitigation strategies: Multimodal analgesia  PONV status at discharge: No PONV  Anesthetic complications: no      Cardiovascular status: blood pressure returned to baseline and hemodynamically stable  Respiratory status: unassisted, spontaneous ventilation and room air  Hydration status: euvolemic  Follow-up not needed.              Vitals Value Taken Time   /64 10/11/24 0955   Temp 36.3 °C (97.3 °F) 10/11/24 0940   Pulse 55 10/11/24 0955   Resp 16 10/11/24 0955   SpO2 100 % 10/11/24 0955         No case tracking events are documented in the log.      Pain/Ashly Score: Ashly Score: 10 (10/11/2024  9:55 AM)

## 2024-10-15 LAB
FINAL PATHOLOGIC DIAGNOSIS: NORMAL
GROSS: NORMAL
Lab: NORMAL

## 2024-10-30 DIAGNOSIS — F33.1 MAJOR DEPRESSIVE DISORDER, RECURRENT EPISODE, MODERATE: ICD-10-CM

## 2024-10-30 DIAGNOSIS — F90.1 ATTENTION DEFICIT HYPERACTIVITY DISORDER (ADHD), PREDOMINANTLY HYPERACTIVE TYPE: ICD-10-CM

## 2024-10-30 DIAGNOSIS — F41.9 ANXIETY: ICD-10-CM

## 2024-10-30 RX ORDER — METHYLPHENIDATE HYDROCHLORIDE 20 MG/1
20 TABLET ORAL 2 TIMES DAILY
Qty: 60 TABLET | Refills: 0 | Status: SHIPPED | OUTPATIENT
Start: 2024-12-30

## 2024-10-30 RX ORDER — LOSARTAN POTASSIUM 100 MG/1
100 TABLET ORAL DAILY
Qty: 90 TABLET | Refills: 2 | Status: SHIPPED | OUTPATIENT
Start: 2024-10-30

## 2024-10-30 RX ORDER — AMLODIPINE BESYLATE 2.5 MG/1
2.5 TABLET ORAL DAILY
Qty: 90 TABLET | Refills: 3 | Status: SHIPPED | OUTPATIENT
Start: 2024-10-30

## 2024-10-30 RX ORDER — ESCITALOPRAM OXALATE 20 MG/1
TABLET ORAL
Qty: 90 TABLET | Refills: 3 | Status: SHIPPED | OUTPATIENT
Start: 2024-10-30

## 2024-10-30 RX ORDER — HYDROCHLOROTHIAZIDE 25 MG/1
25 TABLET ORAL DAILY
Qty: 90 TABLET | Refills: 3 | Status: SHIPPED | OUTPATIENT
Start: 2024-10-30

## 2024-10-30 RX ORDER — METHYLPHENIDATE HYDROCHLORIDE 20 MG/1
20 TABLET ORAL 2 TIMES DAILY
Qty: 60 TABLET | Refills: 0 | Status: SHIPPED | OUTPATIENT
Start: 2024-11-30

## 2024-10-30 RX ORDER — PROPRANOLOL HYDROCHLORIDE 10 MG/1
10 TABLET ORAL 3 TIMES DAILY PRN
Qty: 270 TABLET | Refills: 3 | Status: SHIPPED | OUTPATIENT
Start: 2024-10-30

## 2024-10-30 RX ORDER — PANTOPRAZOLE SODIUM 40 MG/1
40 TABLET, DELAYED RELEASE ORAL DAILY
Qty: 90 TABLET | Refills: 3 | Status: SHIPPED | OUTPATIENT
Start: 2024-10-30

## 2024-10-30 RX ORDER — METHYLPHENIDATE HYDROCHLORIDE 20 MG/1
20 TABLET ORAL 2 TIMES DAILY
Qty: 60 TABLET | Refills: 0 | Status: SHIPPED | OUTPATIENT
Start: 2024-10-30

## 2024-10-30 RX ORDER — ESCITALOPRAM OXALATE 10 MG/1
TABLET ORAL
Qty: 90 TABLET | Refills: 3 | Status: SHIPPED | OUTPATIENT
Start: 2024-10-30

## 2024-11-11 ENCOUNTER — OFFICE VISIT (OUTPATIENT)
Dept: INTERNAL MEDICINE | Facility: CLINIC | Age: 59
End: 2024-11-11
Payer: COMMERCIAL

## 2024-11-11 ENCOUNTER — LAB VISIT (OUTPATIENT)
Dept: LAB | Facility: HOSPITAL | Age: 59
End: 2024-11-11
Attending: INTERNAL MEDICINE
Payer: COMMERCIAL

## 2024-11-11 VITALS
BODY MASS INDEX: 27.89 KG/M2 | DIASTOLIC BLOOD PRESSURE: 82 MMHG | HEIGHT: 67 IN | OXYGEN SATURATION: 98 % | SYSTOLIC BLOOD PRESSURE: 124 MMHG | RESPIRATION RATE: 18 BRPM | WEIGHT: 177.69 LBS | TEMPERATURE: 97 F | HEART RATE: 61 BPM

## 2024-11-11 DIAGNOSIS — F90.1 ATTENTION DEFICIT HYPERACTIVITY DISORDER (ADHD), PREDOMINANTLY HYPERACTIVE TYPE: ICD-10-CM

## 2024-11-11 DIAGNOSIS — K76.0 FATTY LIVER: ICD-10-CM

## 2024-11-11 DIAGNOSIS — I10 ESSENTIAL HYPERTENSION: ICD-10-CM

## 2024-11-11 DIAGNOSIS — F41.9 ANXIETY: ICD-10-CM

## 2024-11-11 DIAGNOSIS — K21.9 GASTROESOPHAGEAL REFLUX DISEASE, UNSPECIFIED WHETHER ESOPHAGITIS PRESENT: ICD-10-CM

## 2024-11-11 DIAGNOSIS — R73.03 PREDIABETES: Primary | ICD-10-CM

## 2024-11-11 DIAGNOSIS — R73.03 PREDIABETES: ICD-10-CM

## 2024-11-11 DIAGNOSIS — Z23 NEED FOR VACCINATION: ICD-10-CM

## 2024-11-11 LAB
ALBUMIN SERPL BCP-MCNC: 3.7 G/DL (ref 3.5–5.2)
ALP SERPL-CCNC: 133 U/L (ref 40–150)
ALT SERPL W/O P-5'-P-CCNC: 167 U/L (ref 10–44)
ANION GAP SERPL CALC-SCNC: 12 MMOL/L (ref 8–16)
AST SERPL-CCNC: 121 U/L (ref 10–40)
BASOPHILS # BLD AUTO: 0.02 K/UL (ref 0–0.2)
BASOPHILS NFR BLD: 0.4 % (ref 0–1.9)
BILIRUB SERPL-MCNC: 0.4 MG/DL (ref 0.1–1)
BUN SERPL-MCNC: 10 MG/DL (ref 6–20)
CALCIUM SERPL-MCNC: 9.3 MG/DL (ref 8.7–10.5)
CHLORIDE SERPL-SCNC: 102 MMOL/L (ref 95–110)
CO2 SERPL-SCNC: 28 MMOL/L (ref 23–29)
CREAT SERPL-MCNC: 0.8 MG/DL (ref 0.5–1.4)
DIFFERENTIAL METHOD BLD: ABNORMAL
EOSINOPHIL # BLD AUTO: 0 K/UL (ref 0–0.5)
EOSINOPHIL NFR BLD: 0.7 % (ref 0–8)
ERYTHROCYTE [DISTWIDTH] IN BLOOD BY AUTOMATED COUNT: 13.4 % (ref 11.5–14.5)
EST. GFR  (NO RACE VARIABLE): >60 ML/MIN/1.73 M^2
ESTIMATED AVG GLUCOSE: 120 MG/DL (ref 68–131)
GLUCOSE SERPL-MCNC: 99 MG/DL (ref 70–110)
HBA1C MFR BLD: 5.8 % (ref 4–5.6)
HCT VFR BLD AUTO: 40.2 % (ref 37–48.5)
HGB BLD-MCNC: 12.6 G/DL (ref 12–16)
IMM GRANULOCYTES # BLD AUTO: 0.01 K/UL (ref 0–0.04)
IMM GRANULOCYTES NFR BLD AUTO: 0.2 % (ref 0–0.5)
LYMPHOCYTES # BLD AUTO: 2.6 K/UL (ref 1–4.8)
LYMPHOCYTES NFR BLD: 49 % (ref 18–48)
MCH RBC QN AUTO: 29.4 PG (ref 27–31)
MCHC RBC AUTO-ENTMCNC: 31.3 G/DL (ref 32–36)
MCV RBC AUTO: 94 FL (ref 82–98)
MONOCYTES # BLD AUTO: 0.5 K/UL (ref 0.3–1)
MONOCYTES NFR BLD: 9.9 % (ref 4–15)
NEUTROPHILS # BLD AUTO: 2.1 K/UL (ref 1.8–7.7)
NEUTROPHILS NFR BLD: 39.8 % (ref 38–73)
NRBC BLD-RTO: 0 /100 WBC
PLATELET # BLD AUTO: 198 K/UL (ref 150–450)
PMV BLD AUTO: 13.6 FL (ref 9.2–12.9)
POTASSIUM SERPL-SCNC: 3 MMOL/L (ref 3.5–5.1)
PROT SERPL-MCNC: 7.9 G/DL (ref 6–8.4)
RBC # BLD AUTO: 4.29 M/UL (ref 4–5.4)
SODIUM SERPL-SCNC: 142 MMOL/L (ref 136–145)
WBC # BLD AUTO: 5.35 K/UL (ref 3.9–12.7)

## 2024-11-11 PROCEDURE — 36415 COLL VENOUS BLD VENIPUNCTURE: CPT | Mod: PO | Performed by: INTERNAL MEDICINE

## 2024-11-11 PROCEDURE — 3008F BODY MASS INDEX DOCD: CPT | Mod: CPTII,S$GLB,, | Performed by: INTERNAL MEDICINE

## 2024-11-11 PROCEDURE — 4010F ACE/ARB THERAPY RXD/TAKEN: CPT | Mod: CPTII,S$GLB,, | Performed by: INTERNAL MEDICINE

## 2024-11-11 PROCEDURE — 1159F MED LIST DOCD IN RCRD: CPT | Mod: CPTII,S$GLB,, | Performed by: INTERNAL MEDICINE

## 2024-11-11 PROCEDURE — 99214 OFFICE O/P EST MOD 30 MIN: CPT | Mod: 25,S$GLB,, | Performed by: INTERNAL MEDICINE

## 2024-11-11 PROCEDURE — 90471 IMMUNIZATION ADMIN: CPT | Mod: S$GLB,,, | Performed by: INTERNAL MEDICINE

## 2024-11-11 PROCEDURE — 85025 COMPLETE CBC W/AUTO DIFF WBC: CPT | Performed by: INTERNAL MEDICINE

## 2024-11-11 PROCEDURE — 3074F SYST BP LT 130 MM HG: CPT | Mod: CPTII,S$GLB,, | Performed by: INTERNAL MEDICINE

## 2024-11-11 PROCEDURE — 80053 COMPREHEN METABOLIC PANEL: CPT | Performed by: INTERNAL MEDICINE

## 2024-11-11 PROCEDURE — 3079F DIAST BP 80-89 MM HG: CPT | Mod: CPTII,S$GLB,, | Performed by: INTERNAL MEDICINE

## 2024-11-11 PROCEDURE — 90656 IIV3 VACC NO PRSV 0.5 ML IM: CPT | Mod: S$GLB,,, | Performed by: INTERNAL MEDICINE

## 2024-11-11 PROCEDURE — 1160F RVW MEDS BY RX/DR IN RCRD: CPT | Mod: CPTII,S$GLB,, | Performed by: INTERNAL MEDICINE

## 2024-11-11 PROCEDURE — 3044F HG A1C LEVEL LT 7.0%: CPT | Mod: CPTII,S$GLB,, | Performed by: INTERNAL MEDICINE

## 2024-11-11 PROCEDURE — 99999 PR PBB SHADOW E&M-EST. PATIENT-LVL IV: CPT | Mod: PBBFAC,,, | Performed by: INTERNAL MEDICINE

## 2024-11-11 PROCEDURE — 83036 HEMOGLOBIN GLYCOSYLATED A1C: CPT | Performed by: INTERNAL MEDICINE

## 2024-11-11 RX ORDER — METHYLPHENIDATE HYDROCHLORIDE 20 MG/1
20 TABLET ORAL 2 TIMES DAILY
Qty: 60 TABLET | Refills: 0 | Status: SHIPPED | OUTPATIENT
Start: 2024-12-11

## 2024-11-11 RX ORDER — METHYLPHENIDATE HYDROCHLORIDE 20 MG/1
20 TABLET ORAL 2 TIMES DAILY
Qty: 60 TABLET | Refills: 0 | Status: SHIPPED | OUTPATIENT
Start: 2025-01-11

## 2024-11-11 RX ORDER — METHYLPHENIDATE HYDROCHLORIDE 20 MG/1
20 TABLET ORAL 2 TIMES DAILY
Qty: 60 TABLET | Refills: 0 | Status: SHIPPED | OUTPATIENT
Start: 2024-11-11

## 2024-11-11 NOTE — PROGRESS NOTES
Patient ID: Janki Jones is a 58 y.o. female.    Chief Complaint: Follow-up, Depression, Anxiety, Dizziness, Wrist Pain, and Foot Injury    History of Present Illness    CHIEF COMPLAINT:  Janki presents today for a three-month follow-up.    ADHD AND MEDICATION MANAGEMENT:  She is currently taking Ritalin 20 mg for ADHD management and requesting a refill.     MENTAL HEALTH:  She reports feeling depressed and unmotivated, expressing a lack of desire to engage in activities. She mentions not receiving help at home with her children. She is considering increasing her propranolol dosage for anxiety management, currently taking it daily but contemplating increasing to twice or 3 times daily as needed. She expresses interest in therapy to address her mental health concerns. Pt denies any SI/HI.     MUSCULOSKELETAL CONCERNS:  She reports ongoing foot pain due to plantar fasciitis. She received a cortisone injection in either August or September, but states it has not been effective in relieving her symptoms. She is scheduled for follow-up with the podiatrist next week to address the persistent pain. She also reports experiencing muscle cramps and has a history of carpal tunnel syndrome. She expresses concerns about her physical strength, noting that she is not as strong as she used to be. She mentions a recent incident where she bumped herself, attributing it to decreased strength.    GASTROINTESTINAL HEALTH:  She underwent a colonoscopy three weeks ago, which revealed three polyps. The bowel preparation medication caused a flare-up of her hemorrhoids. Her doctor was informed of this issue and indicated they would address it at a future appointment.    SUPPLEMENTS AND VITAMINS:  She reports taking magnesium and fish oil supplements. She expresses interest in finding a good multivitamin, mentioning Centrum Silver as a potential option.    PHYSICAL ACTIVITY:  She acknowledges the need to increase physical activity  to improve her overall health and strength.         Physical Exam    General: No acute distress. Well-developed. Well-nourished.  Eyes: EOMI. Sclerae anicteric.  HENT: Normocephalic. Atraumatic. Nares patent. Moist oral mucosa.  Ears: Bilateral TMs clear. Bilateral EACs clear.  Cardiovascular: Regular rate. Regular rhythm. No murmurs. No rubs. No gallops. Normal S1, S2.  Respiratory: Normal respiratory effort. Clear to auscultation bilaterally. No rales. No rhonchi. No wheezing.  Abdomen: Soft. Non-tender. Non-distended. Normoactive bowel sounds.  Musculoskeletal: No  obvious deformity.  Extremities: No lower extremity edema.  Neurological: Alert & oriented x3. No slurred speech. Normal gait.  Psychiatric: Normal mood. Normal affect. Good insight. Good judgment.  Skin: Warm. Dry. No rash.         Assessment & Plan    Assessed patient's hypertension, ADD, major depressive disorder, anxiety, fatty liver, gastroesophageal reflux, and prediabetes  Evaluated ADHD medication efficacy and discussed potential dosage adjustment  Noted patient's complaint of plantar fasciitis and recent ineffective steroid injection  Reviewed recent colonoscopy results, noting presence of polyps  Assessed liver function    ANXIETY:  Increased propranolol 10 mg frequency up to 3 times daily as needed for anxiety.    ADHD:  Continued Ritalin 20 mg twice daily, with option to adjust timing of second dose to lunchtime or take half a dose as needed.  Follow up in 3 months for ADHD medication refills.    DEPRESSION:  Continued Lexapro 30 mg daily.    FATTY LIVER:    Trending LAEs   Followed by Hepatology    GENERAL HEALTH AND WELLNESS:  Explained importance of physical activity for overall health and mood improvement.  Janki to engage in regular physical activity to improve energy levels and overall health.  Referred to medical fitness program for physical activity guidance.    MEDICATIONS/SUPPLEMENTS:  Discussed benefits of a good multivitamin,  recommending Centrum Silver as an option.  Recommend considering taking a multivitamin supplement.    FLU VACCINATION:  Flu shot administered in office.    LABS:  CBC, CMP, hemoglobin A1c ordered.    MENTAL HEALTH REFERRAL:  Offered referral to therapist or  upon patient providing contact information.    FOLLOW UP:  Contact the office with therapist or  information for referral processing.  Check patient portal for medical fitness referral details and appointment setup.            This note was generated with the assistance of ambient listening technology. Verbal consent was obtained by the patient and accompanying visitor(s) for the recording of patient appointment to facilitate this note. I attest to having reviewed and edited the generated note for accuracy, though some syntax or spelling errors may persist. Please contact the author of this note for any clarification.

## 2024-11-21 ENCOUNTER — OFFICE VISIT (OUTPATIENT)
Dept: OPTOMETRY | Facility: CLINIC | Age: 59
End: 2024-11-21
Payer: COMMERCIAL

## 2024-11-21 DIAGNOSIS — H10.523 ANGULAR BLEPHAROCONJUNCTIVITIS OF BOTH EYES: ICD-10-CM

## 2024-11-21 DIAGNOSIS — H04.123 DRY EYE SYNDROME OF BOTH EYES: Primary | ICD-10-CM

## 2024-11-21 DIAGNOSIS — H52.4 BILATERAL PRESBYOPIA: ICD-10-CM

## 2024-11-21 DIAGNOSIS — Z83.511 FAMILY HISTORY OF GLAUCOMA IN FATHER: ICD-10-CM

## 2024-11-21 PROCEDURE — 92014 COMPRE OPH EXAM EST PT 1/>: CPT | Mod: S$GLB,,, | Performed by: OPTOMETRIST

## 2024-11-21 PROCEDURE — 1160F RVW MEDS BY RX/DR IN RCRD: CPT | Mod: CPTII,S$GLB,, | Performed by: OPTOMETRIST

## 2024-11-21 PROCEDURE — 1159F MED LIST DOCD IN RCRD: CPT | Mod: CPTII,S$GLB,, | Performed by: OPTOMETRIST

## 2024-11-21 PROCEDURE — 3044F HG A1C LEVEL LT 7.0%: CPT | Mod: CPTII,S$GLB,, | Performed by: OPTOMETRIST

## 2024-11-21 PROCEDURE — 4010F ACE/ARB THERAPY RXD/TAKEN: CPT | Mod: CPTII,S$GLB,, | Performed by: OPTOMETRIST

## 2024-11-21 PROCEDURE — 99999 PR PBB SHADOW E&M-EST. PATIENT-LVL III: CPT | Mod: PBBFAC,,, | Performed by: OPTOMETRIST

## 2024-11-21 RX ORDER — NEOMYCIN SULFATE, POLYMYXIN B SULFATE AND DEXAMETHASONE 3.5; 10000; 1 MG/ML; [USP'U]/ML; MG/ML
1 SUSPENSION/ DROPS OPHTHALMIC 4 TIMES DAILY
Qty: 5 ML | Refills: 0 | Status: SHIPPED | OUTPATIENT
Start: 2024-11-21 | End: 2024-11-28

## 2024-11-21 NOTE — PROGRESS NOTES
HPI    LUIS: 5/11/2023  Chief complaint (CC): Patient is here for annual eye exam today.    States vision has remained stable for most part but up close vision has   gotten blurrier  States she is still having some dry eye issues   Glasses? +  Contacts? -  H/o eye surgery, injections or laser: -  H/o eye injury: -  Known eye conditions? See above  Family h/o eye conditions? Father with glaucoma  Eye gtts? AT's prn      (-) Flashes (-)  Floaters (+) Mucous- a little crusty  (-)  Tearing (-) Itching (+) Burning   (-) Headaches (-) Eye Pain/discomfort (+) Irritation   (-)  Redness (sometimes) Double vision (+) Blurry vision    Diabetic? -  A1c?   Hemoglobin A1C       Date                     Value               Ref Range             Status                11/11/2024               5.8 (H)             4.0 - 5.6 %           Final                  Last edited by Naohmi Pedro, OD on 11/21/2024  9:04 AM.            Assessment /Plan     For exam results, see Encounter Report.      Dry eye syndrome of both eyes  BS control. No signs of diabetic retinopathy. Monitor with annual exam.     Angular blepharoconjunctivitis of both eyes  -     neomycin-polymyxin-dexamethasone (MAXITROL) 3.5mg/mL-10,000 unit/mL-0.1 % DrpS; Place 1 drop into both eyes 4 (four) times daily. for 7 days  Dispense: 5 mL; Refill: 0  Rx Maxitrol QID Ou x 7 days.     Family history of glaucoma in father  FHx- father, sister. No e/o glaucoma. Monitor.     Bilateral presbyopia  SRx released to patient. Patient educated on lens options. Normal ocular health. RTC 1 year for routine exam.

## 2024-11-26 DIAGNOSIS — H10.523 ANGULAR BLEPHAROCONJUNCTIVITIS OF BOTH EYES: ICD-10-CM

## 2024-11-29 ENCOUNTER — OFFICE VISIT (OUTPATIENT)
Dept: ORTHOPEDICS | Facility: CLINIC | Age: 59
End: 2024-11-29
Payer: COMMERCIAL

## 2024-11-29 VITALS — BODY MASS INDEX: 27.89 KG/M2 | WEIGHT: 177.69 LBS | HEIGHT: 67 IN

## 2024-11-29 DIAGNOSIS — M79.2 NEUROGENIC PAIN OF FOOT: ICD-10-CM

## 2024-11-29 DIAGNOSIS — M72.2 PLANTAR FASCIITIS, BILATERAL: Primary | ICD-10-CM

## 2024-11-29 PROCEDURE — 99999 PR PBB SHADOW E&M-EST. PATIENT-LVL III: CPT | Mod: PBBFAC,,, | Performed by: ORTHOPAEDIC SURGERY

## 2024-11-29 RX ORDER — NEOMYCIN SULFATE, POLYMYXIN B SULFATE AND DEXAMETHASONE 3.5; 10000; 1 MG/ML; [USP'U]/ML; MG/ML
1 SUSPENSION/ DROPS OPHTHALMIC 4 TIMES DAILY
Qty: 5 ML | Refills: 0 | Status: SHIPPED | OUTPATIENT
Start: 2024-11-29 | End: 2024-12-06

## 2024-11-29 RX ORDER — METHYLPREDNISOLONE 4 MG/1
TABLET ORAL
Qty: 1 EACH | Refills: 0 | Status: SHIPPED | OUTPATIENT
Start: 2024-11-29

## 2024-11-29 NOTE — PROGRESS NOTES
Janki Jones  Returns today for follow-up.  This is a 58-year-old female who initially presented to me on 12/08/2023 with a three-month history of bilateral heel pain which I felt was consistent with a Achilles tendinitis.  An MRI scan that was performed on 12/30/2023 revealed bilateral plantar fasciitis and mild distal Achilles tendinitis.  The right side was worse than the left side.  She had also been seen by Neurology and was awaiting EMG/nerve conduction studies.  She returned to see me on 04/02/2024 complaining of pain on the bottom of her heels that was radiating distally to her toes and was more consistent with plantar fasciitis then Achilles tendinitis.  Corticosteroid injections were done on both heels at that time and when she returned to see me on 08/27/2024 she reported that the injections helped her for a little more than three months she wanted to get further injections and we also discussed possible surgery.  I again performed bilateral corticosteroid injections at that visit.  She returns today and reports that the last shots did not help much but her  got her a ankle compression sleeve which she states helps.  She would like to get a sleeve for the other ankle.    Examination:  Examination remains unchanged.  She has tenderness bilaterally about her heels.  There is no significant tenderness over the abductor hallucis muscle.  She has no pain on range of motion of her ankle and subtalar joints and she has good function and strength of all the tendons about her feet and ankles.  She is neurovascularly intact bilaterally.    Impression:   1. Plantar fasciitis, bilateral        2. Neurogenic pain of foot, tarsal tunnel ?              Recommendation:  I do not recommend continued steroid injections.  She has components of pain consistent with plantar fasciitis but also has continued neurogenic symptoms.  I suggested that she wear the ankle sleeves on both ankles if it helps.  She is  going to consider surgery in the future.  She [place] going [place] be traveling out of the [place] so I am going to order a Medrol Dosepak

## 2024-12-10 NOTE — TELEPHONE ENCOUNTER
PDMP  Prescribed: 10/11/2024  Dispensed: 11/11/2024  Sold: 11/13/2024    Staff left a vm for pt to contact the clinic to reschedule her appt.

## 2024-12-22 DIAGNOSIS — E87.6 HYPOKALEMIA: ICD-10-CM

## 2024-12-22 DIAGNOSIS — F90.1 ATTENTION DEFICIT HYPERACTIVITY DISORDER (ADHD), PREDOMINANTLY HYPERACTIVE TYPE: ICD-10-CM

## 2024-12-22 DIAGNOSIS — F41.9 ANXIETY: ICD-10-CM

## 2024-12-22 DIAGNOSIS — F33.1 MAJOR DEPRESSIVE DISORDER, RECURRENT EPISODE, MODERATE: ICD-10-CM

## 2024-12-22 DIAGNOSIS — M79.2 NEUROGENIC PAIN OF FOOT: ICD-10-CM

## 2024-12-22 DIAGNOSIS — M72.2 PLANTAR FASCIITIS, BILATERAL: ICD-10-CM

## 2024-12-23 RX ORDER — LOSARTAN POTASSIUM 100 MG/1
100 TABLET ORAL DAILY
Qty: 90 TABLET | Refills: 3 | Status: SHIPPED | OUTPATIENT
Start: 2024-12-23

## 2024-12-23 RX ORDER — METHYLPREDNISOLONE 4 MG/1
TABLET ORAL
Qty: 1 EACH | Refills: 0 | Status: SHIPPED | OUTPATIENT
Start: 2024-12-23

## 2024-12-23 RX ORDER — POTASSIUM CHLORIDE 20 MEQ/1
20 TABLET, EXTENDED RELEASE ORAL DAILY
Qty: 90 TABLET | Refills: 3 | Status: SHIPPED | OUTPATIENT
Start: 2024-12-23

## 2024-12-23 RX ORDER — HYDROCHLOROTHIAZIDE 25 MG/1
25 TABLET ORAL DAILY
Qty: 90 TABLET | Refills: 3 | Status: SHIPPED | OUTPATIENT
Start: 2024-12-23

## 2024-12-23 RX ORDER — PANTOPRAZOLE SODIUM 40 MG/1
40 TABLET, DELAYED RELEASE ORAL DAILY
Qty: 90 TABLET | Refills: 3 | Status: SHIPPED | OUTPATIENT
Start: 2024-12-23

## 2024-12-23 RX ORDER — PROPRANOLOL HYDROCHLORIDE 10 MG/1
10 TABLET ORAL 3 TIMES DAILY PRN
Qty: 270 TABLET | Refills: 3 | Status: SHIPPED | OUTPATIENT
Start: 2024-12-23

## 2024-12-23 RX ORDER — METHYLPHENIDATE HYDROCHLORIDE 20 MG/1
20 TABLET ORAL 2 TIMES DAILY
Qty: 60 TABLET | Refills: 0 | Status: SHIPPED | OUTPATIENT
Start: 2024-12-23

## 2024-12-23 RX ORDER — AMLODIPINE BESYLATE 2.5 MG/1
2.5 TABLET ORAL DAILY
Qty: 90 TABLET | Refills: 3 | Status: SHIPPED | OUTPATIENT
Start: 2024-12-23

## 2024-12-23 RX ORDER — ESCITALOPRAM OXALATE 20 MG/1
TABLET ORAL
Qty: 90 TABLET | Refills: 3 | Status: SHIPPED | OUTPATIENT
Start: 2024-12-23

## 2024-12-23 RX ORDER — ESCITALOPRAM OXALATE 10 MG/1
TABLET ORAL
Qty: 90 TABLET | Refills: 3 | Status: SHIPPED | OUTPATIENT
Start: 2024-12-23

## 2024-12-23 NOTE — TELEPHONE ENCOUNTER
Refill Routing Note   Medication(s) are not appropriate for processing by [facility] for the following reason(s):        New or recently adjusted medication: Potassium  Drug-disease interaction: Lexapro  Required labs abnormal: HCTZ  Outside of protocol: Inderal, Ritalin    [facility] action(s):  Defer  Route  Approve        Medication Therapy Plan: Both strengths of Lexapro 10 mg and 20 mg documented and filled recently.; Drug-Disease: EScitalopram oxalate and Hypokalemia    Pharmacist review requested: Yes     Appointments  past 12m or future 3m with PCP    Date Provider   Last Visit   11/11/2024 Daniel Lawrence, DO   Next Visit   2/3/2025 Daniel Lawrence, DO   ED visits in past 90 days: 0        Note composed:2:19 PM 12/23/2024

## 2024-12-23 NOTE — TELEPHONE ENCOUNTER
Refill Routing Note   Medication(s) are not appropriate for processing by [facility] for the following reason(s):        New or recently adjusted medication  Required labs abnormal  Outside of protocol: Inderal, Ritalin    [facility] action(s):  Defer  Route  Approve           Pharmacist review requested: Yes   Extended chart review required: Yes     Appointments  past 12m or future 3m with PCP    Date Provider   Last Visit   11/11/2024 Daniel Lawrence, DO   Next Visit   2/3/2025 Daniel Lawrence, DO   ED visits in past 90 days: 0        Note composed:2:31 PM 12/23/2024

## 2024-12-23 NOTE — TELEPHONE ENCOUNTER
No care due was identified.  [facility] [place] Embedded Care Due Messages. Reference number: [id number].   12/22/2024 8:43:54 PM CST

## 2025-01-08 ENCOUNTER — HOSPITAL ENCOUNTER (OUTPATIENT)
Dept: RADIOLOGY | Facility: HOSPITAL | Age: 60
Discharge: HOME | End: 2025-01-08
Attending: INTERNAL MEDICINE
Payer: COMMERCIAL

## 2025-01-08 DIAGNOSIS — Z12.31 ENCOUNTER FOR SCREENING MAMMOGRAM FOR BREAST CANCER: ICD-10-CM

## 2025-01-08 PROCEDURE — 77067 SCR MAMMO BI INCL CAD: CPT | Mod: 26,,, | Performed by: RADIOLOGY

## 2025-01-08 PROCEDURE — 77063 BREAST TOMOSYNTHESIS BI: CPT | Mod: 26,,, | Performed by: RADIOLOGY

## 2025-01-08 PROCEDURE — 77067 SCR MAMMO BI INCL CAD: CPT | Mod: TC,PO

## 2025-01-17 DIAGNOSIS — F90.1 ATTENTION DEFICIT HYPERACTIVITY DISORDER (ADHD), PREDOMINANTLY HYPERACTIVE TYPE: ICD-10-CM

## 2025-01-17 DIAGNOSIS — F41.9 ANXIETY: ICD-10-CM

## 2025-01-17 DIAGNOSIS — F33.1 MAJOR DEPRESSIVE DISORDER, RECURRENT EPISODE, MODERATE: ICD-10-CM

## 2025-01-17 DIAGNOSIS — M72.2 PLANTAR FASCIITIS, BILATERAL: ICD-10-CM

## 2025-01-17 DIAGNOSIS — M79.2 NEUROGENIC PAIN OF FOOT: ICD-10-CM

## 2025-01-17 RX ORDER — PROPRANOLOL HYDROCHLORIDE 10 MG/1
10 TABLET ORAL 3 TIMES DAILY PRN
Qty: 270 TABLET | Refills: 3 | Status: SHIPPED | OUTPATIENT
Start: 2025-01-17

## 2025-01-17 RX ORDER — ESCITALOPRAM OXALATE 10 MG/1
TABLET ORAL
Qty: 90 TABLET | Refills: 3 | Status: SHIPPED | OUTPATIENT
Start: 2025-01-17

## 2025-01-17 RX ORDER — ESCITALOPRAM OXALATE 20 MG/1
TABLET ORAL
Qty: 90 TABLET | Refills: 3 | Status: SHIPPED | OUTPATIENT
Start: 2025-01-17

## 2025-01-17 RX ORDER — HYDROCHLOROTHIAZIDE 25 MG/1
25 TABLET ORAL DAILY
Qty: 90 TABLET | Refills: 3 | Status: SHIPPED | OUTPATIENT
Start: 2025-01-17

## 2025-01-17 RX ORDER — METHYLPHENIDATE HYDROCHLORIDE 20 MG/1
20 TABLET ORAL 2 TIMES DAILY
Qty: 60 TABLET | Refills: 0 | Status: SHIPPED | OUTPATIENT
Start: 2025-02-17

## 2025-01-17 RX ORDER — PANTOPRAZOLE SODIUM 40 MG/1
40 TABLET, DELAYED RELEASE ORAL DAILY
Qty: 90 TABLET | Refills: 3 | Status: SHIPPED | OUTPATIENT
Start: 2025-01-17

## 2025-01-17 RX ORDER — AMLODIPINE BESYLATE 2.5 MG/1
2.5 TABLET ORAL DAILY
Qty: 90 TABLET | Refills: 3 | Status: SHIPPED | OUTPATIENT
Start: 2025-01-17

## 2025-01-17 RX ORDER — LOSARTAN POTASSIUM 100 MG/1
100 TABLET ORAL DAILY
Qty: 90 TABLET | Refills: 3 | Status: SHIPPED | OUTPATIENT
Start: 2025-01-17

## 2025-01-17 RX ORDER — METHYLPHENIDATE HYDROCHLORIDE 20 MG/1
20 TABLET ORAL 2 TIMES DAILY
Qty: 60 TABLET | Refills: 0 | Status: SHIPPED | OUTPATIENT
Start: 2025-01-17

## 2025-01-17 NOTE — TELEPHONE ENCOUNTER
No care due was identified.  [facility] Embedded Care Due Messages. Reference number: [id number].   1/17/2025 10:45:34 AM CST

## 2025-01-24 RX ORDER — METHYLPREDNISOLONE 4 MG/1
TABLET ORAL
Qty: 1 EACH | Refills: 0 | Status: SHIPPED | OUTPATIENT
Start: 2025-01-24 | End: 2025-02-03

## 2025-02-03 ENCOUNTER — OFFICE VISIT (OUTPATIENT)
Dept: INTERNAL MEDICINE | Facility: CLINIC | Age: 60
End: 2025-02-03
Payer: COMMERCIAL

## 2025-02-03 VITALS
HEART RATE: 55 BPM | TEMPERATURE: 98 F | SYSTOLIC BLOOD PRESSURE: 132 MMHG | DIASTOLIC BLOOD PRESSURE: 86 MMHG | RESPIRATION RATE: 18 BRPM | HEIGHT: 67 IN | BODY MASS INDEX: 27.96 KG/M2 | WEIGHT: 178.13 LBS | OXYGEN SATURATION: 99 %

## 2025-02-03 DIAGNOSIS — K21.9 GASTROESOPHAGEAL REFLUX DISEASE, UNSPECIFIED WHETHER ESOPHAGITIS PRESENT: ICD-10-CM

## 2025-02-03 DIAGNOSIS — F90.1 ATTENTION DEFICIT HYPERACTIVITY DISORDER (ADHD), PREDOMINANTLY HYPERACTIVE TYPE: Primary | ICD-10-CM

## 2025-02-03 DIAGNOSIS — F43.23 ADJUSTMENT DISORDER WITH MIXED ANXIETY AND DEPRESSED MOOD: ICD-10-CM

## 2025-02-03 DIAGNOSIS — R73.03 PREDIABETES: ICD-10-CM

## 2025-02-03 DIAGNOSIS — K76.0 FATTY LIVER: ICD-10-CM

## 2025-02-03 DIAGNOSIS — I10 ESSENTIAL HYPERTENSION: ICD-10-CM

## 2025-02-03 PROCEDURE — G2211 COMPLEX E/M VISIT ADD ON: HCPCS | Mod: S$GLB,,, | Performed by: INTERNAL MEDICINE

## 2025-02-03 PROCEDURE — 99999 PR PBB SHADOW E&M-EST. PATIENT-LVL IV: CPT | Mod: PBBFAC,,, | Performed by: INTERNAL MEDICINE

## 2025-02-03 PROCEDURE — 3008F BODY MASS INDEX DOCD: CPT | Mod: CPTII,S$GLB,, | Performed by: INTERNAL MEDICINE

## 2025-02-03 PROCEDURE — 3079F DIAST BP 80-89 MM HG: CPT | Mod: CPTII,S$GLB,, | Performed by: INTERNAL MEDICINE

## 2025-02-03 PROCEDURE — 4010F ACE/ARB THERAPY RXD/TAKEN: CPT | Mod: CPTII,S$GLB,, | Performed by: INTERNAL MEDICINE

## 2025-02-03 PROCEDURE — 1159F MED LIST DOCD IN RCRD: CPT | Mod: CPTII,S$GLB,, | Performed by: INTERNAL MEDICINE

## 2025-02-03 PROCEDURE — 99214 OFFICE O/P EST MOD 30 MIN: CPT | Mod: S$GLB,,, | Performed by: INTERNAL MEDICINE

## 2025-02-03 PROCEDURE — 3075F SYST BP GE 130 - 139MM HG: CPT | Mod: CPTII,S$GLB,, | Performed by: INTERNAL MEDICINE

## 2025-02-03 NOTE — PROGRESS NOTES
Patient ID: Janki Jones is a 59 y.o. female.    Chief Complaint: Follow-up    History of Present Illness    CHIEF COMPLAINT:  Janki presents today for three month follow up    MUSCULOSKELETAL:  She reports sharp hand pain. She has a history of left-sided carpal tunnel syndrome and has contacted her hand surgeon regarding symptoms.    MENTAL HEALTH:  She reports spending majority of time isolated in bedroom after completing necessary daily activities. She expresses interest in psychiatric care but reports difficulty obtaining appointments.    MEDICATIONS:  She takes Losartan 100 mg daily, amlodipine 2.5 mg daily, and hydrochlorothiazide 25 mg daily for blood pressure management. She continues pantoprazole 40 mg daily for reflux with good effect. For anxiety management, she takes propranolol in the morning (prescribed up to 3 times daily as needed) and Lexapro 30 mg daily for anxiety and depression.    LABS:  A1C was 5.8, indicating mild prediabetes.         Physical Exam    General: No acute distress. Well-developed. Well-nourished.  Eyes: EOMI. Sclerae anicteric.  HENT: Normocephalic. Atraumatic. Nares patent. Moist oral mucosa.  Ears: Bilateral TMs clear. Bilateral EACs clear.  Cardiovascular: Regular rate. Regular rhythm. No murmurs. No rubs. No gallops. Normal S1, S2.  Respiratory: Normal respiratory effort. Clear to auscultation bilaterally. No rales. No rhonchi. No wheezing.  Abdomen: Soft. Non-tender. Non-distended. Normoactive bowel sounds.  Musculoskeletal: No  obvious deformity.  Extremities: No lower extremity edema.  Neurological: Alert & oriented x3. No slurred speech. Normal gait.  Psychiatric: Normal mood. Normal affect. Good insight. Good judgment.  Skin: Warm. Dry. No rash.         Assessment & Plan    IMPRESSION:  - Assessed prediabetes status based on recent A1C of 5.8  - Evaluated hypertension management, continuing current regimen  - Reviewed effectiveness of current GERD  treatment  - Considered anxiety management, recommending increased use of as-needed propranolol  - Addressed hand pain, noting previous diagnosis of left-sided carpal tunnel syndrome by Dr. Bowers  - Discussed current mental health concerns and need for psychiatric evaluation    ADHD:  - Diagnosed the patient with ADHD.  - Prescribed methamphetamine for ADHD management.    HYPERTENSION:  - Continued losartan 100 mg daily for hypertension management.  - Continued amlodipine 2.5 mg daily for hypertension control.  - Continued hydrochlorothiazide 25 mg daily for blood pressure regulation.  - Monitored the patient's hypertension status.  - Evaluated blood pressure, which is currently stable.    GASTROESOPHAGEAL REFLUX DISEASE (GERD):  - Continued pantoprazole 40 mg daily for acid reflux management.  - Monitored the patient's gastroesophageal reflux symptoms.  - Evaluated the efficacy of the acid reflux medication, which the patient reports is working well.    FATTY LIVER DISEASE:  - Monitored the patient's fatty liver condition.    PREDIABETES:  - Monitored the patient's prediabetes status, with the last HbA1c at 5.8%.  - Planned to follow up on the patient's prediabetes condition.    ANXIETY:  - Continued escitalopram 30 mg daily for anxiety management.  - Continued propranolol as needed for anxiety, recommending increasing use up to 3 times daily if necessary.    DEPRESSION:  - Continued escitalopram 30 mg daily for depression management.  - Monitored the patient's depression symptoms.  - Evaluated the patient's report of feeling isolated and staying in her bedroom.    MENTAL HEALTH:  - Instructed the patient to search for a psychiatrist to address current mental health concerns.  - Offered to provide a referral once the patient selects a psychiatrist.  - Acknowledged the patient's need for mental health support.    CARPAL TUNNEL SYNDROME:  - Monitored the patient's report of sharp pain in the left hand.  - Evaluated  the patient's previous diagnosis of left-sided carpal tunnel syndrome.  - Noted that the patient emailed the hand surgeon about the pain.  - Advised the patient to follow up with the hand surgeon, Dr. Bowers.    FOLLOW UP:  - Follow up in 3 months.  - Jnaki to send portal message for adding adult children as new patients.            This note was generated with the assistance of ambient listening technology. Verbal consent was obtained by the patient and accompanying visitor(s) for the recording of patient appointment to facilitate this note. I attest to having reviewed and edited the generated note for accuracy, though some syntax or spelling errors may persist. Please contact the author of this note for any clarification.

## 2025-02-06 ENCOUNTER — TELEPHONE (OUTPATIENT)
Dept: ORTHOPEDICS | Facility: CLINIC | Age: 60
End: 2025-02-06
Payer: COMMERCIAL

## 2025-02-14 ENCOUNTER — TELEPHONE (OUTPATIENT)
Dept: ORTHOPEDICS | Facility: CLINIC | Age: 60
End: 2025-02-14
Payer: COMMERCIAL

## 2025-02-14 NOTE — PROGRESS NOTES
"Janki Jones presents for follow up evaluation of   Encounter Diagnoses   Name Primary?    Bilateral hand pain     Left carpal tunnel syndrome Yes    Tenosynovitis, de Quervain     Primary osteoarthritis of both first carpometacarpal joints      History of Present Illness    CHIEF COMPLAINT:  - Bilateral hand pain and numbness    HPI:  Patient presents with pain in both hands, primarily in the left hand. Symptoms began approximately three weeks ago when her puppy jumped on her hands, causing injury. The left hand has numbness, previously diagnosed as moderate carpal tunnel syndrome based on a nerve study last year. The right hand has cramping and pain. Both hands feel "sleepy" and wake her up at night. She experiences tingling sensations during household activities. Pain on the side of the thumb is exacerbated by certain movements. She occasionally engages in mikael, which may contribute to her symptoms.    PREVIOUS TREATMENTS:  - Surgery performed on the right hand for carpal tunnel syndrome  - Injection administered to the left side after the surgery  - Nerve study conducted last year, which showed moderate carpal tunnel syndrome on the left side    SURGICAL HISTORY:  - Carpal tunnel surgery: Right hand  - Carpal tunnel surgery: Left hand    WORK STATUS:  - Previously worked for the [employer]  - Currently does some work with her  at home  - Performs household tasks, including cooking for her  and college-aged son    Vitals:    02/17/25 0831   PainSc:   5   PainLoc: Hand       PE:    AA&O x 4.  NAD  HEENT:  NCAT, sclera nonicteric  Lungs:  Respirations are equal and unlabored.  CV:  2+ bilateral upper and lower extremity pulses.  MSK:   Positive compression, Tinel's and Phalen's left wrist, positive Finkelstein's test left wrist. Neurovascularly intact bilaterally.  5/5 thenar and intrinsic musculature strength.  Full range of motion hands, wrists and elbows.    Diagnostic studies and " other clinical records review:  X-rays AP, lateral and oblique bilateral hand taken today are independently reviewed by me and shows bilateral Eaton stage II basilar thumb arthritis.     EMG:  SYLVIAE 4/30/24 L. moderate carpal tunnel w/ slowed sensory and prolong motor / R. mild carpal tunnel w slowing sensory    Assessment/Plan:   Encounter Diagnoses   Name Primary?    Bilateral hand pain     Left carpal tunnel syndrome Yes    Tenosynovitis, de Quervain     Primary osteoarthritis of both first carpometacarpal joints      The patient and I had a thorough discussion today. We discussed the working diagnosis as well as several other potential alternative diagnoses. Treatment options were discussed, both conservative and surgical. Conservative treatment options would include things such as activity modifications, workplace modifications, a period of rest, oral vs topical OTC and prescription anti-inflammatory medications, occupational therapy, splinting/bracing, immobilization, corticosteroid injections, and others. Surgical options were discussed as well.    -I have offered her a selective injection. I have explained the risks, benefits, and alternatives of the procedure in detail.  The patient voices understanding and all questions have been answered. The patient agrees to proceed as planned. So after a sterile prep of the skin in the normal fashion the left 1st dorsal extensor compartment, left carpal tunnel injected using a 25 gauge needle with a combination of 1cc 1% plain lidocaine and 40 mg of methylprednisolone.  The patient is cautioned and immediate relief of pain is secondary to the local anesthetic and will be temporary.  After the anesthetic wears off there may be a increase in pain that may last for a few hours or a few days and they should use ice to help alleviate this flair up of pain. Patient tolerated the procedure well.    Will call in 2 weeks to follow up for steroid injection efficacy or sooner for  any worsening of symptoms  Call with any questions/concerns in the interim           Toma Bowers MD    Please be aware that this note has been generated with the assistance of [employer] voice-to-text.  Please excuse any spelling or grammatical errors.  This note was generated with the assistance of ambient listening technology. Verbal consent was obtained by the patient and accompanying visitor(s) for the recording of patient appointment to facilitate this note. I attest to having reviewed and edited the generated note for accuracy, though some syntax or spelling errors may persist. Please contact the author of this note for any clarification.

## 2025-02-14 NOTE — TELEPHONE ENCOUNTER
Patient communication     Notified patient to stop at [place] [facility] - 1st floor check in [address] [facility] [address] 30 minutes prior to your appointment time on 2/17/25 with Dr. Bowers for x-rays.     Made them aware that this is not a scheduled xray appointment and they might be running behind as they are considered a walk-in xray.    Verbalized the Following:  *Please arrive at your informed time above, if you are more than 15 Minutes late to your appointment with Dr. Bowers we will have to reschedule your appointment. This will allow you to be seen in a timely manor and be conscious to other patients being seen that same day*

## 2025-02-17 ENCOUNTER — OFFICE VISIT (OUTPATIENT)
Dept: ORTHOPEDICS | Facility: CLINIC | Age: 60
End: 2025-02-17
Payer: COMMERCIAL

## 2025-02-17 ENCOUNTER — HOSPITAL ENCOUNTER (OUTPATIENT)
Dept: RADIOLOGY | Facility: HOSPITAL | Age: 60
Discharge: HOME | End: 2025-02-17
Attending: ORTHOPAEDIC SURGERY
Payer: COMMERCIAL

## 2025-02-17 DIAGNOSIS — M18.0 PRIMARY OSTEOARTHRITIS OF BOTH FIRST CARPOMETACARPAL JOINTS: ICD-10-CM

## 2025-02-17 DIAGNOSIS — M79.641 BILATERAL HAND PAIN: ICD-10-CM

## 2025-02-17 DIAGNOSIS — M79.642 BILATERAL HAND PAIN: ICD-10-CM

## 2025-02-17 DIAGNOSIS — G56.02 LEFT CARPAL TUNNEL SYNDROME: Primary | ICD-10-CM

## 2025-02-17 DIAGNOSIS — M65.4 TENOSYNOVITIS, DE QUERVAIN: ICD-10-CM

## 2025-02-17 PROCEDURE — 73130 X-RAY EXAM OF HAND: CPT | Mod: TC,50

## 2025-02-17 PROCEDURE — 99999 PR PBB SHADOW E&M-EST. PATIENT-LVL III: CPT | Mod: PBBFAC,,, | Performed by: ORTHOPAEDIC SURGERY

## 2025-02-17 PROCEDURE — 1160F RVW MEDS BY RX/DR IN RCRD: CPT | Mod: CPTII,S$GLB,, | Performed by: ORTHOPAEDIC SURGERY

## 2025-02-17 PROCEDURE — 1159F MED LIST DOCD IN RCRD: CPT | Mod: CPTII,S$GLB,, | Performed by: ORTHOPAEDIC SURGERY

## 2025-02-17 PROCEDURE — 99214 OFFICE O/P EST MOD 30 MIN: CPT | Mod: 25,S$GLB,, | Performed by: ORTHOPAEDIC SURGERY

## 2025-02-17 PROCEDURE — 4010F ACE/ARB THERAPY RXD/TAKEN: CPT | Mod: CPTII,S$GLB,, | Performed by: ORTHOPAEDIC SURGERY

## 2025-02-17 PROCEDURE — 20550 NJX 1 TENDON SHEATH/LIGAMENT: CPT | Mod: 51,XS,LT,S$GLB | Performed by: ORTHOPAEDIC SURGERY

## 2025-02-17 PROCEDURE — 20526 THER INJECTION CARP TUNNEL: CPT | Mod: LT,S$GLB,, | Performed by: ORTHOPAEDIC SURGERY

## 2025-02-17 RX ADMIN — METHYLPREDNISOLONE ACETATE 40 MG: 40 INJECTION, SUSPENSION INTRA-ARTICULAR; INTRALESIONAL; INTRAMUSCULAR; SOFT TISSUE at 08:02

## 2025-02-17 NOTE — PROCEDURES
Carpal Tunnel: L carpal tunnel    Date/Time: 2/17/2025 8:30 AM    Performed by: Toma Bowers MD  Authorized by: Toma Bowers MD    Consent Done?:  Yes (Verbal)  Indications:  Pain  Timeout: prior to procedure the correct patient, procedure, and site was verified    Prep: patient was prepped and draped in usual sterile fashion      Local anesthesia used?: Yes    Anesthesia:  Local infiltration  Local anesthetic:  Lidocaine 1% without epinephrine  Location:  Wrist  Site:  L carpal tunnel  Needle size:  25 G  Medications:  40 mg methylPREDNISolone acetate 40 mg/mL  Patient tolerance:  Patient tolerated the procedure well with no immediate complications  Tendon Sheath    Date/Time: 2/17/2025 8:30 AM    Performed by: Toma Bowers MD  Authorized by: Toma Bowers MD    Consent Done?:  Yes (Verbal)  Indications:  Pain  Prep: patient was prepped and draped in usual sterile fashion      Local anesthesia used?: Yes    Anesthesia:  Local infiltration  Local anesthetic:  Lidocaine 1% without epinephrine  Anesthetic total (ml):  1    Location:  Wrist  Site:  L first doral compartment  Needle size:  25 G  Approach:  Radial  Medications:  40 mg methylPREDNISolone acetate 40 mg/mL  Patient tolerance:  Patient tolerated the procedure well with no immediate complications

## 2025-03-03 DIAGNOSIS — F41.9 ANXIETY: ICD-10-CM

## 2025-03-03 DIAGNOSIS — F90.1 ATTENTION DEFICIT HYPERACTIVITY DISORDER (ADHD), PREDOMINANTLY HYPERACTIVE TYPE: ICD-10-CM

## 2025-03-03 DIAGNOSIS — F33.1 MAJOR DEPRESSIVE DISORDER, RECURRENT EPISODE, MODERATE: ICD-10-CM

## 2025-03-03 RX ORDER — ESCITALOPRAM OXALATE 20 MG/1
TABLET ORAL
Qty: 90 TABLET | Refills: 3 | Status: CANCELLED | OUTPATIENT
Start: 2025-03-03

## 2025-03-03 RX ORDER — METHYLPHENIDATE HYDROCHLORIDE 20 MG/1
20 TABLET ORAL 2 TIMES DAILY
Qty: 60 TABLET | Refills: 0 | Status: SHIPPED | OUTPATIENT
Start: 2025-04-03

## 2025-03-03 RX ORDER — PROPRANOLOL HYDROCHLORIDE 10 MG/1
10 TABLET ORAL 3 TIMES DAILY PRN
Qty: 270 TABLET | Refills: 3 | Status: CANCELLED | OUTPATIENT
Start: 2025-03-03

## 2025-03-03 RX ORDER — METHYLPHENIDATE HYDROCHLORIDE 20 MG/1
20 TABLET ORAL 2 TIMES DAILY
Qty: 60 TABLET | Refills: 0 | Status: SHIPPED | OUTPATIENT
Start: 2025-03-03

## 2025-03-03 RX ORDER — PANTOPRAZOLE SODIUM 40 MG/1
40 TABLET, DELAYED RELEASE ORAL DAILY
Qty: 90 TABLET | Refills: 3 | Status: CANCELLED | OUTPATIENT
Start: 2025-03-03

## 2025-03-03 RX ORDER — HYDROCHLOROTHIAZIDE 25 MG/1
25 TABLET ORAL DAILY
Qty: 90 TABLET | Refills: 3 | Status: CANCELLED | OUTPATIENT
Start: 2025-03-03

## 2025-03-03 RX ORDER — AMLODIPINE BESYLATE 2.5 MG/1
2.5 TABLET ORAL DAILY
Qty: 90 TABLET | Refills: 3 | Status: CANCELLED | OUTPATIENT
Start: 2025-03-03

## 2025-03-03 RX ORDER — LOSARTAN POTASSIUM 100 MG/1
100 TABLET ORAL DAILY
Qty: 90 TABLET | Refills: 3 | Status: CANCELLED | OUTPATIENT
Start: 2025-03-03

## 2025-03-03 NOTE — TELEPHONE ENCOUNTER
LOV: 2/3/25  Upcoming Ov: 5/14/25  LF: Ritalin 20: 1/17/25    Pt sent the following but have refills (will send msg to pt stating rf at pharmacy):  LF: Inderal 10: 1/17/25, 270tab, 3rf  LF: Norvasc 2.5: 1/17/25, 90tab, 3rf  LF: Protonix 40: 1/17/25, 90tab, 3rf  LF: Hydrodiuril 25: 1/17/25, 90tab, 3rf  LF: Cozaar 100: 1/17/25, 90tab, 3rf  LF: Lexapro 20: 1/17/25, 90tab, 3rf

## 2025-03-03 NOTE — TELEPHONE ENCOUNTER
No care due was identified.  Health [facility] Embedded Care Due Messages. Reference number: [id number].   3/03/2025 1:09:44 PM CST

## 2025-03-22 RX ORDER — METHYLPREDNISOLONE ACETATE 40 MG/ML
40 INJECTION, SUSPENSION INTRA-ARTICULAR; INTRALESIONAL; INTRAMUSCULAR; SOFT TISSUE
Status: DISCONTINUED | OUTPATIENT
Start: 2025-02-17 | End: 2025-03-22 | Stop reason: HOSPADM

## 2025-04-09 ENCOUNTER — TELEPHONE (OUTPATIENT)
Dept: ORTHOPEDICS | Facility: CLINIC | Age: 60
End: 2025-04-09
Payer: COMMERCIAL

## 2025-04-09 NOTE — TELEPHONE ENCOUNTER
Patient communication      Patient reports 70% continued Improvement from left de Quervain and left carpal tunnel steroid injection February 17, 2025    She continues to have right hand numbness and tingling as well and she has a history of carpal tunnel release in 2022      She would like to follow up and schedule surgery for potentially in July.  She will discuss with her family    Moo Amezcua MS, OTC   Sports Medicine Assistant   [facility]  (P) [phone]  (F) [phone]

## 2025-05-12 DIAGNOSIS — F90.1 ATTENTION DEFICIT HYPERACTIVITY DISORDER (ADHD), PREDOMINANTLY HYPERACTIVE TYPE: ICD-10-CM

## 2025-05-12 DIAGNOSIS — F41.9 ANXIETY: ICD-10-CM

## 2025-05-12 DIAGNOSIS — F33.1 MAJOR DEPRESSIVE DISORDER, RECURRENT EPISODE, MODERATE: ICD-10-CM

## 2025-05-12 RX ORDER — PANTOPRAZOLE SODIUM 40 MG/1
40 TABLET, DELAYED RELEASE ORAL DAILY
Qty: 90 TABLET | Refills: 3 | Status: SHIPPED | OUTPATIENT
Start: 2025-05-12

## 2025-05-12 RX ORDER — PROPRANOLOL HYDROCHLORIDE 10 MG/1
10 TABLET ORAL 3 TIMES DAILY PRN
Qty: 270 TABLET | Refills: 3 | Status: SHIPPED | OUTPATIENT
Start: 2025-05-12

## 2025-05-12 RX ORDER — ESCITALOPRAM OXALATE 20 MG/1
TABLET ORAL
Qty: 90 TABLET | Refills: 3 | Status: SHIPPED | OUTPATIENT
Start: 2025-05-12

## 2025-05-12 RX ORDER — HYDROCHLOROTHIAZIDE 25 MG/1
25 TABLET ORAL DAILY
Qty: 90 TABLET | Refills: 3 | Status: SHIPPED | OUTPATIENT
Start: 2025-05-12

## 2025-05-12 RX ORDER — METHYLPHENIDATE HYDROCHLORIDE 20 MG/1
20 TABLET ORAL 2 TIMES DAILY
Qty: 60 TABLET | Refills: 0 | Status: SHIPPED | OUTPATIENT
Start: 2025-05-12

## 2025-05-12 RX ORDER — AMLODIPINE BESYLATE 2.5 MG/1
2.5 TABLET ORAL DAILY
Qty: 90 TABLET | Refills: 3 | Status: SHIPPED | OUTPATIENT
Start: 2025-05-12

## 2025-05-12 RX ORDER — LOSARTAN POTASSIUM 100 MG/1
100 TABLET ORAL DAILY
Qty: 90 TABLET | Refills: 3 | Status: SHIPPED | OUTPATIENT
Start: 2025-05-12

## 2025-05-12 NOTE — TELEPHONE ENCOUNTER
No care due was identified.  Health [facility] Embedded Care Due Messages. Reference number: [id number].   5/12/2025 10:54:51 AM CDT

## 2025-05-14 ENCOUNTER — LAB VISIT (OUTPATIENT)
Dept: LAB | Facility: HOSPITAL | Age: 60
End: 2025-05-14
Attending: INTERNAL MEDICINE
Payer: COMMERCIAL

## 2025-05-14 ENCOUNTER — OFFICE VISIT (OUTPATIENT)
Dept: INTERNAL MEDICINE | Facility: CLINIC | Age: 60
End: 2025-05-14
Payer: COMMERCIAL

## 2025-05-14 VITALS
HEIGHT: 67 IN | BODY MASS INDEX: 28.53 KG/M2 | DIASTOLIC BLOOD PRESSURE: 84 MMHG | RESPIRATION RATE: 16 BRPM | OXYGEN SATURATION: 97 % | HEART RATE: 58 BPM | TEMPERATURE: 97 F | SYSTOLIC BLOOD PRESSURE: 132 MMHG | WEIGHT: 181.75 LBS

## 2025-05-14 DIAGNOSIS — K21.9 GASTROESOPHAGEAL REFLUX DISEASE, UNSPECIFIED WHETHER ESOPHAGITIS PRESENT: ICD-10-CM

## 2025-05-14 DIAGNOSIS — R73.03 PREDIABETES: Primary | ICD-10-CM

## 2025-05-14 DIAGNOSIS — R73.03 PREDIABETES: ICD-10-CM

## 2025-05-14 DIAGNOSIS — F33.1 MAJOR DEPRESSIVE DISORDER, RECURRENT EPISODE, MODERATE: ICD-10-CM

## 2025-05-14 DIAGNOSIS — I10 ESSENTIAL HYPERTENSION: ICD-10-CM

## 2025-05-14 DIAGNOSIS — F41.9 ANXIETY: ICD-10-CM

## 2025-05-14 DIAGNOSIS — K76.0 FATTY LIVER: ICD-10-CM

## 2025-05-14 DIAGNOSIS — F90.1 ATTENTION DEFICIT HYPERACTIVITY DISORDER (ADHD), PREDOMINANTLY HYPERACTIVE TYPE: ICD-10-CM

## 2025-05-14 LAB
ABSOLUTE EOSINOPHIL (OHS): 0.1 K/UL
ABSOLUTE MONOCYTE (OHS): 0.55 K/UL (ref 0.3–1)
ABSOLUTE NEUTROPHIL COUNT (OHS): 2.35 K/UL (ref 1.8–7.7)
ALBUMIN SERPL BCP-MCNC: 3.7 G/DL (ref 3.5–5.2)
ALP SERPL-CCNC: 130 UNIT/L (ref 40–150)
ALT SERPL W/O P-5'-P-CCNC: 204 UNIT/L (ref 10–44)
ANION GAP (OHS): 9 MMOL/L (ref 8–16)
AST SERPL-CCNC: 153 UNIT/L (ref 11–45)
BASOPHILS # BLD AUTO: 0.02 K/UL
BASOPHILS NFR BLD AUTO: 0.3 %
BILIRUB SERPL-MCNC: 0.4 MG/DL (ref 0.1–1)
BUN SERPL-MCNC: 14 MG/DL (ref 6–20)
CALCIUM SERPL-MCNC: 9.2 MG/DL (ref 8.7–10.5)
CHLORIDE SERPL-SCNC: 100 MMOL/L (ref 95–110)
CO2 SERPL-SCNC: 31 MMOL/L (ref 23–29)
CREAT SERPL-MCNC: 0.8 MG/DL (ref 0.5–1.4)
EAG (OHS): 120 MG/DL (ref 68–131)
ERYTHROCYTE [DISTWIDTH] IN BLOOD BY AUTOMATED COUNT: 13.9 % (ref 11.5–14.5)
GFR SERPLBLD CREATININE-BSD FMLA CKD-EPI: >60 ML/MIN/1.73/M2
GLUCOSE SERPL-MCNC: 95 MG/DL (ref 70–110)
HBA1C MFR BLD: 5.8 % (ref 4–5.6)
HCT VFR BLD AUTO: 40 % (ref 37–48.5)
HGB BLD-MCNC: 12.2 GM/DL (ref 12–16)
IMM GRANULOCYTES # BLD AUTO: 0.02 K/UL (ref 0–0.04)
IMM GRANULOCYTES NFR BLD AUTO: 0.3 % (ref 0–0.5)
LYMPHOCYTES # BLD AUTO: 3.03 K/UL (ref 1–4.8)
MCH RBC QN AUTO: 28.3 PG (ref 27–31)
MCHC RBC AUTO-ENTMCNC: 30.5 G/DL (ref 32–36)
MCV RBC AUTO: 93 FL (ref 82–98)
NUCLEATED RBC (/100WBC) (OHS): 0 /100 WBC
PLATELET # BLD AUTO: 202 K/UL (ref 150–450)
PMV BLD AUTO: 13 FL (ref 9.2–12.9)
POTASSIUM SERPL-SCNC: 3.3 MMOL/L (ref 3.5–5.1)
PROT SERPL-MCNC: 7.9 GM/DL (ref 6–8.4)
RBC # BLD AUTO: 4.31 M/UL (ref 4–5.4)
RELATIVE EOSINOPHIL (OHS): 1.6 %
RELATIVE LYMPHOCYTE (OHS): 49.9 % (ref 18–48)
RELATIVE MONOCYTE (OHS): 9.1 % (ref 4–15)
RELATIVE NEUTROPHIL (OHS): 38.8 % (ref 38–73)
SODIUM SERPL-SCNC: 140 MMOL/L (ref 136–145)
WBC # BLD AUTO: 6.07 K/UL (ref 3.9–12.7)

## 2025-05-14 PROCEDURE — 36415 COLL VENOUS BLD VENIPUNCTURE: CPT | Mod: PO

## 2025-05-14 PROCEDURE — 85025 COMPLETE CBC W/AUTO DIFF WBC: CPT

## 2025-05-14 PROCEDURE — 3079F DIAST BP 80-89 MM HG: CPT | Mod: CPTII,S$GLB,, | Performed by: INTERNAL MEDICINE

## 2025-05-14 PROCEDURE — G2211 COMPLEX E/M VISIT ADD ON: HCPCS | Mod: S$GLB,,, | Performed by: INTERNAL MEDICINE

## 2025-05-14 PROCEDURE — 99999 PR PBB SHADOW E&M-EST. PATIENT-LVL IV: CPT | Mod: PBBFAC,,, | Performed by: INTERNAL MEDICINE

## 2025-05-14 PROCEDURE — 4010F ACE/ARB THERAPY RXD/TAKEN: CPT | Mod: CPTII,S$GLB,, | Performed by: INTERNAL MEDICINE

## 2025-05-14 PROCEDURE — 82247 BILIRUBIN TOTAL: CPT

## 2025-05-14 PROCEDURE — 83036 HEMOGLOBIN GLYCOSYLATED A1C: CPT

## 2025-05-14 PROCEDURE — 3008F BODY MASS INDEX DOCD: CPT | Mod: CPTII,S$GLB,, | Performed by: INTERNAL MEDICINE

## 2025-05-14 PROCEDURE — 1159F MED LIST DOCD IN RCRD: CPT | Mod: CPTII,S$GLB,, | Performed by: INTERNAL MEDICINE

## 2025-05-14 PROCEDURE — 3075F SYST BP GE 130 - 139MM HG: CPT | Mod: CPTII,S$GLB,, | Performed by: INTERNAL MEDICINE

## 2025-05-14 PROCEDURE — 99214 OFFICE O/P EST MOD 30 MIN: CPT | Mod: S$GLB,,, | Performed by: INTERNAL MEDICINE

## 2025-05-14 NOTE — PROGRESS NOTES
Subjective     Patient ID: Janki Jones is a 59 y.o. female.    Chief Complaint: Follow-up    HPI  Pt with HTN, ADD, MDD, Prediabetes, Fatty Liver, L CTS is here for f/u   Review of Systems   Constitutional:  Negative for activity change, appetite change, chills, diaphoresis, fatigue, fever and unexpected weight change.   HENT:  Negative for postnasal drip, rhinorrhea, sinus pressure/congestion, sneezing, sore throat, trouble swallowing and voice change.    Respiratory:  Negative for cough, shortness of breath and wheezing.    Cardiovascular:  Negative for chest pain, palpitations and leg swelling.   Gastrointestinal:  Negative for abdominal pain, blood in stool, constipation, diarrhea, nausea and vomiting.   Genitourinary:  Negative for dysuria.   Musculoskeletal:  Negative for arthralgias and myalgias.   Integumentary:  Negative for rash and wound.   Allergic/Immunologic: Negative for environmental allergies and food allergies.   Hematological:  Negative for adenopathy. Does not bruise/bleed easily.   Psychiatric/Behavioral:  Positive for dysphoric mood. Negative for self-injury and suicidal ideas. The patient is nervous/anxious.           Objective     Physical Exam  Constitutional:       General: She is not in acute distress.     Appearance: Normal appearance. She is well-developed. She is not diaphoretic.   HENT:      Head: Normocephalic and atraumatic.      Right Ear: External ear normal.      Left Ear: External ear normal.      Nose: Nose normal.      Mouth/Throat:      Pharynx: No oropharyngeal exudate.   Eyes:      General: No scleral icterus.        Right eye: No discharge.         Left eye: No discharge.      Conjunctiva/sclera: Conjunctivae normal.      Pupils: Pupils are equal, round, and reactive to light.   Neck:      Vascular: No JVD.   Cardiovascular:      Rate and Rhythm: Normal rate and regular rhythm.      Pulses: Normal pulses.      Heart sounds: Normal heart sounds.   Pulmonary:       Effort: Pulmonary effort is normal. No respiratory distress.      Breath sounds: Normal breath sounds. No wheezing, rhonchi or rales.   Abdominal:      General: Bowel sounds are normal. There is no distension.      Palpations: Abdomen is soft.      Tenderness: There is no abdominal tenderness. There is no guarding or rebound.   Musculoskeletal:      Cervical back: Neck supple.      Right lower leg: No edema.      Left lower leg: No edema.   Lymphadenopathy:      Cervical: No cervical adenopathy.   Skin:     General: Skin is warm and dry.      Coloration: Skin is not pale.      Findings: No rash.   Neurological:      General: No focal deficit present.      Mental Status: She is alert and oriented to person, place, and time.      Gait: Gait normal.   Psychiatric:         Behavior: Behavior normal.         Thought Content: Thought content normal.            Assessment and Plan     1. Prediabetes  -     CBC Auto Differential; Future; Expected date: 05/14/2025  -     Comprehensive Metabolic Panel; Future; Expected date: 05/14/2025  -     Hemoglobin A1C; Future; Expected date: 05/14/2025    2. Anxiety    3. Attention deficit hyperactivity disorder (ADHD), predominantly hyperactive type    4. Major depressive disorder, recurrent episode, moderate    5. Essential hypertension  -     CBC Auto Differential; Future; Expected date: 05/14/2025  -     Comprehensive Metabolic Panel; Future; Expected date: 05/14/2025  -     Hemoglobin A1C; Future; Expected date: 05/14/2025    6. Fatty liver    7. Gastroesophageal reflux disease, unspecified whether esophagitis present           HTN- stable       ADD- stable on Concerta      MDD- fair control on Lexapro 30 mg qd, Wellbutrin d/c      Anxiety- Lexapro as above   -can take Propranolol 10 mg TID PRN anxiety      Fatty liver- referral back to Hepatology   -Wellbutrin stopped out of an abundance of caution 2/2 elevated LAEs(not sure if this is contributing or not)      GERD- on PPI       F/u in 3 months

## 2025-05-15 ENCOUNTER — RESULTS FOLLOW-UP (OUTPATIENT)
Dept: INTERNAL MEDICINE | Facility: CLINIC | Age: 60
End: 2025-05-15

## 2025-05-15 DIAGNOSIS — K76.0 FATTY LIVER: Primary | ICD-10-CM

## 2025-05-17 DIAGNOSIS — F90.1 ATTENTION DEFICIT HYPERACTIVITY DISORDER (ADHD), PREDOMINANTLY HYPERACTIVE TYPE: ICD-10-CM

## 2025-05-17 NOTE — TELEPHONE ENCOUNTER
No care due was identified.  [facility] Embedded Care Due Messages. Reference number: [id number].   5/17/2025 9:01:22 AM CDT

## 2025-05-19 RX ORDER — HYDROCHLOROTHIAZIDE 25 MG/1
25 TABLET ORAL DAILY
Qty: 90 TABLET | Refills: 3 | Status: SHIPPED | OUTPATIENT
Start: 2025-05-19

## 2025-05-19 RX ORDER — METHYLPHENIDATE HYDROCHLORIDE 20 MG/1
20 TABLET ORAL 2 TIMES DAILY
Qty: 60 TABLET | Refills: 0 | Status: SHIPPED | OUTPATIENT
Start: 2025-05-19

## 2025-06-18 DIAGNOSIS — F33.1 MAJOR DEPRESSIVE DISORDER, RECURRENT EPISODE, MODERATE: ICD-10-CM

## 2025-06-18 DIAGNOSIS — F90.1 ATTENTION DEFICIT HYPERACTIVITY DISORDER (ADHD), PREDOMINANTLY HYPERACTIVE TYPE: ICD-10-CM

## 2025-06-18 DIAGNOSIS — F41.9 ANXIETY: ICD-10-CM

## 2025-06-18 RX ORDER — ESCITALOPRAM OXALATE 20 MG/1
TABLET ORAL
Qty: 90 TABLET | Refills: 3 | Status: SHIPPED | OUTPATIENT
Start: 2025-06-18

## 2025-06-18 RX ORDER — PANTOPRAZOLE SODIUM 40 MG/1
40 TABLET, DELAYED RELEASE ORAL DAILY
Qty: 90 TABLET | Refills: 3 | Status: SHIPPED | OUTPATIENT
Start: 2025-06-18

## 2025-06-18 RX ORDER — LOSARTAN POTASSIUM 100 MG/1
100 TABLET ORAL DAILY
Qty: 90 TABLET | Refills: 3 | Status: SHIPPED | OUTPATIENT
Start: 2025-06-18

## 2025-06-18 RX ORDER — HYDROCHLOROTHIAZIDE 25 MG/1
25 TABLET ORAL DAILY
Qty: 90 TABLET | Refills: 3 | Status: SHIPPED | OUTPATIENT
Start: 2025-06-18

## 2025-06-18 RX ORDER — METHYLPHENIDATE HYDROCHLORIDE 20 MG/1
20 TABLET ORAL 2 TIMES DAILY
Qty: 60 TABLET | Refills: 0 | Status: SHIPPED | OUTPATIENT
Start: 2025-06-18

## 2025-06-18 RX ORDER — PROPRANOLOL HYDROCHLORIDE 10 MG/1
10 TABLET ORAL 3 TIMES DAILY PRN
Qty: 270 TABLET | Refills: 3 | Status: SHIPPED | OUTPATIENT
Start: 2025-06-18

## 2025-06-18 RX ORDER — ESCITALOPRAM OXALATE 10 MG/1
TABLET ORAL
Qty: 90 TABLET | Refills: 3 | Status: SHIPPED | OUTPATIENT
Start: 2025-06-18

## 2025-06-18 RX ORDER — AMLODIPINE BESYLATE 2.5 MG/1
2.5 TABLET ORAL DAILY
Qty: 90 TABLET | Refills: 3 | Status: SHIPPED | OUTPATIENT
Start: 2025-06-18

## 2025-06-18 NOTE — TELEPHONE ENCOUNTER
No care due was identified.  [facility] Embedded Care Due Messages. Reference number: [id number].   6/18/2025 11:56:23 AM CDT

## 2025-07-11 ENCOUNTER — TELEPHONE (OUTPATIENT)
Dept: HEPATOLOGY | Facility: CLINIC | Age: 60
End: 2025-07-11

## 2025-07-11 ENCOUNTER — OFFICE VISIT (OUTPATIENT)
Dept: HEPATOLOGY | Facility: CLINIC | Age: 60
End: 2025-07-11
Payer: COMMERCIAL

## 2025-07-11 ENCOUNTER — TELEPHONE (OUTPATIENT)
Dept: HEPATOLOGY | Facility: CLINIC | Age: 60
End: 2025-07-11
Payer: COMMERCIAL

## 2025-07-11 VITALS — BODY MASS INDEX: 28.56 KG/M2 | HEIGHT: 67 IN | WEIGHT: 182 LBS

## 2025-07-11 DIAGNOSIS — R73.03 PREDIABETES: ICD-10-CM

## 2025-07-11 DIAGNOSIS — K74.01 HEPATIC FIBROSIS, EARLY FIBROSIS: ICD-10-CM

## 2025-07-11 DIAGNOSIS — I10 ESSENTIAL HYPERTENSION: ICD-10-CM

## 2025-07-11 DIAGNOSIS — R74.8 ELEVATED LIVER ENZYMES: ICD-10-CM

## 2025-07-11 DIAGNOSIS — E66.3 OVERWEIGHT (BMI 25.0-29.9): ICD-10-CM

## 2025-07-11 DIAGNOSIS — K76.0 FATTY LIVER: Primary | ICD-10-CM

## 2025-07-11 NOTE — TELEPHONE ENCOUNTER
----- Message from Theron Briscoe NP sent at 7/11/2025  3:34 PM CDT -----  Please schedule patient for labs next week

## 2025-07-11 NOTE — PROGRESS NOTES
The patient location is: [place]  The chief complaint leading to consultation is: elevated liver enzymes    Visit type: audiovisual    Face to Face time with patient: 30 minutes  40 minutes of total time spent on the encounter, which includes face to face time and non-face to face time preparing to see the patient (eg, review of tests), Obtaining and/or reviewing separately obtained history, Documenting clinical information in the electronic or other health record, Independently interpreting results (not separately reported) and communicating results to the patient/family/caregiver, or Care coordination (not separately reported).         Each patient to whom he or she provides medical services by telemedicine is:  (1) informed of the relationship between the physician and patient and the respective role of any other health care provider with respect to management of the patient; and (2) notified that he or she may decline to receive medical services by telemedicine and may withdraw from such care at any time.    Notes: [facility] Patient    REFERRING PROVIDER:  Dr. Daniel Lawrence  PCP: Daniel Lawrence DO     Chief Complaint:  elevated liver enzymes    Last saw AL Egan 7/2023     HISTORY       HPI: This is a 59 y.o. patient with PMH noted below, presenting for follow up of  fatty liver disease.    Previous serologic w/u negative for Parag's, alpha-1 antitrypsin deficiency, hemochromatosis, autoimmune etiology, and viral hepatitis.    Prior serologic workup:   Lab Results   Component Value Date    SMOOTHMUSCAB Negative 1:40 10/26/2022    AMAIFA Negative 1:40 10/26/2022    IGGSERUM 1582 11/16/2022    ANASCREEN Negative <1:80 10/26/2022    FERRITIN 323 (H) 03/07/2023    FESATURATED 24 10/26/2022    Q6BTVLTGCU 133 10/26/2022    CERULOPLSM 31.0 10/26/2022    HEPBSAG Non-reactive 10/26/2022    HEPCAB Non-reactive 10/26/2022         Interval HPI: Presents today alone. Patient was  previously seen by AL Egan for elevated liver enzymes, last seen 7/2023. At that time she had liver biopsy that reviewed [facility]. Liver enzymes remain significantly elevated. Patient's current weight is 182#. Has gained about 20# since 2020. OTC supplements include Advil 1 at night when needed for jaw pain, not every night. Denies tylenol use. States that she was not taking K+. She was not instructed to stop, K+ has been low so recommend pt restarting due to HCTZ. Reports compliance with BP medications. Has been on methylphenidate for about 16 years, currently just taking 20 mg once a day. Is considering stopping. Other supplements as listed below. Mother with history of cirrhosis - passed away from it. Did drink alcohol. Patient states rare alcohol use, a couple times a year. Wants to work on weight loss and better lifestyle adjustments. Requests dietician referral. Will recheck autoimmune markers and get updated US. Based on results will get updated liver staging and consider starting rezdiffra.    Diet: some SSB, no fried/fast, no desserts  Exercise: walks  Supplements: MVI, omega 3, mag, MVI    Risk factors for fatty liver include:  Overweight - current BMI 28.51  HTN - managed with medication  PreDM - last A1C 5.8      LABS & DIAGNOSTIC STUDIES      Labs done 5/2025 show elevated transaminase levels (ALT > AST, elevated since 2020)  Platelets wnl, alk phos wnl  Synthetic liver functioning wnl    Lab Results   Component Value Date     (H) 05/14/2025     (H) 05/14/2025    ALKPHOS 130 05/14/2025    BILITOT 0.4 05/14/2025    ALBUMIN 3.7 05/14/2025    INR 1.1 03/29/2023     05/14/2025       Imaging:  Abd U/S done 11/2023 showed     FINDINGS:  Pancreas is obscured by bowel gas.     Liver: Normal in size, measuring 14.6 cm. Fatty liver infiltration.  HR index is elevated at 2.32. No focal hepatic lesions.     Gallbladder: No calculi, wall thickening, or pericholecystic fluid.  No sonographic  Rosado's sign.     Biliary system: The common duct is not dilated, measuring 3 mm.  No intrahepatic ductal dilatation.     Spleen: Normal in size and echotexture, measuring 8.7 x 2.7 cm.     Miscellaneous: No upper abdominal ascites.     Impression:     Hepatic steatosis.      Liver fibrosis staging:  -- Fibroscan 12/2022 noted S2, F0-1 (, kPa 7.1)    Liver biopsy 3/2023:  Final Pathologic Diagnosis Liver, random, biopsy:  - Mild steatosis, predominantly macrovesicular, 30% with steatohepatitis.  - Moderate portal inflammation, predominantly lymphocytic with eosinophils  and macrophages.  - Pericellular and periportal fibrosis, stage 2 (of 4).  - See comment.  COMMENT:  The biopsy is adequate for evaluation.  The patient's history of  elevated liver enzymes is noted and select information from the electronic  medical record is reviewed.  The biopsy shows steatosis with steatohepatitis.   There are many causes of steatohepatitis.  If the clinical history is  consistent with nonalcoholic steatohepatitis, this would correspond to a  NAFLD score of 3 (out of 8, 1 for steatosis, 1 for lobular inflammation, 1  for ballooning).  There is also moderate portal inflammation, predominantly  lymphocytic with eosinophils and macrophages present.  Some degree of portal  inflammation can be secondary to steatohepatitis, however this raises the  possibility of a secondary process such as an infection or drug reaction with  evidence of recovery.  There are no granulomas seen.  There is no significant  plasma cell population seen.  There is pericellular and periportal fibrosis  seen on trichrome stain.  There is no increased iron within hepatocytes on  iron stain.  Appropriate positive controls are examined.     Pathology conference 10/2023:  Significant steatosis appreciated. Ballooning hepatocytes. No distinct apoptotic bodies. No significant plasma cells. Steatohepatitis with resolving injury. Pericellular/periportal  "fibrosis stage 2/4.        Rare Alcohol consumption, see below  Social History     Substance and Sexual Activity   Alcohol Use Yes    Comment: 3-4 times a year       Immunity to Hep A and B - recommend vaccination if not immune    Mother with cirrhosis - passed away. Brother and Sister with fatty liver as well.         Allergy and medication list reviewed and updated     PMHX:  has a past medical history of Abnormal Pap smear (2007), Abnormal Pap smear of cervix, ADD (attention deficit disorder), Anxiety, Depression, Fatty liver, Fibroids, GERD (gastroesophageal reflux disease), Herpes simplex virus (HSV) infection, Hypertension, and Plantar fasciitis.    PSHX:  has a past surgical history that includes Tubal ligation; essure; Cervical biopsy w/ loop electrode excision (01/01/2007); Colonoscopy (N/A, 02/15/2019); Esophagogastroduodenoscopy (N/A, 11/26/2021); Carpal tunnel release (Right, 12/15/2022); Injection of steroid (Left, 12/15/2022); and Colonoscopy (N/A, 10/11/2024).    FAMILY HISTORY: Updated and reviewed in EPIC    ROS:   GENERAL: Denies fatigue  CARDIOVASCULAR: Denies edema  GI: Denies abdominal pain  SKIN: Denies rash, itching   NEURO: Denies confusion, memory loss, or mood changes    PHYSICAL EXAM:   In no acute distress; alert and oriented to person, place and time  VITALS: Ht 5' 7" (1.702 m)   Wt 82.6 kg (182 lb)   LMP  (LMP Unknown) Comment: Last Cycle August 2017  BMI 28.51 kg/m²   EYES: Sclerae anicteric  GI: Soft, non-tender, non-distended. No ascites.  EXTREMITIES:  No edema.  SKIN: Warm and dry. No jaundice. No telangectasias noted. No palmar erythema.  NEURO:  No asterixis.  PSYCH:  Thought and speech pattern appropriate. Behavior normal      ASSESSMENT & PLAN        EDUCATION:  See instructions discussed with patient in Instructions section of the After Visit Summary     ASSESSMENT & PLAN:  59 y.o. female with:   1.  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 Low carb/sugar, high fiber and protein diet, limit your carb intake to LESS than 30-45 grams of carbs with a meal or LESS than 5-10 grams with any snack   4. Exercise, 5 days per week, 30 minutes per day, as tolerated  5. Recommend good cholesterol, blood pressure, blood sugar levels   6. There is a new medication called Rezdiffra for the treatment of F2-F3 liver scarring due to fatty liver. It is only indicated for those with stage 2 or 3 scar tissue (will potentially consider starting)    2. Hepatic fibrosis, stage 2  -- liver biopsy 2023 with stage 2 fibrosis, suspect from MASH  -- HCC surv with annually AFP and imaging - update now  -- pending work up and restaging will consider rezdiffra    3.  Elevated liver enzymes  -- sero work up negative  -- liver bx suggestive of MASH, unsure if other secondary etiology  -- labs before return to clinic to recheck autoimmune makers - if any positive will consider repeat liver bx  -- would consider holding methylphenidate    4. Overweight, HTN, PreDM   -- Body mass index is 28.51 kg/m².   -- increases risk for fatty liver            Follow up for pending workup. with labs and US before  Orders Placed This Encounter   Procedures    US Abdomen Complete    JASBIR Screen w/Reflex    Anti-Smooth Muscle Antibody    Antimitochondrial Antibody    IgG    Iron and TIBC    Ferritin    Comprehensive Metabolic Panel    Phosphatidylethanol (PETH)    Enhanced Liver Fibrosis (ELF)    AFP Tumor Marker    Ambulatory Referral/Consult to Lifestyle Nutrition        Thank you for allowing me to participate in the care of DEYSI Dickinson, FNP-C  Nurse Practitioner  [facility]     I spent a total of 40 minutes on the day of the visit.This includes face to face time and non-face to face time preparing to see the patient (eg, review of tests), obtaining and/or reviewing separately obtained history, documenting clinical information in the electronic or other health record, independently interpreting results and communicating results to the patient/family/caregiver, and coordinating care.         CC'ed note to:   Daniel Lawrence, DO

## 2025-07-11 NOTE — TELEPHONE ENCOUNTER
----- Message from Theron Briscoe NP sent at 7/11/2025  4:02 PM CDT -----  Can we also get this patient scheduled for US, possibly next week

## 2025-07-11 NOTE — PATIENT INSTRUCTIONS
1. Labs and US next week  2. Pending autoimmune markers, may get another liver biopsy      These things are important to improve  Metabolic associated steatotic liver disease :  Avoid all alcohol  Weight loss goal of 20-30 lbs. ok to use weight loss medications to help with weight loss from a liver standpoint if you don't have any other contraindications   [facility] offers benefits to patients so let me know if you want a referral to the [facility] gym. Also, [facility] has dieticians that can create a weight loss plan. If you are interested let me know and I can place a referral. If so, contact the dietician team at [facility] at email [email or web address] or call [phone].  to get scheduled. They do offer video visits   Avoid processed foods. No fried/fast foods. No sugary drinks or drinks with any calories.   Low carb/sugar and high protein diet (90 grams per day of protein).Try to limit your carb intake to LESS than  grams per day total. Use MyFitness Pal or Lose It belinda to add up your carbs through the day. Do NOT drink any beverages with calories or carbs (this can lead to high blood sugar and weight gain). The main thing to focus on is high protein, low carb)  Exercise, 5 days per week, 30 minutes per day, as tolerated  Recommend good cholesterol, blood pressure, blood sugar levels   There is a new medication called Rezdiffra for the treatment of F2-F3 liver scarring due to fatty liver. It is only indicated for patients with significant scar tissue from fatty liver (possibly will need to start)    In some people, fatty liver can progress to steatohepatitis (inflamatory fatty liver) and possibly to cirrhosis, putting one at increased risk for liver cancer, liver failure, and death. Therefore, the lifestyle changes are very important to decrease this risk.     Helpful website for MARCE/fatty liver: [email or web address]

## 2025-07-17 ENCOUNTER — HOSPITAL ENCOUNTER (OUTPATIENT)
Dept: RADIOLOGY | Facility: HOSPITAL | Age: 60
Discharge: HOME | End: 2025-07-17
Payer: COMMERCIAL

## 2025-07-17 ENCOUNTER — TELEPHONE (OUTPATIENT)
Dept: HEPATOLOGY | Facility: CLINIC | Age: 60
End: 2025-07-17
Payer: COMMERCIAL

## 2025-07-17 DIAGNOSIS — R74.8 ELEVATED LIVER ENZYMES: ICD-10-CM

## 2025-07-17 DIAGNOSIS — K76.0 FATTY LIVER: ICD-10-CM

## 2025-07-17 PROCEDURE — 76700 US EXAM ABDOM COMPLETE: CPT | Mod: TC

## 2025-07-17 PROCEDURE — 76700 US EXAM ABDOM COMPLETE: CPT | Mod: 26,,, | Performed by: RADIOLOGY

## 2025-07-17 NOTE — TELEPHONE ENCOUNTER
----- Message from Theron Briscoe NP sent at 7/17/2025  4:19 PM CDT -----  Please r/s pt's labs that she missed.  ----- Message -----  From: Interface, Rad Results In  Sent: 7/17/2025   1:32 PM CDT  To: Theron Briscoe NP

## 2025-07-19 DIAGNOSIS — F33.1 MAJOR DEPRESSIVE DISORDER, RECURRENT EPISODE, MODERATE: ICD-10-CM

## 2025-07-19 DIAGNOSIS — E87.6 HYPOKALEMIA: ICD-10-CM

## 2025-07-19 DIAGNOSIS — F41.9 ANXIETY: ICD-10-CM

## 2025-07-19 DIAGNOSIS — F90.1 ATTENTION DEFICIT HYPERACTIVITY DISORDER (ADHD), PREDOMINANTLY HYPERACTIVE TYPE: ICD-10-CM

## 2025-07-19 NOTE — TELEPHONE ENCOUNTER
No care due was identified.  [facility] Embedded Care Due Messages. Reference number: [id number].   7/19/2025 12:59:06 PM CDT

## 2025-07-21 RX ORDER — POTASSIUM CHLORIDE 20 MEQ/1
20 TABLET, EXTENDED RELEASE ORAL DAILY
Qty: 90 TABLET | Refills: 3 | Status: SHIPPED | OUTPATIENT
Start: 2025-07-21

## 2025-07-21 RX ORDER — HYDROCHLOROTHIAZIDE 25 MG/1
25 TABLET ORAL DAILY
Qty: 90 TABLET | Refills: 3 | Status: SHIPPED | OUTPATIENT
Start: 2025-07-21

## 2025-07-21 RX ORDER — ESCITALOPRAM OXALATE 20 MG/1
TABLET ORAL
Qty: 90 TABLET | Refills: 3 | Status: SHIPPED | OUTPATIENT
Start: 2025-07-21

## 2025-07-21 RX ORDER — METHYLPHENIDATE HYDROCHLORIDE 20 MG/1
20 TABLET ORAL 2 TIMES DAILY
Qty: 60 TABLET | Refills: 0 | Status: SHIPPED | OUTPATIENT
Start: 2025-07-21

## 2025-07-21 RX ORDER — LOSARTAN POTASSIUM 100 MG/1
100 TABLET ORAL DAILY
Qty: 90 TABLET | Refills: 3 | Status: SHIPPED | OUTPATIENT
Start: 2025-07-21

## 2025-07-21 RX ORDER — PANTOPRAZOLE SODIUM 40 MG/1
40 TABLET, DELAYED RELEASE ORAL DAILY
Qty: 90 TABLET | Refills: 3 | Status: SHIPPED | OUTPATIENT
Start: 2025-07-21

## 2025-07-21 RX ORDER — PROPRANOLOL HYDROCHLORIDE 10 MG/1
10 TABLET ORAL 3 TIMES DAILY PRN
Qty: 270 TABLET | Refills: 3 | Status: SHIPPED | OUTPATIENT
Start: 2025-07-21

## 2025-07-21 RX ORDER — ESCITALOPRAM OXALATE 10 MG/1
TABLET ORAL
Qty: 90 TABLET | Refills: 3 | Status: SHIPPED | OUTPATIENT
Start: 2025-07-21

## 2025-07-21 RX ORDER — AMLODIPINE BESYLATE 2.5 MG/1
2.5 TABLET ORAL DAILY
Qty: 90 TABLET | Refills: 3 | Status: SHIPPED | OUTPATIENT
Start: 2025-07-21

## 2025-07-21 NOTE — TELEPHONE ENCOUNTER
Refill Routing Note   Medication(s) are not appropriate for processing by [facility] for the following reason(s):        Outside of protocol  Required labs abnormal  New or recently adjusted medication    [facility] action(s):  Route  Defer  Approve             Appointments  past 12m or future 3m with PCP    Date Provider   Last Visit   5/14/2025 Daniel Lawrence, DO   Next Visit   8/20/2025 Daniel Lawrence, DO   ED visits in past 90 days: 0        Note composed:12:56 PM 07/21/2025

## 2025-07-28 ENCOUNTER — CLINICAL SUPPORT (OUTPATIENT)
Dept: NUTRITION | Facility: CLINIC | Age: 60
End: 2025-07-28
Payer: COMMERCIAL

## 2025-07-28 DIAGNOSIS — K76.0 FATTY LIVER: Primary | ICD-10-CM

## 2025-07-28 PROCEDURE — 97802 MEDICAL NUTRITION INDIV IN: CPT | Mod: 95,,, | Performed by: DIETITIAN, REGISTERED

## 2025-08-01 ENCOUNTER — LAB VISIT (OUTPATIENT)
Dept: LAB | Facility: HOSPITAL | Age: 60
End: 2025-08-01
Payer: COMMERCIAL

## 2025-08-01 DIAGNOSIS — R74.8 ELEVATED LIVER ENZYMES: ICD-10-CM

## 2025-08-01 DIAGNOSIS — Z00.00 ANNUAL PHYSICAL EXAM: ICD-10-CM

## 2025-08-01 LAB
ALBUMIN SERPL BCP-MCNC: 4.1 G/DL (ref 3.5–5.2)
ALP SERPL-CCNC: 136 UNIT/L (ref 40–150)
ALT SERPL W/O P-5'-P-CCNC: 202 UNIT/L (ref 0–55)
ANA (OHS): NORMAL
ANION GAP (OHS): 12 MMOL/L (ref 8–16)
AST SERPL-CCNC: 154 UNIT/L (ref 0–50)
BACTERIA #/AREA URNS AUTO: NORMAL /HPF
BILIRUB SERPL-MCNC: 0.7 MG/DL (ref 0.1–1)
BILIRUB UR QL STRIP.AUTO: NEGATIVE
BUN SERPL-MCNC: 12 MG/DL (ref 6–20)
CALCIUM SERPL-MCNC: 9.5 MG/DL (ref 8.7–10.5)
CHLORIDE SERPL-SCNC: 100 MMOL/L (ref 95–110)
CLARITY UR: ABNORMAL
CO2 SERPL-SCNC: 26 MMOL/L (ref 23–29)
COLOR UR AUTO: YELLOW
CREAT SERPL-MCNC: 0.8 MG/DL (ref 0.5–1.4)
FERRITIN SERPL-MCNC: 391 NG/ML (ref 20–300)
GFR SERPLBLD CREATININE-BSD FMLA CKD-EPI: >60 ML/MIN/1.73/M2
GLUCOSE SERPL-MCNC: 104 MG/DL (ref 70–110)
GLUCOSE UR QL STRIP: NEGATIVE
HGB UR QL STRIP: NEGATIVE
IGG SERPL-MCNC: 1845 MG/DL (ref 650–1600)
IRON SATN MFR SERPL: 32 % (ref 20–50)
IRON SERPL-MCNC: 139 UG/DL (ref 30–160)
KETONES UR QL STRIP: NEGATIVE
LEUKOCYTE ESTERASE UR QL STRIP: ABNORMAL
MICROSCOPIC COMMENT: NORMAL
NITRITE UR QL STRIP: NEGATIVE
PH UR STRIP: 7 [PH]
POTASSIUM SERPL-SCNC: 2.9 MMOL/L (ref 3.5–5.1)
PROT SERPL-MCNC: 8.4 GM/DL (ref 6–8.4)
PROT UR QL STRIP: NEGATIVE
RBC #/AREA URNS AUTO: 1 /HPF (ref 0–4)
SODIUM SERPL-SCNC: 138 MMOL/L (ref 136–145)
SP GR UR STRIP: 1.01
SQUAMOUS #/AREA URNS AUTO: 15 /HPF
TIBC SERPL-MCNC: 431 UG/DL (ref 250–450)
TRANSFERRIN SERPL-MCNC: 291 MG/DL (ref 200–375)
UROBILINOGEN UR STRIP-ACNC: NEGATIVE EU/DL
WBC #/AREA URNS AUTO: 3 /HPF (ref 0–5)

## 2025-08-01 PROCEDURE — 86381 MITOCHONDRIAL ANTIBODY EACH: CPT

## 2025-08-01 PROCEDURE — 86015 ACTIN ANTIBODY EACH: CPT

## 2025-08-01 PROCEDURE — 80053 COMPREHEN METABOLIC PANEL: CPT

## 2025-08-01 PROCEDURE — 84466 ASSAY OF TRANSFERRIN: CPT

## 2025-08-01 PROCEDURE — 86038 ANTINUCLEAR ANTIBODIES: CPT

## 2025-08-01 PROCEDURE — 82728 ASSAY OF FERRITIN: CPT

## 2025-08-01 PROCEDURE — 80321 ALCOHOLS BIOMARKERS 1OR 2: CPT

## 2025-08-01 PROCEDURE — 82784 ASSAY IGA/IGD/IGG/IGM EACH: CPT

## 2025-08-01 PROCEDURE — 81001 URINALYSIS AUTO W/SCOPE: CPT

## 2025-08-05 LAB
LIVER FIBR SCORE SERPL CALC.FIBROSURE: 11.2
MITOCHONDRIA AB TITR SER IF: NORMAL {TITER}
PLPETH BLD-MCNC: <10 NG/ML
POPETH BLD-MCNC: <10 NG/ML
SMOOTH MUSCLE AB TITR SER IF: NORMAL {TITER}
TOXICOLOGIST REVIEW: NORMAL

## 2025-08-20 ENCOUNTER — TELEPHONE (OUTPATIENT)
Facility: CLINIC | Age: 60
End: 2025-08-20
Payer: COMMERCIAL

## 2025-09-03 ENCOUNTER — HOSPITAL ENCOUNTER (OUTPATIENT)
Dept: RADIOLOGY | Facility: OTHER | Age: 60
Discharge: HOME | End: 2025-09-03
Payer: COMMERCIAL

## 2025-09-03 DIAGNOSIS — M79.641 RIGHT HAND PAIN: Primary | ICD-10-CM

## 2025-09-03 DIAGNOSIS — M25.521 RIGHT ELBOW PAIN: ICD-10-CM

## 2025-09-03 DIAGNOSIS — M79.641 RIGHT HAND PAIN: ICD-10-CM

## 2025-09-03 PROCEDURE — 73080 X-RAY EXAM OF ELBOW: CPT | Mod: 26,RT,, | Performed by: RADIOLOGY

## 2025-09-03 PROCEDURE — 73130 X-RAY EXAM OF HAND: CPT | Mod: 26,RT,, | Performed by: RADIOLOGY

## 2025-09-03 PROCEDURE — 73130 X-RAY EXAM OF HAND: CPT | Mod: TC,RT

## 2025-09-03 PROCEDURE — 73080 X-RAY EXAM OF ELBOW: CPT | Mod: TC,RT

## (undated) DEVICE — BANDAGE ESMARK 4INX3YD

## (undated) DEVICE — BNDG COFLEX FOAM LF2 ST 3X5YD

## (undated) DEVICE — STOCKINETTE DBL PLY ST 4X

## (undated) DEVICE — NDL 18GA X1 1/2 REG BEVEL

## (undated) DEVICE — GAUZE SPONGE 4X4 12PLY

## (undated) DEVICE — COVER CAMERA OPERATING ROOM

## (undated) DEVICE — DRESSING N ADH OIL EMUL 3X3

## (undated) DEVICE — SOL NACL IRR 1000ML BTL

## (undated) DEVICE — TOURNIQUET SB QC DP 18X4IN

## (undated) DEVICE — SUT PROLENE 3-0 FS-2 18

## (undated) DEVICE — PAD CAST SPECIALIST STRL 3

## (undated) DEVICE — Device

## (undated) DEVICE — NDL SAFETY 22G X 1.5 ECLIPSE

## (undated) DEVICE — GLOVE BIOGEL PI MICRO SZ 7

## (undated) DEVICE — KNIFE CARPAL TUNNEL